# Patient Record
Sex: MALE | Race: WHITE | NOT HISPANIC OR LATINO | Employment: OTHER | URBAN - METROPOLITAN AREA
[De-identification: names, ages, dates, MRNs, and addresses within clinical notes are randomized per-mention and may not be internally consistent; named-entity substitution may affect disease eponyms.]

---

## 2017-03-10 RX ORDER — AMLODIPINE BESYLATE 5 MG/1
TABLET ORAL
Qty: 30 TABLET | Refills: 0 | Status: SHIPPED | OUTPATIENT
Start: 2017-03-10 | End: 2017-04-10 | Stop reason: SDUPTHER

## 2017-04-10 RX ORDER — AMLODIPINE BESYLATE 5 MG/1
TABLET ORAL
Qty: 30 TABLET | Refills: 0 | Status: SHIPPED | OUTPATIENT
Start: 2017-04-10 | End: 2017-05-08 | Stop reason: SDUPTHER

## 2017-05-08 RX ORDER — LOSARTAN POTASSIUM 100 MG/1
TABLET ORAL
Qty: 30 TABLET | Refills: 0 | Status: SHIPPED | OUTPATIENT
Start: 2017-05-08 | End: 2017-06-06 | Stop reason: SDUPTHER

## 2017-05-08 RX ORDER — AMLODIPINE BESYLATE 5 MG/1
TABLET ORAL
Qty: 30 TABLET | Refills: 0 | Status: SHIPPED | OUTPATIENT
Start: 2017-05-08 | End: 2017-06-06 | Stop reason: SDUPTHER

## 2017-06-07 RX ORDER — AMLODIPINE BESYLATE 5 MG/1
TABLET ORAL
Qty: 30 TABLET | Refills: 0 | Status: SHIPPED | OUTPATIENT
Start: 2017-06-07 | End: 2017-07-05 | Stop reason: SDUPTHER

## 2017-06-07 RX ORDER — LOSARTAN POTASSIUM 100 MG/1
TABLET ORAL
Qty: 30 TABLET | Refills: 0 | Status: SHIPPED | OUTPATIENT
Start: 2017-06-07 | End: 2017-07-05 | Stop reason: SDUPTHER

## 2017-07-07 RX ORDER — AMLODIPINE BESYLATE 5 MG/1
TABLET ORAL
Qty: 30 TABLET | Refills: 0 | Status: SHIPPED | OUTPATIENT
Start: 2017-07-07 | End: 2018-05-10 | Stop reason: SDUPTHER

## 2017-07-07 RX ORDER — LOSARTAN POTASSIUM 100 MG/1
TABLET ORAL
Qty: 30 TABLET | Refills: 0 | Status: SHIPPED | OUTPATIENT
Start: 2017-07-07 | End: 2017-10-10 | Stop reason: SDUPTHER

## 2017-08-23 DIAGNOSIS — Z00.00 ROUTINE HEALTH MAINTENANCE: Primary | ICD-10-CM

## 2017-08-23 DIAGNOSIS — Z12.5 ENCOUNTER FOR SCREENING FOR MALIGNANT NEOPLASM OF PROSTATE: ICD-10-CM

## 2017-08-30 ENCOUNTER — CLINICAL SUPPORT (OUTPATIENT)
Dept: FAMILY MEDICINE CLINIC | Facility: CLINIC | Age: 72
End: 2017-08-30

## 2017-08-30 DIAGNOSIS — Z00.00 ROUTINE HEALTH MAINTENANCE: Primary | ICD-10-CM

## 2017-08-30 LAB
ALBUMIN SERPL-MCNC: 4.8 G/DL (ref 3.5–5.2)
ALBUMIN/GLOB SERPL: 1.7 G/DL
ALP SERPL-CCNC: 90 U/L (ref 39–117)
ALT SERPL-CCNC: 16 U/L (ref 1–41)
APPEARANCE UR: CLEAR
AST SERPL-CCNC: 26 U/L (ref 1–40)
BILIRUB SERPL-MCNC: 0.6 MG/DL (ref 0.1–1.2)
BILIRUB UR QL STRIP: NEGATIVE
BUN SERPL-MCNC: 20 MG/DL (ref 8–23)
BUN/CREAT SERPL: 23 (ref 7–25)
CALCIUM SERPL-MCNC: 10.2 MG/DL (ref 8.6–10.5)
CHLORIDE SERPL-SCNC: 99 MMOL/L (ref 98–107)
CHOLEST SERPL-MCNC: 268 MG/DL (ref 0–200)
CO2 SERPL-SCNC: 27.1 MMOL/L (ref 22–29)
COLOR UR: ABNORMAL
CREAT SERPL-MCNC: 0.87 MG/DL (ref 0.76–1.27)
GLOBULIN SER CALC-MCNC: 2.9 GM/DL
GLUCOSE SERPL-MCNC: 95 MG/DL (ref 65–99)
GLUCOSE UR QL: NEGATIVE
HDLC SERPL-MCNC: 64 MG/DL (ref 40–60)
HGB UR QL STRIP: NEGATIVE
KETONES UR QL STRIP: NEGATIVE
LDLC SERPL CALC-MCNC: 180 MG/DL (ref 0–100)
LDLC/HDLC SERPL: 2.82 {RATIO}
LEUKOCYTE ESTERASE UR QL STRIP: NEGATIVE
NITRITE UR QL STRIP: NEGATIVE
PH UR STRIP: 7 [PH] (ref 5–8)
POTASSIUM SERPL-SCNC: 4.3 MMOL/L (ref 3.5–5.2)
PROT SERPL-MCNC: 7.7 G/DL (ref 6–8.5)
PROT UR QL STRIP: NEGATIVE
PSA SERPL-MCNC: 0.3 NG/ML (ref 0–4)
SODIUM SERPL-SCNC: 139 MMOL/L (ref 136–145)
SP GR UR: 1.02 (ref 1–1.03)
TRIGL SERPL-MCNC: 118 MG/DL (ref 0–150)
UROBILINOGEN UR STRIP-MCNC: ABNORMAL MG/DL
VLDLC SERPL CALC-MCNC: 23.6 MG/DL (ref 5–40)

## 2017-08-30 PROCEDURE — 93000 ELECTROCARDIOGRAM COMPLETE: CPT

## 2017-08-30 PROCEDURE — 85025 COMPLETE CBC W/AUTO DIFF WBC: CPT

## 2017-08-30 PROCEDURE — 71020 XR CHEST PA AND LATERAL: CPT

## 2017-09-06 ENCOUNTER — OFFICE VISIT (OUTPATIENT)
Dept: FAMILY MEDICINE CLINIC | Facility: CLINIC | Age: 72
End: 2017-09-06

## 2017-09-06 VITALS
OXYGEN SATURATION: 98 % | DIASTOLIC BLOOD PRESSURE: 88 MMHG | SYSTOLIC BLOOD PRESSURE: 146 MMHG | HEIGHT: 71 IN | HEART RATE: 75 BPM | RESPIRATION RATE: 16 BRPM | WEIGHT: 221 LBS | BODY MASS INDEX: 30.94 KG/M2

## 2017-09-06 DIAGNOSIS — I10 ESSENTIAL HYPERTENSION: ICD-10-CM

## 2017-09-06 DIAGNOSIS — E78.00 HYPERCHOLESTEROLEMIA: ICD-10-CM

## 2017-09-06 DIAGNOSIS — Z12.11 SCREEN FOR COLON CANCER: Primary | ICD-10-CM

## 2017-09-06 DIAGNOSIS — Z00.00 ANNUAL PHYSICAL EXAM: ICD-10-CM

## 2017-09-06 LAB — HEMOCCULT STL QL IA: NEGATIVE

## 2017-09-06 PROCEDURE — 82274 ASSAY TEST FOR BLOOD FECAL: CPT

## 2017-09-06 PROCEDURE — 99397 PER PM REEVAL EST PAT 65+ YR: CPT

## 2017-09-06 NOTE — PROGRESS NOTES
Subjective   Gamal Modi is a 72 y.o. male. Patient is here today for   Chief Complaint   Patient presents with   • Annual Exam     PHYSICAL           Vitals:    09/06/17 1016   BP: 146/88   Pulse: 75   Resp: 16   SpO2: 98%       The following portions of the patient's history were reviewed and updated as appropriate: allergies, current medications, past family history, past medical history, past social history, past surgical history and problem list.    Past Medical History:   Diagnosis Date   • Gout    • Hypertension    • Sebaceous cyst       No Known Allergies   Social History     Social History   • Marital status:      Spouse name: N/A   • Number of children: N/A   • Years of education: N/A     Occupational History   • Not on file.     Social History Main Topics   • Smoking status: Never Smoker   • Smokeless tobacco: Not on file   • Alcohol use Yes   • Drug use: Defer   • Sexual activity: Defer     Other Topics Concern   • Not on file     Social History Narrative        Current Outpatient Prescriptions:   •  allopurinol (ZYLOPRIM) 300 MG tablet, Take 300 mg by mouth daily., Disp: , Rfl:   •  amLODIPine (NORVASC) 5 MG tablet, TAKE 1 TABLET BY MOUTH EVERY DAY, Disp: 30 tablet, Rfl: 0  •  B Complex-Folic Acid (SUPER B COMPLEX MAXI PO), Take  by mouth., Disp: , Rfl:   •  losartan (COZAAR) 100 MG tablet, TAKE 1 TABLET BY MOUTH EVERY DAY, Disp: 30 tablet, Rfl: 0  •  Multiple Vitamin (MULTI VITAMIN MENS PO), Take  by mouth., Disp: , Rfl:   •  mupirocin (BACTROBAN NASAL) 2 % nasal ointment, into each nostril 2 (two) times a day., Disp: 10 g, Rfl: 0  •  Omega-3 Fatty Acids (FISH OIL) 1000 MG capsule capsule, Take  by mouth daily with breakfast., Disp: , Rfl:      EKG  Normal    Objective   History of Present Illness   The patient is here for an annual physical exam    Review of Systems   Constitutional: Negative for fatigue and fever.        The patient states that he has been doing much less exercise as  compared to about one year ago   HENT: Negative for congestion, dental problem, ear pain, hearing loss, postnasal drip, rhinorrhea and sore throat.    Eyes: Negative for photophobia and visual disturbance.   Respiratory: Negative for cough, shortness of breath and wheezing.    Cardiovascular: Negative for chest pain, palpitations and leg swelling.   Gastrointestinal: Negative for abdominal pain, blood in stool, constipation, diarrhea and nausea.        No dyspepsia   Endocrine: Negative for cold intolerance and heat intolerance.   Genitourinary: Negative for difficulty urinating, dysuria, frequency and hematuria.   Musculoskeletal: Negative for arthralgias, back pain, joint swelling, myalgias and neck pain.   Neurological: Negative.    Hematological: Negative for adenopathy. Does not bruise/bleed easily.   Psychiatric/Behavioral: Negative.        Physical Exam   Constitutional: He is oriented to person, place, and time. He appears well-developed and well-nourished.   Moderately overweight   HENT:   Head: Normocephalic.   Right Ear: External ear normal.   Left Ear: External ear normal.   Nose: Nose normal.   Mouth/Throat: Oropharynx is clear and moist.   Eyes: Conjunctivae and EOM are normal. Pupils are equal, round, and reactive to light. No scleral icterus.   Neck: Normal range of motion. No JVD present. No thyromegaly present.   Carotid pulses normal   Cardiovascular: Normal rate, regular rhythm, normal heart sounds and intact distal pulses.    No murmur heard.  Pulmonary/Chest: Effort normal and breath sounds normal. No respiratory distress. He has no wheezes. He has no rales.   Abdominal: Soft. Bowel sounds are normal. He exhibits no distension and no mass. There is no tenderness. There is no guarding.   Genitourinary: Rectum normal, prostate normal and penis normal. Rectal exam shows guaiac negative stool.   Musculoskeletal: Normal range of motion. He exhibits no edema, tenderness or deformity.    Lymphadenopathy:     He has no cervical adenopathy.   Neurological: He is alert and oriented to person, place, and time. No cranial nerve deficit. He exhibits normal muscle tone. Coordination normal.   Skin: Skin is warm and dry.   Psychiatric: He has a normal mood and affect.   Nursing note and vitals reviewed.      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  The patient's chest x-ray has old scarring from his previous exposure to asbestos there is no change from multiple previous chest x-rays done at this office.  The patient has no pulmonary symptoms.  The patient's EKG remains normal.  CMP is normal.  PSA remains normal.  CBC and urinalysis were normal.  Total cholesterol is elevated at 268 and the patient's LDL cholesterol is also elevated at 180.  The patient's triglycerides were 118 and his HDL cholesterol 64.  The patient's total cholesterol and LDL cholesterol have risen significantly since last year.  The patient states that he has not been exercising much at all.  We discussed a low sugar, low starch and low saturated fat diet as well as him increasing his aerobic exercise significantly.  The patient does not wish to go on medications and wants to work on this with diet and exercise.  The patient does not smoke nor does he consume excessive alcohol.  The patient has had both pneumonia vaccines and the shingles vaccine.  The patient had a colonoscopy which was normal about 3 or 4 years ago.  The patient was having some difficulty with erectile dysfunction and, on his own, discontinued because his blood pressure medicines and after 3 weeks and his symptoms improved.  He restarted his losartan and amlodipine prior to coming in for his physical.  I have asked him to hold his amlodipine  for several weeks to see if this helps his erectile dysfunction.  He will call us to let us know if the discontinuation of this medication helps the problem.  The patient will closely monitor his blood pressure over this period of time    PLAN  Recheck in one year for annual physical exam    No Follow-up on file.

## 2017-10-02 ENCOUNTER — TELEPHONE (OUTPATIENT)
Dept: FAMILY MEDICINE CLINIC | Facility: CLINIC | Age: 72
End: 2017-10-02

## 2017-10-02 RX ORDER — ALLOPURINOL 300 MG/1
300 TABLET ORAL DAILY
Qty: 30 TABLET | Refills: 0 | Status: SHIPPED | OUTPATIENT
Start: 2017-10-02 | End: 2017-10-30 | Stop reason: SDUPTHER

## 2017-10-02 NOTE — TELEPHONE ENCOUNTER
PATIENT AWARE THAT IF HE DIDN'T HEAR BACK FROM ME THEN HIS RX WOULD BE SENT TO THE PHARMACY. WE ALREADY HAD ALLOPURINOL ON HIS MED LIST. SENT TO [facility].    ----- Message from Johanna Christopher MA sent at 10/2/2017  9:04 AM EDT -----  Contact: PT  CALLED MR MUNOZ BACK AND HE DOES NOT WANT TO BE SEEN HE WANTS TO KNOW IF ANOTHER DR CAN CALL HIS RX IN FOR ALLOPURINOL 300 MG / PHARMACY HE USES IS [facility] [address] [place] [address] [facility] - [phone] [facility] [phone] FX/ PT CAN BE REACHED [phone]  ----- Message -----     From: Naomi Hurley MA     Sent: 10/2/2017   8:56 AM       To: HINA Renner DR. IS OUT OF THE OFFICE UNTIL 10/10/17.    ----- Message -----     From: Johanna Christopher MA     Sent: 10/2/2017   8:27 AM       To: Naomi Hurley MA    PT IS WANTING TO KNOW IF DR LEWIS CAN CALL HIM IN A RX FOR GOUT LIKE HE HAS PREVIOUSLY PT CAN BE REACHED [phone]

## 2017-10-10 RX ORDER — LOSARTAN POTASSIUM 100 MG/1
TABLET ORAL
Qty: 30 TABLET | Refills: 5 | Status: SHIPPED | OUTPATIENT
Start: 2017-10-10 | End: 2018-09-04 | Stop reason: SDUPTHER

## 2017-10-31 RX ORDER — ALLOPURINOL 300 MG/1
TABLET ORAL
Qty: 30 TABLET | Refills: 4 | Status: SHIPPED | OUTPATIENT
Start: 2017-10-31 | End: 2019-04-01 | Stop reason: SDUPTHER

## 2018-05-10 ENCOUNTER — OFFICE VISIT (OUTPATIENT)
Dept: FAMILY MEDICINE CLINIC | Facility: CLINIC | Age: 73
End: 2018-05-10

## 2018-05-10 VITALS
SYSTOLIC BLOOD PRESSURE: 132 MMHG | HEART RATE: 83 BPM | WEIGHT: 223 LBS | RESPIRATION RATE: 18 BRPM | BODY MASS INDEX: 31.22 KG/M2 | OXYGEN SATURATION: 97 % | DIASTOLIC BLOOD PRESSURE: 74 MMHG | TEMPERATURE: 99.9 F | HEIGHT: 71 IN

## 2018-05-10 DIAGNOSIS — I10 ACCELERATED HYPERTENSION: Primary | ICD-10-CM

## 2018-05-10 PROCEDURE — 99213 OFFICE O/P EST LOW 20 MIN: CPT

## 2018-05-10 RX ORDER — AMLODIPINE BESYLATE 10 MG/1
10 TABLET ORAL DAILY
Qty: 90 TABLET | Refills: 3 | Status: SHIPPED | OUTPATIENT
Start: 2018-05-10 | End: 2019-04-29 | Stop reason: SDUPTHER

## 2018-05-10 RX ORDER — AMLODIPINE BESYLATE 10 MG/1
10 TABLET ORAL DAILY
Refills: 0 | COMMUNITY
Start: 2018-05-01 | End: 2018-05-10 | Stop reason: SDUPTHER

## 2018-05-10 NOTE — PROGRESS NOTES
Subjective   Gamal Modi is a 73 y.o. male. Patient is here today for   Chief Complaint   Patient presents with   • Blood Pressure Check     patient was in [place] and went to an urgent care center on 4/30/18 due to his elevated BP, nausea, and left arm numbness. He was then sent to the ER for possible TIA.          Vitals:    05/10/18 0948   BP: 132/74   Pulse: 83   Resp: 18   Temp: 99.9 °F (37.7 °C)   SpO2: 97%     The following portions of the patient's history were reviewed and updated as appropriate: allergies, current medications, past family history, past medical history, past social history, past surgical history and problem list.    Past Medical History:   Diagnosis Date   • Gout    • Hypertension    • Sebaceous cyst       No Known Allergies   Social History     Social History   • Marital status:      Spouse name: N/A   • Number of children: N/A   • Years of education: N/A     Occupational History   • Not on file.     Social History Main Topics   • Smoking status: Never Smoker   • Smokeless tobacco: Not on file   • Alcohol use Yes   • Drug use: Unknown   • Sexual activity: Defer     Other Topics Concern   • Not on file     Social History Narrative   • No narrative on file        Current Outpatient Prescriptions:   •  allopurinol (ZYLOPRIM) 300 MG tablet, TAKE 1 TABLET BY MOUTH DAILY, Disp: 30 tablet, Rfl: 4  •  B Complex-Folic Acid (SUPER B COMPLEX MAXI PO), Take  by mouth., Disp: , Rfl:   •  losartan (COZAAR) 100 MG tablet, TAKE 1 TABLET BY MOUTH EVERY DAY, Disp: 30 tablet, Rfl: 5  •  Multiple Vitamin (MULTI VITAMIN MENS PO), Take  by mouth., Disp: , Rfl:   •  mupirocin (BACTROBAN NASAL) 2 % nasal ointment, into each nostril 2 (two) times a day., Disp: 10 g, Rfl: 0  •  Omega-3 Fatty Acids (FISH OIL) 1000 MG capsule capsule, Take  by mouth daily with breakfast., Disp: , Rfl:   •  amLODIPine (NORVASC) 10 MG tablet, Take 1 tablet by mouth Daily., Disp: 90 tablet, Rfl: 3     Objective      History of Present Illness   The patient is here today for evaluation of hypertension and a follow-up to a recent hospitalization in [place] for elevated blood pressure and transient numbness of his left arm.    Review of Systems   Constitutional:        The patient feels well now but was slightly lightheaded when he was first diagnosed with accelerated hypertension   HENT: Negative.    Respiratory: Negative for cough, shortness of breath and wheezing.    Cardiovascular: Negative for chest pain, palpitations and leg swelling.   Gastrointestinal: Negative for abdominal pain, blood in stool, constipation and diarrhea.   Genitourinary:        The patient has no problems with urination but does have some mild to moderate erectile dysfunction issues.  Libido is intact.   Musculoskeletal:        Mild aches and pains only   Neurological: Negative for speech difficulty, weakness and headaches.        On 4/30/18 patient had some numbness in his left arm and mild dizziness.  Now these symptoms have resolved.   Psychiatric/Behavioral: Negative.        Physical Exam   Constitutional: He is oriented to person, place, and time. He appears well-developed and well-nourished.   Mildly overweight   Eyes: Pupils are equal, round, and reactive to light.   Neck:   Carotid pulses normal   Cardiovascular: Normal rate, regular rhythm and normal heart sounds.    Pulmonary/Chest: Effort normal and breath sounds normal. No respiratory distress. He has no wheezes. He has no rales.   Abdominal: Soft. Bowel sounds are normal.   Musculoskeletal:   Mild osteoarthritic changes in multiple joints   Neurological: He is alert and oriented to person, place, and time. He has normal reflexes. He displays normal reflexes. No cranial nerve deficit. Coordination normal.   On 4/30/18 the patient had some numbness in his left arm which resolved about 6 hours later.  It has not recurred since that time.   Skin: Skin is warm and dry.   Psychiatric: He has a  normal mood and affect.   Nursing note and vitals reviewed.      ASSESSMENT  #1 essential hypertension                       #2 transient numbness of left arm  #3 erectile dysfunction    DISCUSSION/SUMMARY   The patient's vital signs are normal with his blood pressure being 130/74.  Early in the morning of 4/30/18 the patient awoke with numbness of his left arm and difficulty with dizziness and lightheadedness.  The patient first went to an urgent care center but was referred to the hospital because of this degree of symptomatology.  The patient was admitted overnight and had a negative MRI and carotid screening.  The patient's labs were all normal as well.  The patient was on losartan 100 mg daily and 5 mg of amlodipine.  They increased his amlodipine to 10 mg daily and told the patient to stay on his losartan 100 mg daily.  I am seeing him today in follow-up.  The patient's symptoms have totally resolved and his blood pressures have gradually come down to a normal range.  He will continue to check his blood pressures very regularly and I explained to him that our goal is to keep his blood pressure down below 130/80.  He will let us know if this is not achieved with his current medications.  I discussed the possibility of lower leg swelling with his amlodipine 10 mg daily.  He will watch for this.  The patient also is having difficulty with erectile dysfunction which predates this recent episode of accelerated hypertension.  I have given the patient a prescription for generic sildenafil 20 mg tablets, 2-5 tablets, 30-45 minutes prior to sexual activity.  The patient will set up a physical in the early fall.  If the patient has any abnormal neurological symptoms of course he will call us about that.    PLAN  CPE in early fall.  The patient will call back to set this up.  No Follow-up on file.

## 2018-09-04 RX ORDER — LOSARTAN POTASSIUM 100 MG/1
TABLET ORAL
Qty: 30 TABLET | Refills: 5 | Status: SHIPPED | OUTPATIENT
Start: 2018-09-04 | End: 2019-02-07 | Stop reason: SDUPTHER

## 2018-12-07 ENCOUNTER — APPOINTMENT (OUTPATIENT)
Dept: GENERAL RADIOLOGY | Facility: HOSPITAL | Age: 73
End: 2018-12-07

## 2018-12-07 PROCEDURE — 73560 X-RAY EXAM OF KNEE 1 OR 2: CPT | Performed by: FAMILY MEDICINE

## 2019-02-07 RX ORDER — LOSARTAN POTASSIUM 100 MG/1
TABLET ORAL
Qty: 30 TABLET | Refills: 2 | Status: SHIPPED | OUTPATIENT
Start: 2019-02-07 | End: 2019-04-26 | Stop reason: SDUPTHER

## 2019-04-01 RX ORDER — ALLOPURINOL 300 MG/1
TABLET ORAL
Qty: 30 TABLET | Refills: 2 | Status: SHIPPED | OUTPATIENT
Start: 2019-04-01 | End: 2019-06-28 | Stop reason: SDUPTHER

## 2019-04-26 RX ORDER — LOSARTAN POTASSIUM 100 MG/1
TABLET ORAL
Qty: 30 TABLET | Refills: 0 | Status: SHIPPED | OUTPATIENT
Start: 2019-04-26 | End: 2020-01-24 | Stop reason: SDUPTHER

## 2019-04-29 RX ORDER — AMLODIPINE BESYLATE 10 MG/1
10 TABLET ORAL DAILY
Qty: 14 TABLET | Refills: 0 | Status: SHIPPED | OUTPATIENT
Start: 2019-04-29 | End: 2019-06-13 | Stop reason: SDUPTHER

## 2019-04-29 RX ORDER — LOSARTAN POTASSIUM 100 MG/1
TABLET ORAL
Qty: 30 TABLET | Refills: 5 | Status: SHIPPED | OUTPATIENT
Start: 2019-04-29 | End: 2019-09-03

## 2019-06-14 RX ORDER — AMLODIPINE BESYLATE 10 MG/1
10 TABLET ORAL DAILY
Qty: 30 TABLET | Refills: 0 | Status: SHIPPED | OUTPATIENT
Start: 2019-06-14 | End: 2019-07-09 | Stop reason: SDUPTHER

## 2019-07-01 RX ORDER — ALLOPURINOL 300 MG/1
TABLET ORAL
Qty: 30 TABLET | Refills: 0 | Status: SHIPPED | OUTPATIENT
Start: 2019-07-01 | End: 2019-07-28 | Stop reason: SDUPTHER

## 2019-07-09 RX ORDER — AMLODIPINE BESYLATE 10 MG/1
10 TABLET ORAL DAILY
Qty: 30 TABLET | Refills: 0 | Status: SHIPPED | OUTPATIENT
Start: 2019-07-09 | End: 2019-08-12 | Stop reason: SDUPTHER

## 2019-07-29 RX ORDER — ALLOPURINOL 300 MG/1
TABLET ORAL
Qty: 30 TABLET | Refills: 0 | Status: SHIPPED | OUTPATIENT
Start: 2019-07-29 | End: 2019-08-27 | Stop reason: SDUPTHER

## 2019-08-12 RX ORDER — AMLODIPINE BESYLATE 10 MG/1
10 TABLET ORAL DAILY
Qty: 30 TABLET | Refills: 0 | Status: SHIPPED | OUTPATIENT
Start: 2019-08-12 | End: 2019-09-07 | Stop reason: SDUPTHER

## 2019-08-27 RX ORDER — ALLOPURINOL 300 MG/1
TABLET ORAL
Qty: 30 TABLET | Refills: 0 | Status: SHIPPED | OUTPATIENT
Start: 2019-08-27 | End: 2021-09-27

## 2019-09-03 ENCOUNTER — OFFICE VISIT (OUTPATIENT)
Dept: FAMILY MEDICINE CLINIC | Facility: CLINIC | Age: 74
End: 2019-09-03

## 2019-09-03 ENCOUNTER — HOSPITAL ENCOUNTER (OUTPATIENT)
Dept: GENERAL RADIOLOGY | Facility: HOSPITAL | Age: 74
Discharge: HOME | End: 2019-09-03
Admitting: INTERNAL MEDICINE

## 2019-09-03 VITALS
TEMPERATURE: 97.3 F | DIASTOLIC BLOOD PRESSURE: 80 MMHG | HEIGHT: 70 IN | HEART RATE: 92 BPM | SYSTOLIC BLOOD PRESSURE: 142 MMHG | RESPIRATION RATE: 17 BRPM | OXYGEN SATURATION: 97 % | BODY MASS INDEX: 34.65 KG/M2 | WEIGHT: 242 LBS

## 2019-09-03 DIAGNOSIS — R06.02 SHORTNESS OF BREATH ON EXERTION: Primary | ICD-10-CM

## 2019-09-03 DIAGNOSIS — E78.00 HYPERCHOLESTEROLEMIA: ICD-10-CM

## 2019-09-03 DIAGNOSIS — I10 ESSENTIAL HYPERTENSION: ICD-10-CM

## 2019-09-03 LAB
ALBUMIN SERPL-MCNC: 4.6 G/DL (ref 3.5–5.2)
ALBUMIN/GLOB SERPL: 1.7 G/DL
ALP SERPL-CCNC: 79 U/L (ref 39–117)
ALT SERPL-CCNC: 23 U/L (ref 1–41)
AST SERPL-CCNC: 26 U/L (ref 1–40)
BASOPHILS # BLD AUTO: 0.05 10*3/MM3 (ref 0–0.2)
BASOPHILS NFR BLD AUTO: 0.9 % (ref 0–1.5)
BILIRUB SERPL-MCNC: 0.7 MG/DL (ref 0.2–1.2)
BUN SERPL-MCNC: 17 MG/DL (ref 8–23)
BUN/CREAT SERPL: 23 (ref 7–25)
CALCIUM SERPL-MCNC: 10.2 MG/DL (ref 8.6–10.5)
CHLORIDE SERPL-SCNC: 96 MMOL/L (ref 98–107)
CHOLEST SERPL-MCNC: 226 MG/DL (ref 0–200)
CO2 SERPL-SCNC: 28.5 MMOL/L (ref 22–29)
CREAT SERPL-MCNC: 0.74 MG/DL (ref 0.76–1.27)
EOSINOPHIL # BLD AUTO: 0.12 10*3/MM3 (ref 0–0.4)
EOSINOPHIL NFR BLD AUTO: 2.1 % (ref 0.3–6.2)
ERYTHROCYTE [DISTWIDTH] IN BLOOD BY AUTOMATED COUNT: 12.4 % (ref 12.3–15.4)
GLOBULIN SER CALC-MCNC: 2.7 GM/DL
GLUCOSE SERPL-MCNC: 110 MG/DL (ref 65–99)
HCT VFR BLD AUTO: 39.6 % (ref 37.5–51)
HDLC SERPL-MCNC: 70 MG/DL (ref 40–60)
HGB BLD-MCNC: 12.5 G/DL (ref 13–17.7)
IMM GRANULOCYTES # BLD AUTO: 0.03 10*3/MM3 (ref 0–0.05)
IMM GRANULOCYTES NFR BLD AUTO: 0.5 % (ref 0–0.5)
LDLC SERPL CALC-MCNC: 131 MG/DL (ref 0–100)
LDLC/HDLC SERPL: 1.87 {RATIO}
LYMPHOCYTES # BLD AUTO: 1.63 10*3/MM3 (ref 0.7–3.1)
LYMPHOCYTES NFR BLD AUTO: 28.6 % (ref 19.6–45.3)
MCH RBC QN AUTO: 31.6 PG (ref 26.6–33)
MCHC RBC AUTO-ENTMCNC: 31.6 G/DL (ref 31.5–35.7)
MCV RBC AUTO: 100 FL (ref 79–97)
MONOCYTES # BLD AUTO: 0.89 10*3/MM3 (ref 0.1–0.9)
MONOCYTES NFR BLD AUTO: 15.6 % (ref 5–12)
NEUTROPHILS # BLD AUTO: 2.97 10*3/MM3 (ref 1.7–7)
NEUTROPHILS NFR BLD AUTO: 52.3 % (ref 42.7–76)
NRBC BLD AUTO-RTO: 0 /100 WBC (ref 0–0.2)
PLATELET # BLD AUTO: 293 10*3/MM3 (ref 140–450)
POTASSIUM SERPL-SCNC: 4.1 MMOL/L (ref 3.5–5.2)
PROT SERPL-MCNC: 7.3 G/DL (ref 6–8.5)
RBC # BLD AUTO: 3.96 10*6/MM3 (ref 4.14–5.8)
SODIUM SERPL-SCNC: 140 MMOL/L (ref 136–145)
TRIGL SERPL-MCNC: 126 MG/DL (ref 0–150)
VLDLC SERPL CALC-MCNC: 25.2 MG/DL
WBC # BLD AUTO: 5.69 10*3/MM3 (ref 3.4–10.8)

## 2019-09-03 PROCEDURE — 71046 X-RAY EXAM CHEST 2 VIEWS: CPT

## 2019-09-03 PROCEDURE — 99214 OFFICE O/P EST MOD 30 MIN: CPT | Performed by: INTERNAL MEDICINE

## 2019-09-03 PROCEDURE — 93000 ELECTROCARDIOGRAM COMPLETE: CPT | Performed by: INTERNAL MEDICINE

## 2019-09-03 RX ORDER — MELATONIN
1000 DAILY
COMMUNITY

## 2019-09-03 NOTE — PATIENT INSTRUCTIONS
EKG today shows junctional rhythm without acute changes.  We will check some labs today as well as a chest x-ray.  Blood pressure is high.  Discussed monitoring blood pressure correctly at home as instructed.  Will adjust blood pressure medicines if indicated and follow-up depending on pending results.

## 2019-09-03 NOTE — PROGRESS NOTES
Subjective   Gamal Modi is a 74 y.o. male. Patient is here today for   Chief Complaint   Patient presents with   • Shortness of Breath     3-4 weeks          Vitals:    09/03/19 0929   BP: 142/80   Pulse: 92   Resp: 17   Temp: 97.3 °F (36.3 °C)   SpO2: 97%       The following portions of the patient's history were reviewed and updated as appropriate: allergies, current medications, past family history, past medical history, past social history, past surgical history and problem list.    Past Medical History:   Diagnosis Date   • Gout    • Hypertension    • Sebaceous cyst       No Known Allergies   Social History     Socioeconomic History   • Marital status:      Spouse name: Not on file   • Number of children: Not on file   • Years of education: Not on file   • Highest education level: Not on file   Tobacco Use   • Smoking status: Never Smoker   Substance and Sexual Activity   • Alcohol use: Yes   • Drug use: Defer   • Sexual activity: Defer        Current Outpatient Medications:   •  cholecalciferol (VITAMIN D3) 1000 units tablet, Take 1,000 Units by mouth Daily., Disp: , Rfl:   •  Multiple Vitamins-Minerals (OCUVITE EXTRA PO), Take  by mouth., Disp: , Rfl:   •  allopurinol (ZYLOPRIM) 300 MG tablet, TAKE 1 TABLET BY MOUTH DAILY, Disp: 30 tablet, Rfl: 0  •  amLODIPine (NORVASC) 10 MG tablet, Take 1 tablet by mouth Daily. NEEDS APPOINTMENT- NO MORE REFILLS., Disp: 30 tablet, Rfl: 0  •  B Complex-Folic Acid (SUPER B COMPLEX MAXI PO), Take  by mouth., Disp: , Rfl:   •  losartan (COZAAR) 100 MG tablet, TAKE 1 TABLET BY MOUTH EVERY DAY, Disp: 30 tablet, Rfl: 0  •  Multiple Vitamin (MULTI VITAMIN MENS PO), Take  by mouth., Disp: , Rfl:      Objective   History of Present Illness Gamal complains of exertional shortness of breath that started a couple weeks ago.  He has never smoked and has no history of asthma.  He denies chest pain.  He has hypertension and hyperuricemia.  When he checks his blood  pressure systolic readings are in the 140s.  Lipids were last checked in 2018 in [facility] and total cholesterol was 187.  HDL was 95 and LDL was 81.  Triglycerides were normal at 55.  LDL cholesterol in 2017 was 180.  He states vascular screening test in the past of been normal.    Review of Systems   Constitutional: Negative.    Respiratory: Positive for shortness of breath. Negative for chest tightness.    Cardiovascular: Negative for chest pain.   Gastrointestinal: Negative.    Genitourinary: Negative.    Neurological: Negative.    Psychiatric/Behavioral: Negative.        Physical Exam   Constitutional: He appears well-developed and well-nourished.   Cardiovascular: Normal rate, regular rhythm and normal heart sounds.   145/60   Pulmonary/Chest: Effort normal and breath sounds normal.   Musculoskeletal: He exhibits no edema.   Neurological: He is alert.   Psychiatric: He has a normal mood and affect. His behavior is normal. Judgment and thought content normal.   Vitals reviewed.      ASSESSMENT     Problem List Items Addressed This Visit        Cardiovascular and Mediastinum    BP (high blood pressure)    Relevant Orders    ECG 12 Lead    CBC & Differential    Lipid Panel With LDL / HDL Ratio    Comprehensive Metabolic Panel    Hypercholesterolemia       Respiratory    Shortness of breath on exertion - Primary    Relevant Orders    ECG 12 Lead    CBC & Differential    XR Chest PA & Lateral          PLAN  Patient Instructions   EKG today shows junctional rhythm without acute changes.  We will check some labs today as well as a chest x-ray.  Blood pressure is high.  Discussed monitoring blood pressure correctly at home as instructed.  Will adjust blood pressure medicines if indicated and follow-up depending on pending results.      No Follow-up on file.

## 2019-09-05 DIAGNOSIS — R06.02 SHORTNESS OF BREATH ON EXERTION: Primary | ICD-10-CM

## 2019-09-09 RX ORDER — AMLODIPINE BESYLATE 10 MG/1
TABLET ORAL
Qty: 30 TABLET | Refills: 5 | Status: SHIPPED | OUTPATIENT
Start: 2019-09-09 | End: 2020-03-05

## 2019-09-27 RX ORDER — ALLOPURINOL 300 MG/1
TABLET ORAL
Qty: 30 TABLET | Refills: 0 | OUTPATIENT
Start: 2019-09-27

## 2019-09-27 NOTE — TELEPHONE ENCOUNTER
I am denying the allopurinol .  I have not seen this patient  in 3 years. He will need an appt for further refills

## 2019-10-03 ENCOUNTER — TRANSCRIBE ORDERS (OUTPATIENT)
Dept: ADMINISTRATIVE | Facility: HOSPITAL | Age: 74
End: 2019-10-03

## 2019-10-03 DIAGNOSIS — J84.9 ILD (INTERSTITIAL LUNG DISEASE) (HCC): Primary | ICD-10-CM

## 2019-10-10 RX ORDER — LOSARTAN POTASSIUM 100 MG/1
TABLET ORAL
Qty: 30 TABLET | Refills: 3 | Status: SHIPPED | OUTPATIENT
Start: 2019-10-10 | End: 2020-01-24

## 2019-10-16 ENCOUNTER — HOSPITAL ENCOUNTER (OUTPATIENT)
Dept: CT IMAGING | Facility: HOSPITAL | Age: 74
Discharge: HOME | End: 2019-10-16
Admitting: INTERNAL MEDICINE

## 2019-10-16 DIAGNOSIS — J84.9 ILD (INTERSTITIAL LUNG DISEASE) (HCC): ICD-10-CM

## 2019-10-16 PROCEDURE — 71250 CT THORAX DX C-: CPT

## 2019-11-15 ENCOUNTER — TRANSCRIBE ORDERS (OUTPATIENT)
Dept: ADMINISTRATIVE | Facility: HOSPITAL | Age: 74
End: 2019-11-15

## 2019-11-15 DIAGNOSIS — R06.09 DOE (DYSPNEA ON EXERTION): Primary | ICD-10-CM

## 2020-01-24 RX ORDER — LOSARTAN POTASSIUM 100 MG/1
TABLET ORAL
Qty: 30 TABLET | Refills: 0 | Status: SHIPPED | OUTPATIENT
Start: 2020-01-24 | End: 2020-02-24

## 2020-02-24 RX ORDER — LOSARTAN POTASSIUM 100 MG/1
TABLET ORAL
Qty: 30 TABLET | Refills: 0 | Status: SHIPPED | OUTPATIENT
Start: 2020-02-24 | End: 2020-03-24

## 2020-03-05 RX ORDER — AMLODIPINE BESYLATE 10 MG/1
TABLET ORAL
Qty: 30 TABLET | Refills: 5 | Status: SHIPPED | OUTPATIENT
Start: 2020-03-05 | End: 2020-10-12 | Stop reason: SDUPTHER

## 2020-03-24 RX ORDER — LOSARTAN POTASSIUM 100 MG/1
TABLET ORAL
Qty: 30 TABLET | Refills: 2 | Status: SHIPPED | OUTPATIENT
Start: 2020-03-24 | End: 2020-06-17

## 2020-06-17 RX ORDER — LOSARTAN POTASSIUM 100 MG/1
TABLET ORAL
Qty: 30 TABLET | Refills: 2 | Status: SHIPPED | OUTPATIENT
Start: 2020-06-17 | End: 2020-10-12 | Stop reason: SDUPTHER

## 2020-08-17 RX ORDER — AMLODIPINE BESYLATE 10 MG/1
TABLET ORAL
Qty: 30 TABLET | Refills: 5 | OUTPATIENT
Start: 2020-08-17

## 2020-10-02 DIAGNOSIS — Z00.00 ANNUAL PHYSICAL EXAM: Primary | ICD-10-CM

## 2020-10-05 ENCOUNTER — HOSPITAL ENCOUNTER (OUTPATIENT)
Dept: GENERAL RADIOLOGY | Facility: HOSPITAL | Age: 75
Discharge: HOME | End: 2020-10-05

## 2020-10-05 ENCOUNTER — LAB (OUTPATIENT)
Dept: LAB | Facility: HOSPITAL | Age: 75
End: 2020-10-05

## 2020-10-05 DIAGNOSIS — I10 ESSENTIAL HYPERTENSION: Primary | ICD-10-CM

## 2020-10-05 DIAGNOSIS — E78.00 HYPERCHOLESTEROLEMIA: ICD-10-CM

## 2020-10-05 LAB
ALBUMIN SERPL-MCNC: 4.2 G/DL (ref 3.5–5.2)
ALBUMIN/GLOB SERPL: 1.4 G/DL
ALP SERPL-CCNC: 76 U/L (ref 39–117)
ALT SERPL W P-5'-P-CCNC: 13 U/L (ref 1–41)
ANION GAP SERPL CALCULATED.3IONS-SCNC: 11.3 MMOL/L (ref 5–15)
AST SERPL-CCNC: 19 U/L (ref 1–40)
BACTERIA UR QL AUTO: NORMAL /HPF
BASOPHILS # BLD AUTO: 0.08 10*3/MM3 (ref 0–0.2)
BASOPHILS NFR BLD AUTO: 1.4 % (ref 0–1.5)
BILIRUB SERPL-MCNC: 0.5 MG/DL (ref 0–1.2)
BILIRUB UR QL STRIP: NEGATIVE
BUN SERPL-MCNC: 15 MG/DL (ref 8–23)
BUN/CREAT SERPL: 16.5 (ref 7–25)
CALCIUM SPEC-SCNC: 9.7 MG/DL (ref 8.6–10.5)
CHLORIDE SERPL-SCNC: 97 MMOL/L (ref 98–107)
CHOLEST SERPL-MCNC: 194 MG/DL (ref 0–200)
CLARITY UR: CLEAR
CO2 SERPL-SCNC: 28.7 MMOL/L (ref 22–29)
COLOR UR: YELLOW
CREAT SERPL-MCNC: 0.91 MG/DL (ref 0.76–1.27)
DEPRECATED RDW RBC AUTO: 42.8 FL (ref 37–54)
EOSINOPHIL # BLD AUTO: 0.16 10*3/MM3 (ref 0–0.4)
EOSINOPHIL NFR BLD AUTO: 2.7 % (ref 0.3–6.2)
ERYTHROCYTE [DISTWIDTH] IN BLOOD BY AUTOMATED COUNT: 12.5 % (ref 12.3–15.4)
GFR SERPL CREATININE-BSD FRML MDRD: 82 ML/MIN/1.73
GLOBULIN UR ELPH-MCNC: 2.9 GM/DL
GLUCOSE SERPL-MCNC: 103 MG/DL (ref 65–99)
GLUCOSE UR STRIP-MCNC: NEGATIVE MG/DL
HCT VFR BLD AUTO: 37.2 % (ref 37.5–51)
HDLC SERPL-MCNC: 66 MG/DL (ref 40–60)
HGB BLD-MCNC: 12.8 G/DL (ref 13–17.7)
HGB UR QL STRIP.AUTO: NEGATIVE
HYALINE CASTS UR QL AUTO: NORMAL /LPF
IMM GRANULOCYTES # BLD AUTO: 0.05 10*3/MM3 (ref 0–0.05)
IMM GRANULOCYTES NFR BLD AUTO: 0.9 % (ref 0–0.5)
KETONES UR QL STRIP: NEGATIVE
LDLC SERPL CALC-MCNC: 112 MG/DL (ref 0–100)
LDLC/HDLC SERPL: 1.7 {RATIO}
LEUKOCYTE ESTERASE UR QL STRIP.AUTO: NEGATIVE
LYMPHOCYTES # BLD AUTO: 1.74 10*3/MM3 (ref 0.7–3.1)
LYMPHOCYTES NFR BLD AUTO: 29.9 % (ref 19.6–45.3)
MCH RBC QN AUTO: 32.4 PG (ref 26.6–33)
MCHC RBC AUTO-ENTMCNC: 34.4 G/DL (ref 31.5–35.7)
MCV RBC AUTO: 94.2 FL (ref 79–97)
MONOCYTES # BLD AUTO: 0.76 10*3/MM3 (ref 0.1–0.9)
MONOCYTES NFR BLD AUTO: 13.1 % (ref 5–12)
NEUTROPHILS NFR BLD AUTO: 3.03 10*3/MM3 (ref 1.7–7)
NEUTROPHILS NFR BLD AUTO: 52 % (ref 42.7–76)
NITRITE UR QL STRIP: NEGATIVE
NRBC BLD AUTO-RTO: 0 /100 WBC (ref 0–0.2)
PH UR STRIP.AUTO: 8 [PH] (ref 5–8)
PLATELET # BLD AUTO: 337 10*3/MM3 (ref 140–450)
PMV BLD AUTO: 10.3 FL (ref 6–12)
POTASSIUM SERPL-SCNC: 4 MMOL/L (ref 3.5–5.2)
PROT SERPL-MCNC: 7.1 G/DL (ref 6–8.5)
PROT UR QL STRIP: NEGATIVE
RBC # BLD AUTO: 3.95 10*6/MM3 (ref 4.14–5.8)
RBC # UR: NORMAL /HPF
REF LAB TEST METHOD: NORMAL
SODIUM SERPL-SCNC: 137 MMOL/L (ref 136–145)
SP GR UR STRIP: 1.02 (ref 1–1.03)
SQUAMOUS #/AREA URNS HPF: NORMAL /HPF
TRIGL SERPL-MCNC: 78 MG/DL (ref 0–150)
UROBILINOGEN UR QL STRIP: NORMAL
VLDLC SERPL-MCNC: 15.6 MG/DL (ref 5–40)
WBC # BLD AUTO: 5.82 10*3/MM3 (ref 3.4–10.8)
WBC UR QL AUTO: NORMAL /HPF

## 2020-10-05 PROCEDURE — 36415 COLL VENOUS BLD VENIPUNCTURE: CPT | Performed by: INTERNAL MEDICINE

## 2020-10-05 PROCEDURE — 81001 URINALYSIS AUTO W/SCOPE: CPT | Performed by: INTERNAL MEDICINE

## 2020-10-05 PROCEDURE — 80053 COMPREHEN METABOLIC PANEL: CPT | Performed by: INTERNAL MEDICINE

## 2020-10-05 PROCEDURE — 85025 COMPLETE CBC W/AUTO DIFF WBC: CPT | Performed by: INTERNAL MEDICINE

## 2020-10-05 PROCEDURE — 71046 X-RAY EXAM CHEST 2 VIEWS: CPT

## 2020-10-05 PROCEDURE — 80061 LIPID PANEL: CPT | Performed by: INTERNAL MEDICINE

## 2020-10-12 ENCOUNTER — OFFICE VISIT (OUTPATIENT)
Dept: FAMILY MEDICINE CLINIC | Facility: CLINIC | Age: 75
End: 2020-10-12

## 2020-10-12 VITALS
OXYGEN SATURATION: 96 % | HEIGHT: 70 IN | WEIGHT: 246 LBS | HEART RATE: 103 BPM | SYSTOLIC BLOOD PRESSURE: 170 MMHG | DIASTOLIC BLOOD PRESSURE: 90 MMHG | BODY MASS INDEX: 35.22 KG/M2 | TEMPERATURE: 98.7 F | RESPIRATION RATE: 17 BRPM

## 2020-10-12 DIAGNOSIS — I10 ESSENTIAL HYPERTENSION: ICD-10-CM

## 2020-10-12 DIAGNOSIS — Z23 NEED FOR VACCINATION: ICD-10-CM

## 2020-10-12 DIAGNOSIS — J61 PULMONARY ASBESTOSIS (HCC): ICD-10-CM

## 2020-10-12 DIAGNOSIS — E78.00 HYPERCHOLESTEROLEMIA: ICD-10-CM

## 2020-10-12 DIAGNOSIS — D64.9 ANEMIA, UNSPECIFIED TYPE: ICD-10-CM

## 2020-10-12 DIAGNOSIS — Z00.00 ANNUAL PHYSICAL EXAM: Primary | ICD-10-CM

## 2020-10-12 LAB — HEMOCCULT STL QL IA: NEGATIVE

## 2020-10-12 PROCEDURE — 82274 ASSAY TEST FOR BLOOD FECAL: CPT | Performed by: INTERNAL MEDICINE

## 2020-10-12 PROCEDURE — 99397 PER PM REEVAL EST PAT 65+ YR: CPT | Performed by: INTERNAL MEDICINE

## 2020-10-12 PROCEDURE — 90471 IMMUNIZATION ADMIN: CPT | Performed by: INTERNAL MEDICINE

## 2020-10-12 PROCEDURE — 90694 VACC AIIV4 NO PRSRV 0.5ML IM: CPT | Performed by: INTERNAL MEDICINE

## 2020-10-12 RX ORDER — AMLODIPINE BESYLATE 10 MG/1
10 TABLET ORAL DAILY
Qty: 90 TABLET | Refills: 3 | Status: SHIPPED | OUTPATIENT
Start: 2020-10-12 | End: 2021-09-22 | Stop reason: HOSPADM

## 2020-10-12 RX ORDER — LOSARTAN POTASSIUM 100 MG/1
100 TABLET ORAL DAILY
Qty: 90 TABLET | Refills: 3 | Status: SHIPPED | OUTPATIENT
Start: 2020-10-12 | End: 2021-09-22 | Stop reason: HOSPADM

## 2020-10-12 NOTE — PROGRESS NOTES
Subjective   Gamal Modi is a 75 y.o. male. Patient is here today for   Chief Complaint   Patient presents with   • Annual Exam          Vitals:    10/12/20 0902   BP: 170/90   Pulse: 103   Resp: 17   Temp: 98.7 °F (37.1 °C)   SpO2: 96%     Body mass index is 35.3 kg/m².    The following portions of the patient's history were reviewed and updated as appropriate: allergies, current medications, past family history, past medical history, past social history, past surgical history and problem list.    Past Medical History:   Diagnosis Date   • Gout    • Hypertension    • Sebaceous cyst       No Known Allergies   Social History     Socioeconomic History   • Marital status:      Spouse name: Not on file   • Number of children: Not on file   • Years of education: Not on file   • Highest education level: Not on file   Tobacco Use   • Smoking status: Never Smoker   Substance and Sexual Activity   • Alcohol use: Yes   • Drug use: Defer   • Sexual activity: Defer        Current Outpatient Medications:   •  allopurinol (ZYLOPRIM) 300 MG tablet, TAKE 1 TABLET BY MOUTH DAILY, Disp: 30 tablet, Rfl: 0  •  amLODIPine (NORVASC) 10 MG tablet, Take 1 tablet by mouth Daily., Disp: 90 tablet, Rfl: 3  •  B Complex-Folic Acid (SUPER B COMPLEX MAXI PO), Take  by mouth., Disp: , Rfl:   •  cholecalciferol (VITAMIN D3) 1000 units tablet, Take 1,000 Units by mouth Daily., Disp: , Rfl:   •  losartan (COZAAR) 100 MG tablet, Take 1 tablet by mouth Daily., Disp: 90 tablet, Rfl: 3  •  Multiple Vitamin (MULTI VITAMIN MENS PO), Take  by mouth., Disp: , Rfl:   •  Multiple Vitamins-Minerals (OCUVITE EXTRA PO), Take  by mouth., Disp: , Rfl:      EKG not done    Objective   History of Present Illness Gamal is here today for an annual physical examination.  He has hypertension, pulmonary asbestosis, history of gout, and obesity.  He feels well.  He watches dietary sodium.  He walks for exercise.  He does have  dyspnea on exertion.  He is  followed by pulmonology and has a CT of the chest scheduled for November.  Allopurinol is on his medicine list but he is not taking it.  He has not had any recent gout attacks.  There are no vascular screening results in epic.  He states he had a normal colonoscopy 4 or 5 years ago.  There are no results in epic.    Review of Systems   Constitutional: Negative for activity change and unexpected weight change.   Respiratory: Negative for cough and shortness of breath.    Cardiovascular:        BP around 130/<80   Gastrointestinal: Negative.    Genitourinary: Negative.    Musculoskeletal: Negative.    Skin: Negative.    Neurological: Negative.    Psychiatric/Behavioral: Negative.        Physical Exam  Constitutional:       Appearance: He is obese.   HENT:      Right Ear: Tympanic membrane normal.      Left Ear: Tympanic membrane normal.      Nose: Nose normal.      Mouth/Throat:      Mouth: Mucous membranes are moist.      Pharynx: Oropharynx is clear.   Eyes:      Extraocular Movements: Extraocular movements intact.      Pupils: Pupils are equal, round, and reactive to light.   Neck:      Vascular: No carotid bruit.   Cardiovascular:      Rate and Rhythm: Normal rate and regular rhythm.      Heart sounds: Normal heart sounds.      Comments: 160/80  Pulmonary:      Effort: Pulmonary effort is normal.      Breath sounds: No wheezing, rhonchi or rales.   Abdominal:      General: Abdomen is flat. Bowel sounds are normal.      Palpations: Abdomen is soft.      Tenderness: There is no abdominal tenderness.   Musculoskeletal:      Right lower leg: No edema.      Left lower leg: No edema.   Lymphadenopathy:      Cervical: No cervical adenopathy.   Skin:     General: Skin is warm and dry.   Neurological:      Mental Status: He is alert.   Psychiatric:         Mood and Affect: Mood normal.         Behavior: Behavior normal.         Thought Content: Thought content normal.         Judgment: Judgment normal.         ASSESSMENT      Problem List Items Addressed This Visit        Cardiovascular and Mediastinum    BP (high blood pressure)    Relevant Medications    amLODIPine (NORVASC) 10 MG tablet    losartan (COZAAR) 100 MG tablet    Hypercholesterolemia       Respiratory    Pulmonary asbestosis (CMS/HCC)       Hematopoietic and Hemostatic    Anemia       Other    Annual physical exam - Primary    Relevant Orders    POCT FECAL OCCULT BLOOD BY IMMUNOASSAY (Completed)      Other Visit Diagnoses     Need for vaccination        Relevant Orders    Fluad Quad 65+ yrs ([id number]) (Completed)          PLAN  Patient Instructions   Blood pressure is high today but stable by your numbers.  Continue to monitor blood pressure correctly at home while resting relaxed as instructed.  Normal is less than 120/80.  Hemoglobin is mildly decreased.  We will also check iron studies and a B12 folate level.  Fasting blood sugar is mildly elevated and LDL cholesterol is also mildly elevated.  HDL cholesterol is normal.  Kidney and liver functions and thyroid-stimulating hormone level and urinalysis are normal.  We will also check a prostate-specific antigen.  Discussed diet, exercise, and weight loss.  We will continue current medicines.      No follow-ups on file.

## 2020-10-12 NOTE — PATIENT INSTRUCTIONS
Blood pressure is high today but stable by your numbers.  Continue to monitor blood pressure correctly at home while resting relaxed as instructed.  Normal is less than 120/80.  Hemoglobin is mildly decreased.  We will also check iron studies and a B12 folate level.  Fasting blood sugar is mildly elevated and LDL cholesterol is also mildly elevated.  HDL cholesterol is normal.  Kidney and liver functions and thyroid-stimulating hormone level and urinalysis are normal.  We will also check a prostate-specific antigen, hepatitis C screening, and a uric acid.  Discussed diet, exercise, and weight loss.  We will continue current medicines.  You need vascular screening tests.

## 2020-11-02 ENCOUNTER — HOSPITAL ENCOUNTER (OUTPATIENT)
Dept: CT IMAGING | Facility: HOSPITAL | Age: 75
Discharge: HOME | End: 2020-11-02
Admitting: INTERNAL MEDICINE

## 2020-11-02 DIAGNOSIS — R06.09 DOE (DYSPNEA ON EXERTION): ICD-10-CM

## 2020-11-02 PROCEDURE — 71250 CT THORAX DX C-: CPT

## 2021-03-09 DIAGNOSIS — Z23 IMMUNIZATION DUE: ICD-10-CM

## 2021-09-20 ENCOUNTER — HOSPITAL ENCOUNTER (OUTPATIENT)
Facility: HOSPITAL | Age: 76
Setting detail: OBSERVATION
Discharge: HOME | End: 2021-09-22
Attending: EMERGENCY MEDICINE | Admitting: INTERNAL MEDICINE

## 2021-09-20 ENCOUNTER — APPOINTMENT (OUTPATIENT)
Dept: CT IMAGING | Facility: HOSPITAL | Age: 76
End: 2021-09-20

## 2021-09-20 DIAGNOSIS — I48.91 NEW ONSET ATRIAL FIBRILLATION (HCC): Primary | ICD-10-CM

## 2021-09-20 DIAGNOSIS — I50.9 ACUTE CONGESTIVE HEART FAILURE, UNSPECIFIED HEART FAILURE TYPE (HCC): ICD-10-CM

## 2021-09-20 DIAGNOSIS — R06.02 SHORTNESS OF BREATH: ICD-10-CM

## 2021-09-20 LAB
ALBUMIN SERPL-MCNC: 4.5 G/DL (ref 3.5–5.2)
ALBUMIN/GLOB SERPL: 1.6 G/DL
ALP SERPL-CCNC: 83 U/L (ref 39–117)
ALT SERPL W P-5'-P-CCNC: 26 U/L (ref 1–41)
ANION GAP SERPL CALCULATED.3IONS-SCNC: 15.1 MMOL/L (ref 5–15)
AST SERPL-CCNC: 43 U/L (ref 1–40)
BASOPHILS # BLD AUTO: 0.06 10*3/MM3 (ref 0–0.2)
BASOPHILS NFR BLD AUTO: 0.7 % (ref 0–1.5)
BILIRUB SERPL-MCNC: 0.9 MG/DL (ref 0–1.2)
BUN SERPL-MCNC: 25 MG/DL (ref 8–23)
BUN/CREAT SERPL: 29.8 (ref 7–25)
CALCIUM SPEC-SCNC: 9.6 MG/DL (ref 8.6–10.5)
CHLORIDE SERPL-SCNC: 99 MMOL/L (ref 98–107)
CO2 SERPL-SCNC: 26.9 MMOL/L (ref 22–29)
CREAT SERPL-MCNC: 0.84 MG/DL (ref 0.76–1.27)
D DIMER PPP FEU-MCNC: 1.16 MCGFEU/ML (ref 0–0.49)
DEPRECATED RDW RBC AUTO: 47.3 FL (ref 37–54)
EOSINOPHIL # BLD AUTO: 0.06 10*3/MM3 (ref 0–0.4)
EOSINOPHIL NFR BLD AUTO: 0.7 % (ref 0.3–6.2)
ERYTHROCYTE [DISTWIDTH] IN BLOOD BY AUTOMATED COUNT: 13.6 % (ref 12.3–15.4)
GFR SERPL CREATININE-BSD FRML MDRD: 89 ML/MIN/1.73
GLOBULIN UR ELPH-MCNC: 2.8 GM/DL
GLUCOSE SERPL-MCNC: 115 MG/DL (ref 65–99)
HCT VFR BLD AUTO: 36.8 % (ref 37.5–51)
HGB BLD-MCNC: 12.7 G/DL (ref 13–17.7)
IMM GRANULOCYTES # BLD AUTO: 0.09 10*3/MM3 (ref 0–0.05)
IMM GRANULOCYTES NFR BLD AUTO: 1.1 % (ref 0–0.5)
LYMPHOCYTES # BLD AUTO: 1.45 10*3/MM3 (ref 0.7–3.1)
LYMPHOCYTES NFR BLD AUTO: 18 % (ref 19.6–45.3)
MCH RBC QN AUTO: 32.9 PG (ref 26.6–33)
MCHC RBC AUTO-ENTMCNC: 34.5 G/DL (ref 31.5–35.7)
MCV RBC AUTO: 95.3 FL (ref 79–97)
MONOCYTES # BLD AUTO: 1.11 10*3/MM3 (ref 0.1–0.9)
MONOCYTES NFR BLD AUTO: 13.8 % (ref 5–12)
NEUTROPHILS NFR BLD AUTO: 5.3 10*3/MM3 (ref 1.7–7)
NEUTROPHILS NFR BLD AUTO: 65.7 % (ref 42.7–76)
NRBC BLD AUTO-RTO: 0 /100 WBC (ref 0–0.2)
NT-PROBNP SERPL-MCNC: 1711 PG/ML (ref 0–900)
PLATELET # BLD AUTO: 282 10*3/MM3 (ref 140–450)
PMV BLD AUTO: 9.3 FL (ref 6–12)
POTASSIUM SERPL-SCNC: 3.9 MMOL/L (ref 3.5–5.2)
PROT SERPL-MCNC: 7.3 G/DL (ref 6–8.5)
QT INTERVAL: 355 MS
RBC # BLD AUTO: 3.86 10*6/MM3 (ref 4.14–5.8)
SARS-COV-2 ORF1AB RESP QL NAA+PROBE: NOT DETECTED
SODIUM SERPL-SCNC: 141 MMOL/L (ref 136–145)
TROPONIN T SERPL-MCNC: <0.01 NG/ML (ref 0–0.03)
WBC # BLD AUTO: 8.07 10*3/MM3 (ref 3.4–10.8)

## 2021-09-20 PROCEDURE — 93005 ELECTROCARDIOGRAM TRACING: CPT | Performed by: EMERGENCY MEDICINE

## 2021-09-20 PROCEDURE — 0 IOPAMIDOL PER 1 ML: Performed by: EMERGENCY MEDICINE

## 2021-09-20 PROCEDURE — 25010000002 FUROSEMIDE PER 20 MG: Performed by: EMERGENCY MEDICINE

## 2021-09-20 PROCEDURE — 80053 COMPREHEN METABOLIC PANEL: CPT | Performed by: EMERGENCY MEDICINE

## 2021-09-20 PROCEDURE — 93010 ELECTROCARDIOGRAM REPORT: CPT | Performed by: INTERNAL MEDICINE

## 2021-09-20 PROCEDURE — 71275 CT ANGIOGRAPHY CHEST: CPT

## 2021-09-20 PROCEDURE — U0004 COV-19 TEST NON-CDC HGH THRU: HCPCS | Performed by: EMERGENCY MEDICINE

## 2021-09-20 PROCEDURE — 84484 ASSAY OF TROPONIN QUANT: CPT | Performed by: EMERGENCY MEDICINE

## 2021-09-20 PROCEDURE — 25010000002 ENOXAPARIN PER 10 MG: Performed by: INTERNAL MEDICINE

## 2021-09-20 PROCEDURE — G0378 HOSPITAL OBSERVATION PER HR: HCPCS

## 2021-09-20 PROCEDURE — 96376 TX/PRO/DX INJ SAME DRUG ADON: CPT

## 2021-09-20 PROCEDURE — 96374 THER/PROPH/DIAG INJ IV PUSH: CPT

## 2021-09-20 PROCEDURE — 25010000002 FUROSEMIDE PER 20 MG: Performed by: INTERNAL MEDICINE

## 2021-09-20 PROCEDURE — 96372 THER/PROPH/DIAG INJ SC/IM: CPT

## 2021-09-20 PROCEDURE — 83880 ASSAY OF NATRIURETIC PEPTIDE: CPT | Performed by: EMERGENCY MEDICINE

## 2021-09-20 PROCEDURE — 85025 COMPLETE CBC W/AUTO DIFF WBC: CPT | Performed by: EMERGENCY MEDICINE

## 2021-09-20 PROCEDURE — C9803 HOPD COVID-19 SPEC COLLECT: HCPCS

## 2021-09-20 PROCEDURE — 99284 EMERGENCY DEPT VISIT MOD MDM: CPT

## 2021-09-20 PROCEDURE — 85379 FIBRIN DEGRADATION QUANT: CPT | Performed by: EMERGENCY MEDICINE

## 2021-09-20 RX ORDER — UREA 10 %
3 LOTION (ML) TOPICAL NIGHTLY PRN
Status: DISCONTINUED | OUTPATIENT
Start: 2021-09-20 | End: 2021-09-22 | Stop reason: HOSPADM

## 2021-09-20 RX ORDER — AMLODIPINE BESYLATE 5 MG/1
10 TABLET ORAL DAILY
Status: DISCONTINUED | OUTPATIENT
Start: 2021-09-20 | End: 2021-09-21

## 2021-09-20 RX ORDER — MELATONIN
1000 DAILY
Status: DISCONTINUED | OUTPATIENT
Start: 2021-09-20 | End: 2021-09-22 | Stop reason: HOSPADM

## 2021-09-20 RX ORDER — FUROSEMIDE 10 MG/ML
40 INJECTION INTRAMUSCULAR; INTRAVENOUS EVERY 12 HOURS
Status: DISCONTINUED | OUTPATIENT
Start: 2021-09-20 | End: 2021-09-21

## 2021-09-20 RX ORDER — ONDANSETRON 4 MG/1
4 TABLET, FILM COATED ORAL EVERY 6 HOURS PRN
Status: DISCONTINUED | OUTPATIENT
Start: 2021-09-20 | End: 2021-09-22 | Stop reason: HOSPADM

## 2021-09-20 RX ORDER — ACETAMINOPHEN 325 MG/1
650 TABLET ORAL EVERY 4 HOURS PRN
Status: DISCONTINUED | OUTPATIENT
Start: 2021-09-20 | End: 2021-09-22 | Stop reason: HOSPADM

## 2021-09-20 RX ORDER — ALLOPURINOL 300 MG/1
300 TABLET ORAL DAILY
Status: DISCONTINUED | OUTPATIENT
Start: 2021-09-20 | End: 2021-09-22 | Stop reason: HOSPADM

## 2021-09-20 RX ORDER — MULTIPLE VITAMINS W/ MINERALS TAB 9MG-400MCG
1 TAB ORAL DAILY
Status: DISCONTINUED | OUTPATIENT
Start: 2021-09-20 | End: 2021-09-22 | Stop reason: HOSPADM

## 2021-09-20 RX ORDER — ONDANSETRON 2 MG/ML
4 INJECTION INTRAMUSCULAR; INTRAVENOUS EVERY 6 HOURS PRN
Status: DISCONTINUED | OUTPATIENT
Start: 2021-09-20 | End: 2021-09-22 | Stop reason: HOSPADM

## 2021-09-20 RX ORDER — LOSARTAN POTASSIUM 100 MG/1
100 TABLET ORAL DAILY
Status: DISCONTINUED | OUTPATIENT
Start: 2021-09-20 | End: 2021-09-21

## 2021-09-20 RX ORDER — NITROGLYCERIN 0.4 MG/1
0.4 TABLET SUBLINGUAL
Status: DISCONTINUED | OUTPATIENT
Start: 2021-09-20 | End: 2021-09-22 | Stop reason: HOSPADM

## 2021-09-20 RX ORDER — DIPHENOXYLATE HYDROCHLORIDE AND ATROPINE SULFATE 2.5; .025 MG/1; MG/1
1 TABLET ORAL DAILY
Status: DISCONTINUED | OUTPATIENT
Start: 2021-09-20 | End: 2021-09-22 | Stop reason: HOSPADM

## 2021-09-20 RX ORDER — FUROSEMIDE 10 MG/ML
40 INJECTION INTRAMUSCULAR; INTRAVENOUS ONCE
Status: COMPLETED | OUTPATIENT
Start: 2021-09-20 | End: 2021-09-20

## 2021-09-20 RX ORDER — SODIUM CHLORIDE 0.9 % (FLUSH) 0.9 %
10 SYRINGE (ML) INJECTION AS NEEDED
Status: DISCONTINUED | OUTPATIENT
Start: 2021-09-20 | End: 2021-09-22 | Stop reason: HOSPADM

## 2021-09-20 RX ORDER — FOLIC ACID/VIT B COMPLEX AND C 0.8 MG
1 TABLET ORAL DAILY
Status: DISCONTINUED | OUTPATIENT
Start: 2021-09-20 | End: 2021-09-22 | Stop reason: HOSPADM

## 2021-09-20 RX ADMIN — IOPAMIDOL 95 ML: 755 INJECTION, SOLUTION INTRAVENOUS at 16:04

## 2021-09-20 RX ADMIN — FUROSEMIDE 40 MG: 10 INJECTION, SOLUTION INTRAMUSCULAR; INTRAVENOUS at 22:42

## 2021-09-20 RX ADMIN — FUROSEMIDE 40 MG: 20 INJECTION, SOLUTION INTRAMUSCULAR; INTRAVENOUS at 16:30

## 2021-09-20 RX ADMIN — ENOXAPARIN SODIUM 40 MG: 40 INJECTION SUBCUTANEOUS at 22:43

## 2021-09-20 NOTE — PROGRESS NOTES
"Pharmacy Consult - [facility]    Gamal Modi has been consulted for pharmacy to dose enoxaparin for VTE prophylaxis per Dr. Polanco's request.     Relevant clinical data and objective history reviewed:  76 y.o. male 177.8 cm (70\") 119 kg (263 lb)    Home Anticoagulation: None    Past Medical History:   Diagnosis Date    Gout     Hypertension     Sebaceous cyst      has No Known Allergies.    Lab Results   Component Value Date    WBC 8.07 09/20/2021    HGB 12.7 (L) 09/20/2021    HCT 36.8 (L) 09/20/2021    MCV 95.3 09/20/2021     09/20/2021     Lab Results   Component Value Date    GLUCOSE 115 (H) 09/20/2021    CALCIUM 9.6 09/20/2021     09/20/2021    K 3.9 09/20/2021    CO2 26.9 09/20/2021    CL 99 09/20/2021    BUN 25 (H) 09/20/2021    CREATININE 0.84 09/20/2021    EGFRIFAFRI 126 09/03/2019    EGFRIFNONA 89 09/20/2021    BCR 29.8 (H) 09/20/2021    ANIONGAP 15.1 (H) 09/20/2021       Estimated Creatinine Clearance: 99.7 mL/min (by C-G formula based on SCr of 0.84 mg/dL).    Active Inpatient Anticoagulation Orders: None    Assessment/Plan    Will start patient on 40 mg subcutaneous every 24 hours  No need for adjustment for renal function. Pharmacy will sign off but will continue to follow peripherally for renal dose adjustments.    Marilin Singh [facility]    "

## 2021-09-20 NOTE — ED NOTES
".  Nursing report ED to floor  Gamal Modi  76 y.o.  male    HPI (triage note):   Chief Complaint   Patient presents with   • Shortness of Breath   • Numbness   • Tingling       Admitting doctor:   Nancy Polanco MD    Admitting diagnosis:   The primary encounter diagnosis was New onset atrial fibrillation (CMS/HCC). Diagnoses of Acute congestive heart failure, unspecified heart failure type (CMS/HCC) and Shortness of breath were also pertinent to this visit.    Code status:   Current Code Status     Date Active Code Status Order ID Comments User Context       9/20/2021 1852 CPR [id number]  Nancy Polanco MD ED     Advance Care Planning Activity      Questions for Current Code Status     Question Answer Comment    Code Status CPR     Medical Interventions (Level of Support Prior to Arrest) Full           Allergies:   Patient has no known allergies.    Weight:       09/20/21 1926   Weight: 119 kg (263 lb)       Most recent vitals:   Vitals:    09/20/21 1402 09/20/21 1403 09/20/21 1630 09/20/21 1926   BP:  160/82 138/84    Pulse: 97  94    Resp: 16      Temp: 98.6 °F (37 °C)      TempSrc: Tympanic      SpO2: 96%      Weight:    119 kg (263 lb)   Height:    177.8 cm (70\")       Active LDAs/IV Access:   Lines, Drains & Airways    Active LDAs     Name:   Placement date:   Placement time:   Site:   Days:    Peripheral IV 09/20/21 1422 Left Antecubital   09/20/21    1422    Antecubital   less than 1                Labs (abnormal labs have a star):   Labs Reviewed   COMPREHENSIVE METABOLIC PANEL - Abnormal; Notable for the following components:       Result Value    Glucose 115 (*)     BUN 25 (*)     AST (SGOT) 43 (*)     BUN/Creatinine Ratio 29.8 (*)     Anion Gap 15.1 (*)     All other components within normal limits    Narrative:     GFR Normal >60  Chronic Kidney Disease <60  Kidney Failure <15     BNP (IN-HOUSE) - Abnormal; Notable for the following components:    proBNP 1,711.0 (*)     All other " components within normal limits    Narrative:     Among patients with dyspnea, NT-proBNP is highly sensitive for the detection of acute congestive heart failure. In addition NT-proBNP of <300 pg/ml effectively rules out acute congestive heart failure with 99% negative predictive value.    Results may be falsely decreased if patient taking Biotin.     D-DIMER, QUANTITATIVE - Abnormal; Notable for the following components:    D-Dimer, Quantitative 1.16 (*)     All other components within normal limits    Narrative:     The [employer] D-Dimer test used in conjunction with a clinical pretest probability (PTP) assessment model, has been approved by the FDA to rule out the presence of venous thromboembolism (VTE) in outpatients suspected of deep venous thrombosis (DVT) or pulmonary embolism (PE). The cut-off for negative predictive value is <0.50 MCGFEU/mL.   CBC WITH AUTO DIFFERENTIAL - Abnormal; Notable for the following components:    RBC 3.86 (*)     Hemoglobin 12.7 (*)     Hematocrit 36.8 (*)     Lymphocyte % 18.0 (*)     Monocyte % 13.8 (*)     Immature Grans % 1.1 (*)     Monocytes, Absolute 1.11 (*)     Immature Grans, Absolute 0.09 (*)     All other components within normal limits   TROPONIN (IN-HOUSE) - Normal    Narrative:     Troponin T Reference Range:  <= 0.03 ng/mL-   Negative for AMI  >0.03 ng/mL-     Abnormal for myocardial necrosis.  Clinicians would have to utilize clinical acumen, EKG, Troponin and serial changes to determine if it is an Acute Myocardial Infarction or myocardial injury due to an underlying chronic condition.       Results may be falsely decreased if patient taking Biotin.     COVID PRE-OP / PRE-PROCEDURE SCREENING ORDER (NO ISOLATION)    Narrative:     The following orders were created for panel order COVID PRE-OP / PRE-PROCEDURE SCREENING ORDER (NO ISOLATION) - Swab, Nasopharynx.  Procedure                               Abnormality         Status                     ---------                                -----------         ------                     COVID-19,APTIMA PANTHER(...[[id number]]                      In process                   Please view results for these tests on the individual orders.   COVID-19,APTIMA PANTHER (CINTHYA) SONYA ,NP/OP SWAB IN UTM/VTM/SALINE TRANSPORT MEDIA,24 HR TAT   CBC AND DIFFERENTIAL    Narrative:     The following orders were created for panel order CBC & Differential.  Procedure                               Abnormality         Status                     ---------                               -----------         ------                     CBC Auto Differential[[id number]]        Abnormal            Final result                 Please view results for these tests on the individual orders.       EKG:   ECG 12 Lead   Final Result   HEART RATE= 90  bpm   RR Interval= 665  ms   PA Interval=   ms   P Horizontal Axis=   deg   P Front Axis=   deg   QRSD Interval= 95  ms   QT Interval= 355  ms   QRS Axis= 61  deg   T Wave Axis= 24  deg   - ABNORMAL ECG -   Atrial fibrillation   NO PRIOR TRACING AVAILABLE FOR COMPARISON   Electronically Signed By: Ian Shell ([facility]) 20-Sep-2021 19:12:48   Date and Time of Study: 2021-09-20 14:32:18          Meds given in ED:   Medications   sodium chloride 0.9 % flush 10 mL (has no administration in time range)   nitroglycerin (NITROSTAT) SL tablet 0.4 mg (has no administration in time range)   acetaminophen (TYLENOL) tablet 650 mg (has no administration in time range)   ondansetron (ZOFRAN) tablet 4 mg (has no administration in time range)     Or   ondansetron (ZOFRAN) injection 4 mg (has no administration in time range)   melatonin tablet 3 mg (has no administration in time range)   Pharmacy to Dose enoxaparin (LOVENOX) (has no administration in time range)   furosemide (LASIX) injection 40 mg (has no administration in time range)   iopamidol (ISOVUE-370) 76 % injection 100 mL (95 mL Intravenous Given 9/20/21 7392)   furosemide (LASIX)  injection 40 mg (40 mg Intravenous Given 9/20/21 1630)       Imaging results:  CT Angiogram Chest    Result Date: 9/20/2021  1. No evidence of pulmonary embolus. 2. Other findings discussed above appear stable since the 11/02/2020 study.      Radiation dose reduction techniques were utilized, including automated exposure control and exposure modulation based on body size.  This report was finalized on 9/20/2021 4:46 PM by Dr. Naseem Schaeffer M.D.        Ambulatory status:   -     Social issues:   Social History     Socioeconomic History   • Marital status:      Spouse name: Not on file   • Number of children: Not on file   • Years of education: Not on file   • Highest education level: Not on file   Tobacco Use   • Smoking status: Never Smoker   Substance and Sexual Activity   • Alcohol use: Yes   • Drug use: Defer   • Sexual activity: Defer    Nursing report ED to floor       Shae Estrada RN  09/20/21 [id number]

## 2021-09-20 NOTE — ED PROVIDER NOTES
EMERGENCY DEPARTMENT ENCOUNTER    Room Number:  33/33  Date of encounter:  9/20/2021  PCP: Alberto Monsivais MD  Historian: Patient     I used full protective equipment while examining this patient.  This includes face mask, gloves and protective eyewear.  I washed my hands before entering the room and immediately upon leaving the room.  Patient was wearing a surgical mask.      HPI:  Chief Complaint: Shortness of breath  A complete HPI/ROS/PMH/PSH/SH/FH are unobtainable due to: None    Context: Gamal Modi is a 76 y.o. male, with history of interstitial lung disease, who presents to the ED c/o shortness of breath that began around 3 AM this morning.  Patient states he woke up feeling short of breath.  He is supposed to be on 3.5 L of oxygen at night, but he was in [place] last night and did not take his oxygen with him.  Also reports having a dry cough during this episode.  Symptoms lasted for several hours then began to subside.  Patient and his wife then drove back to [place].  Reports having shortness of breath with exertion on the drive home.  Symptoms are currently mild.  Denies chest pain, fever, nausea, vomiting, abdominal pain, palpitations, dizziness, syncope, or calf pain.  Has chronic ankle edema which has worsened over the past few days.  Denies recent weight gain..  Also reports having tingling in his left hand since he woke up early this morning.  Denies unilateral weakness, headache, vision changes, or slurred speech.  Patient has been fully vaccinated for Covid and recently had his third shot.      PAST MEDICAL HISTORY  Active Ambulatory Problems     Diagnosis Date Noted   • Gout 04/11/2016   • BP (high blood pressure) 04/11/2016   • Annual physical exam 05/23/2016   • Hypercholesterolemia 09/06/2017   • Shortness of breath on exertion 09/03/2019   • Anemia 10/12/2020   • Pulmonary asbestosis (CMS/HCC) 10/12/2020     Resolved Ambulatory Problems     Diagnosis Date Noted   •  Accelerated hypertension 05/10/2018     Past Medical History:   Diagnosis Date   • Hypertension    • Sebaceous cyst          PAST SURGICAL HISTORY  Past Surgical History:   Procedure Laterality Date   • COLON SURGERY     • OTHER SURGICAL HISTORY      BOWEL BLOCKAGE         FAMILY HISTORY  Family History   Problem Relation Age of Onset   • Diabetes Other    • Cancer Mother    • Diabetes Father    • Cancer Sister          SOCIAL HISTORY  Social History     Socioeconomic History   • Marital status:      Spouse name: Not on file   • Number of children: Not on file   • Years of education: Not on file   • Highest education level: Not on file   Tobacco Use   • Smoking status: Never Smoker   Substance and Sexual Activity   • Alcohol use: Yes   • Drug use: Defer   • Sexual activity: Defer         ALLERGIES  Patient has no known allergies.       REVIEW OF SYSTEMS  Review of Systems      All systems have been reviewed and are negative except as as discussed in the HPI    PHYSICAL EXAM    I have reviewed the triage vital signs and nursing notes.    ED Triage Vitals   Temp Heart Rate Resp BP SpO2   09/20/21 1402 09/20/21 1402 09/20/21 1402 09/20/21 1403 09/20/21 1402   98.6 °F (37 °C) 97 16 160/82 96 %      Temp src Heart Rate Source Patient Position BP Location FiO2 (%)   09/20/21 1402 09/20/21 1402 -- -- --   Tympanic Monitor          Physical Exam  GENERAL: Awake, alert, no acute distress  HENT: NCAT, nares patent, moist mucous membranes  NECK: supple  EYES: Extraocular muscles intact, no scleral icterus  CV: Irregularly irregular rhythm, regular rate, equal radial pulses bilaterally  RESPIRATORY: normal effort, clear to auscultation bilaterally with decreased air movement in both lung bases  ABDOMEN: soft, nontender  MUSCULOSKELETAL: Extremities are nontender and without obvious deformity.  There is normal range of motion in all extremities.  There is no calf tenderness.  There is 1+ pedal edema in both lower  legs/ankles   NEURO: Speech is clear and fluent.  No aphasia.  No facial droop.  Normal strength and light touch sensation all extremities.  SKIN: warm, dry, no rash  PSYCH: Normal mood and affect      LAB RESULTS  Recent Results (from the past 24 hour(s))   Comprehensive Metabolic Panel    Collection Time: 09/20/21  2:26 PM    Specimen: Blood   Result Value Ref Range    Glucose 115 (H) 65 - 99 mg/dL    BUN 25 (H) 8 - 23 mg/dL    Creatinine 0.84 0.76 - 1.27 mg/dL    Sodium 141 136 - 145 mmol/L    Potassium 3.9 3.5 - 5.2 mmol/L    Chloride 99 98 - 107 mmol/L    CO2 26.9 22.0 - 29.0 mmol/L    Calcium 9.6 8.6 - 10.5 mg/dL    Total Protein 7.3 6.0 - 8.5 g/dL    Albumin 4.50 3.50 - 5.20 g/dL    ALT (SGPT) 26 1 - 41 U/L    AST (SGOT) 43 (H) 1 - 40 U/L    Alkaline Phosphatase 83 39 - 117 U/L    Total Bilirubin 0.9 0.0 - 1.2 mg/dL    eGFR Non African Amer 89 >60 mL/min/1.73    Globulin 2.8 gm/dL    A/G Ratio 1.6 g/dL    BUN/Creatinine Ratio 29.8 (H) 7.0 - 25.0    Anion Gap 15.1 (H) 5.0 - 15.0 mmol/L   BNP    Collection Time: 09/20/21  2:26 PM    Specimen: Blood   Result Value Ref Range    proBNP 1,711.0 (H) 0.0 - 900.0 pg/mL   D-dimer, Quantitative    Collection Time: 09/20/21  2:26 PM    Specimen: Blood   Result Value Ref Range    D-Dimer, Quantitative 1.16 (H) 0.00 - 0.49 MCGFEU/mL   Troponin    Collection Time: 09/20/21  2:26 PM    Specimen: Blood   Result Value Ref Range    Troponin T <0.010 0.000 - 0.030 ng/mL   CBC Auto Differential    Collection Time: 09/20/21  2:26 PM    Specimen: Blood   Result Value Ref Range    WBC 8.07 3.40 - 10.80 10*3/mm3    RBC 3.86 (L) 4.14 - 5.80 10*6/mm3    Hemoglobin 12.7 (L) 13.0 - 17.7 g/dL    Hematocrit 36.8 (L) 37.5 - 51.0 %    MCV 95.3 79.0 - 97.0 fL    MCH 32.9 26.6 - 33.0 pg    MCHC 34.5 31.5 - 35.7 g/dL    RDW 13.6 12.3 - 15.4 %    RDW-SD 47.3 37.0 - 54.0 fl    MPV 9.3 6.0 - 12.0 fL    Platelets 282 140 - 450 10*3/mm3    Neutrophil % 65.7 42.7 - 76.0 %    Lymphocyte % 18.0 (L)  19.6 - 45.3 %    Monocyte % 13.8 (H) 5.0 - 12.0 %    Eosinophil % 0.7 0.3 - 6.2 %    Basophil % 0.7 0.0 - 1.5 %    Immature Grans % 1.1 (H) 0.0 - 0.5 %    Neutrophils, Absolute 5.30 1.70 - 7.00 10*3/mm3    Lymphocytes, Absolute 1.45 0.70 - 3.10 10*3/mm3    Monocytes, Absolute 1.11 (H) 0.10 - 0.90 10*3/mm3    Eosinophils, Absolute 0.06 0.00 - 0.40 10*3/mm3    Basophils, Absolute 0.06 0.00 - 0.20 10*3/mm3    Immature Grans, Absolute 0.09 (H) 0.00 - 0.05 10*3/mm3    nRBC 0.0 0.0 - 0.2 /100 WBC   ECG 12 Lead    Collection Time: 09/20/21  2:32 PM   Result Value Ref Range    QT Interval 355 ms       Ordered the above labs and independently reviewed the results.      RADIOLOGY  CT Angiogram Chest    Result Date: 9/20/2021  CT ANGIOGRAM OF THE CHEST WITH CONTRAST INCLUDING RECONSTRUCTION IMAGES 09/20/2021  HISTORY: Shortness of breath. Recent travel. Possible pulmonary embolus.  TECHNIQUE: Following the intravenous contrast injection, CT angiography was performed through the chest. Sagittal, coronal and 3-D reconstruction images were reviewed.  FINDINGS: There is no evidence of pulmonary embolus. There is bilateral pleural thickening with partial calcification of the bilateral pleural plaques. Trace amount of left pleural effusion is seen.  No pathologically enlarged hilar or mediastinal lymph nodes are seen. Calcified precarinal node is seen.  There are some scattered areas of chronic parenchymal change in the lungs.  There is fatty infiltration of the liver. There is a 6.6 cm low-density right hepatic lobe lesion consistent with a hepatic cyst. This was also seen on the 11/02/2020 study.      1. No evidence of pulmonary embolus. 2. Other findings discussed above appear stable since the 11/02/2020 study.      Radiation dose reduction techniques were utilized, including automated exposure control and exposure modulation based on body size.         I ordered the above noted radiological studies. Reviewed by me and discussed  "with radiologist.  See dictation for official radiology interpretation.      PROCEDURES  Procedures      MEDICATIONS GIVEN IN ER    Medications   sodium chloride 0.9 % flush 10 mL (has no administration in time range)   iopamidol (ISOVUE-370) 76 % injection 100 mL (95 mL Intravenous Given 9/20/21 1604)   furosemide (LASIX) injection 40 mg (40 mg Intravenous Given 9/20/21 1630)         PROGRESS, DATA ANALYSIS, CONSULTS, AND MEDICAL DECISION MAKING    All labs have been independently reviewed by me.  All radiology studies have been reviewed by me and discussed with radiologist dictating the report.   EKG's independently viewed and interpreted by me.  I have reviewed the nurse's notes, vital signs, past medical history, and medication list.  Discussion below represents my analysis of pertinent findings related to patient's condition, differential diagnosis, treatment plan and final disposition.    Differential diagnosis includes but is not limited to CHF, acute coronary syndrome, pulmonary embolism, pneumothorax, pneumonia, asthma/COPD, deconditioning, anemia, anxiety.         ED Course as of Sep 20 1646   Mon Sep 20, 2021   1416 Old records reviewed.  Patient does not have any prior ED visits or admissions here.  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  There were stable mild fibrotic changes as well    [WH]   1416 EKG          EKG time: 1432  Rhythm/Rate: Atrial fibrillation, rate 90  P waves and DC: Irregular, varying  QRS, axis: Normal  ST and T waves: Nonspecific T wave changes inferiorly, no ST elevation or depression    Interpreted Contemporaneously by me, independently viewed  No prior available for comparison       [WH]   1522 D-Dimer, Quant(!): 1.16 [WH]   1522 proBNP(!): 1,711.0 [WH]   1522 Stable   Hemoglobin(!): 12.7 [WH]   1615 Test results discussed with the patient.  He is resting comfortably.  O2 " sats are 93% on room air.  States he is not short of breath as long as he is lying still.  Denies any history of atrial fibrillation.  CTA is pending.    [WH]   1623 Results of the CTA chest discussed with Dr. Schaeffer.  Images independently viewed by me.  No PE.  There are chronic pleural plaques.  Small amount of pleural fluid bilaterally.  See full report for details.    [WH]   1643 Case discussed with Dr. Polanco and she agrees to admit the patient to monitored bed.  Pertinent exam findings, test results, ED course, and diagnoses were discussed with her.  Patient will be admitted to a monitored bed    [WH]   1645 Patient presented to ED complaining of acute onset of shortness of breath this morning.  Also reported increased ankle edema.  He was found to be in new onset A. fib but his rate was well controlled.  proBNP was elevated.  Suspect there is mild CHF as well.  CTA of the chest was negative for PE.  Patient was given IV Lasix.  He will be admitted to the hospitalist.    []      ED Course User Index  [] Anthony Boland MD       AS OF 16:46 EDT VITALS:    BP - 138/84  HR - 94  TEMP - 98.6 °F (37 °C) (Tympanic)  O2 SATS - 96%      DIAGNOSIS  Final diagnoses:   New onset atrial fibrillation (CMS/HCC)   Acute congestive heart failure, unspecified heart failure type (CMS/HCC)   Shortness of breath         DISPOSITION  Admission    ADMISSION    Discussed treatment plan and reason for admission with pt/family and admitting physician.  Pt/family voiced understanding of the plan for admission for further testing/treatment as needed.         Dictated utilizing Dragon dictation:  Much of this encounter note is an electronic transcription/translation of spoken language to printed text. The electronic translation of spoken language may permit erroneous, or at times, nonsensical words or phrases to be inadvertently transcribed; Although I have reviewed the note for such errors, some may still exist.     Krystian  Anthony CROWE MD  09/20/21 1664

## 2021-09-20 NOTE — H&P
HISTORY AND PHYSICAL   [facility]        Date of Admission: 2021  Patient Identification:  Name: Gamal Modi  Age: 76 y.o.  Sex: male  :  1945  MRN: [id number]                     Primary Care Physician: Alberto Monsivais MD    Chief Complaint:  76 year old gentleman who presented to the emergency room with shortness of breath which started around 3am; he was out of state and did not take his oxygen with him; he uses it at bedtime; he denies fever or chills; he drove back here from [place] but noted intermittent shortness of breath with exertion; no chest pain; he has no history of heart disease; he has also notes some leg swelling which has worsened over the last few days    History of Present Illness:   As above    Past Medical History:  Past Medical History:   Diagnosis Date   • Gout    • Hypertension    • Sebaceous cyst      Past Surgical History:  Past Surgical History:   Procedure Laterality Date   • COLON SURGERY     • OTHER SURGICAL HISTORY      BOWEL BLOCKAGE      Home Meds:  (Not in a hospital admission)      Allergies:  No Known Allergies  Immunizations:  Immunization History   Administered Date(s) Administered   • Fluad Quad 65+ 10/12/2020   • Pneumococcal Conjugate 13-Valent (PCV13) 2016   • Pneumococcal Polysaccharide (PPSV23) 2014   • Zostavax 2014     Social History:   Social History     Social History Narrative   • Not on file     Social History     Socioeconomic History   • Marital status:      Spouse name: Not on file   • Number of children: Not on file   • Years of education: Not on file   • Highest education level: Not on file   Tobacco Use   • Smoking status: Never Smoker   Substance and Sexual Activity   • Alcohol use: Yes   • Drug use: Defer   • Sexual activity: Defer       Family History:  Family History   Problem Relation Age of Onset   • Diabetes Other    • Cancer Mother    • Diabetes Father    • Cancer Sister          Review of Systems  See history of present illness and past medical history.  Patient denies headache, dizziness, syncope, falls, trauma, change in vision, change in hearing, change in taste, changes in weight, changes in appetite, focal weakness, numbness, or paresthesia.  Patient denies chest pain,   PND,  sinus pressure, rhinorrhea, epistaxis, hemoptysis, nausea, vomiting,hematemesis, diarrhea, constipation or hematchezia.  Denies cold or heat intolerance, polydipsia, polyuria, polyphagia. Denies hematuria, pyuria, dysuria, hesitancy, frequency or urgency. Denies consumption of raw and under cooked meats foods or change in water source.  Denies fever, chills, sweats, night sweats.  Denies missing any routine medications. Remainder of ROS is negative.    Objective:  T Max 24 hrs: Temp (24hrs), Av.6 °F (37 °C), Min:98.6 °F (37 °C), Max:98.6 °F (37 °C)    Vitals Ranges:   Temp:  [98.6 °F (37 °C)] 98.6 °F (37 °C)  Heart Rate:  [94-97] 94  Resp:  [16] 16  BP: (138-160)/(82-84) 138/84      Exam:  /84   Pulse 94   Temp 98.6 °F (37 °C) (Tympanic)   Resp 16   SpO2 96%     General Appearance:    Alert, cooperative, no distress, appears stated age   Head:    Normocephalic, without obvious abnormality, atraumatic   Eyes:    PERRL, conjunctivae/corneas clear, EOM's intact, both eyes   Ears:    Normal external ear canals, both ears   Nose:   Nares normal, septum midline, mucosa normal, no drainage    or sinus tenderness   Throat:   Lips, mucosa, and tongue normal   Neck:   Supple, symmetrical, trachea midline, no adenopathy;     thyroid:  no enlargement/tenderness/nodules; no carotid    bruit or JVD   Back:     Symmetric, no curvature, ROM normal, no CVA tenderness   Lungs:     Decreased breath sounds bilaterally, respirations unlabored   Chest Wall:    No tenderness or deformity    Heart:    Regular rate and rhythm, S1 and S2 normal, no murmur, rub   or gallop   Abdomen:     Soft, nontender, bowel sounds  active all four quadrants,     no masses, no hepatomegaly, no splenomegaly   Extremities:   Extremities normal, atraumatic, no cyanosis or edema   Pulses:   2+ and symmetric all extremities   Skin:   Skin color, texture, turgor normal, no rashes or lesions   Lymph nodes:   Cervical, supraclavicular, and axillary nodes normal   Neurologic:   CNII-XII intact, normal strength, sensation intact throughout      .    Data Review:  Labs in chart were reviewed.  WBC   Date Value Ref Range Status   09/20/2021 8.07 3.40 - 10.80 10*3/mm3 Final     Hemoglobin   Date Value Ref Range Status   09/20/2021 12.7 (L) 13.0 - 17.7 g/dL Final     Hematocrit   Date Value Ref Range Status   09/20/2021 36.8 (L) 37.5 - 51.0 % Final     Platelets   Date Value Ref Range Status   09/20/2021 282 140 - 450 10*3/mm3 Final     Sodium   Date Value Ref Range Status   09/20/2021 141 136 - 145 mmol/L Final     Potassium   Date Value Ref Range Status   09/20/2021 3.9 3.5 - 5.2 mmol/L Final     Chloride   Date Value Ref Range Status   09/20/2021 99 98 - 107 mmol/L Final     CO2   Date Value Ref Range Status   09/20/2021 26.9 22.0 - 29.0 mmol/L Final     BUN   Date Value Ref Range Status   09/20/2021 25 (H) 8 - 23 mg/dL Final     Creatinine   Date Value Ref Range Status   09/20/2021 0.84 0.76 - 1.27 mg/dL Final     Glucose   Date Value Ref Range Status   09/20/2021 115 (H) 65 - 99 mg/dL Final     Calcium   Date Value Ref Range Status   09/20/2021 9.6 8.6 - 10.5 mg/dL Final     AST (SGOT)   Date Value Ref Range Status   09/20/2021 43 (H) 1 - 40 U/L Final     ALT (SGPT)   Date Value Ref Range Status   09/20/2021 26 1 - 41 U/L Final     Alkaline Phosphatase   Date Value Ref Range Status   09/20/2021 83 39 - 117 U/L Final                Imaging Results (All)     Procedure Component Value Units Date/Time    CT Angiogram Chest [[id number]] Collected: 09/20/21 1637     Updated: 09/20/21 1650    Narrative:      CT ANGIOGRAM OF THE CHEST WITH CONTRAST INCLUDING  RECONSTRUCTION IMAGES  09/20/2021     HISTORY: Shortness of breath. Recent travel. Possible pulmonary embolus.     TECHNIQUE: Following the intravenous contrast injection, CT angiography  was performed through the chest. Sagittal, coronal and 3-D  reconstruction images were reviewed.     FINDINGS: There is no evidence of pulmonary embolus. There is bilateral  pleural thickening with partial calcification of the bilateral pleural  plaques. Trace amount of left pleural effusion is seen.     No pathologically enlarged hilar or mediastinal lymph nodes are seen.  Calcified precarinal node is seen.     There are some scattered areas of chronic parenchymal change in the  lungs.     There is fatty infiltration of the liver. There is a 6.6 cm low-density  right hepatic lobe lesion consistent with a hepatic cyst. This was also  seen on the 11/02/2020 study.       Impression:      1. No evidence of pulmonary embolus.  2. Other findings discussed above appear stable since the 11/02/2020  study.                 Radiation dose reduction techniques were utilized, including automated  exposure control and exposure modulation based on body size.     This report was finalized on 9/20/2021 4:46 PM by Dr. Naseem Schaeffer M.D.           Patient Active Problem List   Diagnosis Code   • Gout M10.9   • BP (high blood pressure) I10   • Annual physical exam Z00.00   • Hypercholesterolemia E78.00   • Shortness of breath on exertion R06.02   • Anemia D64.9   • Pulmonary asbestosis (CMS/HCC) J61   • New onset atrial fibrillation (CMS/HCC) I48.91       Assessment:    New onset atrial fibrillation (CMS/HCC)  acute chf  Hypertension  Sleep apnea  obesity    Plan:  Will get a second troponin  Echo  Monitor on telemetry  Cardiology consult  Oxygen at night  Continue diuresis  D.w patient and ED provider    Nancy Polanco MD  9/20/2021  18:53 EDT

## 2021-09-20 NOTE — ED TRIAGE NOTES
soa worse c exertion started during the night.  He reports left hand numbness and tingling in left arm    Patient was placed in face mask during first look triage.  Patient was wearing a face mask throughout encounter.  I wore personal protective equipment throughout the encounter.  Hand hygiene was performed before and after patient encounter.

## 2021-09-21 ENCOUNTER — APPOINTMENT (OUTPATIENT)
Dept: CARDIOLOGY | Facility: HOSPITAL | Age: 76
End: 2021-09-21

## 2021-09-21 DIAGNOSIS — I48.19 ATRIAL FIBRILLATION, PERSISTENT (HCC): Primary | ICD-10-CM

## 2021-09-21 PROBLEM — K76.0 FATTY LIVER: Status: ACTIVE | Noted: 2021-09-21

## 2021-09-21 PROBLEM — I51.89 DIASTOLIC DYSFUNCTION: Status: ACTIVE | Noted: 2021-09-21

## 2021-09-21 PROBLEM — R06.00 DYSPNEA: Status: ACTIVE | Noted: 2019-09-03

## 2021-09-21 PROBLEM — D64.9 ANEMIA: Status: ACTIVE | Noted: 2021-09-21

## 2021-09-21 LAB
ANION GAP SERPL CALCULATED.3IONS-SCNC: 11.1 MMOL/L (ref 5–15)
BH CV ECHO MEAS - ACS: 2.1 CM
BH CV ECHO MEAS - AI DEC SLOPE: 161 CM/SEC^2
BH CV ECHO MEAS - AI MAX PG: 35 MMHG
BH CV ECHO MEAS - AI MAX VEL: 296 CM/SEC
BH CV ECHO MEAS - AI P1/2T: 538.5 MSEC
BH CV ECHO MEAS - AO MAX PG (FULL): 1.3 MMHG
BH CV ECHO MEAS - AO MAX PG: 6.9 MMHG
BH CV ECHO MEAS - AO MEAN PG (FULL): 1 MMHG
BH CV ECHO MEAS - AO MEAN PG: 4 MMHG
BH CV ECHO MEAS - AO ROOT AREA (BSA CORRECTED): 1.5
BH CV ECHO MEAS - AO ROOT AREA: 10.2 CM^2
BH CV ECHO MEAS - AO ROOT DIAM: 3.6 CM
BH CV ECHO MEAS - AO V2 MAX: 131 CM/SEC
BH CV ECHO MEAS - AO V2 MEAN: 89.9 CM/SEC
BH CV ECHO MEAS - AO V2 VTI: 23.3 CM
BH CV ECHO MEAS - AVA(I,A): 3.2 CM^2
BH CV ECHO MEAS - AVA(I,D): 3.2 CM^2
BH CV ECHO MEAS - AVA(V,A): 3.1 CM^2
BH CV ECHO MEAS - AVA(V,D): 3.1 CM^2
BH CV ECHO MEAS - BSA(HAYCOCK): 2.5 M^2
BH CV ECHO MEAS - BSA: 2.3 M^2
BH CV ECHO MEAS - BZI_BMI: 37.7 KILOGRAMS/M^2
BH CV ECHO MEAS - BZI_METRIC_HEIGHT: 177.8 CM
BH CV ECHO MEAS - BZI_METRIC_WEIGHT: 119.3 KG
BH CV ECHO MEAS - CONTRAST EF (2CH): 69 CM2
BH CV ECHO MEAS - CONTRAST EF 4CH: 76 CM2
BH CV ECHO MEAS - EDV(CUBED): 64 ML
BH CV ECHO MEAS - EDV(MOD-SP2): 64 ML
BH CV ECHO MEAS - EDV(MOD-SP4): 100 ML
BH CV ECHO MEAS - EDV(TEICH): 70 ML
BH CV ECHO MEAS - EF(CUBED): 57.8 %
BH CV ECHO MEAS - EF(MOD-BP): 73 %
BH CV ECHO MEAS - EF(MOD-SP2): 68.8 %
BH CV ECHO MEAS - EF(MOD-SP4): 76 %
BH CV ECHO MEAS - EF(TEICH): 50 %
BH CV ECHO MEAS - ESV(MOD-SP2): 20 ML
BH CV ECHO MEAS - ESV(MOD-SP4): 24 ML
BH CV ECHO MEAS - ESV(TEICH): 35 ML
BH CV ECHO MEAS - FS: 25 %
BH CV ECHO MEAS - IVS/LVPW: 1.1
BH CV ECHO MEAS - IVSD: 1.1 CM
BH CV ECHO MEAS - LAT PEAK E' VEL: 4.6 CM/SEC
BH CV ECHO MEAS - LV DIASTOLIC VOL/BSA (35-75): 42.6 ML/M^2
BH CV ECHO MEAS - LV MASS(C)D: 136.2 GRAMS
BH CV ECHO MEAS - LV MASS(C)DI: 58.1 GRAMS/M^2
BH CV ECHO MEAS - LV MAX PG: 5.6 MMHG
BH CV ECHO MEAS - LV MEAN PG: 3 MMHG
BH CV ECHO MEAS - LV SYSTOLIC VOL/BSA (12-30): 10.2 ML/M^2
BH CV ECHO MEAS - LV V1 MAX: 118 CM/SEC
BH CV ECHO MEAS - LV V1 MEAN: 73.6 CM/SEC
BH CV ECHO MEAS - LV V1 VTI: 21.8 CM
BH CV ECHO MEAS - LVIDD: 4 CM
BH CV ECHO MEAS - LVIDS: 3 CM
BH CV ECHO MEAS - LVLD AP2: 7.6 CM
BH CV ECHO MEAS - LVLD AP4: 8 CM
BH CV ECHO MEAS - LVLS AP2: 6.5 CM
BH CV ECHO MEAS - LVLS AP4: 6.6 CM
BH CV ECHO MEAS - LVOT AREA (M): 3.5 CM^2
BH CV ECHO MEAS - LVOT AREA: 3.5 CM^2
BH CV ECHO MEAS - LVOT DIAM: 2.1 CM
BH CV ECHO MEAS - LVPWD: 1 CM
BH CV ECHO MEAS - MED PEAK E' VEL: 5.8 CM/SEC
BH CV ECHO MEAS - MR MAX PG: 34.8 MMHG
BH CV ECHO MEAS - MR MAX VEL: 295 CM/SEC
BH CV ECHO MEAS - MV DEC SLOPE: 808 CM/SEC^2
BH CV ECHO MEAS - MV DEC TIME: 0.16 SEC
BH CV ECHO MEAS - MV E MAX VEL: 123 CM/SEC
BH CV ECHO MEAS - MV MAX PG: 4.8 MMHG
BH CV ECHO MEAS - MV MEAN PG: 2 MMHG
BH CV ECHO MEAS - MV P1/2T MAX VEL: 118 CM/SEC
BH CV ECHO MEAS - MV P1/2T: 42.8 MSEC
BH CV ECHO MEAS - MV V2 MAX: 110 CM/SEC
BH CV ECHO MEAS - MV V2 MEAN: 66.9 CM/SEC
BH CV ECHO MEAS - MV V2 VTI: 15.2 CM
BH CV ECHO MEAS - MVA P1/2T LCG: 1.9 CM^2
BH CV ECHO MEAS - MVA(P1/2T): 5.1 CM^2
BH CV ECHO MEAS - MVA(VTI): 5 CM^2
BH CV ECHO MEAS - PA ACC TIME: 0.13 SEC
BH CV ECHO MEAS - PA MAX PG (FULL): 2.7 MMHG
BH CV ECHO MEAS - PA MAX PG: 3.7 MMHG
BH CV ECHO MEAS - PA PR(ACCEL): 20.5 MMHG
BH CV ECHO MEAS - PA V2 MAX: 96.4 CM/SEC
BH CV ECHO MEAS - PULM DIAS VEL: 49.8 CM/SEC
BH CV ECHO MEAS - PULM S/D: 0.61
BH CV ECHO MEAS - PULM SYS VEL: 30.2 CM/SEC
BH CV ECHO MEAS - PVA(V,A): 2.8 CM^2
BH CV ECHO MEAS - PVA(V,D): 2.8 CM^2
BH CV ECHO MEAS - QP/QS: 0.67
BH CV ECHO MEAS - RAP SYSTOLE: 3 MMHG
BH CV ECHO MEAS - RV MAX PG: 1 MMHG
BH CV ECHO MEAS - RV MEAN PG: 1 MMHG
BH CV ECHO MEAS - RV V1 MAX: 50.9 CM/SEC
BH CV ECHO MEAS - RV V1 MEAN: 33.1 CM/SEC
BH CV ECHO MEAS - RV V1 VTI: 9.6 CM
BH CV ECHO MEAS - RVOT AREA: 5.3 CM^2
BH CV ECHO MEAS - RVOT DIAM: 2.6 CM
BH CV ECHO MEAS - RVSP: 35 MMHG
BH CV ECHO MEAS - SI(AO): 101.1 ML/M^2
BH CV ECHO MEAS - SI(CUBED): 15.8 ML/M^2
BH CV ECHO MEAS - SI(LVOT): 32.2 ML/M^2
BH CV ECHO MEAS - SI(MOD-SP2): 18.8 ML/M^2
BH CV ECHO MEAS - SI(MOD-SP4): 32.4 ML/M^2
BH CV ECHO MEAS - SI(TEICH): 14.9 ML/M^2
BH CV ECHO MEAS - SV(AO): 237.2 ML
BH CV ECHO MEAS - SV(CUBED): 37 ML
BH CV ECHO MEAS - SV(LVOT): 75.5 ML
BH CV ECHO MEAS - SV(MOD-SP2): 44 ML
BH CV ECHO MEAS - SV(MOD-SP4): 76 ML
BH CV ECHO MEAS - SV(RVOT): 50.8 ML
BH CV ECHO MEAS - SV(TEICH): 35 ML
BH CV ECHO MEAS - TR MAX VEL: 280 CM/SEC
BH CV ECHO MEASUREMENTS AVERAGE E/E' RATIO: 23.65
BH CV XLRA - RV BASE: 3.8 CM
BH CV XLRA - RV LENGTH: 6.5 CM
BH CV XLRA - RV MID: 3.2 CM
BH CV XLRA - TDI S': 11.6 CM/SEC
BUN SERPL-MCNC: 20 MG/DL (ref 8–23)
BUN/CREAT SERPL: 23.8 (ref 7–25)
CALCIUM SPEC-SCNC: 9.2 MG/DL (ref 8.6–10.5)
CHLORIDE SERPL-SCNC: 97 MMOL/L (ref 98–107)
CO2 SERPL-SCNC: 31.9 MMOL/L (ref 22–29)
CREAT SERPL-MCNC: 0.84 MG/DL (ref 0.76–1.27)
DEPRECATED RDW RBC AUTO: 47.8 FL (ref 37–54)
ERYTHROCYTE [DISTWIDTH] IN BLOOD BY AUTOMATED COUNT: 13.4 % (ref 12.3–15.4)
GFR SERPL CREATININE-BSD FRML MDRD: 89 ML/MIN/1.73
GLUCOSE SERPL-MCNC: 100 MG/DL (ref 65–99)
HCT VFR BLD AUTO: 35.5 % (ref 37.5–51)
HGB BLD-MCNC: 12.1 G/DL (ref 13–17.7)
LEFT ATRIUM VOLUME INDEX: 31 ML/M2
MAGNESIUM SERPL-MCNC: 1.8 MG/DL (ref 1.6–2.4)
MCH RBC QN AUTO: 33.1 PG (ref 26.6–33)
MCHC RBC AUTO-ENTMCNC: 34.1 G/DL (ref 31.5–35.7)
MCV RBC AUTO: 97 FL (ref 79–97)
PLATELET # BLD AUTO: 253 10*3/MM3 (ref 140–450)
PMV BLD AUTO: 9.3 FL (ref 6–12)
POTASSIUM SERPL-SCNC: 3.6 MMOL/L (ref 3.5–5.2)
RBC # BLD AUTO: 3.66 10*6/MM3 (ref 4.14–5.8)
SINUS: 3.4 CM
SODIUM SERPL-SCNC: 140 MMOL/L (ref 136–145)
STJ: 3 CM
TSH SERPL DL<=0.05 MIU/L-ACNC: 6.31 UIU/ML (ref 0.27–4.2)
WBC # BLD AUTO: 5.75 10*3/MM3 (ref 3.4–10.8)

## 2021-09-21 PROCEDURE — G0378 HOSPITAL OBSERVATION PER HR: HCPCS

## 2021-09-21 PROCEDURE — 80048 BASIC METABOLIC PNL TOTAL CA: CPT | Performed by: INTERNAL MEDICINE

## 2021-09-21 PROCEDURE — 85027 COMPLETE CBC AUTOMATED: CPT | Performed by: INTERNAL MEDICINE

## 2021-09-21 PROCEDURE — 36415 COLL VENOUS BLD VENIPUNCTURE: CPT | Performed by: INTERNAL MEDICINE

## 2021-09-21 PROCEDURE — 93306 TTE W/DOPPLER COMPLETE: CPT | Performed by: INTERNAL MEDICINE

## 2021-09-21 PROCEDURE — 25010000002 PERFLUTREN (DEFINITY) 8.476 MG IN SODIUM CHLORIDE (PF) 0.9 % 10 ML INJECTION: Performed by: INTERNAL MEDICINE

## 2021-09-21 PROCEDURE — 84443 ASSAY THYROID STIM HORMONE: CPT | Performed by: INTERNAL MEDICINE

## 2021-09-21 PROCEDURE — 99245 OFF/OP CONSLTJ NEW/EST HI 55: CPT | Performed by: INTERNAL MEDICINE

## 2021-09-21 PROCEDURE — 93306 TTE W/DOPPLER COMPLETE: CPT

## 2021-09-21 PROCEDURE — 25010000002 FUROSEMIDE PER 20 MG: Performed by: INTERNAL MEDICINE

## 2021-09-21 PROCEDURE — 96376 TX/PRO/DX INJ SAME DRUG ADON: CPT

## 2021-09-21 PROCEDURE — 83735 ASSAY OF MAGNESIUM: CPT | Performed by: INTERNAL MEDICINE

## 2021-09-21 RX ORDER — DILTIAZEM HYDROCHLORIDE 240 MG/1
240 CAPSULE, COATED, EXTENDED RELEASE ORAL
Status: DISCONTINUED | OUTPATIENT
Start: 2021-09-21 | End: 2021-09-21

## 2021-09-21 RX ORDER — LOSARTAN POTASSIUM 50 MG/1
50 TABLET ORAL DAILY
Status: DISCONTINUED | OUTPATIENT
Start: 2021-09-22 | End: 2021-09-22 | Stop reason: HOSPADM

## 2021-09-21 RX ORDER — SPIRONOLACTONE 25 MG/1
25 TABLET ORAL DAILY
Status: DISCONTINUED | OUTPATIENT
Start: 2021-09-21 | End: 2021-09-22 | Stop reason: HOSPADM

## 2021-09-21 RX ORDER — DILTIAZEM HYDROCHLORIDE 120 MG/1
120 CAPSULE, COATED, EXTENDED RELEASE ORAL
Status: DISCONTINUED | OUTPATIENT
Start: 2021-09-21 | End: 2021-09-22 | Stop reason: HOSPADM

## 2021-09-21 RX ORDER — FUROSEMIDE 40 MG/1
40 TABLET ORAL DAILY
Status: DISCONTINUED | OUTPATIENT
Start: 2021-09-21 | End: 2021-09-22 | Stop reason: HOSPADM

## 2021-09-21 RX ADMIN — APIXABAN 5 MG: 5 TABLET, FILM COATED ORAL at 20:06

## 2021-09-21 RX ADMIN — FUROSEMIDE 40 MG: 10 INJECTION, SOLUTION INTRAMUSCULAR; INTRAVENOUS at 09:21

## 2021-09-21 RX ADMIN — SPIRONOLACTONE 25 MG: 25 TABLET ORAL at 14:29

## 2021-09-21 RX ADMIN — DILTIAZEM HYDROCHLORIDE 120 MG: 120 CAPSULE, COATED, EXTENDED RELEASE ORAL at 14:29

## 2021-09-21 RX ADMIN — FUROSEMIDE 40 MG: 40 TABLET ORAL at 14:29

## 2021-09-21 RX ADMIN — PERFLUTREN 1 ML: 6.52 INJECTION, SUSPENSION INTRAVENOUS at 09:01

## 2021-09-21 RX ADMIN — APIXABAN 5 MG: 5 TABLET, FILM COATED ORAL at 14:29

## 2021-09-21 NOTE — PLAN OF CARE
Problem: Adult Inpatient Plan of Care  Goal: Plan of Care Review  Outcome: Ongoing, Progressing  Flowsheets (Taken 9/21/2021 9976)  Progress: improving  Plan of Care Reviewed With: patient  Outcome Summary: Pt came from ER with Afib and SOA.Pt alert and orientated. HR around 105. gave IV lasix and Lovenox as order. wearing 2 L oxygen during the night and tolerated. no c/o pain. walked independently to bathroom. had 1 time BM. no acute change. will continue to monitor.

## 2021-09-21 NOTE — PROGRESS NOTES
Name: Gamal Modi ADMIT: 2021   : 1945  PCP: Alberto Monsivais MD    MRN: [id number] LOS: 1 days   AGE/SEX: 76 y.o. male  ROOM: [facility]     Subjective   Subjective   Patient feels much better. Significant decrease in the dyspnea. No PND or orthopnea. No chest pain. No palpitation. No ankle edema. No fever or chills. No cough. No wheeze. No hemoptysis.    Review of Systems  GI. No abdominal pain. No nausea or vomiting.  . No dysuria or hematuria.     Objective   Objective   Vital Signs  Temp:  [97.8 °F (36.6 °C)-98.9 °F (37.2 °C)] 98.9 °F (37.2 °C)  Heart Rate:  [] 101  Resp:  [16-18] 16  BP: (123-147)/(71-90) 123/89  SpO2:  [89 %-95 %] 91 %  on  Flow (L/min):  [2] 2;   Device (Oxygen Therapy): room air    Intake/Output Summary (Last 24 hours) at 2021 1600  Last data filed at 2021 1210  Gross per 24 hour   Intake 0 ml   Output 1400 ml   Net -1400 ml     Body mass index is 37.74 kg/m².      21  1926 21  0859   Weight: 119 kg (263 lb) 119 kg (263 lb)     Physical Exam  General. Middle-aged gentleman. Alert oriented x3. No apparent pain distress or diaphoresis. Normal mood and affect. Overweight.  Eyes. Pupils equal round and reactive. Intact extraocular musculature. No pallor or jaundice. Normal conjunctive and lids. Oral cavity. Moist mucous membrane.  Neck. Supple. No JVD. No lymphadenopathy or thyromegaly.  Cardiovascular. Regular rate and rhythm. Grade 2 systolic murmur. Occasional ectopic beats.  Chest. Clear to auscultation bilaterally with no added sounds.  Abdomen. Soft lax. No tenderness. No organomegaly. No guarding or rebound.  Extremities. No clubbing cyanosis or edema.  CNS. No acute focal neurological deficits.     Results Review:      Results from last 7 days   Lab Units 21  0413 21  1426   SODIUM mmol/L 140 141   POTASSIUM mmol/L 3.6 3.9   CHLORIDE mmol/L 97* 99   CO2 mmol/L 31.9* 26.9   BUN mg/dL 20 25*   CREATININE mg/dL 0.84 0.84    GLUCOSE mg/dL 100* 115*   CALCIUM mg/dL 9.2 9.6   AST (SGOT) U/L  --  43*   ALT (SGPT) U/L  --  26     Estimated Creatinine Clearance: 99.7 mL/min (by C-G formula based on SCr of 0.84 mg/dL).          Results from last 7 days   Lab Units 09/20/21  1426   TROPONIN T ng/mL <0.010     Results from last 7 days   Lab Units 09/20/21  1426   PROBNP pg/mL 1,711.0*               Invalid input(s):  PHOS        Invalid input(s): LDLCALC  Results from last 7 days   Lab Units 09/21/21  0413 09/20/21  1426 09/20/21  1426   WBC 10*3/mm3 5.75  --  8.07   HEMOGLOBIN g/dL 12.1*  --  12.7*   HEMATOCRIT % 35.5*  --  36.8*   PLATELETS 10*3/mm3 253  --  282   MCV fL 97.0   < > 95.3   MCH pg 33.1*   < > 32.9   MCHC g/dL 34.1   < > 34.5   RDW % 13.4   < > 13.6   RDW-SD fl 47.8   < > 47.3   MPV fL 9.3   < > 9.3   NEUTROPHIL % %  --   --  65.7   LYMPHOCYTE % %  --   --  18.0*   MONOCYTES % %  --   --  13.8*   EOSINOPHIL % %  --   --  0.7   BASOPHIL % %  --   --  0.7   IMM GRAN % %  --   --  1.1*   NEUTROS ABS 10*3/mm3  --   --  5.30   LYMPHS ABS 10*3/mm3  --   --  1.45   MONOS ABS 10*3/mm3  --   --  1.11*   EOS ABS 10*3/mm3  --   --  0.06   BASOS ABS 10*3/mm3  --   --  0.06   IMMATURE GRANS (ABS) 10*3/mm3  --   --  0.09*   NRBC /100 WBC  --   --  0.0    < > = values in this interval not displayed.                                           Imaging:  Imaging Results (Last 24 Hours)     ** No results found for the last 24 hours. **             I reviewed the patient's new clinical results / labs / tests / procedures      Assessment/Plan     Active Hospital Problems    Diagnosis  POA   • **New onset atrial fibrillation (CMS/HCC) [I48.91]  Yes   • Diastolic dysfunction [I51.89]  Yes   • Anemia [D64.9]  Yes   • Fatty liver [K76.0]  Yes   • Dyspnea [R06.00]  Yes   • Essential hypertension [I10]  Yes      Resolved Hospital Problems   No resolved problems to display.           · Dyspnea is secondary to new onset atrial fibrillation and that  diastolic cardiac dysfunction in a patient with a history of hypertension. 2D echo with normal ejection fraction and no significant valvular disease and diastolic dysfunction. Potassium is normal. Troponin is negative. CTA of the chest is negative for PE. Cardiology consult noted and appreciated. Cardiology stopped the Norvasc started diltiazem/Lasix/Aldactone. Eliquis. Vascular CHADS2 score is 2. Will check magnesium and TSH. Greatly improved after IV diuresis.  · Anemia. Hemoglobin stable. Will monitor specially after Eliquis initiation.  · Pulmonary asbestosis/positive D-dimer. CTA of the chest is negative for PE but positive for pleural plaques and calcification. Will need outpatient follow-up.  · Fatty liver with mild elevation of the liver function test. Asymptomatic. Will monitor.  · Gout. Stable on allopurinol.        · Discussed with discussed with the patient/wife.  · Disposition. Anticipate discharge tomorrow if blood pressure/rate control/hemoglobin/electrolytes are normal      Marielena Leslie MD  [facility]  09/21/21  16:00 EDT

## 2021-09-21 NOTE — CASE MANAGEMENT/SOCIAL WORK
Continued Stay Note  [facility]     Patient Name: Gamal Modi  MRN: [id number]  Today's Date: 9/21/2021    Admit Date: 9/20/2021    Discharge Plan     Row Name 09/21/21 1538       Plan    Plan  Home with wife on Eliquis. To receive 1st month of Eliquis through [facility]  free and $10/month copay card given for following months. Scale given to pt for daily weights. Pt to be D/C on 48 hr holter monitor per Cardiology.    Patient/Family in Agreement with Plan  yes    Plan Comments  Met with pt. and wife at bedside. Explained roll of . Face sheet and pharmacy verified. Pt lives with his wife. There are 3 steps to enter home.  DME equipment includes home O2@2.5LNC at night. Scale given to pt. for daily weights. Pt states he is buying a B/P cuff for home. Pt is independent with ADLs. Pt has never been to Rehab or used HH. Pt’s PCP is Dr. ROSETTA Monsivais. Pt enrolled with Meds to Bed as he is starting on Eliquis at D/C. Pt will receive 1st month of Eliquis through [facility] free and $10/month copay card given for following months. Pt still drives. At discharge, family will transport. Explained that CCP would follow to assess for discharge needs.  Chay Lehman RN-BC        Discharge Codes    No documentation.             Chay Lehman RN

## 2021-09-21 NOTE — CONSULTS
Date of Hospital Visit: 2021  Date of consult: 2021  Encounter Provider: Ochoa Murrell MD  Place of Service: [facility] CARDIOLOGY  Patient Name: Gamal Modi  :1945  Referral Provider: Nancy Polanco, *    Chief complaint: Shortness of breath    Reason for consultation: new Afib    History of Present Illness:    This is a 76 year old male patient with a history of interstitial lung disease, hypertension and gout.   He presented to the [facility] ED on 2021 with complaints of shortness of breath that began a few hours prior to arrival. He is on 3.5L oxygen at night at baseline prescribed by his pulmonologist for asbestosis pleural plaques. He was out of state and did not take his oxygen with him. He also reports a dry cough and leg swelling beyond his baseline. He had elevated D-dimer, proBNP 1711.0, EKG showed atrial fibrillation, rate of 90. CTA chest was negative for PE. He was given IV Lasix and admitted for further evaluation.     Patient reports progressively worsening shortness of breath for the past 3 months or more and has been interfering with his daily activities.  He plays golf with his wife and it has become increasingly difficult to walk around and play.  He believes he has gained some weight along with lower extremity swelling.  He denies any significant chest discomfort and no prior diagnosis of heart disease other than hypertension for which she takes medications.  Denies prior diagnosis of obstructive sleep apnea        Past Medical History:   Diagnosis Date   • Gout    • Hypertension    • Sebaceous cyst        Past Surgical History:   Procedure Laterality Date   • COLON SURGERY     • OTHER SURGICAL HISTORY      BOWEL BLOCKAGE       Medications Prior to Admission   Medication Sig Dispense Refill Last Dose   • allopurinol (ZYLOPRIM) 300 MG tablet TAKE 1 TABLET BY MOUTH DAILY 30 tablet 0    • amLODIPine (NORVASC) 10 MG  "tablet Take 1 tablet by mouth Daily. 90 tablet 3    • B Complex-Folic Acid (SUPER B COMPLEX MAXI PO) Take  by mouth.      • cholecalciferol (VITAMIN D3) 1000 units tablet Take 1,000 Units by mouth Daily.      • losartan (COZAAR) 100 MG tablet Take 1 tablet by mouth Daily. 90 tablet 3    • Multiple Vitamin (MULTI VITAMIN MENS PO) Take  by mouth.      • Multiple Vitamins-Minerals (OCUVITE EXTRA PO) Take  by mouth.          Current Meds  Scheduled Meds:allopurinol, 300 mg, Oral, Daily  apixaban, 5 mg, Oral, Q12H  b complex-vitamin c-folic acid, 1 tablet, Oral, Daily  cholecalciferol, 1,000 Units, Oral, Daily  dilTIAZem CD, 120 mg, Oral, Q24H  furosemide, 40 mg, Oral, Daily  [START ON 9/22/2021] losartan, 50 mg, Oral, Daily  multivitamin, 1 tablet, Oral, Daily  multivitamin with minerals, 1 tablet, Oral, Daily  spironolactone, 25 mg, Oral, Daily      Continuous Infusions:   PRN Meds:.•  acetaminophen  •  melatonin  •  nitroglycerin  •  ondansetron **OR** ondansetron  •  [COMPLETED] Insert peripheral IV **AND** sodium chloride    Allergies as of 09/20/2021   • (No Known Allergies)       Social History     Socioeconomic History   • Marital status:      Spouse name: Not on file   • Number of children: Not on file   • Years of education: Not on file   • Highest education level: Not on file   Tobacco Use   • Smoking status: Never Smoker   Substance and Sexual Activity   • Alcohol use: Yes   • Drug use: Defer   • Sexual activity: Defer       Family History   Problem Relation Age of Onset   • Diabetes Other    • Cancer Mother    • Diabetes Father    • Cancer Sister        REVIEW OF SYSTEMS:   All systems reviewed and negative except as noted in HPI.       Objective:   Temp:  [97.8 °F (36.6 °C)-98.9 °F (37.2 °C)] 98.9 °F (37.2 °C)  Heart Rate:  [] 116  Resp:  [16-18] 16  BP: (123-160)/(71-90) 123/89  Body mass index is 37.74 kg/m².  Flowsheet Rows      First Filed Value   Admission Height  177.8 cm (70\") Documented " at 09/20/2021 1926   Admission Weight  119 kg (263 lb) Documented at 09/20/2021 1926        Vitals:    09/21/21 1210   BP: 123/89   Pulse: 116   Resp: 16   Temp: 98.9 °F (37.2 °C)   SpO2: 94%       General Appearance:    Alert, cooperative, in no acute distress   Head:    Normocephalic, without obvious abnormality, atraumatic   Eyes:            Lids and lashes normal, conjunctivae and sclerae normal, no   icterus, no pallor, corneas clear, PERRLA   Ears:    Ears appear intact with no abnormalities noted   Throat:   No oral lesions, no thrush, oral mucosa moist   Neck:   No adenopathy, supple, trachea midline, no thyromegaly, no   carotid bruit, no JVD   Back:     No kyphosis present, no scoliosis present, no skin lesions, erythema or scars, no tenderness to percussion or palpation, range of motion normal   Lungs:     Clear to auscultation,respirations regular, even and unlabored    Heart:   Irregularly irregular, normal S1 and S2, no murmur, no gallop, no rub, no click   Chest Wall:    No abnormalities observed   Abdomen:     Normal bowel sounds, no masses, no organomegaly, soft nontender, nondistended, no guarding, no rebound  tenderness   Extremities:   Moves all extremities well, no edema, no cyanosis, no redness   Pulses:   Pulses palpable and equal bilaterally. Normal radial, carotid, femoral, dorsalis pedis and posterior tibial pulses bilaterally. Normal abdominal aorta   Skin:  Neurology:   Psychiatric:   No bleeding, bruising or rash   Normal speech and cranial nerve exam, no focal deficit   Alert and oriented x 3, normal mood and affect                 Review of Data:      Results from last 7 days   Lab Units 09/21/21  0413 09/20/21  1426 09/20/21  1426   SODIUM mmol/L 140   < > 141   POTASSIUM mmol/L 3.6   < > 3.9   CHLORIDE mmol/L 97*   < > 99   CO2 mmol/L 31.9*   < > 26.9   BUN mg/dL 20   < > 25*   CREATININE mg/dL 0.84   < > 0.84   CALCIUM mg/dL 9.2   < > 9.6   BILIRUBIN mg/dL  --   --  0.9   ALK PHOS  U/L  --   --  83   ALT (SGPT) U/L  --   --  26   AST (SGOT) U/L  --   --  43*   GLUCOSE mg/dL 100*   < > 115*    < > = values in this interval not displayed.     Results from last 7 days   Lab Units 09/20/21  1426   TROPONIN T ng/mL <0.010     @LABRCNTbnp@  Results from last 7 days   Lab Units 09/21/21  0413 09/20/21  1426   WBC 10*3/mm3 5.75 8.07   HEMOGLOBIN g/dL 12.1* 12.7*   HEMATOCRIT % 35.5* 36.8*   PLATELETS 10*3/mm3 253 282             @LABRCNTIP(chol,trig,hdl,ldl)    Admission EKG 09/20/2021:      Previous EKG 08/30/2017:      I personally viewed and interpreted the patient's EKG/Telemetry data  )  Patient Active Problem List   Diagnosis   • Gout   • BP (high blood pressure)   • Annual physical exam   • Hypercholesterolemia   • Shortness of breath on exertion   • Anemia   • Pulmonary asbestosis (CMS/HCC)   • New onset atrial fibrillation (CMS/HCC)     I have reviewed prior documentations and labs, EKG/rhyhtm strip, CTA of chest   Discussed with patient's nurse     Assessment and Plan:    1.  Shortness of breath-likely related to underlying diastolic CHF and atrial fibrillation  2.  Newly diagnosed atrial fibrillation-suspect to be symptomatic  CBR0DB2 vasc score   3.  Hypertension  4.  Obesity  5.  Pleural plaque  6.  Coronary calcification on CT   7.  Hypercholesterolemia  8. Prior hx of TIA and patient reports intermittent RUE numbness     Patient reports significant improvement of shortness of breath with IV diuresis.  At time of exam he was hemodynamically stable.  He gets tachycardic intermittently. I will start him a AV madhuri blocker and AC - we have discussed different options and will try Eliquis first. Made some adjustments to BP meds as BP only mildly elevated(diastolic even if he has not taken meds today) - change amlodipine to diltiazem and decreased dose of losartan as he will be on oral lasix/aldactone. Patient can be released on 48 hour Holter. I will follow him up in clinic and consider  doing Stress test to rule out any significant CAD.   Consider doing rhythm strategy if he continues to be symptomatic.    Needs to check weight daily, BP and HR monitoring at home and keep log. BMP in one week.    Thank you for consulting with cardiology      Ochoa Murrell MD  09/21/21  13:06 EDT.  Time spent in reviewing chart, discussion and examination:

## 2021-09-21 NOTE — CASE MANAGEMENT/SOCIAL WORK
Discharge Planning Assessment  [facility]     Patient Name: Gamal Modi  MRN: [id number]  Today's Date: 9/21/2021    Admit Date: 9/20/2021    Discharge Needs Assessment     Row Name 09/21/21 1536       Living Environment    Lives With  spouse    Current Living Arrangements  home/apartment/condo    Primary Care Provided by  self    Provides Primary Care For  no one    Family Caregiver if Needed  spouse    Quality of Family Relationships  supportive;involved    Able to Return to Prior Arrangements  yes       Resource/Environmental Concerns    Resource/Environmental Concerns  none       Transition Planning    Patient/Family Anticipates Transition to  home with family    Patient/Family Anticipated Services at Transition  none    Transportation Anticipated  family or friend will provide       Discharge Needs Assessment    Readmission Within the Last 30 Days  no previous admission in last 30 days    Equipment Currently Used at Home  oxygen Wears home O2 at night    Concerns to be Addressed  discharge planning    Anticipated Changes Related to Illness  none    Equipment Needed After Discharge  other (see comments) Scale given for Daily weights. Pt to purchase B/P cuff.    Provided Post Acute Provider List?  N/A    Provided Post Acute Provider Quality & Resource List?  N/A    Current Discharge Risk  chronically ill        Discharge Plan     Row Name 09/21/21 4368       Plan    Plan  Home with wife on Eliquis. To receive 1st month of Eliquis through [facility]  free and $10/month copay card given for following months. Scale given to pt for daily weights. Pt to be D/C on 48 hr holter monitor per Cardiology.    Patient/Family in Agreement with Plan  yes    Plan Comments  Met with pt. and wife at bedside. Explained roll of . Face sheet and pharmacy verified. Pt lives with his wife. There are 3 steps to enter home.  DME equipment includes home O2@2.5LNC at night. Scale given to pt. for daily  weights. Pt states he is buying a B/P cuff for home. Pt is independent with ADLs. Pt has never been to Rehab or used HH. Pt’s PCP is Dr. ROSETTA Monsivais. Pt enrolled with Meds to Bed as he is starting on Eliquis at D/C. Pt will receive 1st month of Eliquis through [facility] free and $10/month copay card given for following months. Pt still drives. At discharge, family will transport. Explained that CCP would follow to assess for discharge needs.  Chay Lehman RN-BC        Continued Care and Services - Admitted Since 9/20/2021    Coordination has not been started for this encounter.         Demographic Summary     Row Name 09/21/21 1533       General Information    Admission Type  inpatient    Arrived From  emergency department    Reason for Consult  care coordination/care conference;discharge planning    Preferred Language  English     Used During This Interaction  no        Functional Status     Row Name 09/21/21 1536       Functional Status    Usual Activity Tolerance  good    Current Activity Tolerance  good       Functional Status, IADL    Medications  independent    Meal Preparation  independent    Housekeeping  independent    Laundry  independent    Shopping  independent       Mental Status    General Appearance WDL  WDL       Mental Status Summary    Recent Changes in Mental Status/Cognitive Functioning  no changes                Chay Lehman RN

## 2021-09-21 NOTE — SIGNIFICANT NOTE
09/21/21 1028   PT Discharge Summary   Anticipated Discharge Disposition (PT) home   Reason for Discharge Independent;At baseline function   PT Screen/Discharge: Pt is a 75 yo male admitted 9/20/21 with SOA diagnosed with new onset a-fib. PMH includes but not limited to: HTN, HLD. Pt reports he lives with wife in 2 story home with 4STE with hand rail, no use of DME, was independent with mobility, driving, and golfing as a hobby prior to admission. Pt currently transfers and ambulates 150ft independently without AD. No mobility deficits, no safety issues. Pt reports SOA at end of bout that goes away with seated rest break. No skilled PT warranted at this time, PT will sign off. Please re-consult if pt status changes warranting skilled PT.    Time Spent Direct Patient Care: [id number]    Patient was intermittently wearing a face mask during this therapy encounter. Therapist used appropriate personal protective equipment including eye protection, mask, and gloves.  Mask used was standard procedure mask. Appropriate PPE was worn during the entire therapy session. Hand hygiene was completed before and after therapy session. Patient is not in enhanced droplet precautions.

## 2021-09-21 NOTE — PLAN OF CARE
Problem: Adult Inpatient Plan of Care  Goal: Plan of Care Review  Outcome: Ongoing, Progressing  Flowsheets (Taken 9/21/2021 1646)  Progress: improving  Plan of Care Reviewed With: patient  Outcome Summary: VSS. Echo done. Medications adjusted per cardiology. Possible discharge tomorrow. Holter monitor to be placed at discharge. No c/o pain or discomfort. Will continue to monitor.

## 2021-09-22 ENCOUNTER — READMISSION MANAGEMENT (OUTPATIENT)
Dept: CALL CENTER | Facility: HOSPITAL | Age: 76
End: 2021-09-22

## 2021-09-22 ENCOUNTER — APPOINTMENT (OUTPATIENT)
Dept: CARDIOLOGY | Facility: HOSPITAL | Age: 76
End: 2021-09-22

## 2021-09-22 VITALS
TEMPERATURE: 97.9 F | HEART RATE: 89 BPM | WEIGHT: 253.3 LBS | SYSTOLIC BLOOD PRESSURE: 120 MMHG | RESPIRATION RATE: 18 BRPM | BODY MASS INDEX: 36.26 KG/M2 | HEIGHT: 70 IN | DIASTOLIC BLOOD PRESSURE: 75 MMHG | OXYGEN SATURATION: 90 %

## 2021-09-22 PROBLEM — R94.6 ABNORMAL THYROID FUNCTION TEST: Status: ACTIVE | Noted: 2021-09-22

## 2021-09-22 PROBLEM — R06.00 DYSPNEA: Status: RESOLVED | Noted: 2019-09-03 | Resolved: 2021-09-22

## 2021-09-22 LAB
ALBUMIN SERPL-MCNC: 4 G/DL (ref 3.5–5.2)
ALBUMIN/GLOB SERPL: 1.5 G/DL
ALP SERPL-CCNC: 72 U/L (ref 39–117)
ALT SERPL W P-5'-P-CCNC: 52 U/L (ref 1–41)
ANION GAP SERPL CALCULATED.3IONS-SCNC: 14.8 MMOL/L (ref 5–15)
AST SERPL-CCNC: 62 U/L (ref 1–40)
BILIRUB SERPL-MCNC: 0.7 MG/DL (ref 0–1.2)
BUN SERPL-MCNC: 19 MG/DL (ref 8–23)
BUN/CREAT SERPL: 22.4 (ref 7–25)
CALCIUM SPEC-SCNC: 9.2 MG/DL (ref 8.6–10.5)
CHLORIDE SERPL-SCNC: 94 MMOL/L (ref 98–107)
CO2 SERPL-SCNC: 26.2 MMOL/L (ref 22–29)
CREAT SERPL-MCNC: 0.85 MG/DL (ref 0.76–1.27)
DEPRECATED RDW RBC AUTO: 45.1 FL (ref 37–54)
ERYTHROCYTE [DISTWIDTH] IN BLOOD BY AUTOMATED COUNT: 13 % (ref 12.3–15.4)
GFR SERPL CREATININE-BSD FRML MDRD: 88 ML/MIN/1.73
GLOBULIN UR ELPH-MCNC: 2.6 GM/DL
GLUCOSE SERPL-MCNC: 94 MG/DL (ref 65–99)
HCT VFR BLD AUTO: 34.3 % (ref 37.5–51)
HGB BLD-MCNC: 11.9 G/DL (ref 13–17.7)
MCH RBC QN AUTO: 33.1 PG (ref 26.6–33)
MCHC RBC AUTO-ENTMCNC: 34.7 G/DL (ref 31.5–35.7)
MCV RBC AUTO: 95.3 FL (ref 79–97)
PLATELET # BLD AUTO: 262 10*3/MM3 (ref 140–450)
PMV BLD AUTO: 9.5 FL (ref 6–12)
POTASSIUM SERPL-SCNC: 3.2 MMOL/L (ref 3.5–5.2)
PROT SERPL-MCNC: 6.6 G/DL (ref 6–8.5)
RBC # BLD AUTO: 3.6 10*6/MM3 (ref 4.14–5.8)
SODIUM SERPL-SCNC: 135 MMOL/L (ref 136–145)
T4 FREE SERPL-MCNC: 1.4 NG/DL (ref 0.93–1.7)
WBC # BLD AUTO: 5.63 10*3/MM3 (ref 3.4–10.8)

## 2021-09-22 PROCEDURE — 93246 EXT ECG>7D<15D RECORDING: CPT

## 2021-09-22 PROCEDURE — G0378 HOSPITAL OBSERVATION PER HR: HCPCS

## 2021-09-22 PROCEDURE — 80053 COMPREHEN METABOLIC PANEL: CPT | Performed by: INTERNAL MEDICINE

## 2021-09-22 PROCEDURE — 84481 FREE ASSAY (FT-3): CPT | Performed by: INTERNAL MEDICINE

## 2021-09-22 PROCEDURE — 84439 ASSAY OF FREE THYROXINE: CPT | Performed by: INTERNAL MEDICINE

## 2021-09-22 PROCEDURE — 85027 COMPLETE CBC AUTOMATED: CPT | Performed by: INTERNAL MEDICINE

## 2021-09-22 PROCEDURE — 84480 ASSAY TRIIODOTHYRONINE (T3): CPT | Performed by: INTERNAL MEDICINE

## 2021-09-22 RX ORDER — LOSARTAN POTASSIUM 50 MG/1
50 TABLET ORAL DAILY
Qty: 30 TABLET | Refills: 3 | Status: SHIPPED | OUTPATIENT
Start: 2021-09-22 | End: 2022-01-04 | Stop reason: SDUPTHER

## 2021-09-22 RX ORDER — SPIRONOLACTONE 25 MG/1
25 TABLET ORAL DAILY
Qty: 30 TABLET | Refills: 3 | Status: SHIPPED | OUTPATIENT
Start: 2021-09-22 | End: 2022-01-04

## 2021-09-22 RX ORDER — FUROSEMIDE 40 MG/1
40 TABLET ORAL DAILY
Qty: 30 TABLET | Refills: 3 | Status: SHIPPED | OUTPATIENT
Start: 2021-09-22 | End: 2022-01-04 | Stop reason: SDUPTHER

## 2021-09-22 RX ORDER — POTASSIUM CHLORIDE 750 MG/1
40 TABLET, FILM COATED, EXTENDED RELEASE ORAL ONCE
Status: COMPLETED | OUTPATIENT
Start: 2021-09-22 | End: 2021-09-22

## 2021-09-22 RX ORDER — DILTIAZEM HYDROCHLORIDE 120 MG/1
120 CAPSULE, COATED, EXTENDED RELEASE ORAL
Qty: 30 CAPSULE | Refills: 3 | Status: SHIPPED | OUTPATIENT
Start: 2021-09-22 | End: 2022-01-04 | Stop reason: SDUPTHER

## 2021-09-22 RX ADMIN — ALLOPURINOL 300 MG: 300 TABLET ORAL at 08:23

## 2021-09-22 RX ADMIN — LOSARTAN POTASSIUM 50 MG: 50 TABLET, FILM COATED ORAL at 09:24

## 2021-09-22 RX ADMIN — MULTIPLE VITAMINS W/ MINERALS TAB 1 TABLET: TAB at 08:24

## 2021-09-22 RX ADMIN — ACETAMINOPHEN 650 MG: 325 TABLET, FILM COATED ORAL at 07:16

## 2021-09-22 RX ADMIN — FUROSEMIDE 40 MG: 40 TABLET ORAL at 08:24

## 2021-09-22 RX ADMIN — DILTIAZEM HYDROCHLORIDE 120 MG: 120 CAPSULE, COATED, EXTENDED RELEASE ORAL at 09:24

## 2021-09-22 RX ADMIN — POTASSIUM CHLORIDE 40 MEQ: 750 TABLET, EXTENDED RELEASE ORAL at 07:14

## 2021-09-22 RX ADMIN — SPIRONOLACTONE 25 MG: 25 TABLET ORAL at 08:24

## 2021-09-22 RX ADMIN — APIXABAN 5 MG: 5 TABLET, FILM COATED ORAL at 08:24

## 2021-09-22 RX ADMIN — Medication 1 TABLET: at 08:24

## 2021-09-22 RX ADMIN — Medication 1000 UNITS: at 08:24

## 2021-09-22 NOTE — DISCHARGE SUMMARY
Patient Name: Gamal Modi  : 1945  MRN: [id number]    Date of Admission: 2021  Date of Discharge:  2021  Primary Care Physician: Alberto Monsivais MD      Discharge Diagnoses     Active Hospital Problems    Diagnosis  POA   • **New onset atrial fibrillation (CMS/HCC) [I48.91]  Yes   • Abnormal thyroid function test [R94.6]  Yes   • Diastolic dysfunction [I51.89]  Yes   • Anemia [D64.9]  Yes   • Fatty liver [K76.0]  Yes   • Essential hypertension [I10]  Yes      Resolved Hospital Problems    Diagnosis Date Resolved POA   • Dyspnea [R06.00] 2021 Yes        Hospital Course     Brief admission history and physical.  Please refer to the H&P for full details.  A very pleasant 76 years old white gentleman with a past history of hypertension/gout.  Patient presents to the emergency department with progressive shortness of breath within the last several hours.  There was no other cardiac/respiratory/GI/ symptoms.  His physical examination on admission included a blood pressure of 138/84 pulse of 94 temperature 98.6 a respiratory rate of 16 and a O2 sats of 96% on 2 L the rest of the examination is remarkable for overweight gentleman/decreased bilateral air entry in the chest examination/controlled rate atrial fibrillation.  Hospital course.  Initial ER evaluation included CBC that was normal except a hemoglobin of 12.7.  CMP was normal except BUN of 25 and a random blood sugar of 115 AST of 43.  CT of the chest revealed no evidence of pulmonary embolus with bilateral pleural thickening and partial calcification of the bilateral pleural plaques with small amount of left pleural effusion.  Troponin was negative.  D-dimer was positive at 1.1.  proBNP was elevated at 1711.  Covid was negative.  Patient was diagnosed with dyspnea and new onset rate controlled atrial fibrillation and a diastolic congestive heart failure.  2D echo was done revealed a normal ejection fraction no significant  valvular disease and evidence of diastolic dysfunction.  Troponins were negative.  CTA was negative for PE.  Vascular CHADS2 score was 2.  The cardiology consult was obtained and the patient was placed on IV diuresis.  Subsequently the medications were adjusted with decreasing the patient Arps and stopping Norvasc adding diltiazem and p.o. Lasix and Aldactone.  Eliquis was started.  Prior to discharge a Zio patch was placed.  His magnesium was checked and it was normal TSH was checked and was mildly elevated a total T3 and a free T4 was ordered and they are pending at the time of discharge.  The patient dyspnea has resolved with the above measures and cardiology okayed the discharge with an outpatient stress test.  He was noted to be anemic his hemoglobin remained stable around 12.  Patient has a history of pulmonary asbestosis his CTA of the chest was negative for any tumors.  And he needs to follow-up with every 6 month to few years CT of the chest chest.  We will leave that to the primary care physician.  He was noted to have an elevated liver function test and his CT of the chest revealed evidence of fatty liver his liver enzymes were mildly elevated at this time and this needs to be followed up as an outpatient.  The history of gout that remained stable on allopurinol.  At the time of discharge patient was asymptomatic and hemodynamically stable.  Patient will be discharged with a follow-up with primary care physician in 4 to 5 days with a repeat CBC/CMP/magnesium.  He will also follow-up with cardiology for stress test.    Consultants     Consult Orders (all) (From admission, onward)     Start     Ordered    09/20/21 1853  Inpatient Cardiology Consult  Once     Specialty:  Cardiology  Provider:  Breanna Maddox MD    09/20/21 1852 09/20/21 1619  [facility] (on-call MD unless specified) Details  Once     Specialty:  Hospitalist  Provider:  (Not yet assigned)    09/20/21 1616              Procedures     Imaging  Results (All)     Procedure Component Value Units Date/Time    CT Angiogram Chest [[id number]] Collected: 09/20/21 1637     Updated: 09/20/21 1650    Narrative:      CT ANGIOGRAM OF THE CHEST WITH CONTRAST INCLUDING RECONSTRUCTION IMAGES  09/20/2021     HISTORY: Shortness of breath. Recent travel. Possible pulmonary embolus.     TECHNIQUE: Following the intravenous contrast injection, CT angiography  was performed through the chest. Sagittal, coronal and 3-D  reconstruction images were reviewed.     FINDINGS: There is no evidence of pulmonary embolus. There is bilateral  pleural thickening with partial calcification of the bilateral pleural  plaques. Trace amount of left pleural effusion is seen.     No pathologically enlarged hilar or mediastinal lymph nodes are seen.  Calcified precarinal node is seen.     There are some scattered areas of chronic parenchymal change in the  lungs.     There is fatty infiltration of the liver. There is a 6.6 cm low-density  right hepatic lobe lesion consistent with a hepatic cyst. This was also  seen on the 11/02/2020 study.       Impression:      1. No evidence of pulmonary embolus.  2. Other findings discussed above appear stable since the 11/02/2020  study.                 Radiation dose reduction techniques were utilized, including automated  exposure control and exposure modulation based on body size.     This report was finalized on 9/20/2021 4:46 PM by Dr. Naseem Schaeffer M.D.             Pertinent Labs     Results from last 7 days   Lab Units 09/22/21  0358 09/21/21  0413 09/20/21  1426   WBC 10*3/mm3 5.63 5.75 8.07   HEMOGLOBIN g/dL 11.9* 12.1* 12.7*   PLATELETS 10*3/mm3 262 253 282     Results from last 7 days   Lab Units 09/22/21  0358 09/21/21  0413 09/20/21  1426   SODIUM mmol/L 135* 140 141   POTASSIUM mmol/L 3.2* 3.6 3.9   CHLORIDE mmol/L 94* 97* 99   CO2 mmol/L 26.2 31.9* 26.9   BUN mg/dL 19 20 25*   CREATININE mg/dL 0.85 0.84 0.84   GLUCOSE mg/dL 94 100* 115*    Estimated Creatinine Clearance: 96.8 mL/min (by C-G formula based on SCr of 0.85 mg/dL).  Results from last 7 days   Lab Units 09/22/21  0358 09/20/21  1426   ALBUMIN g/dL 4.00 4.50   BILIRUBIN mg/dL 0.7 0.9   ALK PHOS U/L 72 83   AST (SGOT) U/L 62* 43*   ALT (SGPT) U/L 52* 26     Results from last 7 days   Lab Units 09/22/21  0358 09/21/21  1628 09/21/21  0413 09/20/21  1426   CALCIUM mg/dL 9.2  --  9.2 9.6   ALBUMIN g/dL 4.00  --   --  4.50   MAGNESIUM mg/dL  --  1.8  --   --        Results from last 7 days   Lab Units 09/20/21  1426   TROPONIN T ng/mL <0.010   PROBNP pg/mL 1,711.0*   D DIMER QUANT MCGFEU/mL 1.16*           Invalid input(s): LDLCALC      Imaging Results (Last 24 Hours)     ** No results found for the last 24 hours. **          Test Results Pending at Discharge         Discharge Exam   Physical Exam  Vitals.  Temperature 97.9 a pulse of 89 respiratory rate of 18 and blood pressure 120/75 and O2 sats of 90% on room air.  General. Middle-aged gentleman. Alert oriented x3. No apparent pain distress or diaphoresis. Normal mood and affect. Overweight.  Eyes. Pupils equal round and reactive. Intact extraocular musculature. No pallor or jaundice. Normal conjunctive and lids. Oral cavity. Moist mucous membrane.  Neck. Supple. No JVD. No lymphadenopathy or thyromegaly.  Cardiovascular. Regular rate and rhythm. Grade 2 systolic murmur. Occasional ectopic beats.  Chest. Clear to auscultation bilaterally with no added sounds.  Abdomen. Soft lax. No tenderness. No organomegaly. No guarding or rebound.  Extremities. No clubbing cyanosis or edema.  CNS. No acute focal neurological deficits.  Discharge Details        Discharge Medications      New Medications      Instructions Start Date   apixaban 5 MG tablet tablet  Commonly known as: ELIQUIS   5 mg, Oral, Every 12 Hours Scheduled      dilTIAZem  MG 24 hr capsule  Commonly known as: CARDIZEM CD   120 mg, Oral, Every 24 Hours Scheduled      furosemide 40  MG tablet  Commonly known as: LASIX   40 mg, Oral, Daily      spironolactone 25 MG tablet  Commonly known as: ALDACTONE   25 mg, Oral, Daily         Changes to Medications      Instructions Start Date   losartan 50 MG tablet  Commonly known as: COZAAR  What changed:   · medication strength  · how much to take   50 mg, Oral, Daily         Continue These Medications      Instructions Start Date   allopurinol 300 MG tablet  Commonly known as: ZYLOPRIM   TAKE 1 TABLET BY MOUTH DAILY      cholecalciferol 25 MCG (1000 UT) tablet  Commonly known as: VITAMIN D3   1,000 Units, Oral, Daily      multivitamin tablet tablet  Commonly known as: THERAGRAN   Oral      multivitamin with minerals tablet tablet   Oral      SUPER B COMPLEX MAXI PO   Oral         Stop These Medications    amLODIPine 10 MG tablet  Commonly known as: NORVASC            No Known Allergies      Discharge Disposition:  Condition: Stable    Diet:   Diet Order   Procedures   • Diet Regular; Cardiac       Activity:   Activity Instructions     Activity as Tolerated            Counseling : No nonsteroidal anti-inflammatory drugs over-the-counter.    CODE STATUS:    Code Status and Medical Interventions:   Ordered at: 09/20/21 1852     Code Status:    CPR     Medical Interventions (Level of Support Prior to Arrest):    Full       Future Appointments   Date Time Provider Department Center   10/1/2021  8:00 AM S[facility]     Additional Instructions for the Follow-ups that You Need to Schedule     Call MD With Problems / Concerns   As directed      Instructions: Call MD or return to ER if chest pain/palpitation/shortness of breath/ankle edema/nausea or vomiting/abdominal pain/dizziness    Order Comments: Instructions: Call MD or return to ER if chest pain/palpitation/shortness of breath/ankle edema/nausea or vomiting/abdominal pain/dizziness          Discharge Follow-up with PCP   As directed       Currently Documented PCP:    Alberto Monsivais MD     PCP Phone Number:    [phone]     Follow Up Details: Primary MD.  4 days.  BMP and magnesium and CBC and liver function test rechecked/diastolic congestive heart failure/new onset atrial fibrillation/hypertension/gout/anemia/fatty liver with elevated liver function test/abnormal thyroid function test.         Discharge Follow-up with Specified Provider: Cardiology; 2 Weeks   As directed      To: Cardiology    Follow Up: 2 Weeks    Follow Up Details: New onset A. fib/diastolic congestive heart failure/hypertension         Basic Metabolic Panel    Sep 26, 2021 (Approximate)      Release to patient: Immediate           Follow-up Information     Alberto Monsivais MD .    Specialty: Internal Medicine  Why: Primary MD.  4 days.  BMP and magnesium and CBC and liver function test rechecked/diastolic congestive heart failure/new onset atrial fibrillation/hypertension/gout/anemia/fatty liver with elevated liver function test/abnormal thyroid function test.  Contact information:  [address] [facility] [address] [phone]                     Time Spent on Discharge:  Greater than 30 minutes      Marielena Leslie MD  [facility]  09/22/21  08:30 EDT

## 2021-09-22 NOTE — OUTREACH NOTE
Prep Survey      Responses   [facility] patient discharged from?  [place]   Is LACE score < 7 ?  No   Emergency Room discharge w/ pulse ox?  No   Eligibility  [facility]   Date of Admission  09/20/21   Date of Discharge  09/22/21   Discharge Disposition  Home or Self Care   Discharge diagnosis  New onset atrial fibrillation,   Dyspnea,   Diastolic HF     Does the patient have one of the following disease processes/diagnoses(primary or secondary)?  CHF   Does the patient have Home health ordered?  No   Is there a DME ordered?  No   Prep survey completed?  Yes          Ema Ornelas RN

## 2021-09-22 NOTE — PLAN OF CARE
Problem: Adult Inpatient Plan of Care  Goal: Plan of Care Review  Outcome: Ongoing, Progressing  Flowsheets (Taken 9/22/2021 0316)  Progress: improving  Plan of Care Reviewed With: patient  Outcome Summary: VSS. no c/o pain or SOA, on room air during night. no acute changed. will continue to monitor.

## 2021-09-23 ENCOUNTER — TRANSITIONAL CARE MANAGEMENT TELEPHONE ENCOUNTER (OUTPATIENT)
Dept: CALL CENTER | Facility: HOSPITAL | Age: 76
End: 2021-09-23

## 2021-09-23 ENCOUNTER — TELEPHONE (OUTPATIENT)
Dept: FAMILY MEDICINE CLINIC | Facility: CLINIC | Age: 76
End: 2021-09-23

## 2021-09-23 LAB
T3 SERPL-MCNC: 131 NG/DL (ref 71–180)
T3FREE SERPL-MCNC: 3.7 PG/ML (ref 2–4.4)

## 2021-09-23 NOTE — TELEPHONE ENCOUNTER
PATIENT CALLED BACK IS ANXIOUS TO GET THIS DONE AND TO TALK WITH DR ZHANG.    PLEASE ADVISE TODAY IF POSSIBLE  [phone]

## 2021-09-23 NOTE — TELEPHONE ENCOUNTER
Caller: Gamal Modi    Relationship: Self    Best call back number: [phone]     What orders are you requesting (i.e. lab or imaging): LAB WORK- THE FOLLOWING ORDERS:    CBC  BMP  MG  GOUT  ANEMIA   ELEVATED LIVER FUNCTION  ABNORMAL THYROID FUNCTION   LIVER FUNCTION TEST RECHECK     In what timeframe would the patient need to come in: WITHIN THE NEXT 4 DAYS IF AT ALL POSSIBLE     Where will you receive your lab/imaging services: IN OFFICE, [facility]    Additional notes:       PATIENT WAS ADMITTED TO [facility] ON 09/20/21 AND GOT DISCHARGED YESTERDAY. THE DOCTORS AT THE HOSPITAL WANTED HIM TO GET IN TO SEE HIS PCP WITHIN 4 DAYS OF DISCHARGE AND IS ASKING FOR HIM TO GET SEVERAL LABS RECHECKED. PLEASE CONTACT PATIENT ONCE ORDERS ARE ENTERED FOR SCHEDULING PURPOSES.

## 2021-09-23 NOTE — CASE MANAGEMENT/SOCIAL WORK
Case Management Discharge Note      Final Note: Home with wife on Eliquis. To receive 1st month of Eliquis through [facility]  free and $10/month copay card given for following months. Scale given to pt for daily weights. Pt to be D/C on 48 hr holter monitor per Cardiology. Transport by private auto    Provided Post Acute Provider List?: N/A  Provided Post Acute Provider Quality & Resource List?: N/A    Selected Continued Care - Discharged on 9/22/2021 Admission date: 9/20/2021 - Discharge disposition: Home or Self Care    Destination    No services have been selected for the patient.              Durable Medical Equipment    No services have been selected for the patient.              Dialysis/Infusion    No services have been selected for the patient.              Home Medical Care    No services have been selected for the patient.              Therapy    No services have been selected for the patient.              Community Resources    No services have been selected for the patient.              Community & DME    No services have been selected for the patient.                  Transportation Services  Private: Car    Final Discharge Disposition Code: 01 - home or self-care

## 2021-09-23 NOTE — TELEPHONE ENCOUNTER
"Caller: Gamal Modi    Relationship: Self    Best call back number: [phone]     What is the best time to reach you: ANY TIME     Who are you requesting to speak with (clinical staff, provider,  specific staff member):  STAFF     What was the call regarding: PATIENT CALLED IN TODAY TO GET SCHEDULED FOR A HOSPITAL FOLLOW UP. PATIENT WENT TO [facility] ON 09/20/21 WHERE HE WAS ADMITTED FOR SHORTNESS OF BREATHE- THEY DIAGNOSED HIM WITH \" A FIB \". PATIENT WAS DISCHARGED ON 09/22/21.    I WAS UNABLE TO GET PATIENT SCHEDULED WITHIN THE 7 DAY WINDOW WITH HIS PCP, PER OUR WORK FLOW. I ATTEMPTED TO WARM TRANSFER TO THE OFFICE FOR SCHEDULING PURPOSES AND WAS UNSUCCESSFUL.    PATIENT WAS TOLD TO SEE HIS PRIMARY CARE DOCTOR WITHIN 4 DAYS OF BEING DISCHARGED (WHERE HE WILL NEED SEVERAL LABS REDRAWN IN OFFICE- HE IS HOPING TO DO THE LABS AND THE FOLLOW UP ON THE SAME DAY- I AM SENDING IN A ORDER REQUEST MYSELF, FOR ALL THESE LABS THAT NEED TO BE RECHECKED)    PLEASE CONTACT PATIENT FOR SCHEDULING.          "

## 2021-09-23 NOTE — OUTREACH NOTE
Call Center TCM Note      Responses   [facility] patient discharged from?  [place]   Does the patient have one of the following disease processes/diagnoses(primary or secondary)?  CHF   TCM attempt successful?  No [Verbal release out of date ]   Unsuccessful attempts  Attempt 1           Honey Greer RN    9/23/2021, 14:34 EDT

## 2021-09-23 NOTE — OUTREACH NOTE
Call Center TCM Note      Responses   [facility] patient discharged from?  [place]   Does the patient have one of the following disease processes/diagnoses(primary or secondary)?  CHF   TCM attempt successful?  Yes   Call start time  1504   Call end time  1526   Discharge diagnosis  New onset atrial fibrillation,   Dyspnea,   Diastolic HF     Meds reviewed with patient/caregiver?  Yes   Is the patient having any side effects they believe may be caused by any medication additions or changes?  No   Does the patient have all medications ordered at discharge?  Yes   Is the patient taking all medications as directed (includes completed medication regime)?  Yes   Comments regarding appointments  cardiology apt on 10/5/21   Does the patient have a primary care provider?   Yes   Does the patient have an appointment with their PCP within 7 days of discharge?  Yes   Comments regarding PCP  Hosp dc fu apt on 9/27/21   DME comments  2.5 L at night ,  encouraged pt to purchase to pulse oximeter    Pulse Ox monitoring  None   Psychosocial issues?  No   Did the patient receive a copy of their discharge instructions?  Yes   Nursing interventions  Reviewed instructions with patient   What is the patient's perception of their health status since discharge?  Improving   Nursing interventions  Nurse provided patient education   Is the patient weighing daily?  Yes   Does the patient have scales?  Yes   Daily weight interventions  Education provided on importance of daily weight   Is the patient able to teach back Heart Failure diet management?  Yes   Is the patient able to teach back Heart Failure Zones?  Yes   Is the patient able to teach back signs and symptoms of worsening condition? (i.e. weight gain, shortness of air, etc.)  Yes   If the patient is a current smoker, are they able to teach back resources for cessation?  Not a smoker   Is the patient/caregiver able to teach back the hierarchy of who to call/visit for  symptoms/problems? PCP, Specialist, Home health nurse, Urgent Care, ED, 911  Yes   TCM call completed?  Yes   Wrap up additional comments  Everybody was really great pt reports            Honey Greer RN    9/23/2021, 15:26 EDT

## 2021-09-24 ENCOUNTER — TELEPHONE (OUTPATIENT)
Dept: CARDIOLOGY | Facility: CLINIC | Age: 76
End: 2021-09-24

## 2021-09-24 NOTE — TELEPHONE ENCOUNTER
----- Message from MATT Rivera sent at 9/23/2021  4:29 PM EDT -----  I am not sure why he was told to see me I have never seen him. See if Lydia has an availability but it is a Dr. Murrell patient. Thanks   ----- Message -----  From: Dhara Anderson MA  Sent: 9/23/2021  12:50 PM EDT  To: MATT Rivera    What do you need me to do dear?    DA  ----- Message -----  From: Naomi Reynolds  Sent: 9/22/2021   4:32 PM EDT  To: Dhara Anderson MA    Pt called stating he was told see Majo Cooper next week. Can you help me with this please. Thanks

## 2021-09-27 ENCOUNTER — TELEPHONE (OUTPATIENT)
Dept: CARDIOLOGY | Facility: CLINIC | Age: 76
End: 2021-09-27

## 2021-09-27 ENCOUNTER — OFFICE VISIT (OUTPATIENT)
Dept: FAMILY MEDICINE CLINIC | Facility: CLINIC | Age: 76
End: 2021-09-27

## 2021-09-27 VITALS
TEMPERATURE: 96.9 F | SYSTOLIC BLOOD PRESSURE: 142 MMHG | BODY MASS INDEX: 35.65 KG/M2 | WEIGHT: 249 LBS | DIASTOLIC BLOOD PRESSURE: 84 MMHG | HEART RATE: 85 BPM | OXYGEN SATURATION: 100 % | HEIGHT: 70 IN

## 2021-09-27 DIAGNOSIS — I51.89 DIASTOLIC DYSFUNCTION: ICD-10-CM

## 2021-09-27 DIAGNOSIS — I10 ESSENTIAL HYPERTENSION: ICD-10-CM

## 2021-09-27 DIAGNOSIS — Z09 HOSPITAL DISCHARGE FOLLOW-UP: Primary | ICD-10-CM

## 2021-09-27 DIAGNOSIS — R94.6 ABNORMAL THYROID FUNCTION TEST: ICD-10-CM

## 2021-09-27 DIAGNOSIS — I48.91 NEW ONSET ATRIAL FIBRILLATION (HCC): ICD-10-CM

## 2021-09-27 PROCEDURE — 99496 TRANSJ CARE MGMT HIGH F2F 7D: CPT | Performed by: STUDENT IN AN ORGANIZED HEALTH CARE EDUCATION/TRAINING PROGRAM

## 2021-09-27 NOTE — PROGRESS NOTES
"Chief Complaint  Hospital Follow Up Visit and Atrial Fibrillation    Subjective          Gamal Modi presents to [facility] PRIMARY CARE  History of Present Illness  For hospital follow-up.  Patient reported he is feeling much better than before.  He is taking all his medication and he has appointment with his cardiologist next week in October.  He reported he is aware like while he is on blood thinner he has to watch for any blood in stool, urine etc.  Denies any fever, headache, chest pain, palpitation, nausea, vomiting, abdominal pain, any blood in urine, any difficulty urination, constipation or diarrhea.  Patient reported he has been scheduled for stress test on October 1 and following that he has cardiology appointment on October 5.  Patient has a list of his blood pressure record at home with him.  Blood pressure readings are very good mostly below 120 systolic blood pressure lowest is 110 and highest  was 136 systolic blood pressure.    Objective   Vital Signs:   /84   Pulse 85   Temp 96.9 °F (36.1 °C)   Ht 177.8 cm (70\")   Wt 113 kg (249 lb)   SpO2 100%   BMI 35.73 kg/m²     Physical Exam  HENT:      Head: Normocephalic and atraumatic.      Mouth/Throat:      Mouth: Mucous membranes are moist.      Pharynx: Oropharynx is clear.   Eyes:      Extraocular Movements: Extraocular movements intact.      Conjunctiva/sclera: Conjunctivae normal.      Pupils: Pupils are equal, round, and reactive to light.   Cardiovascular:      Rate and Rhythm: Normal rate.   Pulmonary:      Effort: Pulmonary effort is normal.      Breath sounds: Normal breath sounds.   Abdominal:      General: Bowel sounds are normal.      Palpations: Abdomen is soft.   Musculoskeletal:         General: Normal range of motion.      Cervical back: Neck supple.   Skin:     General: Skin is warm.      Capillary Refill: Capillary refill takes less than 2 seconds.   Neurological:      General: No focal deficit " present.      Mental Status: He is alert and oriented to person, place, and time. Mental status is at baseline.   Psychiatric:         Mood and Affect: Mood normal.        Result Review :     CMP    CMP 9/20/21 9/21/21 9/22/21   Glucose 115 (A) 100 (A) 94   BUN 25 (A) 20 19   Creatinine 0.84 0.84 0.85   eGFR Non African Am 89 89 88   Sodium 141 140 135 (A)   Potassium 3.9 3.6 3.2 (A)   Chloride 99 97 (A) 94 (A)   Calcium 9.6 9.2 9.2   Albumin 4.50  4.00   Total Bilirubin 0.9  0.7   Alkaline Phosphatase 83  72   AST (SGOT) 43 (A)  62 (A)   ALT (SGPT) 26  52 (A)   (A) Abnormal value            CBC    CBC 9/20/21 9/21/21 9/22/21   WBC 8.07 5.75 5.63   RBC 3.86 (A) 3.66 (A) 3.60 (A)   Hemoglobin 12.7 (A) 12.1 (A) 11.9 (A)   Hematocrit 36.8 (A) 35.5 (A) 34.3 (A)   MCV 95.3 97.0 95.3   MCH 32.9 33.1 (A) 33.1 (A)   MCHC 34.5 34.1 34.7   RDW 13.6 13.4 13.0   Platelets 282 253 262   (A) Abnormal value            Lipid Panel    Lipid Panel 10/5/20   Total Cholesterol 194   Triglycerides 78   HDL Cholesterol 66 (A)   VLDL Cholesterol 15.6   LDL Cholesterol  112 (A)   LDL/HDL Ratio 1.70   (A) Abnormal value            TSH    TSH 9/21/21   TSH 6.310 (A)   (A) Abnormal value          Free T4 is 1.4 so he meets criteria of subclinical hypothyroidism.  Data reviewed: Recent hospitalization notes Govind was recently admitted in the hospital for shortness of breath and he was diagnosed with atrial fibrillation.  Echocardiogram was done which showed normal ejection fraction but diastolic function was indeterminate with increased left atrial pressure.  Cardiology was consulted and decision was made to place patient on Lasix.  Patient is taking Cardizem and Eliquis for A. fib.  Patient is scheduled to do stress test on October 1 and then he has a visit with his cardiologist on October 5     Current outpatient and discharge medications have been reconciled for the patient.  Reviewed by: Alysha Cosme MD       Assessment and Plan     Diagnoses and all orders for this visit:    1. Hospital discharge follow-up (Primary)  -     CBC Auto Differential  -     Comprehensive Metabolic Panel  -     Magnesium    2. New onset atrial fibrillation (CMS/HCC)  Comments:  Currently patient is rate controlled and is taking Cardizem and Eliquis.  Patient is educated to look for any bleeding while he is on blood thinner    3. Abnormal thyroid function test  Comments:  TSH was checked in hospital which came out slightly high with normal T4  Advised to ask cardiologist if any treatment is needed as pt is having a fib    4. Diastolic dysfunction  Comments:  Patient has cardiology appointment coming on October 5 and stress test on October 1    5. Essential hypertension  Comments:  Moderately controlled.  Blood pressure recordings SBP varies from 110-130 continue taking medication we will continue monitor    Repeat labs ordered.  Will follow up.    Follow Up   No follow-ups on file.  Patient was given instructions and counseling regarding his condition or for health maintenance advice. Please see specific information pulled into the AVS if appropriate.

## 2021-09-28 ENCOUNTER — TELEPHONE (OUTPATIENT)
Dept: FAMILY MEDICINE CLINIC | Facility: CLINIC | Age: 76
End: 2021-09-28

## 2021-09-28 LAB
ALBUMIN SERPL-MCNC: 4.7 G/DL (ref 3.5–5.2)
ALBUMIN/GLOB SERPL: 1.8 G/DL
ALP SERPL-CCNC: 82 U/L (ref 39–117)
ALT SERPL-CCNC: 28 U/L (ref 1–41)
AST SERPL-CCNC: 26 U/L (ref 1–40)
BASOPHILS # BLD AUTO: 0.08 10*3/MM3 (ref 0–0.2)
BASOPHILS NFR BLD AUTO: 1.3 % (ref 0–1.5)
BILIRUB SERPL-MCNC: 0.4 MG/DL (ref 0–1.2)
BUN SERPL-MCNC: 23 MG/DL (ref 8–23)
BUN/CREAT SERPL: 23 (ref 7–25)
CALCIUM SERPL-MCNC: 10.6 MG/DL (ref 8.6–10.5)
CHLORIDE SERPL-SCNC: 99 MMOL/L (ref 98–107)
CO2 SERPL-SCNC: 27.8 MMOL/L (ref 22–29)
CREAT SERPL-MCNC: 1 MG/DL (ref 0.76–1.27)
EOSINOPHIL # BLD AUTO: 0.15 10*3/MM3 (ref 0–0.4)
EOSINOPHIL NFR BLD AUTO: 2.4 % (ref 0.3–6.2)
ERYTHROCYTE [DISTWIDTH] IN BLOOD BY AUTOMATED COUNT: 12.7 % (ref 12.3–15.4)
GLOBULIN SER CALC-MCNC: 2.6 GM/DL
GLUCOSE SERPL-MCNC: 102 MG/DL (ref 65–99)
HCT VFR BLD AUTO: 38.5 % (ref 37.5–51)
HGB BLD-MCNC: 13.1 G/DL (ref 13–17.7)
IMM GRANULOCYTES # BLD AUTO: 0.01 10*3/MM3 (ref 0–0.05)
IMM GRANULOCYTES NFR BLD AUTO: 0.2 % (ref 0–0.5)
LYMPHOCYTES # BLD AUTO: 1.31 10*3/MM3 (ref 0.7–3.1)
LYMPHOCYTES NFR BLD AUTO: 21.3 % (ref 19.6–45.3)
MAGNESIUM SERPL-MCNC: 1.9 MG/DL (ref 1.6–2.4)
MCH RBC QN AUTO: 32.3 PG (ref 26.6–33)
MCHC RBC AUTO-ENTMCNC: 34 G/DL (ref 31.5–35.7)
MCV RBC AUTO: 95.1 FL (ref 79–97)
MONOCYTES # BLD AUTO: 1.03 10*3/MM3 (ref 0.1–0.9)
MONOCYTES NFR BLD AUTO: 16.7 % (ref 5–12)
NEUTROPHILS # BLD AUTO: 3.58 10*3/MM3 (ref 1.7–7)
NEUTROPHILS NFR BLD AUTO: 58.1 % (ref 42.7–76)
NRBC BLD AUTO-RTO: 0 /100 WBC (ref 0–0.2)
PLATELET # BLD AUTO: 365 10*3/MM3 (ref 140–450)
POTASSIUM SERPL-SCNC: 4.8 MMOL/L (ref 3.5–5.2)
PROT SERPL-MCNC: 7.3 G/DL (ref 6–8.5)
RBC # BLD AUTO: 4.05 10*6/MM3 (ref 4.14–5.8)
SODIUM SERPL-SCNC: 139 MMOL/L (ref 136–145)
WBC # BLD AUTO: 6.16 10*3/MM3 (ref 3.4–10.8)

## 2021-09-28 NOTE — TELEPHONE ENCOUNTER
Caller: Gamal Modi    Relationship to patient: Self    Best call back number:[phone]    Patient is needing: PATIENT RETURNED CALL REGARDING LAB RESULTS. PLEASE CALL PATIENT BACK. UNABLE TO WARM TRANSFER

## 2021-10-01 ENCOUNTER — HOSPITAL ENCOUNTER (OUTPATIENT)
Dept: CARDIOLOGY | Facility: HOSPITAL | Age: 76
Discharge: HOME | End: 2021-10-01
Admitting: INTERNAL MEDICINE

## 2021-10-01 VITALS — BODY MASS INDEX: 35.66 KG/M2 | HEIGHT: 70 IN | WEIGHT: 249.12 LBS

## 2021-10-01 DIAGNOSIS — I48.19 ATRIAL FIBRILLATION, PERSISTENT (HCC): ICD-10-CM

## 2021-10-01 LAB
BH CV NUCLEAR PRIOR STUDY: 2
BH CV REST NUCLEAR ISOTOPE DOSE: 11.2 MCI
BH CV STRESS BP STAGE 1: NORMAL
BH CV STRESS COMMENTS STAGE 1: NORMAL
BH CV STRESS DOSE REGADENOSON STAGE 1: 0.4
BH CV STRESS DURATION MIN STAGE 1: 0
BH CV STRESS DURATION SEC STAGE 1: 10
BH CV STRESS HR STAGE 1: 107
BH CV STRESS NUCLEAR ISOTOPE DOSE: 35.7 MCI
BH CV STRESS PROTOCOL 1: NORMAL
BH CV STRESS RECOVERY BP: NORMAL MMHG
BH CV STRESS RECOVERY HR: 88 BPM
BH CV STRESS STAGE 1: 1
LV EF NUC BP: 63 %
MAXIMAL PREDICTED HEART RATE: 146 BPM
PERCENT MAX PREDICTED HR: 73.29 %
STRESS BASELINE BP: NORMAL MMHG
STRESS BASELINE HR: 82 BPM
STRESS PERCENT HR: 86 %
STRESS POST EXERCISE DUR SEC: 10 SEC
STRESS POST PEAK BP: NORMAL MMHG
STRESS POST PEAK HR: 107 BPM
STRESS TARGET HR: 124 BPM

## 2021-10-01 PROCEDURE — 0 TECHNETIUM TETROFOSMIN KIT: Performed by: INTERNAL MEDICINE

## 2021-10-01 PROCEDURE — A9502 TC99M TETROFOSMIN: HCPCS | Performed by: INTERNAL MEDICINE

## 2021-10-01 PROCEDURE — 78452 HT MUSCLE IMAGE SPECT MULT: CPT

## 2021-10-01 PROCEDURE — 93017 CV STRESS TEST TRACING ONLY: CPT

## 2021-10-01 PROCEDURE — 25010000002 REGADENOSON 0.4 MG/5ML SOLUTION: Performed by: INTERNAL MEDICINE

## 2021-10-01 PROCEDURE — 78452 HT MUSCLE IMAGE SPECT MULT: CPT | Performed by: INTERNAL MEDICINE

## 2021-10-01 PROCEDURE — 93016 CV STRESS TEST SUPVJ ONLY: CPT | Performed by: INTERNAL MEDICINE

## 2021-10-01 PROCEDURE — 93018 CV STRESS TEST I&R ONLY: CPT | Performed by: INTERNAL MEDICINE

## 2021-10-01 RX ADMIN — REGADENOSON 0.4 MG: 0.08 INJECTION, SOLUTION INTRAVENOUS at 08:58

## 2021-10-01 RX ADMIN — TETROFOSMIN 1 DOSE: 1.38 INJECTION, POWDER, LYOPHILIZED, FOR SOLUTION INTRAVENOUS at 08:11

## 2021-10-01 RX ADMIN — TETROFOSMIN 1 DOSE: 1.38 INJECTION, POWDER, LYOPHILIZED, FOR SOLUTION INTRAVENOUS at 08:59

## 2021-10-05 ENCOUNTER — LAB (OUTPATIENT)
Dept: LAB | Facility: HOSPITAL | Age: 76
End: 2021-10-05

## 2021-10-05 ENCOUNTER — OFFICE VISIT (OUTPATIENT)
Dept: CARDIOLOGY | Facility: CLINIC | Age: 76
End: 2021-10-05

## 2021-10-05 VITALS
BODY MASS INDEX: 34.65 KG/M2 | HEIGHT: 70 IN | SYSTOLIC BLOOD PRESSURE: 130 MMHG | DIASTOLIC BLOOD PRESSURE: 80 MMHG | HEART RATE: 69 BPM | WEIGHT: 242 LBS | OXYGEN SATURATION: 99 %

## 2021-10-05 DIAGNOSIS — I48.19 ATRIAL FIBRILLATION, PERSISTENT (HCC): Primary | ICD-10-CM

## 2021-10-05 DIAGNOSIS — I48.19 ATRIAL FIBRILLATION, PERSISTENT (HCC): ICD-10-CM

## 2021-10-05 DIAGNOSIS — I51.89 DIASTOLIC DYSFUNCTION: ICD-10-CM

## 2021-10-05 DIAGNOSIS — E78.00 HYPERCHOLESTEROLEMIA: ICD-10-CM

## 2021-10-05 DIAGNOSIS — I10 ESSENTIAL HYPERTENSION: ICD-10-CM

## 2021-10-05 LAB
ANION GAP SERPL CALCULATED.3IONS-SCNC: 12.6 MMOL/L (ref 5–15)
BUN SERPL-MCNC: 26 MG/DL (ref 8–23)
BUN/CREAT SERPL: 24.5 (ref 7–25)
CALCIUM SPEC-SCNC: 10.2 MG/DL (ref 8.6–10.5)
CHLORIDE SERPL-SCNC: 100 MMOL/L (ref 98–107)
CO2 SERPL-SCNC: 28.4 MMOL/L (ref 22–29)
CREAT SERPL-MCNC: 1.06 MG/DL (ref 0.76–1.27)
GFR SERPL CREATININE-BSD FRML MDRD: 68 ML/MIN/1.73
GLUCOSE SERPL-MCNC: 97 MG/DL (ref 65–99)
POTASSIUM SERPL-SCNC: 4.3 MMOL/L (ref 3.5–5.2)
SODIUM SERPL-SCNC: 141 MMOL/L (ref 136–145)

## 2021-10-05 PROCEDURE — 80048 BASIC METABOLIC PNL TOTAL CA: CPT

## 2021-10-05 PROCEDURE — 99214 OFFICE O/P EST MOD 30 MIN: CPT | Performed by: INTERNAL MEDICINE

## 2021-10-05 PROCEDURE — 36415 COLL VENOUS BLD VENIPUNCTURE: CPT

## 2021-10-05 PROCEDURE — 93000 ELECTROCARDIOGRAM COMPLETE: CPT | Performed by: INTERNAL MEDICINE

## 2021-10-05 RX ORDER — ATORVASTATIN CALCIUM 20 MG/1
20 TABLET, FILM COATED ORAL DAILY
Qty: 90 TABLET | Refills: 3 | Status: SHIPPED | OUTPATIENT
Start: 2021-10-05 | End: 2022-01-04 | Stop reason: SDUPTHER

## 2021-10-05 NOTE — PROGRESS NOTES
PATIENTINFORMATION    Date of Office Visit: 10/05/2021  Encounter Provider: Ochoa Murrell MD  Place of Service: [facility] CARDIOLOGY  Patient Name: Gamal Modi  : 1945    Subjective:     Encounter Date:10/05/2021      Patient ID: Gamal Modi is a 76 y.o. male.    Chief Complaint   Patient presents with   • Atrial Fibrillation     hospital follow up    • Congestive Heart Failure   • Hypertension   • Hyperlipidemia     HPI  Mr. Modi a 76 years old man who was recently admitted and treated for symptomatic atrial fibrillation with shortness of breath came to cardiology clinic for post hospital discharge follow-up.  He was started on diltiazem, Eliquis and released home on 2-week ZIO that he returned a few days ago.  He has a normal myocardial perfusion study done few days ago as well.  Patient reports significantly improved shortness of breath particularly over the past several days likely due to conversion to sinus as EKG shows sinus rhythm today.  He also lost about 10 pounds since after discharge on Lasix and spironolactone.  He is compliant with all current medications and brought blood pressure logs from home that is within normal range for the most part and heart rate mostly running in the 80s and 90s.  He has been walking at least 2 miles every day without any significant limiting symptoms.  Otherwise no orthopnea, PND, palpitations, presyncope or syncope or extremity swelling.      ROS   All systems reviewed and negative except as noted in HPI.    Past Medical History:   Diagnosis Date   • AF (atrial fibrillation) (HCC)    • Gout    • Hypertension    • Sebaceous cyst        Past Surgical History:   Procedure Laterality Date   • COLON SURGERY     • OTHER SURGICAL HISTORY      BOWEL BLOCKAGE       Social History     Socioeconomic History   • Marital status:      Spouse name: Not on file   • Number of children: Not on file   • Years of education: Not on  "file   • Highest education level: Not on file   Tobacco Use   • Smoking status: Never Smoker   • Smokeless tobacco: Never Used   Vaping Use   • Vaping Use: Never used   Substance and Sexual Activity   • Alcohol use: Yes   • Drug use: Defer   • Sexual activity: Defer       Family History   Problem Relation Age of Onset   • Diabetes Other    • Cancer Mother    • Diabetes Father    • Cancer Sister            ECG 12 Lead    Date/Time: 10/5/2021 8:59 AM  Performed by: Ochoa Murrell MD  Authorized by: Ochoa Murrell MD   Comparison: compared with previous ECG from 9/20/2021  Similar to previous ECG  Comparison to previous ECG: Sinus rhythm has replaced A. fib on this tracing.  Rhythm: sinus rhythm  Rate: normal  Conduction: 1st degree AV block  ST Segments: ST segments normal  T Waves: T waves normal  QRS axis: normal  Other findings: non-specific ST-T wave changes and early transition    Clinical impression: abnormal EKG               Objective:     /80   Pulse 69   Ht 177.8 cm (70\")   Wt 110 kg (242 lb)   SpO2 99%   BMI 34.72 kg/m²  Body mass index is 34.72 kg/m².     Constitutional:       General: Not in acute distress.     Appearance: Well-developed. Not diaphoretic.   Eyes:      Pupils: Pupils are equal, round, and reactive to light.   HENT:      Head: Normocephalic and atraumatic.   Neck:      Thyroid: No thyromegaly.   Pulmonary:      Effort: Pulmonary effort is normal. No respiratory distress.      Breath sounds: Normal breath sounds. No wheezing. No rales.   Chest:      Chest wall: Not tender to palpatation.   Cardiovascular:      Normal rate. Regular rhythm.      No gallop.   Pulses:     Intact distal pulses.   Edema:     Peripheral edema absent.   Abdominal:      General: Bowel sounds are normal. There is no distension.      Palpations: Abdomen is soft.      Tenderness: There is no guarding.   Musculoskeletal: Normal range of motion.         General: No deformity.      Cervical back: " Normal range of motion and neck supple. Skin:     General: Skin is warm and dry.      Findings: No rash.   Neurological:      Mental Status: Alert and oriented to person, place, and time.      Cranial Nerves: No cranial nerve deficit.      Deep Tendon Reflexes: Reflexes are normal and symmetric.   Psychiatric:         Judgment: Judgment normal.         Review Of Data: I reviewed recent labs      Assessment/Plan:         1.  Shortness of breath-likely related to underlying diastolic CHF and atrial fibrillation  -Significantly improved and today patient is in sinus rhythm.  -Normal myocardial perfusion study on 10/1/2021  -Echo on 9/21/2021 revealed preserved low ventricular ejection fraction, elevated left atrial filling pressure while patient was in A. fib  2.    Recently diagnosed paroxysmal atrial fibrillation-probably- causing shortness of breath  -Today in sinus rhythm  -Zio patch still pending  -Continue diltiazem 120 mg p.o. daily and anticoagulation with Eliquis   3.  Hypertension-fairly controlled  4.  Obesity-lost about 10 pounds-encouraged to continue daily exercise  5.  Pleural plaque-follows up with pulmonary and uses nocturnal oxygen  6.  Coronary calcification on CT   -Start statin  7.  Hypercholesterolemia-start statin  8. Prior hx of TIA and patient reports intermittent RUE numbness -no neurologic complaints today       Patient reports significantly poor shortness of breath and almost back to baseline.  Encouraged to continue exercising.  I will hold off Lasix and spironolactone and patient will continue to monitor blood pressure at home.  May increase losartan if needed.  Home sleep study  BMP today        Sleep history reviewed with patient.  Decision to order home sleep study reviewed with patient and patient is agreeable.  Diagnosis and plan of care discussed with patient and verbalized understanding.    Return to clinic in 3 months or sooner with any concerning symptoms.           Ochoa CHACON  MD Handy  10/05/21  09:18 EDT

## 2021-10-07 ENCOUNTER — TELEPHONE (OUTPATIENT)
Dept: FAMILY MEDICINE CLINIC | Facility: CLINIC | Age: 76
End: 2021-10-07

## 2021-10-07 NOTE — TELEPHONE ENCOUNTER
Caller: Gamal Modi    Relationship: Self    Best call back number: [phone] (H)    Who is your current provider: DR. FILIPPO ZHANG     Who would you like your new provider to be: CORIE SWANN    What are your reasons for transferring care: HE REALLY LIKE HER WHEN HE SEEN HER ON 09/27/21. PATIENT STATES THAT HE FEELS THAT THIS MIGHT HELP DR. ZHANG OUT ALSO, SO HE HAS 1 LESS PATIENT TO SEE.     Additional notes:          THANKS

## 2021-10-12 LAB
MAXIMAL PREDICTED HEART RATE: 146 BPM
STRESS TARGET HR: 124 BPM

## 2021-10-12 PROCEDURE — 93248 EXT ECG>7D<15D REV&INTERPJ: CPT | Performed by: INTERNAL MEDICINE

## 2021-10-18 NOTE — TELEPHONE ENCOUNTER
THE PATIENT IS CALLING IN TO CHECK ON THE STATUS OF HIS TRANSFER OF CARE HE CAN BE REACHED  [phone]

## 2021-10-21 ENCOUNTER — APPOINTMENT (OUTPATIENT)
Dept: SLEEP MEDICINE | Facility: HOSPITAL | Age: 76
End: 2021-10-21

## 2021-11-02 ENCOUNTER — TRANSCRIBE ORDERS (OUTPATIENT)
Dept: ADMINISTRATIVE | Facility: HOSPITAL | Age: 76
End: 2021-11-02

## 2021-11-02 DIAGNOSIS — R06.09 DOE (DYSPNEA ON EXERTION): Primary | ICD-10-CM

## 2021-11-02 DIAGNOSIS — J92.9 PLEURAL PLAQUE: ICD-10-CM

## 2021-11-02 DIAGNOSIS — J98.4 RESTRICTIVE LUNG DISEASE: ICD-10-CM

## 2022-01-03 NOTE — TELEPHONE ENCOUNTER
Caller: Gamal Modi    Relationship: Self    Best call back number: [phone]     What is the best time to reach you: ANY    Who are you requesting to speak with (clinical staff, provider,  specific staff member): DR. SWANN    What was the call regarding: MEDICATION CHANGE WHILE IN HOSPITAL    Do you require a callback: YES

## 2022-01-04 DIAGNOSIS — I48.91 NEW ONSET ATRIAL FIBRILLATION: Primary | ICD-10-CM

## 2022-01-04 DIAGNOSIS — I10 ESSENTIAL HYPERTENSION: ICD-10-CM

## 2022-01-04 DIAGNOSIS — I51.89 DIASTOLIC DYSFUNCTION: ICD-10-CM

## 2022-01-04 RX ORDER — ATORVASTATIN CALCIUM 20 MG/1
20 TABLET, FILM COATED ORAL DAILY
Qty: 90 TABLET | Refills: 3 | Status: SHIPPED | OUTPATIENT
Start: 2022-01-04 | End: 2022-01-13 | Stop reason: SDUPTHER

## 2022-01-04 RX ORDER — DILTIAZEM HYDROCHLORIDE 120 MG/1
120 CAPSULE, COATED, EXTENDED RELEASE ORAL
Qty: 90 CAPSULE | Refills: 3 | Status: SHIPPED | OUTPATIENT
Start: 2022-01-04 | End: 2023-01-23

## 2022-01-04 RX ORDER — FUROSEMIDE 40 MG/1
40 TABLET ORAL DAILY
Qty: 90 TABLET | Refills: 3 | Status: SHIPPED | OUTPATIENT
Start: 2022-01-04 | End: 2022-08-05

## 2022-01-04 RX ORDER — LOSARTAN POTASSIUM 50 MG/1
50 TABLET ORAL DAILY
Qty: 30 TABLET | Refills: 3 | Status: CANCELLED | OUTPATIENT
Start: 2022-01-04

## 2022-01-04 RX ORDER — LOSARTAN POTASSIUM 50 MG/1
50 TABLET ORAL DAILY
Qty: 90 TABLET | Refills: 3 | Status: SHIPPED | OUTPATIENT
Start: 2022-01-04 | End: 2022-01-13 | Stop reason: SDUPTHER

## 2022-01-04 NOTE — TELEPHONE ENCOUNTER
Spoke with patient this morning. He stated that he has had a couple of medications that needed to be refilled. Eliquis is one of them. He only has 2 days left. Patient sees a cardiologist, Dr. Murrell. I'm not sure if they should be refilling the Eliquis or not. He has an appointment with you later this month but sees the cardiologist tomorrow. Enriquekj also sent over a refill on his losartan 50mg. I have pended that as well. If possible, patient would like a 90 day supply of both. Please advise on medication refills.      Thanks,  Korey

## 2022-01-04 NOTE — TELEPHONE ENCOUNTER
Caller: Gamal Modi    Relationship: Self    Best call back number: [phone]    Requested Prescriptions:   Requested Prescriptions     Pending Prescriptions Disp Refills   • apixaban (ELIQUIS) 5 MG tablet tablet 60 tablet 3     Sig: Take 1 tablet by mouth Every 12 (Twelve) Hours   • atorvastatin (LIPITOR) 20 MG tablet 90 tablet 3     Sig: Take 1 tablet by mouth Daily.   • dilTIAZem CD (CARDIZEM CD) 120 MG 24 hr capsule 30 capsule 3     Sig: Take 1 capsule by mouth Daily.   • furosemide (LASIX) 40 MG tablet 30 tablet 3     Sig: Take 1 tablet by mouth Daily.   • losartan (COZAAR) 50 MG tablet 30 tablet 3     Sig: Take 1 tablet by mouth Daily.        Pharmacy where request should be sent: [facility] [address] AT [facility] - [phone] [facility] [phone] FX     Additional details provided by patient: THE PATIENT WILL NEED THESE MEDICATIONS SENT TO THE [facility] PHARMACY ABOVE. PLEASE ADVISE.     Does the patient have less than a 3 day supply:  [x] Yes  [] No    Aamir Driscoll Rep   01/04/22 08:09 EST

## 2022-01-05 ENCOUNTER — OFFICE VISIT (OUTPATIENT)
Dept: CARDIOLOGY | Facility: CLINIC | Age: 77
End: 2022-01-05

## 2022-01-05 ENCOUNTER — LAB (OUTPATIENT)
Dept: LAB | Facility: HOSPITAL | Age: 77
End: 2022-01-05

## 2022-01-05 VITALS
RESPIRATION RATE: 16 BRPM | HEART RATE: 81 BPM | DIASTOLIC BLOOD PRESSURE: 82 MMHG | SYSTOLIC BLOOD PRESSURE: 136 MMHG | WEIGHT: 243 LBS | HEIGHT: 70 IN | BODY MASS INDEX: 34.79 KG/M2 | OXYGEN SATURATION: 96 %

## 2022-01-05 DIAGNOSIS — I48.0 PAROXYSMAL ATRIAL FIBRILLATION: Primary | ICD-10-CM

## 2022-01-05 DIAGNOSIS — I10 ESSENTIAL HYPERTENSION: ICD-10-CM

## 2022-01-05 DIAGNOSIS — E78.00 HYPERCHOLESTEROLEMIA: ICD-10-CM

## 2022-01-05 DIAGNOSIS — I51.89 DIASTOLIC DYSFUNCTION: ICD-10-CM

## 2022-01-05 LAB
ALBUMIN SERPL-MCNC: 4.7 G/DL (ref 3.5–5.2)
ALBUMIN/GLOB SERPL: 1.7 G/DL
ALP SERPL-CCNC: 95 U/L (ref 39–117)
ALT SERPL W P-5'-P-CCNC: 12 U/L (ref 1–41)
ANION GAP SERPL CALCULATED.3IONS-SCNC: 10.1 MMOL/L (ref 5–15)
AST SERPL-CCNC: 16 U/L (ref 1–40)
BILIRUB SERPL-MCNC: 0.5 MG/DL (ref 0–1.2)
BUN SERPL-MCNC: 15 MG/DL (ref 8–23)
BUN/CREAT SERPL: 18.3 (ref 7–25)
CALCIUM SPEC-SCNC: 10 MG/DL (ref 8.6–10.5)
CHLORIDE SERPL-SCNC: 97 MMOL/L (ref 98–107)
CHOLEST SERPL-MCNC: 135 MG/DL (ref 0–200)
CO2 SERPL-SCNC: 31.9 MMOL/L (ref 22–29)
CREAT SERPL-MCNC: 0.82 MG/DL (ref 0.76–1.27)
GFR SERPL CREATININE-BSD FRML MDRD: 92 ML/MIN/1.73
GLOBULIN UR ELPH-MCNC: 2.8 GM/DL
GLUCOSE SERPL-MCNC: 111 MG/DL (ref 65–99)
HDLC SERPL-MCNC: 46 MG/DL (ref 40–60)
LDLC SERPL CALC-MCNC: 73 MG/DL (ref 0–100)
LDLC/HDLC SERPL: 1.59 {RATIO}
POTASSIUM SERPL-SCNC: 3.5 MMOL/L (ref 3.5–5.2)
PROT SERPL-MCNC: 7.5 G/DL (ref 6–8.5)
SODIUM SERPL-SCNC: 139 MMOL/L (ref 136–145)
TRIGL SERPL-MCNC: 79 MG/DL (ref 0–150)
VLDLC SERPL-MCNC: 16 MG/DL (ref 5–40)

## 2022-01-05 PROCEDURE — 80053 COMPREHEN METABOLIC PANEL: CPT

## 2022-01-05 PROCEDURE — 80061 LIPID PANEL: CPT

## 2022-01-05 PROCEDURE — 99214 OFFICE O/P EST MOD 30 MIN: CPT | Performed by: INTERNAL MEDICINE

## 2022-01-05 PROCEDURE — 36415 COLL VENOUS BLD VENIPUNCTURE: CPT

## 2022-01-05 PROCEDURE — 93000 ELECTROCARDIOGRAM COMPLETE: CPT | Performed by: INTERNAL MEDICINE

## 2022-01-05 NOTE — PROGRESS NOTES
PATIENTINFORMATION    Date of Office Visit: 2022  Encounter Provider: Ochoa Murrell MD  Place of Service: [facility] CARDIOLOGY  Patient Name: Gamal Modi  : 1945    Subjective:     Encounter Date:2022      Patient ID: Gamal Modi is a 76 y.o. male.    Chief Complaint   Patient presents with   • Atrial Fibrillation     HPI  Mr. Modi is a very pleasant 75 yo man with pmh of paroxysmal atrial fibrillation and coronary calcifications came to clinic for follow-up visit.  He denies any shortness of breath, chest pain, palpitations and exercises regularly on a treadmill without any limiting exertional symptoms.  No ER visits or hospitalizations since last clinic visit about 3 months ago.  I discontinued Lasix and spironolactone during last visit and he denies any recurrence of shortness of breath or extremity swelling and his blood pressure has been running normal during home monitoring.  No bleeding from any site.  I referred him for sleep study cost but reports feeling great and is sleeping very well.      ROS   All systems reviewed and negative except as per HPI.    Past Medical History:   Diagnosis Date   • AF (atrial fibrillation) (HCC)    • Gout    • Hypertension    • Sebaceous cyst        Past Surgical History:   Procedure Laterality Date   • COLON SURGERY     • OTHER SURGICAL HISTORY      BOWEL BLOCKAGE       Social History     Socioeconomic History   • Marital status:    Tobacco Use   • Smoking status: Never Smoker   • Smokeless tobacco: Never Used   Vaping Use   • Vaping Use: Never used   Substance and Sexual Activity   • Alcohol use: Yes   • Drug use: Defer   • Sexual activity: Defer       Family History   Problem Relation Age of Onset   • Diabetes Other    • Cancer Mother    • Diabetes Father    • Cancer Sister            ECG 12 Lead    Date/Time: 2022 8:41 AM  Performed by: Ochoa Murrell MD  Authorized by: Ochoa Murrell  "MD [email or web address]   Comparison: compared with previous ECG from 10/5/2021  Similar to previous ECG  Rhythm: sinus rhythm  Rate: normal  Conduction: conduction normal  ST Segments: ST segments normal  T Waves: T waves normal  QRS axis: normal  Other: no other findings    Clinical impression: normal ECG               Objective:     /82 (BP Location: Left arm, Patient Position: Sitting, Cuff Size: Large Adult)   Pulse 81   Resp 16   Ht 177.8 cm (70\")   Wt 110 kg (243 lb)   SpO2 96%   BMI 34.87 kg/m²  Body mass index is 34.87 kg/m².     Constitutional:       General: Not in acute distress.     Appearance: Well-developed. Not diaphoretic.   Eyes:      Pupils: Pupils are equal, round, and reactive to light.   HENT:      Head: Normocephalic and atraumatic.   Neck:      Thyroid: No thyromegaly.   Pulmonary:      Effort: Pulmonary effort is normal. No respiratory distress.      Breath sounds: Normal breath sounds. No wheezing. No rales.   Chest:      Chest wall: Not tender to palpatation.   Cardiovascular:      Normal rate. Regular rhythm.      No gallop.   Pulses:     Intact distal pulses.   Edema:     Peripheral edema absent.   Abdominal:      General: Bowel sounds are normal. There is no distension.      Palpations: Abdomen is soft.      Tenderness: There is no guarding.   Musculoskeletal: Normal range of motion.         General: No deformity.      Cervical back: Normal range of motion and neck supple. Skin:     General: Skin is warm and dry.      Findings: No rash.   Neurological:      Mental Status: Alert and oriented to person, place, and time.      Cranial Nerves: No cranial nerve deficit.      Deep Tendon Reflexes: Reflexes are normal and symmetric.   Psychiatric:         Judgment: Judgment normal.         Review Of Data: Reviewed recent labs and his documents       Assessment/Plan:       1.    Shortness of breath in the past likely from A. fib and diastolic dysfunction-resolved  -Normal myocardial perfusion study on " 10/1/2021  -Echo on 9/21/2021 revealed preserved low ventricular ejection fraction, elevated left atrial filling pressure while patient was in A. fib  2.    Paroxysmal atrial fibrillation diagnosed in September 2021  -Today in sinus rhythm  -ZIO revealed A. fib burden of about 1% after discharge in  September  -Continue diltiazem 120 mg p.o. daily and anticoagulation with Eliquis   3.  Hypertension-fairly controlled only on diltiazem 120 mg p.o. daily  4.  Obesity-weight about the same compared to last visit  5.  Pleural plaque-follows up with pulmonary and uses nocturnal oxygen  6.  Coronary calcification on CT -myocardial perfusion study in October 2021  -on statin   7.  Hypercholesterolemia-started on statin about 3 months ago.  8. Prior hx of TIA and patient reports intermittent RUE numbness -no neurologic complaints today     Check lipid panel and CMP today      Diagnosis and plan of care discussed with patient and verbalized understanding.    Return to clinic in 1 year or sooner with any concerning symptoms         Ochoa Murrell MD  01/05/22  08:46 EST

## 2022-01-05 NOTE — PROGRESS NOTES
Place make patient aware of normal lipid/cholesterol level and his kidney and liver function tests are also normal.  Thank you

## 2022-01-06 ENCOUNTER — TELEPHONE (OUTPATIENT)
Dept: CARDIOLOGY | Facility: CLINIC | Age: 77
End: 2022-01-06

## 2022-01-06 NOTE — TELEPHONE ENCOUNTER
[facility] to be informed    ----- Message from Ochoa Murrell MD sent at 1/5/2022 12:45 PM EST -----  Place make patient aware of normal lipid/cholesterol level and his kidney and liver function tests are also normal.  Thank you

## 2022-01-13 DIAGNOSIS — I48.91 NEW ONSET ATRIAL FIBRILLATION: ICD-10-CM

## 2022-01-13 DIAGNOSIS — I51.89 DIASTOLIC DYSFUNCTION: ICD-10-CM

## 2022-01-13 DIAGNOSIS — I10 ESSENTIAL HYPERTENSION: ICD-10-CM

## 2022-01-13 RX ORDER — ATORVASTATIN CALCIUM 20 MG/1
20 TABLET, FILM COATED ORAL DAILY
Qty: 90 TABLET | Refills: 3 | Status: SHIPPED | OUTPATIENT
Start: 2022-01-13 | End: 2023-04-05

## 2022-01-13 RX ORDER — LOSARTAN POTASSIUM 50 MG/1
50 TABLET ORAL DAILY
Qty: 90 TABLET | Refills: 0 | Status: SHIPPED | OUTPATIENT
Start: 2022-01-13 | End: 2022-01-24

## 2022-01-18 DIAGNOSIS — D64.9 ANEMIA, UNSPECIFIED TYPE: ICD-10-CM

## 2022-01-18 DIAGNOSIS — E55.9 VITAMIN D DEFICIENCY: ICD-10-CM

## 2022-01-18 DIAGNOSIS — I10 ESSENTIAL HYPERTENSION: Primary | ICD-10-CM

## 2022-01-18 DIAGNOSIS — E78.00 HYPERCHOLESTEROLEMIA: ICD-10-CM

## 2022-01-18 DIAGNOSIS — R94.6 ABNORMAL THYROID FUNCTION TEST: ICD-10-CM

## 2022-01-19 LAB
25(OH)D3+25(OH)D2 SERPL-MCNC: 41.4 NG/ML (ref 30–100)
BASOPHILS # BLD AUTO: 0 X10E3/UL (ref 0–0.2)
BASOPHILS NFR BLD AUTO: 1 %
BUN SERPL-MCNC: 12 MG/DL (ref 8–27)
BUN/CREAT SERPL: 17 (ref 10–24)
CALCIUM SERPL-MCNC: 9.6 MG/DL (ref 8.6–10.2)
CHLORIDE SERPL-SCNC: 102 MMOL/L (ref 96–106)
CO2 SERPL-SCNC: 24 MMOL/L (ref 20–29)
CREAT SERPL-MCNC: 0.72 MG/DL (ref 0.76–1.27)
EOSINOPHIL # BLD AUTO: 0.3 X10E3/UL (ref 0–0.4)
EOSINOPHIL NFR BLD AUTO: 5 %
ERYTHROCYTE [DISTWIDTH] IN BLOOD BY AUTOMATED COUNT: 12.1 % (ref 11.6–15.4)
GLUCOSE SERPL-MCNC: 98 MG/DL (ref 65–99)
HBA1C MFR BLD: 5.5 % (ref 4.8–5.6)
HCT VFR BLD AUTO: 35.2 % (ref 37.5–51)
HGB BLD-MCNC: 12.1 G/DL (ref 13–17.7)
IMM GRANULOCYTES # BLD AUTO: 0 X10E3/UL (ref 0–0.1)
IMM GRANULOCYTES NFR BLD AUTO: 0 %
LYMPHOCYTES # BLD AUTO: 1.3 X10E3/UL (ref 0.7–3.1)
LYMPHOCYTES NFR BLD AUTO: 23 %
MCH RBC QN AUTO: 32.2 PG (ref 26.6–33)
MCHC RBC AUTO-ENTMCNC: 34.4 G/DL (ref 31.5–35.7)
MCV RBC AUTO: 94 FL (ref 79–97)
MONOCYTES # BLD AUTO: 0.7 X10E3/UL (ref 0.1–0.9)
MONOCYTES NFR BLD AUTO: 13 %
NEUTROPHILS # BLD AUTO: 3.2 X10E3/UL (ref 1.4–7)
NEUTROPHILS NFR BLD AUTO: 58 %
PLATELET # BLD AUTO: 273 X10E3/UL (ref 150–450)
POTASSIUM SERPL-SCNC: 4.5 MMOL/L (ref 3.5–5.2)
RBC # BLD AUTO: 3.76 X10E6/UL (ref 4.14–5.8)
SODIUM SERPL-SCNC: 141 MMOL/L (ref 134–144)
T4 FREE SERPL-MCNC: 1.25 NG/DL (ref 0.82–1.77)
TSH SERPL DL<=0.005 MIU/L-ACNC: 2.32 UIU/ML (ref 0.45–4.5)
WBC # BLD AUTO: 5.5 X10E3/UL (ref 3.4–10.8)

## 2022-01-24 ENCOUNTER — OFFICE VISIT (OUTPATIENT)
Dept: FAMILY MEDICINE CLINIC | Facility: CLINIC | Age: 77
End: 2022-01-24

## 2022-01-24 VITALS
OXYGEN SATURATION: 96 % | RESPIRATION RATE: 18 BRPM | BODY MASS INDEX: 34.9 KG/M2 | TEMPERATURE: 96.9 F | HEART RATE: 75 BPM | DIASTOLIC BLOOD PRESSURE: 85 MMHG | SYSTOLIC BLOOD PRESSURE: 161 MMHG | WEIGHT: 243.8 LBS | HEIGHT: 70 IN

## 2022-01-24 DIAGNOSIS — I10 ESSENTIAL HYPERTENSION: Primary | ICD-10-CM

## 2022-01-24 DIAGNOSIS — E78.00 HYPERCHOLESTEROLEMIA: ICD-10-CM

## 2022-01-24 DIAGNOSIS — I48.0 PAROXYSMAL ATRIAL FIBRILLATION: ICD-10-CM

## 2022-01-24 DIAGNOSIS — Z12.12 ENCOUNTER FOR COLORECTAL CANCER SCREENING: ICD-10-CM

## 2022-01-24 DIAGNOSIS — I51.89 DIASTOLIC DYSFUNCTION: ICD-10-CM

## 2022-01-24 DIAGNOSIS — Z12.11 ENCOUNTER FOR COLORECTAL CANCER SCREENING: ICD-10-CM

## 2022-01-24 DIAGNOSIS — D64.9 ANEMIA, UNSPECIFIED TYPE: ICD-10-CM

## 2022-01-24 PROCEDURE — 99397 PER PM REEVAL EST PAT 65+ YR: CPT | Performed by: STUDENT IN AN ORGANIZED HEALTH CARE EDUCATION/TRAINING PROGRAM

## 2022-01-24 RX ORDER — LOSARTAN POTASSIUM 100 MG/1
100 TABLET ORAL DAILY
Qty: 90 TABLET | Refills: 3 | Status: SHIPPED | OUTPATIENT
Start: 2022-01-24 | End: 2022-07-31 | Stop reason: HOSPADM

## 2022-01-24 NOTE — PROGRESS NOTES
"Chief Complaint  Annual Exam (physical)    Subjective          Gamal Modi presents to [facility] PRIMARY CARE  Annual physical exam and for hypertension.  Patient reported he checked his blood pressure at home daily and averages around 136, 138 at home.  Denies having any symptoms.  Review of system is negative for fever, headache, chest pain, shortness of breath, palpitation, nausea, vomiting, any recent change in bladder habits.        Objective   Vital Signs:   /85 (BP Location: Left arm, Patient Position: Sitting)   Pulse 75   Temp 96.9 °F (36.1 °C) (Oral)   Resp 18   Ht 177.8 cm (70\")   Wt 111 kg (243 lb 12.8 oz)   SpO2 96%   BMI 34.98 kg/m²     Physical Exam  HENT:      Head: Normocephalic and atraumatic.      Mouth/Throat:      Mouth: Mucous membranes are moist.      Pharynx: Oropharynx is clear.   Eyes:      Extraocular Movements: Extraocular movements intact.      Conjunctiva/sclera: Conjunctivae normal.      Pupils: Pupils are equal, round, and reactive to light.   Cardiovascular:      Rate and Rhythm: Normal rate and regular rhythm.   Pulmonary:      Effort: Pulmonary effort is normal.      Breath sounds: Normal breath sounds.   Abdominal:      General: Bowel sounds are normal.      Palpations: Abdomen is soft.   Musculoskeletal:         General: Normal range of motion.      Cervical back: Neck supple.   Skin:     General: Skin is warm.      Capillary Refill: Capillary refill takes less than 2 seconds.   Neurological:      General: No focal deficit present.      Mental Status: He is alert and oriented to person, place, and time. Mental status is at baseline.   Psychiatric:         Mood and Affect: Mood normal.        Result Review :                 Assessment and Plan    Diagnoses and all orders for this visit:    1. Essential hypertension (Primary)  Comments:  not well controlled , increase losartan to 100 mg PO daily.  Continue  BP monitoring  Orders:  -     " losartan (Cozaar) 100 MG tablet; Take 1 tablet by mouth Daily.  Dispense: 90 tablet; Refill: 3    2. Encounter for colorectal cancer screening  -     Ambulatory Referral For Screening Colonoscopy    3. Hypercholesterolemia  Comments:  Continue Lipitor 20 mg p.o. daily    4. Paroxysmal atrial fibrillation (HCC)  Comments:  Diltiazem 120 mg p.o. daily and Eliquis 5 mg p.o. twice daily continue same    5. Diastolic dysfunction  Comments:  asymtomatic , not on any lasix, f/uing cardiologist once a year.    6. Anemia, unspecified type  Comments:  Colonoscopy referral given, average hemoglobin in last 1 year is around 12        Follow Up   No follow-ups on file.  Patient was given instructions and counseling regarding his condition or for health maintenance advice. Please see specific information pulled into the AVS if appropriate.

## 2022-01-26 ENCOUNTER — PRE-PROCEDURE SCREENING (OUTPATIENT)
Dept: GASTROENTEROLOGY | Facility: CLINIC | Age: 77
End: 2022-01-26

## 2022-01-26 NOTE — TELEPHONE ENCOUNTER
Last scope 7yrs ( no records)-- No personal of polyps-- No family history of polyps or colon cancer--Eliquis--Medications:             apixaban (ELIQUIS) 5 MG tablet tablet  atorvastatin (LIPITOR) 20 MG tablet  cholecalciferol (VITAMIN D3) 1000 units tablet  dilTIAZem CD (CARDIZEM CD) 120 MG 24 hr capsule  furosemide (LASIX) 40 MG tablet  losartan (Cozaar) 100 MG tablet  Multiple Vitamin (MULTI VITAMIN MENS PO)  Multiple Vitamins-Minerals (OCUVITE   EXTRA PO)      Pt verbalized and understood that it would be few weeks before been schedule

## 2022-01-31 NOTE — TELEPHONE ENCOUNTER
When pt calls back will need New pt visit as indication is for anemia.  Ok to schedule with BG if necessary thx you

## 2022-02-14 ENCOUNTER — TELEPHONE (OUTPATIENT)
Dept: GASTROENTEROLOGY | Facility: CLINIC | Age: 77
End: 2022-02-14

## 2022-02-14 ENCOUNTER — OFFICE VISIT (OUTPATIENT)
Dept: GASTROENTEROLOGY | Facility: CLINIC | Age: 77
End: 2022-02-14

## 2022-02-14 VITALS — TEMPERATURE: 97.8 F | BODY MASS INDEX: 34.3 KG/M2 | WEIGHT: 239.6 LBS | HEIGHT: 70 IN

## 2022-02-14 DIAGNOSIS — D50.8 OTHER IRON DEFICIENCY ANEMIA: Primary | ICD-10-CM

## 2022-02-14 PROCEDURE — 99204 OFFICE O/P NEW MOD 45 MIN: CPT | Performed by: NURSE PRACTITIONER

## 2022-02-14 NOTE — PROGRESS NOTES
Chief Complaint   Patient presents with   • Anemia       HPI    Gamal Modi is a  76 y.o. male here to establish care as a new patient for anemia with a hemoglobin of 12.1.    This patient will also follow with Dr. Branham.    Currently without GI complaints; no nausea, vomiting, diarrhea, constipation, rectal bleeding, abdominal pain, poor appetite, or weight loss.    His last colonoscopy was 2015 and normal per his recollection.  No family history of colon polyps or colon cancer.    He is on Eliquis for history of atrial fibrillation.    Past Medical History:   Diagnosis Date   • AF (atrial fibrillation) (HCC)    • Gout    • Hypertension    • Sebaceous cyst        Past Surgical History:   Procedure Laterality Date   • COLON SURGERY     • COLONOSCOPY  approx 2015    negative per pt    • OTHER SURGICAL HISTORY      BOWEL BLOCKAGE       Scheduled Meds:     Continuous Infusions: No current facility-administered medications for this visit.      PRN Meds:     No Known Allergies    Social History     Socioeconomic History   • Marital status:    Tobacco Use   • Smoking status: Never Smoker   • Smokeless tobacco: Never Used   Vaping Use   • Vaping Use: Never used   Substance and Sexual Activity   • Alcohol use: Not Currently   • Drug use: Defer   • Sexual activity: Defer       Family History   Problem Relation Age of Onset   • Diabetes Other    • Cancer Mother    • Diabetes Father    • Cancer Sister        Review of Systems   Constitutional: Negative for activity change, appetite change, fatigue and unexpected weight change.   HENT: Negative for trouble swallowing.    Eyes: Negative.    Respiratory: Negative.    Cardiovascular: Negative.    Gastrointestinal: Negative for abdominal distention, abdominal pain, anal bleeding, blood in stool, constipation, diarrhea, nausea, rectal pain and vomiting.   Endocrine: Negative.    Genitourinary: Negative.    Musculoskeletal: Negative.    Allergic/Immunologic: Negative.     Neurological: Negative.    Hematological: Negative.    Psychiatric/Behavioral: Negative.        Vitals:    02/14/22 0937   Temp: 97.8 °F (36.6 °C)       Physical Exam  Constitutional:       Appearance: He is well-developed.   Abdominal:      General: Bowel sounds are normal. There is no distension.      Palpations: Abdomen is soft. There is no mass.      Tenderness: There is no abdominal tenderness. There is no guarding.      Hernia: No hernia is present.   Skin:     General: Skin is warm and dry.      Capillary Refill: Capillary refill takes less than 2 seconds.   Neurological:      Mental Status: He is alert and oriented to person, place, and time.   Psychiatric:         Behavior: Behavior normal.       Assessment    Diagnoses and all orders for this visit:    1. Other iron deficiency anemia (Primary)  -     Case Request; Standing  -     Case Request  -     CBC & Differential  -     Iron and TIBC  -     Vitamin B12 and Folate    Plan    Pleasant 76-year-old male seen today to establish care as a new patient for iron deficiency anemia.    Differential diagnosis for iron-deficiency anemia must include sources of occult gastrointestinal blood loss such as ulceration, AVM, inflammation, celiac disease, or mass/malignancy. We will proceed with bidirectional endoscopy for further evaluation.     I discussed with the patient that I would prefer that anticoagulation be held for prior to the procedure, as there is otherwise increased risk of bleeding. However, I have indicated that I would defer the final decision to the patient’s prescribing provider (cardiology/internist) as to whether or not it is safe to hold anticoagulation from a cardiac standpoint.    Labs today as above.    Follow-up and further recommendations pending the aforementioned work-up.         MATT Estrada  [facility]  [address] [place] [address]  Office: [phone]

## 2022-02-14 NOTE — TELEPHONE ENCOUNTER
MONIQUE Shepherd for colonoscopy/EGD on  03/12/2022 arrive at 8am   . Gave Prep instructions in office. ----miralax      Advised PT  that  will call with final arrival time  24 hrs before procedure. If they do not get a phone call, arrival time will stay the same as given on instructions

## 2022-02-15 ENCOUNTER — TELEPHONE (OUTPATIENT)
Dept: GASTROENTEROLOGY | Facility: CLINIC | Age: 77
End: 2022-02-15

## 2022-02-15 LAB
BASOPHILS # BLD AUTO: 0.1 X10E3/UL (ref 0–0.2)
BASOPHILS NFR BLD AUTO: 1 %
EOSINOPHIL # BLD AUTO: 0.3 X10E3/UL (ref 0–0.4)
EOSINOPHIL NFR BLD AUTO: 5 %
ERYTHROCYTE [DISTWIDTH] IN BLOOD BY AUTOMATED COUNT: 12 % (ref 11.6–15.4)
FOLATE SERPL-MCNC: >20 NG/ML
HCT VFR BLD AUTO: 38.6 % (ref 37.5–51)
HGB BLD-MCNC: 12.8 G/DL (ref 13–17.7)
IMM GRANULOCYTES # BLD AUTO: 0 X10E3/UL (ref 0–0.1)
IMM GRANULOCYTES NFR BLD AUTO: 0 %
IRON SATN MFR SERPL: 25 % (ref 15–55)
IRON SERPL-MCNC: 71 UG/DL (ref 38–169)
LYMPHOCYTES # BLD AUTO: 1.7 X10E3/UL (ref 0.7–3.1)
LYMPHOCYTES NFR BLD AUTO: 31 %
MCH RBC QN AUTO: 30.8 PG (ref 26.6–33)
MCHC RBC AUTO-ENTMCNC: 33.2 G/DL (ref 31.5–35.7)
MCV RBC AUTO: 93 FL (ref 79–97)
MONOCYTES # BLD AUTO: 0.7 X10E3/UL (ref 0.1–0.9)
MONOCYTES NFR BLD AUTO: 13 %
NEUTROPHILS # BLD AUTO: 2.8 X10E3/UL (ref 1.4–7)
NEUTROPHILS NFR BLD AUTO: 50 %
PLATELET # BLD AUTO: 367 X10E3/UL (ref 150–450)
RBC # BLD AUTO: 4.16 X10E6/UL (ref 4.14–5.8)
TIBC SERPL-MCNC: 288 UG/DL (ref 250–450)
UIBC SERPL-MCNC: 217 UG/DL (ref 111–343)
VIT B12 SERPL-MCNC: 595 PG/ML (ref 232–1245)
WBC # BLD AUTO: 5.5 X10E3/UL (ref 3.4–10.8)

## 2022-02-15 NOTE — PROGRESS NOTES
Please inform the patient that labs demonstrate improvement in hemoglobin up to 12.8 just about back to normal.  No evidence of vitamin deficiencies.  Await endoscopic findings.

## 2022-02-15 NOTE — TELEPHONE ENCOUNTER
----- Message from MATT Estrada sent at 2/15/2022  2:02 PM EST -----  Please inform the patient that labs demonstrate improvement in hemoglobin up to 12.8 just about back to normal.  No evidence of vitamin deficiencies.  Await endoscopic findings.

## 2022-02-18 ENCOUNTER — TELEPHONE (OUTPATIENT)
Dept: GASTROENTEROLOGY | Facility: CLINIC | Age: 77
End: 2022-02-18

## 2022-02-18 NOTE — TELEPHONE ENCOUNTER
----- Message from MATT Estrada sent at 2/14/2022 10:15 AM EST -----  Needs cardiac clearance to hold Eliquis prior to procedures with Dr. Branham.  Please contact cardiology.

## 2022-02-18 NOTE — TELEPHONE ENCOUNTER
Per Ochoa Murrell MD Stowers, Sharon G RN  Caller: Unspecified (Today,  8:36 AM)  It is ok to hold Elquis 2 days prior to procedure resume ASAP. Thank you!

## 2022-03-15 ENCOUNTER — OFFICE VISIT (OUTPATIENT)
Dept: FAMILY MEDICINE CLINIC | Facility: CLINIC | Age: 77
End: 2022-03-15

## 2022-03-15 VITALS
DIASTOLIC BLOOD PRESSURE: 66 MMHG | TEMPERATURE: 97.5 F | HEIGHT: 70 IN | WEIGHT: 235 LBS | HEART RATE: 79 BPM | BODY MASS INDEX: 33.64 KG/M2 | OXYGEN SATURATION: 98 % | SYSTOLIC BLOOD PRESSURE: 108 MMHG

## 2022-03-15 DIAGNOSIS — G56.02 CARPAL TUNNEL SYNDROME OF LEFT WRIST: ICD-10-CM

## 2022-03-15 DIAGNOSIS — G56.03 BILATERAL CARPAL TUNNEL SYNDROME: ICD-10-CM

## 2022-03-15 DIAGNOSIS — R20.2 NUMBNESS AND TINGLING OF BOTH UPPER EXTREMITIES WHILE SLEEPING: Primary | ICD-10-CM

## 2022-03-15 DIAGNOSIS — R20.0 NUMBNESS AND TINGLING OF BOTH UPPER EXTREMITIES WHILE SLEEPING: Primary | ICD-10-CM

## 2022-03-15 DIAGNOSIS — N52.9 ERECTILE DYSFUNCTION, UNSPECIFIED ERECTILE DYSFUNCTION TYPE: ICD-10-CM

## 2022-03-15 PROCEDURE — 99213 OFFICE O/P EST LOW 20 MIN: CPT | Performed by: STUDENT IN AN ORGANIZED HEALTH CARE EDUCATION/TRAINING PROGRAM

## 2022-03-15 RX ORDER — TADALAFIL 5 MG/1
5 TABLET ORAL DAILY PRN
Qty: 90 TABLET | Refills: 1 | Status: SHIPPED | OUTPATIENT
Start: 2022-03-15 | End: 2022-08-17

## 2022-03-15 NOTE — PROGRESS NOTES
"Chief Complaint  Shoulder Pain, Numbness, and Sleeping Problem    Subjective          Gamal Modi Sr. presents to [facility]  For bilateral shoulder pain numbness.  Patient reported his symptoms is affecting his sleep.  Pain often awakes him from sleep.  Sweated with any exacerbating or relieving factors.  Review of system is negative for fever, headache, chest pain, shortness of breath, palpitation, nausea, vomiting, any recent change in bladder habits.        Objective   Vital Signs:   /66   Pulse 79   Temp 97.5 °F (36.4 °C)   Ht 177.8 cm (70\")   Wt 107 kg (235 lb)   SpO2 98%   BMI 33.72 kg/m²     Physical Exam  HENT:      Head: Normocephalic and atraumatic.      Mouth/Throat:      Mouth: Mucous membranes are moist.      Pharynx: Oropharynx is clear.   Eyes:      Extraocular Movements: Extraocular movements intact.      Conjunctiva/sclera: Conjunctivae normal.      Pupils: Pupils are equal, round, and reactive to light.   Cardiovascular:      Rate and Rhythm: Normal rate and regular rhythm.   Pulmonary:      Effort: Pulmonary effort is normal.      Breath sounds: Normal breath sounds.   Abdominal:      General: Bowel sounds are normal.      Palpations: Abdomen is soft.   Musculoskeletal:         General: Normal range of motion.      Cervical back: Neck supple.      Comments: DTR on both right and left upper extremity 2+, pulses normal, skin warm to touch, motor strength grade 5 WNL both upper extremity   Skin:     General: Skin is warm.      Capillary Refill: Capillary refill takes less than 2 seconds.   Neurological:      General: No focal deficit present.      Mental Status: He is alert and oriented to person, place, and time. Mental status is at baseline.        Result Review :                 Assessment and Plan    Diagnoses and all orders for this visit:    1. Numbness and tingling of both upper extremities while sleeping (Primary)  -     XR Spine Cervical " Complete 4 or 5 View; Future  -     Ambulatory Referral to Physical Therapy Evaluate and treat; ROM, Strengthening  -     EMG & Nerve Conduction Test; Future    2. Erectile dysfunction, unspecified erectile dysfunction type  -     Testosterone  -     tadalafil (Cialis) 5 MG tablet; Take 1 tablet by mouth Daily As Needed for Erectile Dysfunction.  Dispense: 90 tablet; Refill: 1    3. Carpal tunnel syndrome of left wrist    4. Bilateral carpal tunnel syndrome    EMG studies ordered to confirm carpal tunnel syndrome, x-ray done negative for any fracture or acute compression.  Physical therapy referral given.  Patient was called and informed about x-ray result and reported his symptoms are improving.    Follow Up   Return if symptoms worsen or fail to improve.  Patient was given instructions and counseling regarding his condition or for health maintenance advice. Please see specific information pulled into the AVS if appropriate.

## 2022-03-17 ENCOUNTER — HOSPITAL ENCOUNTER (OUTPATIENT)
Dept: GENERAL RADIOLOGY | Facility: HOSPITAL | Age: 77
Discharge: HOME | End: 2022-03-17
Admitting: STUDENT IN AN ORGANIZED HEALTH CARE EDUCATION/TRAINING PROGRAM

## 2022-03-17 DIAGNOSIS — R20.2 NUMBNESS AND TINGLING OF BOTH UPPER EXTREMITIES WHILE SLEEPING: ICD-10-CM

## 2022-03-17 DIAGNOSIS — R20.0 NUMBNESS AND TINGLING OF BOTH UPPER EXTREMITIES WHILE SLEEPING: ICD-10-CM

## 2022-03-17 PROCEDURE — 72050 X-RAY EXAM NECK SPINE 4/5VWS: CPT

## 2022-03-18 LAB — TESTOST SERPL-MCNC: 457 NG/DL (ref 264–916)

## 2022-03-21 ENCOUNTER — TELEPHONE (OUTPATIENT)
Dept: FAMILY MEDICINE CLINIC | Facility: CLINIC | Age: 77
End: 2022-03-21

## 2022-03-23 NOTE — TELEPHONE ENCOUNTER
Dr. Cosme,       Please see patient message below. He called to discuss lab results. Let me know if anything additional is needed. Testosterone is resulted DOS 03/17/2022      ThanksKorey

## 2022-05-04 ENCOUNTER — TRANSCRIBE ORDERS (OUTPATIENT)
Dept: ADMINISTRATIVE | Facility: HOSPITAL | Age: 77
End: 2022-05-04

## 2022-05-04 DIAGNOSIS — Z01.818 OTHER SPECIFIED PRE-OPERATIVE EXAMINATION: Primary | ICD-10-CM

## 2022-05-05 ENCOUNTER — TELEPHONE (OUTPATIENT)
Dept: GASTROENTEROLOGY | Facility: CLINIC | Age: 77
End: 2022-05-05

## 2022-05-05 DIAGNOSIS — Z01.818 PRE-OP TESTING: Primary | ICD-10-CM

## 2022-05-10 ENCOUNTER — LAB (OUTPATIENT)
Dept: LAB | Facility: HOSPITAL | Age: 77
End: 2022-05-10

## 2022-05-10 DIAGNOSIS — Z01.818 OTHER SPECIFIED PRE-OPERATIVE EXAMINATION: ICD-10-CM

## 2022-05-10 LAB — SARS-COV-2 ORF1AB RESP QL NAA+PROBE: NOT DETECTED

## 2022-05-10 PROCEDURE — U0004 COV-19 TEST NON-CDC HGH THRU: HCPCS

## 2022-05-10 PROCEDURE — C9803 HOPD COVID-19 SPEC COLLECT: HCPCS

## 2022-05-12 ENCOUNTER — HOSPITAL ENCOUNTER (OUTPATIENT)
Facility: HOSPITAL | Age: 77
Setting detail: HOSPITAL OUTPATIENT SURGERY
Discharge: HOME | End: 2022-05-12
Attending: INTERNAL MEDICINE | Admitting: INTERNAL MEDICINE

## 2022-05-12 ENCOUNTER — ANESTHESIA (OUTPATIENT)
Dept: GASTROENTEROLOGY | Facility: HOSPITAL | Age: 77
End: 2022-05-12

## 2022-05-12 ENCOUNTER — ANESTHESIA EVENT (OUTPATIENT)
Dept: GASTROENTEROLOGY | Facility: HOSPITAL | Age: 77
End: 2022-05-12

## 2022-05-12 VITALS
OXYGEN SATURATION: 97 % | DIASTOLIC BLOOD PRESSURE: 68 MMHG | RESPIRATION RATE: 16 BRPM | WEIGHT: 220 LBS | BODY MASS INDEX: 30.8 KG/M2 | HEIGHT: 71 IN | SYSTOLIC BLOOD PRESSURE: 116 MMHG | HEART RATE: 77 BPM

## 2022-05-12 DIAGNOSIS — D50.8 OTHER IRON DEFICIENCY ANEMIA: ICD-10-CM

## 2022-05-12 PROCEDURE — 45385 COLONOSCOPY W/LESION REMOVAL: CPT | Performed by: INTERNAL MEDICINE

## 2022-05-12 PROCEDURE — 88305 TISSUE EXAM BY PATHOLOGIST: CPT | Performed by: INTERNAL MEDICINE

## 2022-05-12 PROCEDURE — S0260 H&P FOR SURGERY: HCPCS | Performed by: INTERNAL MEDICINE

## 2022-05-12 PROCEDURE — 43239 EGD BIOPSY SINGLE/MULTIPLE: CPT | Performed by: INTERNAL MEDICINE

## 2022-05-12 PROCEDURE — 25010000002 PROPOFOL 10 MG/ML EMULSION: Performed by: ANESTHESIOLOGY

## 2022-05-12 RX ORDER — PROPOFOL 10 MG/ML
VIAL (ML) INTRAVENOUS AS NEEDED
Status: DISCONTINUED | OUTPATIENT
Start: 2022-05-12 | End: 2022-05-12 | Stop reason: SURG

## 2022-05-12 RX ORDER — SODIUM CHLORIDE 0.9 % (FLUSH) 0.9 %
10 SYRINGE (ML) INJECTION EVERY 12 HOURS SCHEDULED
Status: DISCONTINUED | OUTPATIENT
Start: 2022-05-12 | End: 2022-05-12 | Stop reason: HOSPADM

## 2022-05-12 RX ORDER — GLYCOPYRROLATE 0.2 MG/ML
INJECTION INTRAMUSCULAR; INTRAVENOUS AS NEEDED
Status: DISCONTINUED | OUTPATIENT
Start: 2022-05-12 | End: 2022-05-12 | Stop reason: SURG

## 2022-05-12 RX ORDER — FERROUS SULFATE 325(65) MG
325 TABLET ORAL
COMMUNITY

## 2022-05-12 RX ORDER — SODIUM CHLORIDE, SODIUM LACTATE, POTASSIUM CHLORIDE, CALCIUM CHLORIDE 600; 310; 30; 20 MG/100ML; MG/100ML; MG/100ML; MG/100ML
30 INJECTION, SOLUTION INTRAVENOUS CONTINUOUS PRN
Status: DISCONTINUED | OUTPATIENT
Start: 2022-05-12 | End: 2022-05-12 | Stop reason: HOSPADM

## 2022-05-12 RX ORDER — SODIUM CHLORIDE 0.9 % (FLUSH) 0.9 %
10 SYRINGE (ML) INJECTION AS NEEDED
Status: DISCONTINUED | OUTPATIENT
Start: 2022-05-12 | End: 2022-05-12 | Stop reason: HOSPADM

## 2022-05-12 RX ORDER — LIDOCAINE HYDROCHLORIDE 20 MG/ML
INJECTION, SOLUTION INFILTRATION; PERINEURAL AS NEEDED
Status: DISCONTINUED | OUTPATIENT
Start: 2022-05-12 | End: 2022-05-12 | Stop reason: SURG

## 2022-05-12 RX ADMIN — GLYCOPYRROLATE 0.3 MG: 0.2 INJECTION INTRAMUSCULAR; INTRAVENOUS at 09:12

## 2022-05-12 RX ADMIN — PROPOFOL 400 MG: 10 INJECTION, EMULSION INTRAVENOUS at 09:14

## 2022-05-12 RX ADMIN — LIDOCAINE HYDROCHLORIDE 100 MG: 20 INJECTION, SOLUTION INFILTRATION; PERINEURAL at 09:12

## 2022-05-12 RX ADMIN — SODIUM CHLORIDE, POTASSIUM CHLORIDE, SODIUM LACTATE AND CALCIUM CHLORIDE 30 ML/HR: 600; 310; 30; 20 INJECTION, SOLUTION INTRAVENOUS at 08:43

## 2022-05-12 NOTE — H&P
[facility]  Pre Procedure History & Physical    Chief Complaint:   Time for my colonoscopy and egd     Subjective     HPI:   77 y.o. male with IDQA    Past Medical History:   Past Medical History:   Diagnosis Date   • AF (atrial fibrillation) (HCC)    • Elevated cholesterol    • Gout    • Hypertension    • Long term exposure to asbestos     3 years   • Sebaceous cyst        Family History:  Family History   Problem Relation Age of Onset   • Diabetes Other    • Cancer Mother    • Diabetes Father    • Cancer Sister        Social History:   reports that he has never smoked. He has never used smokeless tobacco. He reports previous alcohol use. Drug use questions deferred to the physician.    Medications:   Medications Prior to Admission   Medication Sig Dispense Refill Last Dose   • cholecalciferol (VITAMIN D3) 1000 units tablet Take 1,000 Units by mouth Daily.   Past Week at Unknown time   • dilTIAZem CD (CARDIZEM CD) 120 MG 24 hr capsule Take 1 capsule by mouth Daily. 90 capsule 3 Past Week at Unknown time   • losartan (Cozaar) 100 MG tablet Take 1 tablet by mouth Daily. 90 tablet 3 Past Week at Unknown time   • Multiple Vitamin (MULTI VITAMIN MENS PO) Take  by mouth.   Past Week at Unknown time   • apixaban (ELIQUIS) 5 MG tablet tablet Take 1 tablet by mouth Every 12 (Twelve) Hours 180 tablet 3 5/9/2022   • atorvastatin (LIPITOR) 20 MG tablet Take 1 tablet by mouth Daily. 90 tablet 3 More than a month at Unknown time   • ferrous sulfate 325 (65 FE) MG tablet Take 325 mg by mouth Daily With Breakfast.   5/8/2022   • furosemide (LASIX) 40 MG tablet Take 1 tablet by mouth Daily. 90 tablet 3 More than a month at Unknown time   • Multiple Vitamins-Minerals (OCUVITE EXTRA PO) Take  by mouth.      • tadalafil (Cialis) 5 MG tablet Take 1 tablet by mouth Daily As Needed for Erectile Dysfunction. 90 tablet 1        Allergies:  Patient has no known allergies.    ROS:    Pertinent items are noted in HPI  "    Objective     Blood pressure 150/77, pulse 73, resp. rate 17, height 180.3 cm (71\"), weight 99.8 kg (220 lb), SpO2 97 %.    Physical Exam   Constitutional: Pt is oriented to person, place, and time and well-developed, well-nourished, and in no distress.   Abdominal: Soft.   Psychiatric: Mood, memory, affect and judgment normal.     Assessment & Plan     Diagnosis:  STACY    Anticipated Surgical Procedure:  Colonoscopy and egd     The risks, benefits, and alternatives of this procedure have been discussed with the patient or the responsible party- the patient understands and agrees to proceed.                                                                "

## 2022-05-12 NOTE — ANESTHESIA PREPROCEDURE EVALUATION
Anesthesia Evaluation     NPO Solid Status: > 8 hours             Airway   Mallampati: II  TM distance: >3 FB  Neck ROM: full  no difficulty expected  Dental - normal exam     Pulmonary - normal exam   Cardiovascular - normal exam    (+) hypertension, dysrhythmias Atrial Fib, hyperlipidemia,       Neuro/Psych  GI/Hepatic/Renal/Endo    (+)   liver disease fatty liver disease,     Musculoskeletal     Abdominal  - normal exam   Substance History      OB/GYN          Other                        Anesthesia Plan    ASA 3     MAC       Anesthetic plan, all risks, benefits, and alternatives have been provided, discussed and informed consent has been obtained with: patient.        CODE STATUS:

## 2022-05-12 NOTE — DISCHARGE INSTRUCTIONS
For the next 24 hours patient needs to be with a responsible adult.    For 24 hours DO NOT drive, operate machinery, appliances, drink alcohol, make important decisions or sign legal documents.    Start with a light or bland diet if you are feeling sick to your stomach otherwise advance to regular diet as tolerated.    Follow recommendations on procedure report if provided by your doctor.    Call Dr Garcia for problems [phone]    Problems may include but not limited to: large amounts of bleeding, trouble breathing, repeated vomiting, severe unrelieved pain, fever or chills.

## 2022-05-12 NOTE — ANESTHESIA POSTPROCEDURE EVALUATION
"Patient: Gamal Modi Sr.    Procedure Summary     Date: 05/12/22 [facility] / [facility] /  [facility]    Anesthesia Start: 0907 Anesthesia Stop: 0937    Procedures:       ESOPHAGOGASTRODUODENOSCOPY WITH BIOPSIES (N/A Esophagus)      COLONOSCOPY INTO CECUM/ TERMINAL ILEUM WITH POLYPECTOMY (N/A ) Diagnosis:       Other iron deficiency anemia      (Other iron deficiency anemia [D50.8])    Surgeons: Aidan Branham MD Provider: John Sexton MD    Anesthesia Type: MAC ASA Status: 3          Anesthesia Type: MAC    Vitals  Vitals Value Taken Time   /68 05/12/22 0955   Temp     Pulse 77 05/12/22 0955   Resp 16 05/12/22 0955   SpO2 97 % 05/12/22 0955           Post Anesthesia Care and Evaluation    Patient location during evaluation: bedside  Patient participation: complete - patient participated  Level of consciousness: awake and alert  Pain management: adequate  Airway patency: patent  Anesthetic complications: No anesthetic complications    Cardiovascular status: acceptable  Respiratory status: acceptable  Hydration status: acceptable    Comments: /68 (BP Location: Left arm, Patient Position: Lying)   Pulse 77   Resp 16   Ht 180.3 cm (71\")   Wt 99.8 kg (220 lb)   SpO2 97%   BMI 30.68 kg/m²       " Estimated Blood Loss (Cc): minimal

## 2022-05-13 LAB
LAB AP CASE REPORT: NORMAL
PATH REPORT.FINAL DX SPEC: NORMAL
PATH REPORT.GROSS SPEC: NORMAL

## 2022-05-16 ENCOUNTER — OFFICE VISIT (OUTPATIENT)
Dept: FAMILY MEDICINE CLINIC | Facility: CLINIC | Age: 77
End: 2022-05-16

## 2022-05-16 VITALS
HEART RATE: 84 BPM | RESPIRATION RATE: 18 BRPM | BODY MASS INDEX: 30.8 KG/M2 | WEIGHT: 220 LBS | TEMPERATURE: 97.5 F | DIASTOLIC BLOOD PRESSURE: 74 MMHG | SYSTOLIC BLOOD PRESSURE: 110 MMHG | HEIGHT: 71 IN | OXYGEN SATURATION: 96 %

## 2022-05-16 DIAGNOSIS — J20.9 ACUTE BRONCHITIS, UNSPECIFIED ORGANISM: Primary | ICD-10-CM

## 2022-05-16 DIAGNOSIS — R05.9 COUGH: ICD-10-CM

## 2022-05-16 PROCEDURE — 99213 OFFICE O/P EST LOW 20 MIN: CPT | Performed by: STUDENT IN AN ORGANIZED HEALTH CARE EDUCATION/TRAINING PROGRAM

## 2022-05-16 RX ORDER — BIMATOPROST 0.01 %
1 DROPS OPHTHALMIC (EYE) NIGHTLY
COMMUNITY
Start: 2022-05-07

## 2022-05-16 RX ORDER — GUAIFENESIN/DEXTROMETHORPHAN 100-10MG/5
5 SYRUP ORAL NIGHTLY PRN
Qty: 118 ML | Refills: 0 | Status: SHIPPED | OUTPATIENT
Start: 2022-05-16 | End: 2022-05-31

## 2022-05-16 RX ORDER — AZITHROMYCIN 250 MG/1
TABLET, FILM COATED ORAL
Qty: 6 TABLET | Refills: 0 | Status: ON HOLD | OUTPATIENT
Start: 2022-05-16 | End: 2022-07-29

## 2022-05-16 NOTE — PROGRESS NOTES
I spoke to patient about his results, no recall needed he will follow-up with his PCP  You do not need to call none

## 2022-05-16 NOTE — PROGRESS NOTES
"Chief Complaint  Cough (CHEST CONGESTION- COUGHING UP GREEN- STARTED ABOUT FOUR DAYS AGO- NO FEVER, CHILLS, BODY ACHES)    Subjective          Gamal Modi Sr. presents to [facility]  For cough along with sputum production since last Thursday which was May 12.  Patient reported color of sputum has been changed to green now and that was the reason he came for evaluation.  He was tested positive in April but at that time did not have any symptoms and finished his isolation.  Patient has history of asbestosis and seeing pulmonologist.  Review of system is negative for fever, headache, chest pain, shortness of breath, palpitation, nausea, vomiting, any recent change in bladder habits.      Cough        Objective   Vital Signs:  /74   Pulse 84   Temp 97.5 °F (36.4 °C) (Oral)   Resp 18   Ht 180.3 cm (70.98\")   Wt 99.8 kg (220 lb)   SpO2 96%   BMI 30.70 kg/m²           Physical Exam  HENT:      Head: Normocephalic and atraumatic.      Mouth/Throat:      Mouth: Mucous membranes are moist.      Pharynx: Oropharynx is clear.   Eyes:      Extraocular Movements: Extraocular movements intact.      Conjunctiva/sclera: Conjunctivae normal.      Pupils: Pupils are equal, round, and reactive to light.   Cardiovascular:      Rate and Rhythm: Normal rate and regular rhythm.   Pulmonary:      Effort: Pulmonary effort is normal.      Breath sounds: Normal breath sounds.   Abdominal:      General: Bowel sounds are normal.      Palpations: Abdomen is soft.   Musculoskeletal:         General: Normal range of motion.      Cervical back: Neck supple.   Skin:     General: Skin is warm.      Capillary Refill: Capillary refill takes less than 2 seconds.   Neurological:      General: No focal deficit present.      Mental Status: He is alert and oriented to person, place, and time. Mental status is at baseline.   Psychiatric:         Mood and Affect: Mood normal.        Result Review :            "      Assessment and Plan    Diagnoses and all orders for this visit:    1. Acute bronchitis, unspecified organism (Primary)  Comments:  Prescribe Z-Jonathon, encouraged to drink a lot of water, over-the-counter Robitussin DM at night as needed for cough.  Orders:  -     azithromycin (Zithromax Z-Jonathon) 250 MG tablet; Take 2 tablets by mouth on day 1, then 1 tablet daily on days 2-5  Dispense: 6 tablet; Refill: 0  -     guaiFENesin-dextromethorphan (ROBITUSSIN DM) 100-10 MG/5ML syrup; Take 5 mL by mouth At Night As Needed for Cough for up to 15 days.  Dispense: 118 mL; Refill: 0    2. Cough  -     COVID-19,[facility] ROUTINE, NP/OP SWAB IN TRANSPORT MEDIA OR ESWAB 72 HR TAT - Swab, Nasopharynx  -     guaiFENesin-dextromethorphan (ROBITUSSIN DM) 100-10 MG/5ML syrup; Take 5 mL by mouth At Night As Needed for Cough for up to 15 days.  Dispense: 118 mL; Refill: 0    Warning symptoms like shortness of breath, using neck muscles for breathing, unable to speak in full sentences should not be ignored and patient should go immediately to ER for further evaluation.           Follow Up   Return if symptoms worsen or fail to improve.  Patient was given instructions and counseling regarding his condition or for health maintenance advice. Please see specific information pulled into the AVS if appropriate.

## 2022-05-17 LAB
LABCORP SARS-COV-2, NAA 2 DAY TAT: NORMAL
SARS-COV-2 RNA RESP QL NAA+PROBE: NOT DETECTED

## 2022-06-16 ENCOUNTER — OFFICE VISIT (OUTPATIENT)
Dept: ORTHOPEDIC SURGERY | Facility: CLINIC | Age: 77
End: 2022-06-16

## 2022-06-16 VITALS — TEMPERATURE: 98.6 F | HEIGHT: 71 IN | BODY MASS INDEX: 29.41 KG/M2 | WEIGHT: 210.1 LBS

## 2022-06-16 DIAGNOSIS — S42.402A CLOSED FRACTURE OF LEFT ELBOW, INITIAL ENCOUNTER: ICD-10-CM

## 2022-06-16 DIAGNOSIS — S46.012A TRAUMATIC TEAR OF LEFT ROTATOR CUFF, UNSPECIFIED TEAR EXTENT, INITIAL ENCOUNTER: ICD-10-CM

## 2022-06-16 DIAGNOSIS — M25.512 LEFT SHOULDER PAIN, UNSPECIFIED CHRONICITY: Primary | ICD-10-CM

## 2022-06-16 DIAGNOSIS — M25.532 LEFT WRIST PAIN: ICD-10-CM

## 2022-06-16 DIAGNOSIS — M25.522 LEFT ELBOW PAIN: ICD-10-CM

## 2022-06-16 PROCEDURE — 73110 X-RAY EXAM OF WRIST: CPT | Performed by: ORTHOPAEDIC SURGERY

## 2022-06-16 PROCEDURE — 73030 X-RAY EXAM OF SHOULDER: CPT | Performed by: ORTHOPAEDIC SURGERY

## 2022-06-16 PROCEDURE — 99204 OFFICE O/P NEW MOD 45 MIN: CPT | Performed by: ORTHOPAEDIC SURGERY

## 2022-06-16 PROCEDURE — 73070 X-RAY EXAM OF ELBOW: CPT | Performed by: ORTHOPAEDIC SURGERY

## 2022-06-16 RX ORDER — HYDROCODONE BITARTRATE AND ACETAMINOPHEN 5; 325 MG/1; MG/1
1 TABLET ORAL EVERY 4 HOURS PRN
Qty: 40 TABLET | Refills: 0 | Status: SHIPPED | OUTPATIENT
Start: 2022-06-16 | End: 2022-06-28 | Stop reason: SDUPTHER
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
## 2022-06-20 ENCOUNTER — TELEPHONE (OUTPATIENT)
Dept: FAMILY MEDICINE CLINIC | Facility: CLINIC | Age: 77
End: 2022-06-20

## 2022-06-20 NOTE — TELEPHONE ENCOUNTER
Caller: Gamal Modi Sr.    Relationship: Self    Best call back number: [phone]    Who are you requesting to speak with (clinical staff, provider,  specific staff member): CLINICAL STAFF     What was the call regarding: STATES THE NUMBING IN HANDS IS GETTING WORSE AND WANTS TO KNOW IF NERVE TEST SCHEDULED FOR 8/8/22 CAN BE MOVED UP SOONER PLEASE CALL AND ADVISE

## 2022-06-21 NOTE — TELEPHONE ENCOUNTER
Spoke with Zohra, she was able to get this patient in to see Dr. Washington 07/26/2022 for the study. Unfortunately this patient will be out of town that particular day. This was the only time Zohra could come up with after calling multiple different locations. Most of them are scheduling out until late August. Patient was offered to come in to talk to MD about worsening symptoms but patient declined and stated that he would wait until August to have the testing done. Nothing further needed at this time.      Korey

## 2022-06-21 NOTE — TELEPHONE ENCOUNTER
We are looking into this. We will see if patient can have testing done sooner elsewhere. Aida Luke is working on this. Updates to come as we get them.      Korey

## 2022-06-28 ENCOUNTER — OFFICE VISIT (OUTPATIENT)
Dept: ORTHOPEDIC SURGERY | Facility: CLINIC | Age: 77
End: 2022-06-28

## 2022-06-28 VITALS — WEIGHT: 215 LBS | BODY MASS INDEX: 30.78 KG/M2 | TEMPERATURE: 97.5 F | HEIGHT: 70 IN

## 2022-06-28 DIAGNOSIS — S49.92XD SHOULDER INJURY, LEFT, SUBSEQUENT ENCOUNTER: ICD-10-CM

## 2022-06-28 DIAGNOSIS — S46.012A TRAUMATIC TEAR OF LEFT ROTATOR CUFF, UNSPECIFIED TEAR EXTENT, INITIAL ENCOUNTER: ICD-10-CM

## 2022-06-28 DIAGNOSIS — S42.492D CLOSED BICONDYLAR FRACTURE OF DISTAL END OF LEFT HUMERUS WITH ROUTINE HEALING, SUBSEQUENT ENCOUNTER: ICD-10-CM

## 2022-06-28 DIAGNOSIS — M89.9 LESION OF LEFT HUMERUS: Primary | ICD-10-CM

## 2022-06-28 PROCEDURE — 99214 OFFICE O/P EST MOD 30 MIN: CPT | Performed by: NURSE PRACTITIONER

## 2022-06-28 RX ORDER — HYDROCODONE BITARTRATE AND ACETAMINOPHEN 5; 325 MG/1; MG/1
1 TABLET ORAL EVERY 4 HOURS PRN
Qty: 25 TABLET | Refills: 0 | Status: ON HOLD | OUTPATIENT
Start: 2022-06-28 | End: 2022-07-29
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
## 2022-07-05 ENCOUNTER — TELEPHONE (OUTPATIENT)
Dept: ORTHOPEDIC SURGERY | Facility: CLINIC | Age: 77
End: 2022-07-05

## 2022-07-05 NOTE — TELEPHONE ENCOUNTER
Provider: OLAF  Caller: BRONWYN  Phone Number: [phone]  Reason for Call:PT IS REPORTING HE HAS HAD THE IMAGINE YOU ORDERED HAS BEEN DONE LAST Thursday AT [facility]. HE WILL REACH OUT AND TRY TO GET THE IMAGINE SENT TO US ASAP

## 2022-07-05 NOTE — TELEPHONE ENCOUNTER
----- Message from Augusta Garcia MD sent at 7/5/2022  9:59 AM EDT -----  Can you order for me on this patient an MRI of his humerus I believe it is left with long bone tumor protocol with and without IV contrast thank you probably at [facility] just because that is where the other MRIs were done    Thank you

## 2022-07-12 ENCOUNTER — APPOINTMENT (OUTPATIENT)
Dept: MRI IMAGING | Facility: HOSPITAL | Age: 77
End: 2022-07-12

## 2022-07-28 ENCOUNTER — DOCUMENTATION (OUTPATIENT)
Dept: ORTHOPEDIC SURGERY | Facility: CLINIC | Age: 77
End: 2022-07-28

## 2022-07-29 ENCOUNTER — HOSPITAL ENCOUNTER (OUTPATIENT)
Facility: HOSPITAL | Age: 77
Setting detail: OBSERVATION
Discharge: HOME | End: 2022-07-31
Attending: EMERGENCY MEDICINE | Admitting: INTERNAL MEDICINE

## 2022-07-29 ENCOUNTER — APPOINTMENT (OUTPATIENT)
Dept: GENERAL RADIOLOGY | Facility: HOSPITAL | Age: 77
End: 2022-07-29

## 2022-07-29 ENCOUNTER — APPOINTMENT (OUTPATIENT)
Dept: MRI IMAGING | Facility: HOSPITAL | Age: 77
End: 2022-07-29

## 2022-07-29 DIAGNOSIS — R42 DIZZINESS: Primary | ICD-10-CM

## 2022-07-29 DIAGNOSIS — E87.1 HYPONATREMIA: ICD-10-CM

## 2022-07-29 LAB
ALBUMIN SERPL-MCNC: 4 G/DL (ref 3.5–5.2)
ALBUMIN/GLOB SERPL: 1.4 G/DL
ALP SERPL-CCNC: 101 U/L (ref 39–117)
ALT SERPL W P-5'-P-CCNC: 31 U/L (ref 1–41)
ANION GAP SERPL CALCULATED.3IONS-SCNC: 13.7 MMOL/L (ref 5–15)
ANION GAP SERPL CALCULATED.3IONS-SCNC: 17.3 MMOL/L (ref 5–15)
AST SERPL-CCNC: 33 U/L (ref 1–40)
BASOPHILS # BLD AUTO: 0.02 10*3/MM3 (ref 0–0.2)
BASOPHILS NFR BLD AUTO: 0.3 % (ref 0–1.5)
BILIRUB SERPL-MCNC: 1.2 MG/DL (ref 0–1.2)
BUN SERPL-MCNC: 10 MG/DL (ref 8–23)
BUN SERPL-MCNC: 11 MG/DL (ref 8–23)
BUN/CREAT SERPL: 11.6 (ref 7–25)
BUN/CREAT SERPL: 13 (ref 7–25)
CALCIUM SPEC-SCNC: 8.5 MG/DL (ref 8.6–10.5)
CALCIUM SPEC-SCNC: 9.2 MG/DL (ref 8.6–10.5)
CHLORIDE SERPL-SCNC: 74 MMOL/L (ref 98–107)
CHLORIDE SERPL-SCNC: 77 MMOL/L (ref 98–107)
CO2 SERPL-SCNC: 25.7 MMOL/L (ref 22–29)
CO2 SERPL-SCNC: 28.3 MMOL/L (ref 22–29)
CREAT SERPL-MCNC: 0.77 MG/DL (ref 0.76–1.27)
CREAT SERPL-MCNC: 0.95 MG/DL (ref 0.76–1.27)
DEPRECATED RDW RBC AUTO: 43.7 FL (ref 37–54)
EGFRCR SERPLBLD CKD-EPI 2021: 83.5 ML/MIN/1.73
EGFRCR SERPLBLD CKD-EPI 2021: 93.4 ML/MIN/1.73
EOSINOPHIL # BLD AUTO: 0.01 10*3/MM3 (ref 0–0.4)
EOSINOPHIL NFR BLD AUTO: 0.1 % (ref 0.3–6.2)
ERYTHROCYTE [DISTWIDTH] IN BLOOD BY AUTOMATED COUNT: 13.4 % (ref 12.3–15.4)
GLOBULIN UR ELPH-MCNC: 2.9 GM/DL
GLUCOSE BLDC GLUCOMTR-MCNC: 144 MG/DL (ref 70–130)
GLUCOSE SERPL-MCNC: 120 MG/DL (ref 65–99)
GLUCOSE SERPL-MCNC: 130 MG/DL (ref 65–99)
HCT VFR BLD AUTO: 33.7 % (ref 37.5–51)
HGB BLD-MCNC: 12 G/DL (ref 13–17.7)
HOLD SPECIMEN: NORMAL
IMM GRANULOCYTES # BLD AUTO: 0.02 10*3/MM3 (ref 0–0.05)
IMM GRANULOCYTES NFR BLD AUTO: 0.3 % (ref 0–0.5)
LYMPHOCYTES # BLD AUTO: 1.16 10*3/MM3 (ref 0.7–3.1)
LYMPHOCYTES NFR BLD AUTO: 17 % (ref 19.6–45.3)
MCH RBC QN AUTO: 32.3 PG (ref 26.6–33)
MCHC RBC AUTO-ENTMCNC: 35.6 G/DL (ref 31.5–35.7)
MCV RBC AUTO: 90.8 FL (ref 79–97)
MONOCYTES # BLD AUTO: 1.32 10*3/MM3 (ref 0.1–0.9)
MONOCYTES NFR BLD AUTO: 19.3 % (ref 5–12)
NEUTROPHILS NFR BLD AUTO: 4.31 10*3/MM3 (ref 1.7–7)
NEUTROPHILS NFR BLD AUTO: 63 % (ref 42.7–76)
NRBC BLD AUTO-RTO: 0 /100 WBC (ref 0–0.2)
NT-PROBNP SERPL-MCNC: 783 PG/ML (ref 0–1800)
PLATELET # BLD AUTO: 334 10*3/MM3 (ref 140–450)
PMV BLD AUTO: 9.6 FL (ref 6–12)
POTASSIUM SERPL-SCNC: 3.1 MMOL/L (ref 3.5–5.2)
POTASSIUM SERPL-SCNC: 3.1 MMOL/L (ref 3.5–5.2)
PROT SERPL-MCNC: 6.9 G/DL (ref 6–8.5)
QT INTERVAL: 393 MS
RBC # BLD AUTO: 3.71 10*6/MM3 (ref 4.14–5.8)
SARS-COV-2 ORF1AB RESP QL NAA+PROBE: NOT DETECTED
SODIUM SERPL-SCNC: 117 MMOL/L (ref 136–145)
SODIUM SERPL-SCNC: 119 MMOL/L (ref 136–145)
TROPONIN T SERPL-MCNC: <0.01 NG/ML (ref 0–0.03)
WBC NRBC COR # BLD: 6.84 10*3/MM3 (ref 3.4–10.8)
WHOLE BLOOD HOLD COAG: NORMAL
WHOLE BLOOD HOLD SPECIMEN: NORMAL

## 2022-07-29 PROCEDURE — 93010 ELECTROCARDIOGRAM REPORT: CPT | Performed by: INTERNAL MEDICINE

## 2022-07-29 PROCEDURE — 85025 COMPLETE CBC W/AUTO DIFF WBC: CPT | Performed by: EMERGENCY MEDICINE

## 2022-07-29 PROCEDURE — G0378 HOSPITAL OBSERVATION PER HR: HCPCS

## 2022-07-29 PROCEDURE — C9803 HOPD COVID-19 SPEC COLLECT: HCPCS

## 2022-07-29 PROCEDURE — 99285 EMERGENCY DEPT VISIT HI MDM: CPT

## 2022-07-29 PROCEDURE — 80053 COMPREHEN METABOLIC PANEL: CPT | Performed by: EMERGENCY MEDICINE

## 2022-07-29 PROCEDURE — U0004 COV-19 TEST NON-CDC HGH THRU: HCPCS | Performed by: EMERGENCY MEDICINE

## 2022-07-29 PROCEDURE — 83880 ASSAY OF NATRIURETIC PEPTIDE: CPT | Performed by: EMERGENCY MEDICINE

## 2022-07-29 PROCEDURE — 82962 GLUCOSE BLOOD TEST: CPT

## 2022-07-29 PROCEDURE — 99204 OFFICE O/P NEW MOD 45 MIN: CPT | Performed by: PSYCHIATRY & NEUROLOGY

## 2022-07-29 PROCEDURE — 93005 ELECTROCARDIOGRAM TRACING: CPT | Performed by: EMERGENCY MEDICINE

## 2022-07-29 PROCEDURE — 71045 X-RAY EXAM CHEST 1 VIEW: CPT

## 2022-07-29 PROCEDURE — 70551 MRI BRAIN STEM W/O DYE: CPT

## 2022-07-29 PROCEDURE — 84484 ASSAY OF TROPONIN QUANT: CPT | Performed by: EMERGENCY MEDICINE

## 2022-07-29 RX ORDER — POTASSIUM CHLORIDE 1.5 G/1.77G
40 POWDER, FOR SOLUTION ORAL AS NEEDED
Status: DISCONTINUED | OUTPATIENT
Start: 2022-07-29 | End: 2022-07-31 | Stop reason: HOSPADM

## 2022-07-29 RX ORDER — CYCLOBENZAPRINE HCL 10 MG
5 TABLET ORAL 3 TIMES DAILY PRN
Status: DISCONTINUED | OUTPATIENT
Start: 2022-07-29 | End: 2022-07-31 | Stop reason: HOSPADM

## 2022-07-29 RX ORDER — ONDANSETRON 4 MG/1
4 TABLET, FILM COATED ORAL EVERY 6 HOURS PRN
Status: DISCONTINUED | OUTPATIENT
Start: 2022-07-29 | End: 2022-07-31 | Stop reason: HOSPADM

## 2022-07-29 RX ORDER — POTASSIUM CHLORIDE 7.45 MG/ML
10 INJECTION INTRAVENOUS
Status: DISCONTINUED | OUTPATIENT
Start: 2022-07-29 | End: 2022-07-31 | Stop reason: HOSPADM

## 2022-07-29 RX ORDER — POTASSIUM CHLORIDE 750 MG/1
40 TABLET, FILM COATED, EXTENDED RELEASE ORAL AS NEEDED
Status: DISCONTINUED | OUTPATIENT
Start: 2022-07-29 | End: 2022-07-31 | Stop reason: HOSPADM

## 2022-07-29 RX ORDER — SODIUM CHLORIDE 0.9 % (FLUSH) 0.9 %
10 SYRINGE (ML) INJECTION AS NEEDED
Status: DISCONTINUED | OUTPATIENT
Start: 2022-07-29 | End: 2022-07-31 | Stop reason: HOSPADM

## 2022-07-29 RX ORDER — MULTIPLE VITAMINS W/ MINERALS TAB 9MG-400MCG
1 TAB ORAL DAILY
Status: DISCONTINUED | OUTPATIENT
Start: 2022-07-30 | End: 2022-07-31 | Stop reason: HOSPADM

## 2022-07-29 RX ORDER — ATORVASTATIN CALCIUM 20 MG/1
20 TABLET, FILM COATED ORAL DAILY
Status: DISCONTINUED | OUTPATIENT
Start: 2022-07-30 | End: 2022-07-31 | Stop reason: HOSPADM

## 2022-07-29 RX ORDER — ONDANSETRON 2 MG/ML
4 INJECTION INTRAMUSCULAR; INTRAVENOUS EVERY 6 HOURS PRN
Status: DISCONTINUED | OUTPATIENT
Start: 2022-07-29 | End: 2022-07-31 | Stop reason: HOSPADM

## 2022-07-29 RX ORDER — ACETAMINOPHEN 325 MG/1
650 TABLET ORAL EVERY 4 HOURS PRN
Status: DISCONTINUED | OUTPATIENT
Start: 2022-07-29 | End: 2022-07-31 | Stop reason: HOSPADM

## 2022-07-29 RX ORDER — DIPHENOXYLATE HYDROCHLORIDE AND ATROPINE SULFATE 2.5; .025 MG/1; MG/1
1 TABLET ORAL DAILY
Status: DISCONTINUED | OUTPATIENT
Start: 2022-07-30 | End: 2022-07-31 | Stop reason: HOSPADM

## 2022-07-29 RX ORDER — MELATONIN
1000 DAILY
Status: DISCONTINUED | OUTPATIENT
Start: 2022-07-30 | End: 2022-07-31 | Stop reason: HOSPADM

## 2022-07-29 RX ORDER — DILTIAZEM HYDROCHLORIDE 120 MG/1
120 CAPSULE, COATED, EXTENDED RELEASE ORAL
Status: DISCONTINUED | OUTPATIENT
Start: 2022-07-30 | End: 2022-07-31 | Stop reason: HOSPADM

## 2022-07-29 RX ORDER — UREA 10 %
3 LOTION (ML) TOPICAL NIGHTLY PRN
Status: DISCONTINUED | OUTPATIENT
Start: 2022-07-29 | End: 2022-07-31 | Stop reason: HOSPADM

## 2022-07-29 RX ORDER — FERROUS SULFATE 325(65) MG
325 TABLET ORAL
Status: DISCONTINUED | OUTPATIENT
Start: 2022-07-30 | End: 2022-07-31 | Stop reason: HOSPADM

## 2022-07-29 RX ORDER — NITROGLYCERIN 0.4 MG/1
0.4 TABLET SUBLINGUAL
Status: DISCONTINUED | OUTPATIENT
Start: 2022-07-29 | End: 2022-07-31 | Stop reason: HOSPADM

## 2022-07-29 RX ORDER — LATANOPROST 50 UG/ML
1 SOLUTION/ DROPS OPHTHALMIC NIGHTLY
Status: DISCONTINUED | OUTPATIENT
Start: 2022-07-29 | End: 2022-07-31 | Stop reason: HOSPADM

## 2022-07-29 RX ADMIN — SODIUM CHLORIDE 1000 ML: 9 INJECTION, SOLUTION INTRAVENOUS at 11:28

## 2022-07-29 RX ADMIN — ACETAMINOPHEN 650 MG: 325 TABLET, FILM COATED ORAL at 18:49

## 2022-07-29 RX ADMIN — Medication 10 ML: at 11:23

## 2022-07-29 RX ADMIN — POTASSIUM CHLORIDE 40 MEQ: 750 TABLET, EXTENDED RELEASE ORAL at 22:22

## 2022-07-29 RX ADMIN — APIXABAN 5 MG: 5 TABLET, FILM COATED ORAL at 22:18

## 2022-07-29 RX ADMIN — LATANOPROST 1 DROP: 50 SOLUTION/ DROPS OPHTHALMIC at 22:18

## 2022-07-30 PROBLEM — E87.1 HYPONATREMIA: Status: ACTIVE | Noted: 2022-07-30

## 2022-07-30 LAB
ANION GAP SERPL CALCULATED.3IONS-SCNC: 11.3 MMOL/L (ref 5–15)
BUN SERPL-MCNC: 10 MG/DL (ref 8–23)
BUN/CREAT SERPL: 15.4 (ref 7–25)
CALCIUM SPEC-SCNC: 8.7 MG/DL (ref 8.6–10.5)
CHLORIDE SERPL-SCNC: 83 MMOL/L (ref 98–107)
CO2 SERPL-SCNC: 30.7 MMOL/L (ref 22–29)
CREAT SERPL-MCNC: 0.65 MG/DL (ref 0.76–1.27)
DEPRECATED RDW RBC AUTO: 44.5 FL (ref 37–54)
EGFRCR SERPLBLD CKD-EPI 2021: 98.3 ML/MIN/1.73
ERYTHROCYTE [DISTWIDTH] IN BLOOD BY AUTOMATED COUNT: 13.6 % (ref 12.3–15.4)
GLUCOSE SERPL-MCNC: 109 MG/DL (ref 65–99)
HCT VFR BLD AUTO: 29.2 % (ref 37.5–51)
HGB BLD-MCNC: 10.1 G/DL (ref 13–17.7)
MCH RBC QN AUTO: 31.6 PG (ref 26.6–33)
MCHC RBC AUTO-ENTMCNC: 34.6 G/DL (ref 31.5–35.7)
MCV RBC AUTO: 91.3 FL (ref 79–97)
OSMOLALITY UR: 115 MOSM/KG
PLATELET # BLD AUTO: 286 10*3/MM3 (ref 140–450)
PMV BLD AUTO: 8.7 FL (ref 6–12)
POTASSIUM SERPL-SCNC: 3.3 MMOL/L (ref 3.5–5.2)
POTASSIUM SERPL-SCNC: 4.3 MMOL/L (ref 3.5–5.2)
RBC # BLD AUTO: 3.2 10*6/MM3 (ref 4.14–5.8)
SODIUM SERPL-SCNC: 125 MMOL/L (ref 136–145)
TSH SERPL DL<=0.05 MIU/L-ACNC: 2.28 UIU/ML (ref 0.27–4.2)
WBC NRBC COR # BLD: 5.02 10*3/MM3 (ref 3.4–10.8)

## 2022-07-30 PROCEDURE — 36415 COLL VENOUS BLD VENIPUNCTURE: CPT | Performed by: INTERNAL MEDICINE

## 2022-07-30 PROCEDURE — 83935 ASSAY OF URINE OSMOLALITY: CPT | Performed by: INTERNAL MEDICINE

## 2022-07-30 PROCEDURE — 85027 COMPLETE CBC AUTOMATED: CPT | Performed by: INTERNAL MEDICINE

## 2022-07-30 PROCEDURE — 80048 BASIC METABOLIC PNL TOTAL CA: CPT | Performed by: INTERNAL MEDICINE

## 2022-07-30 PROCEDURE — 84132 ASSAY OF SERUM POTASSIUM: CPT | Performed by: INTERNAL MEDICINE

## 2022-07-30 PROCEDURE — 84300 ASSAY OF URINE SODIUM: CPT | Performed by: INTERNAL MEDICINE

## 2022-07-30 PROCEDURE — 84443 ASSAY THYROID STIM HORMONE: CPT | Performed by: INTERNAL MEDICINE

## 2022-07-30 PROCEDURE — 99214 OFFICE O/P EST MOD 30 MIN: CPT | Performed by: NURSE PRACTITIONER

## 2022-07-30 PROCEDURE — G0378 HOSPITAL OBSERVATION PER HR: HCPCS

## 2022-07-30 RX ADMIN — Medication 1000 UNITS: at 09:31

## 2022-07-30 RX ADMIN — POTASSIUM CHLORIDE 40 MEQ: 750 TABLET, EXTENDED RELEASE ORAL at 09:37

## 2022-07-30 RX ADMIN — APIXABAN 5 MG: 5 TABLET, FILM COATED ORAL at 20:13

## 2022-07-30 RX ADMIN — POTASSIUM CHLORIDE 40 MEQ: 750 TABLET, EXTENDED RELEASE ORAL at 13:51

## 2022-07-30 RX ADMIN — DILTIAZEM HYDROCHLORIDE 120 MG: 120 CAPSULE, COATED, EXTENDED RELEASE ORAL at 09:31

## 2022-07-30 RX ADMIN — FERROUS SULFATE TAB 325 MG (65 MG ELEMENTAL FE) 325 MG: 325 (65 FE) TAB at 09:31

## 2022-07-30 RX ADMIN — APIXABAN 5 MG: 5 TABLET, FILM COATED ORAL at 09:31

## 2022-07-30 RX ADMIN — LATANOPROST 1 DROP: 50 SOLUTION/ DROPS OPHTHALMIC at 20:14

## 2022-07-30 RX ADMIN — MULTIPLE VITAMINS W/ MINERALS TAB 1 TABLET: TAB at 09:31

## 2022-07-30 RX ADMIN — ATORVASTATIN CALCIUM 20 MG: 20 TABLET, FILM COATED ORAL at 09:37

## 2022-07-31 ENCOUNTER — READMISSION MANAGEMENT (OUTPATIENT)
Dept: CALL CENTER | Facility: HOSPITAL | Age: 77
End: 2022-07-31

## 2022-07-31 VITALS
OXYGEN SATURATION: 99 % | WEIGHT: 208.78 LBS | DIASTOLIC BLOOD PRESSURE: 59 MMHG | SYSTOLIC BLOOD PRESSURE: 109 MMHG | TEMPERATURE: 97.8 F | HEIGHT: 71 IN | RESPIRATION RATE: 16 BRPM | HEART RATE: 93 BPM | BODY MASS INDEX: 29.23 KG/M2

## 2022-07-31 LAB
ALBUMIN SERPL-MCNC: 3.9 G/DL (ref 3.5–5.2)
ANION GAP SERPL CALCULATED.3IONS-SCNC: 13 MMOL/L (ref 5–15)
ANION GAP SERPL CALCULATED.3IONS-SCNC: 13 MMOL/L (ref 5–15)
BUN SERPL-MCNC: 10 MG/DL (ref 8–23)
BUN SERPL-MCNC: 10 MG/DL (ref 8–23)
BUN/CREAT SERPL: 13.2 (ref 7–25)
BUN/CREAT SERPL: 13.2 (ref 7–25)
CALCIUM SPEC-SCNC: 8.9 MG/DL (ref 8.6–10.5)
CALCIUM SPEC-SCNC: 8.9 MG/DL (ref 8.6–10.5)
CHLORIDE SERPL-SCNC: 90 MMOL/L (ref 98–107)
CHLORIDE SERPL-SCNC: 90 MMOL/L (ref 98–107)
CO2 SERPL-SCNC: 28 MMOL/L (ref 22–29)
CO2 SERPL-SCNC: 28 MMOL/L (ref 22–29)
CREAT SERPL-MCNC: 0.76 MG/DL (ref 0.76–1.27)
CREAT SERPL-MCNC: 0.76 MG/DL (ref 0.76–1.27)
DEPRECATED RDW RBC AUTO: 44.5 FL (ref 37–54)
EGFRCR SERPLBLD CKD-EPI 2021: 93.7 ML/MIN/1.73
EGFRCR SERPLBLD CKD-EPI 2021: 93.7 ML/MIN/1.73
EOSINOPHIL # BLD MANUAL: 0.12 10*3/MM3 (ref 0–0.4)
EOSINOPHIL NFR BLD MANUAL: 2 % (ref 0.3–6.2)
ERYTHROCYTE [DISTWIDTH] IN BLOOD BY AUTOMATED COUNT: 13.2 % (ref 12.3–15.4)
FERRITIN SERPL-MCNC: 479 NG/ML (ref 30–400)
GLUCOSE SERPL-MCNC: 137 MG/DL (ref 65–99)
GLUCOSE SERPL-MCNC: 137 MG/DL (ref 65–99)
HCT VFR BLD AUTO: 29.9 % (ref 37.5–51)
HGB BLD-MCNC: 10.3 G/DL (ref 13–17.7)
IRON 24H UR-MRATE: 45 MCG/DL (ref 59–158)
IRON SATN MFR SERPL: 16 % (ref 20–50)
LYMPHOCYTES # BLD MANUAL: 1.69 10*3/MM3 (ref 0.7–3.1)
LYMPHOCYTES NFR BLD MANUAL: 17 % (ref 5–12)
MAGNESIUM SERPL-MCNC: 1.3 MG/DL (ref 1.6–2.4)
MCH RBC QN AUTO: 31.9 PG (ref 26.6–33)
MCHC RBC AUTO-ENTMCNC: 34.4 G/DL (ref 31.5–35.7)
MCV RBC AUTO: 92.6 FL (ref 79–97)
MONOCYTES # BLD: 0.99 10*3/MM3 (ref 0.1–0.9)
NEUTROPHILS # BLD AUTO: 3.03 10*3/MM3 (ref 1.7–7)
NEUTROPHILS NFR BLD MANUAL: 52 % (ref 42.7–76)
PHOSPHATE SERPL-MCNC: 2.5 MG/DL (ref 2.5–4.5)
PLAT MORPH BLD: NORMAL
PLATELET # BLD AUTO: 391 10*3/MM3 (ref 140–450)
PMV BLD AUTO: 9.5 FL (ref 6–12)
POTASSIUM SERPL-SCNC: 3.5 MMOL/L (ref 3.5–5.2)
POTASSIUM SERPL-SCNC: 3.5 MMOL/L (ref 3.5–5.2)
RBC # BLD AUTO: 3.23 10*6/MM3 (ref 4.14–5.8)
RBC MORPH BLD: NORMAL
SODIUM SERPL-SCNC: 131 MMOL/L (ref 136–145)
SODIUM SERPL-SCNC: 131 MMOL/L (ref 136–145)
SODIUM UR-SCNC: <60 MMOL/L
TIBC SERPL-MCNC: 280 MCG/DL (ref 298–536)
TRANSFERRIN SERPL-MCNC: 188 MG/DL (ref 200–360)
URATE SERPL-MCNC: 1.2 MG/DL (ref 3.4–7)
VARIANT LYMPHS NFR BLD MANUAL: 29 % (ref 19.6–45.3)
WBC MORPH BLD: NORMAL
WBC NRBC COR # BLD: 5.82 10*3/MM3 (ref 3.4–10.8)

## 2022-07-31 PROCEDURE — 84466 ASSAY OF TRANSFERRIN: CPT | Performed by: INTERNAL MEDICINE

## 2022-07-31 PROCEDURE — 80048 BASIC METABOLIC PNL TOTAL CA: CPT | Performed by: INTERNAL MEDICINE

## 2022-07-31 PROCEDURE — 84550 ASSAY OF BLOOD/URIC ACID: CPT | Performed by: INTERNAL MEDICINE

## 2022-07-31 PROCEDURE — 83540 ASSAY OF IRON: CPT | Performed by: INTERNAL MEDICINE

## 2022-07-31 PROCEDURE — 82728 ASSAY OF FERRITIN: CPT | Performed by: INTERNAL MEDICINE

## 2022-07-31 PROCEDURE — G0378 HOSPITAL OBSERVATION PER HR: HCPCS

## 2022-07-31 PROCEDURE — 25010000002 MAGNESIUM SULFATE 2 GM/50ML SOLUTION: Performed by: INTERNAL MEDICINE

## 2022-07-31 PROCEDURE — 96365 THER/PROPH/DIAG IV INF INIT: CPT

## 2022-07-31 PROCEDURE — 85025 COMPLETE CBC W/AUTO DIFF WBC: CPT | Performed by: INTERNAL MEDICINE

## 2022-07-31 PROCEDURE — 80069 RENAL FUNCTION PANEL: CPT | Performed by: INTERNAL MEDICINE

## 2022-07-31 PROCEDURE — 96366 THER/PROPH/DIAG IV INF ADDON: CPT

## 2022-07-31 PROCEDURE — 83735 ASSAY OF MAGNESIUM: CPT | Performed by: INTERNAL MEDICINE

## 2022-07-31 PROCEDURE — 85007 BL SMEAR W/DIFF WBC COUNT: CPT | Performed by: INTERNAL MEDICINE

## 2022-07-31 RX ORDER — MAGNESIUM SULFATE HEPTAHYDRATE 40 MG/ML
2 INJECTION, SOLUTION INTRAVENOUS AS NEEDED
Status: DISCONTINUED | OUTPATIENT
Start: 2022-07-31 | End: 2022-07-31 | Stop reason: HOSPADM

## 2022-07-31 RX ORDER — MAGNESIUM SULFATE HEPTAHYDRATE 40 MG/ML
4 INJECTION, SOLUTION INTRAVENOUS AS NEEDED
Status: DISCONTINUED | OUTPATIENT
Start: 2022-07-31 | End: 2022-07-31 | Stop reason: HOSPADM

## 2022-07-31 RX ADMIN — ATORVASTATIN CALCIUM 20 MG: 20 TABLET, FILM COATED ORAL at 08:10

## 2022-07-31 RX ADMIN — APIXABAN 5 MG: 5 TABLET, FILM COATED ORAL at 08:10

## 2022-07-31 RX ADMIN — MAGNESIUM SULFATE HEPTAHYDRATE 2 G: 2 INJECTION, SOLUTION INTRAVENOUS at 15:13

## 2022-07-31 RX ADMIN — MAGNESIUM SULFATE HEPTAHYDRATE 2 G: 2 INJECTION, SOLUTION INTRAVENOUS at 13:13

## 2022-07-31 RX ADMIN — MAGNESIUM SULFATE HEPTAHYDRATE 2 G: 2 INJECTION, SOLUTION INTRAVENOUS at 11:39

## 2022-07-31 RX ADMIN — DILTIAZEM HYDROCHLORIDE 120 MG: 120 CAPSULE, COATED, EXTENDED RELEASE ORAL at 08:10

## 2022-07-31 RX ADMIN — Medication 10 ML: at 08:10

## 2022-07-31 RX ADMIN — Medication 1000 UNITS: at 08:10

## 2022-07-31 RX ADMIN — POTASSIUM CHLORIDE 40 MEQ: 750 TABLET, EXTENDED RELEASE ORAL at 11:39

## 2022-07-31 RX ADMIN — FERROUS SULFATE TAB 325 MG (65 MG ELEMENTAL FE) 325 MG: 325 (65 FE) TAB at 08:10

## 2022-07-31 RX ADMIN — MULTIPLE VITAMINS W/ MINERALS TAB 1 TABLET: TAB at 08:10

## 2022-08-01 ENCOUNTER — TRANSITIONAL CARE MANAGEMENT TELEPHONE ENCOUNTER (OUTPATIENT)
Dept: CALL CENTER | Facility: HOSPITAL | Age: 77
End: 2022-08-01

## 2022-08-01 NOTE — CASE MANAGEMENT/SOCIAL WORK
Case Management Discharge Note      Final Note: home no needs         Selected Continued Care - Discharged on 7/31/2022 Admission date: 7/29/2022 - Discharge disposition: Home or Self Care    Destination    No services have been selected for the patient.              Durable Medical Equipment    No services have been selected for the patient.              Dialysis/Infusion    No services have been selected for the patient.              Home Medical Care    No services have been selected for the patient.              Therapy    No services have been selected for the patient.              Community Resources    No services have been selected for the patient.              Community & DME    No services have been selected for the patient.                  Transportation Services  Private: Car    Final Discharge Disposition Code: 01 - home or self-care

## 2022-08-01 NOTE — OUTREACH NOTE
Call Center TCM Note    Flowsheet Row Responses   [facility] patient discharged from? [place]   Does the patient have one of the following disease processes/diagnoses(primary or secondary)? Other   TCM attempt successful? Yes  [verbal release for Jamee, wife]   Call start time 1447   Call end time 1450   Discharge diagnosis dizziness, A-fib, hyponatremia, anemia   Meds reviewed with patient/caregiver? Yes   Does the patient have all medications ordered at discharge? N/A   Is the patient taking all medications as directed (includes completed medication regime)? Yes   Medication comments Aware of need for discontinuation of Losartan   Comments regarding appointments Encouraged to review AVS for appts   Does the patient have a primary care provider?  Yes   Does the patient have an appointment with their PCP within 7 days of discharge? No   Comments regarding PCP Patient prefers to contact PCP for f/u   What is preventing the patient from scheduling follow up appointments within 7 days of discharge? Haven't had time   Nursing Interventions Advised patient to make appointment   Has the patient kept scheduled appointments due by today? N/A   Has home health visited the patient within 72 hours of discharge? N/A   Psychosocial issues? No   Did the patient receive a copy of their discharge instructions? Yes   Nursing interventions Reviewed instructions with patient   What is the patient's perception of their health status since discharge? Improving  [Patient pleasant--reports no concerns and offers no complaints at time of call. Denies ongoing issues with vertigo. Will call to schedule his appt for f/u. ]   Is the patient/caregiver able to teach back signs and symptoms related to disease process for when to call PCP? Yes   TCM call completed? Yes          Jamee Boateng RN    8/1/2022, 14:52 EDT

## 2022-08-05 ENCOUNTER — OFFICE VISIT (OUTPATIENT)
Dept: FAMILY MEDICINE CLINIC | Facility: CLINIC | Age: 77
End: 2022-08-05

## 2022-08-05 ENCOUNTER — TELEPHONE (OUTPATIENT)
Dept: NEUROLOGY | Facility: CLINIC | Age: 77
End: 2022-08-05

## 2022-08-05 VITALS
WEIGHT: 207 LBS | HEART RATE: 93 BPM | DIASTOLIC BLOOD PRESSURE: 78 MMHG | SYSTOLIC BLOOD PRESSURE: 140 MMHG | HEIGHT: 71 IN | TEMPERATURE: 97.8 F | RESPIRATION RATE: 18 BRPM | BODY MASS INDEX: 28.98 KG/M2 | OXYGEN SATURATION: 95 %

## 2022-08-05 DIAGNOSIS — Z09 HOSPITAL DISCHARGE FOLLOW-UP: Primary | ICD-10-CM

## 2022-08-05 DIAGNOSIS — E83.42 HYPOMAGNESEMIA: ICD-10-CM

## 2022-08-05 DIAGNOSIS — E87.1 HYPONATREMIA: ICD-10-CM

## 2022-08-05 PROCEDURE — 99495 TRANSJ CARE MGMT MOD F2F 14D: CPT | Performed by: STUDENT IN AN ORGANIZED HEALTH CARE EDUCATION/TRAINING PROGRAM

## 2022-08-08 ENCOUNTER — HOSPITAL ENCOUNTER (OUTPATIENT)
Dept: INFUSION THERAPY | Facility: HOSPITAL | Age: 77
Discharge: HOME | End: 2022-08-08
Admitting: PSYCHIATRY & NEUROLOGY

## 2022-08-08 DIAGNOSIS — R20.2 NUMBNESS AND TINGLING OF BOTH UPPER EXTREMITIES WHILE SLEEPING: ICD-10-CM

## 2022-08-08 DIAGNOSIS — R20.0 NUMBNESS AND TINGLING OF BOTH UPPER EXTREMITIES WHILE SLEEPING: ICD-10-CM

## 2022-08-08 PROCEDURE — 95911 NRV CNDJ TEST 9-10 STUDIES: CPT | Performed by: PSYCHIATRY & NEUROLOGY

## 2022-08-08 PROCEDURE — 95886 MUSC TEST DONE W/N TEST COMP: CPT

## 2022-08-08 PROCEDURE — 95886 MUSC TEST DONE W/N TEST COMP: CPT | Performed by: PSYCHIATRY & NEUROLOGY

## 2022-08-08 PROCEDURE — 95911 NRV CNDJ TEST 9-10 STUDIES: CPT
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
## 2022-08-10 ENCOUNTER — READMISSION MANAGEMENT (OUTPATIENT)
Dept: CALL CENTER | Facility: HOSPITAL | Age: 77
End: 2022-08-10

## 2022-08-10 NOTE — OUTREACH NOTE
Medical Week 2 Survey    Flowsheet Row Responses   [facility] patient discharged from? [place]   Does the patient have one of the following disease processes/diagnoses(primary or secondary)? Other   Week 2 attempt successful? No   Unsuccessful attempts Attempt 1          CHERYLE Tan Registered Nurse

## 2022-08-15 DIAGNOSIS — N52.9 ERECTILE DYSFUNCTION, UNSPECIFIED ERECTILE DYSFUNCTION TYPE: ICD-10-CM

## 2022-08-17 RX ORDER — TADALAFIL 5 MG/1
TABLET ORAL
Qty: 90 TABLET | Refills: 1 | Status: SHIPPED | OUTPATIENT
Start: 2022-08-17 | End: 2023-03-24

## 2022-08-31 LAB
ALBUMIN SERPL-MCNC: 4.8 G/DL (ref 3.5–5.2)
ALBUMIN/GLOB SERPL: 2.7 G/DL
ALP SERPL-CCNC: 70 U/L (ref 39–117)
ALT SERPL-CCNC: 10 U/L (ref 1–41)
AST SERPL-CCNC: 16 U/L (ref 1–40)
BASOPHILS # BLD AUTO: 0.05 10*3/MM3 (ref 0–0.2)
BASOPHILS NFR BLD AUTO: 0.9 % (ref 0–1.5)
BILIRUB SERPL-MCNC: 0.5 MG/DL (ref 0–1.2)
BUN SERPL-MCNC: 18 MG/DL (ref 8–23)
BUN/CREAT SERPL: 22.2 (ref 7–25)
CALCIUM SERPL-MCNC: 10.1 MG/DL (ref 8.6–10.5)
CHLORIDE SERPL-SCNC: 98 MMOL/L (ref 98–107)
CO2 SERPL-SCNC: 29 MMOL/L (ref 22–29)
CREAT SERPL-MCNC: 0.81 MG/DL (ref 0.76–1.27)
EGFRCR-CYS SERPLBLD CKD-EPI 2021: 91.9 ML/MIN/1.73
EOSINOPHIL # BLD AUTO: 0.07 10*3/MM3 (ref 0–0.4)
EOSINOPHIL NFR BLD AUTO: 1.2 % (ref 0.3–6.2)
ERYTHROCYTE [DISTWIDTH] IN BLOOD BY AUTOMATED COUNT: 13.3 % (ref 12.3–15.4)
GLOBULIN SER CALC-MCNC: 1.8 GM/DL
GLUCOSE SERPL-MCNC: 100 MG/DL (ref 65–99)
HCT VFR BLD AUTO: 38.3 % (ref 37.5–51)
HGB BLD-MCNC: 12.9 G/DL (ref 13–17.7)
IMM GRANULOCYTES # BLD AUTO: 0.01 10*3/MM3 (ref 0–0.05)
IMM GRANULOCYTES NFR BLD AUTO: 0.2 % (ref 0–0.5)
LYMPHOCYTES # BLD AUTO: 1.76 10*3/MM3 (ref 0.7–3.1)
LYMPHOCYTES NFR BLD AUTO: 30.7 % (ref 19.6–45.3)
MAGNESIUM SERPL-MCNC: 1.7 MG/DL (ref 1.6–2.4)
MCH RBC QN AUTO: 32 PG (ref 26.6–33)
MCHC RBC AUTO-ENTMCNC: 33.7 G/DL (ref 31.5–35.7)
MCV RBC AUTO: 95 FL (ref 79–97)
MONOCYTES # BLD AUTO: 0.73 10*3/MM3 (ref 0.1–0.9)
MONOCYTES NFR BLD AUTO: 12.7 % (ref 5–12)
NEUTROPHILS # BLD AUTO: 3.11 10*3/MM3 (ref 1.7–7)
NEUTROPHILS NFR BLD AUTO: 54.3 % (ref 42.7–76)
NRBC BLD AUTO-RTO: 0 /100 WBC (ref 0–0.2)
PHOSPHATE SERPL-MCNC: 3.5 MG/DL (ref 2.5–4.5)
PLATELET # BLD AUTO: 387 10*3/MM3 (ref 140–450)
POTASSIUM SERPL-SCNC: 4 MMOL/L (ref 3.5–5.2)
PROT SERPL-MCNC: 6.6 G/DL (ref 6–8.5)
RBC # BLD AUTO: 4.03 10*6/MM3 (ref 4.14–5.8)
SODIUM SERPL-SCNC: 137 MMOL/L (ref 136–145)
WBC # BLD AUTO: 5.73 10*3/MM3 (ref 3.4–10.8)

## 2022-09-06 ENCOUNTER — OFFICE VISIT (OUTPATIENT)
Dept: FAMILY MEDICINE CLINIC | Facility: CLINIC | Age: 77
End: 2022-09-06

## 2022-09-06 VITALS
HEART RATE: 86 BPM | SYSTOLIC BLOOD PRESSURE: 126 MMHG | WEIGHT: 207 LBS | DIASTOLIC BLOOD PRESSURE: 76 MMHG | OXYGEN SATURATION: 100 % | BODY MASS INDEX: 28.98 KG/M2 | HEIGHT: 71 IN | RESPIRATION RATE: 18 BRPM | TEMPERATURE: 97.5 F

## 2022-09-06 DIAGNOSIS — I10 ESSENTIAL HYPERTENSION: ICD-10-CM

## 2022-09-06 DIAGNOSIS — G56.03 BILATERAL CARPAL TUNNEL SYNDROME: ICD-10-CM

## 2022-09-06 DIAGNOSIS — D50.8 IRON DEFICIENCY ANEMIA SECONDARY TO INADEQUATE DIETARY IRON INTAKE: ICD-10-CM

## 2022-09-06 DIAGNOSIS — I48.0 PAROXYSMAL ATRIAL FIBRILLATION: ICD-10-CM

## 2022-09-06 DIAGNOSIS — Z00.00 ANNUAL PHYSICAL EXAM: Primary | ICD-10-CM

## 2022-09-06 DIAGNOSIS — Z23 NEED FOR PROPHYLACTIC VACCINATION WITH COMBINED DIPHTHERIA-TETANUS-PERTUSSIS (DTP) VACCINE: ICD-10-CM

## 2022-09-06 DIAGNOSIS — J61 PULMONARY ASBESTOSIS: ICD-10-CM

## 2022-09-06 DIAGNOSIS — Z13.89 SCREENING FOR MULTIPLE CONDITIONS: ICD-10-CM

## 2022-09-06 DIAGNOSIS — Z00.00 ENCOUNTER FOR PREVENTIVE CARE: ICD-10-CM

## 2022-09-06 PROCEDURE — 90715 TDAP VACCINE 7 YRS/> IM: CPT | Performed by: STUDENT IN AN ORGANIZED HEALTH CARE EDUCATION/TRAINING PROGRAM

## 2022-09-06 PROCEDURE — 99397 PER PM REEVAL EST PAT 65+ YR: CPT | Performed by: STUDENT IN AN ORGANIZED HEALTH CARE EDUCATION/TRAINING PROGRAM

## 2022-09-06 PROCEDURE — 90471 IMMUNIZATION ADMIN: CPT | Performed by: STUDENT IN AN ORGANIZED HEALTH CARE EDUCATION/TRAINING PROGRAM

## 2022-09-06 PROCEDURE — 99214 OFFICE O/P EST MOD 30 MIN: CPT | Performed by: STUDENT IN AN ORGANIZED HEALTH CARE EDUCATION/TRAINING PROGRAM

## 2022-09-06 NOTE — PROGRESS NOTES
"Chief Complaint  Physical    Subjective        Gamal Modi Sr. presents to [facility]  For physical examination and on follow-up on his general health.  Patient was recently in hospital and at that time patient medications were adjusted.  He used to take losartan but was discontinued at discharge time.  Patient stated his blood pressure is controlled and he feels much better.  Denies having any new complaint.  Review of system is negative for fever, headache, chest pain, shortness of breath, palpitation, nausea, vomiting, any recent change in bladder habits.      Hyperlipidemia        Objective   Vital Signs:  /76 (BP Location: Left arm, Patient Position: Sitting)   Pulse 86   Temp 97.5 °F (36.4 °C) (Oral)   Resp 18   Ht 180.3 cm (70.98\")   Wt 93.9 kg (207 lb)   SpO2 100%   BMI 28.88 kg/m²   Estimated body mass index is 28.88 kg/m² as calculated from the following:    Height as of this encounter: 180.3 cm (70.98\").    Weight as of this encounter: 93.9 kg (207 lb).          Physical Exam  HENT:      Head: Normocephalic and atraumatic.      Mouth/Throat:      Mouth: Mucous membranes are moist.      Pharynx: Oropharynx is clear.   Eyes:      Extraocular Movements: Extraocular movements intact.      Conjunctiva/sclera: Conjunctivae normal.      Pupils: Pupils are equal, round, and reactive to light.   Cardiovascular:      Rate and Rhythm: Normal rate and regular rhythm.   Pulmonary:      Effort: Pulmonary effort is normal.      Breath sounds: Normal breath sounds.   Abdominal:      General: Bowel sounds are normal.      Palpations: Abdomen is soft.   Musculoskeletal:         General: Normal range of motion.      Cervical back: Neck supple.   Skin:     General: Skin is warm.      Capillary Refill: Capillary refill takes less than 2 seconds.   Neurological:      General: No focal deficit present.      Mental Status: He is alert and oriented to person, place, and time. " Mental status is at baseline.   Psychiatric:         Mood and Affect: Mood normal.        Result Review :    CMP    CMP 7/30/22 7/30/22 7/31/22 7/31/22 8/30/22    0802 1920 0858 0858    Glucose 109 (A)  137 (A) 137 (A) 100 (A)   BUN 10  10 10 18   Creatinine 0.65 (A)  0.76 0.76 0.81   Sodium 125 (A)  131 (A) 131 (A) 137   Potassium 3.3 (A) 4.3 3.5 3.5 4.0   Chloride 83 (A)  90 (A) 90 (A) 98   Calcium 8.7  8.9 8.9 10.1   Total Protein     6.6   Albumin   3.90  4.80   Globulin     1.8   Total Bilirubin     0.5   Alkaline Phosphatase     70   AST (SGOT)     16   ALT (SGPT)     10   (A) Abnormal value            CBC    CBC 7/30/22 7/31/22 8/30/22   WBC 5.02 5.82 5.73   RBC 3.20 (A) 3.23 (A) 4.03 (A)   Hemoglobin 10.1 (A) 10.3 (A) 12.9 (A)   Hematocrit 29.2 (A) 29.9 (A) 38.3   MCV 91.3 92.6 95.0   MCH 31.6 31.9 32.0   MCHC 34.6 34.4 33.7   RDW 13.6 13.2 13.3   Platelets 286 391 387   (A) Abnormal value            Lipid Panel    Lipid Panel 1/5/22   Total Cholesterol 135   Triglycerides 79   HDL Cholesterol 46   VLDL Cholesterol 16   LDL Cholesterol  73   LDL/HDL Ratio 1.59           TSH    TSH 9/21/21 1/18/22 7/30/22   TSH 6.310 (A) 2.320 2.280   (A) Abnormal value                      Assessment and Plan   Diagnoses and all orders for this visit:    1. Annual physical exam (Primary)  Comments:  Labs reviewed, medications reviewed    2. Bilateral carpal tunnel syndrome  Comments:  EMG study done and showed severe disease on the left hand and moderate disease on the right hand.,  Orthopedic referral given, advised wrist splint  Orders:  -     Ambulatory Referral to Orthopedic Surgery    3. Screening for multiple conditions  -     CBC Auto Differential; Future  -     Comprehensive Metabolic Panel; Future  -     Lipid Panel With / Chol / HDL Ratio; Future  -     TSH; Future    4. Need for prophylactic vaccination with combined diphtheria-tetanus-pertussis (DTP) vaccine  -     Tdap Vaccine Greater Than or Equal To 8yo  IM    5. Essential hypertension  Comments:  Losartan is on hold, blood pressure is well controlled so we will continue to hold losartan.    6. Paroxysmal atrial fibrillation (HCC)  Comments:  On Eliquis 5 twice daily, diltiazem 120 mg p.o. daily, continue same    7. Pulmonary asbestosis (HCC)  Comments:  Currently on oxygen at night, stated undergo sleep study in past and he was prescribed oxygen to use at night, currently asymptomatic    8. Encounter for preventive care  Comments:  Tdap shot given today, patient updated with shingles and COVID 3 shots, colonoscopy due in 5 years    9. Iron deficiency anemia secondary to inadequate dietary iron intake  Comments:  On iron supplements, hemoglobin is around 12.9, currently asymptomatic, continue same    Patient encouraged to partake of healthy diet rich in fresh fruits and vegetables as well as lean proteins.  Patient encouraged to participate in daily exercise with goal of 30 min sustained activity.  Wear seatbelt when driving  Flu shot annually           Follow Up   No follow-ups on file.  Patient was given instructions and counseling regarding his condition or for health maintenance advice. Please see specific information pulled into the AVS if appropriate.

## 2022-09-07 NOTE — PROGRESS NOTES
"Chief Complaint  Hospital Follow Up Visit ([facility] 7/24/22 [facility])    Subjective        Gamal Modi Sr. presents to [facility]  For hospital follow-up.  Patient denies having any new complaint today.  Review of system is negative for fever, headache, chest pain, shortness of breath, palpitation, nausea, vomiting, any recent change in bladder habits.        Objective   Vital Signs:  /78 (BP Location: Left arm, Patient Position: Sitting)   Pulse 93   Temp 97.8 °F (36.6 °C) (Oral)   Resp 18   Ht 180.3 cm (70.98\")   Wt 93.9 kg (207 lb)   SpO2 95%   BMI 28.88 kg/m²   Estimated body mass index is 28.88 kg/m² as calculated from the following:    Height as of this encounter: 180.3 cm (70.98\").    Weight as of this encounter: 93.9 kg (207 lb).          Physical Exam  HENT:      Head: Normocephalic and atraumatic.      Mouth/Throat:      Mouth: Mucous membranes are moist.      Pharynx: Oropharynx is clear.   Eyes:      Extraocular Movements: Extraocular movements intact.      Conjunctiva/sclera: Conjunctivae normal.      Pupils: Pupils are equal, round, and reactive to light.   Cardiovascular:      Rate and Rhythm: Normal rate and regular rhythm.   Pulmonary:      Effort: Pulmonary effort is normal.      Breath sounds: Normal breath sounds.   Abdominal:      General: Bowel sounds are normal.      Palpations: Abdomen is soft.   Musculoskeletal:         General: Normal range of motion.      Cervical back: Neck supple.   Skin:     General: Skin is warm.      Capillary Refill: Capillary refill takes less than 2 seconds.   Neurological:      General: No focal deficit present.      Mental Status: He is alert and oriented to person, place, and time. Mental status is at baseline.   Psychiatric:         Mood and Affect: Mood normal.        Result Review :    [facility] 7/30/22 7/30/22 7/31/22 7/31/22 8/30/22    0802 1920 0858 0858    Glucose 109 (A)  137 (A) 137 (A) 100 (A)   BUN 10  10 10 " 18   Creatinine 0.65 (A)  0.76 0.76 0.81   Sodium 125 (A)  131 (A) 131 (A) 137   Potassium 3.3 (A) 4.3 3.5 3.5 4.0   Chloride 83 (A)  90 (A) 90 (A) 98   Calcium 8.7  8.9 8.9 10.1   Total Protein     6.6   Albumin   3.90  4.80   Globulin     1.8   Total Bilirubin     0.5   Alkaline Phosphatase     70   AST (SGOT)     16   ALT (SGPT)     10   (A) Abnormal value            CBC    CBC 7/30/22 7/31/22 8/30/22   WBC 5.02 5.82 5.73   RBC 3.20 (A) 3.23 (A) 4.03 (A)   Hemoglobin 10.1 (A) 10.3 (A) 12.9 (A)   Hematocrit 29.2 (A) 29.9 (A) 38.3   MCV 91.3 92.6 95.0   MCH 31.6 31.9 32.0   MCHC 34.6 34.4 33.7   RDW 13.6 13.2 13.3   Platelets 286 391 387   (A) Abnormal value            Data reviewed: Recent hospitalization notes Patient was recently admitted in the hospital for dizziness, hyponatremia.  Patient undergo MRI of the brain which came out negative for any pathology.  Neurology was consulted.  Only abnormal value noticed in the hospital was hyponatremia, hypomagnesemia..  The patient was given magnesium supplements.  On discharge since patient blood pressure was okay without losartan patient losartan was placed on hold and advised patient to follow-up with the PCP for blood pressure monitoring.          Assessment and Plan   Diagnoses and all orders for this visit:    1. Hospital discharge follow-up (Primary)  Comments:  Hospital notes, medication, labs all reviewed  Orders:  -     Cancel: CBC Auto Differential; Future  -     Cancel: Comprehensive metabolic panel; Future  -     Cancel: Magnesium; Future  -     Cancel: Phosphorus; Future  -     CBC Auto Differential  -     Comprehensive metabolic panel  -     Magnesium  -     Phosphorus    2. Hypomagnesemia  Comments:  Was supplemented in the hospital, magnesium level ordered    3. Hyponatremia  Comments:  Sodium level ordered, will follow up,    Other orders  -     CBC & Differential             Follow Up   No follow-ups on file.  Patient was given instructions and  counseling regarding his condition or for health maintenance advice. Please see specific information pulled into the AVS if appropriate.

## 2022-10-25 ENCOUNTER — HOSPITAL ENCOUNTER (OUTPATIENT)
Dept: CT IMAGING | Facility: HOSPITAL | Age: 77
Discharge: HOME | End: 2022-10-25
Admitting: INTERNAL MEDICINE

## 2022-10-25 DIAGNOSIS — J98.4 RESTRICTIVE LUNG DISEASE: ICD-10-CM

## 2022-10-25 DIAGNOSIS — J92.9 PLEURAL PLAQUE: ICD-10-CM

## 2022-10-25 DIAGNOSIS — R06.09 DOE (DYSPNEA ON EXERTION): ICD-10-CM

## 2022-10-25 PROCEDURE — 71250 CT THORAX DX C-: CPT

## 2022-11-08 ENCOUNTER — TRANSCRIBE ORDERS (OUTPATIENT)
Dept: ADMINISTRATIVE | Facility: HOSPITAL | Age: 77
End: 2022-11-08

## 2022-11-08 DIAGNOSIS — J94.9 PLEURAL DISEASE: Primary | ICD-10-CM

## 2022-11-08 DIAGNOSIS — J98.4 RESTRICTIVE LUNG DISEASE: ICD-10-CM

## 2023-01-23 DIAGNOSIS — I48.91 NEW ONSET ATRIAL FIBRILLATION: ICD-10-CM

## 2023-01-23 RX ORDER — DILTIAZEM HYDROCHLORIDE 120 MG/1
CAPSULE, COATED, EXTENDED RELEASE ORAL
Qty: 90 CAPSULE | Refills: 3 | Status: SHIPPED | OUTPATIENT
Start: 2023-01-23

## 2023-02-14 DIAGNOSIS — I48.91 NEW ONSET ATRIAL FIBRILLATION: ICD-10-CM

## 2023-02-15 RX ORDER — APIXABAN 5 MG/1
TABLET, FILM COATED ORAL
Qty: 180 TABLET | Refills: 3 | Status: SHIPPED | OUTPATIENT
Start: 2023-02-15

## 2023-02-20 ENCOUNTER — HOSPITAL ENCOUNTER (OUTPATIENT)
Dept: CT IMAGING | Facility: HOSPITAL | Age: 78
Discharge: HOME | End: 2023-02-20
Admitting: INTERNAL MEDICINE
Payer: COMMERCIAL

## 2023-02-20 DIAGNOSIS — J98.4 RESTRICTIVE LUNG DISEASE: ICD-10-CM

## 2023-02-20 DIAGNOSIS — J94.9 PLEURAL DISEASE: ICD-10-CM

## 2023-02-20 PROCEDURE — 71250 CT THORAX DX C-: CPT

## 2023-03-24 ENCOUNTER — OFFICE VISIT (OUTPATIENT)
Dept: FAMILY MEDICINE CLINIC | Facility: CLINIC | Age: 78
End: 2023-03-24
Payer: COMMERCIAL

## 2023-03-24 VITALS
OXYGEN SATURATION: 95 % | SYSTOLIC BLOOD PRESSURE: 127 MMHG | DIASTOLIC BLOOD PRESSURE: 78 MMHG | HEART RATE: 84 BPM | HEIGHT: 71 IN | RESPIRATION RATE: 16 BRPM | BODY MASS INDEX: 31.11 KG/M2 | TEMPERATURE: 97.8 F | WEIGHT: 222.2 LBS

## 2023-03-24 DIAGNOSIS — N52.9 ERECTILE DYSFUNCTION, UNSPECIFIED ERECTILE DYSFUNCTION TYPE: ICD-10-CM

## 2023-03-24 DIAGNOSIS — D50.8 IRON DEFICIENCY ANEMIA SECONDARY TO INADEQUATE DIETARY IRON INTAKE: ICD-10-CM

## 2023-03-24 DIAGNOSIS — I48.0 PAROXYSMAL ATRIAL FIBRILLATION: ICD-10-CM

## 2023-03-24 DIAGNOSIS — M25.511 CHRONIC PAIN OF BOTH SHOULDERS: ICD-10-CM

## 2023-03-24 DIAGNOSIS — I10 ESSENTIAL HYPERTENSION: ICD-10-CM

## 2023-03-24 DIAGNOSIS — H40.9 GLAUCOMA OF LEFT EYE, UNSPECIFIED GLAUCOMA TYPE: ICD-10-CM

## 2023-03-24 DIAGNOSIS — G56.03 BILATERAL CARPAL TUNNEL SYNDROME: Primary | ICD-10-CM

## 2023-03-24 DIAGNOSIS — G89.29 CHRONIC PAIN OF BOTH SHOULDERS: ICD-10-CM

## 2023-03-24 DIAGNOSIS — R91.8 PULMONARY NODULES: ICD-10-CM

## 2023-03-24 DIAGNOSIS — I51.89 DIASTOLIC DYSFUNCTION: ICD-10-CM

## 2023-03-24 DIAGNOSIS — J61 PULMONARY ASBESTOSIS: ICD-10-CM

## 2023-03-24 DIAGNOSIS — M25.512 CHRONIC PAIN OF BOTH SHOULDERS: ICD-10-CM

## 2023-03-24 RX ORDER — TADALAFIL 5 MG/1
TABLET ORAL
Qty: 90 TABLET | Refills: 1 | Status: SHIPPED | OUTPATIENT
Start: 2023-03-24

## 2023-03-24 NOTE — PROGRESS NOTES
"Chief Complaint  Results    Subjective        Gamal Modi Sr. presents to [facility]  History of Present Illness  For follow-up on the labs, hypercholesterolemia and with chief complaint of having B/l shouder pain for last 6 months.  Patient had MRI shoulder done in last 6-month and was following Dr. Garcia orthopedic surgeon for that.  MRI shoulder showed some age-related changes and he had some lesion in his humerus and for that he underwent biopsy and he was told by the specialist that everything came out normal and nothing needed to be done for that in the future.  Patient stated his left elbow issue has resolved but definitely when he raises his hand above his head he feels pain at the tip of his shoulder bilateral.  Patient stated he played golf and he does not have any problems at that time.  He is doing exercise and also lifts weight but has not done any physical therapy for the same issue in the past.  Patient stated he will discuss with orthopedic surgeon about his carpal tunnel syndrome also.      Objective   Vital Signs:  /78   Pulse 84   Temp 97.8 °F (36.6 °C)   Resp 16   Ht 180.3 cm (70.98\")   Wt 101 kg (222 lb 3.2 oz)   SpO2 95%   BMI 31.00 kg/m²   Estimated body mass index is 31 kg/m² as calculated from the following:    Height as of this encounter: 180.3 cm (70.98\").    Weight as of this encounter: 101 kg (222 lb 3.2 oz).             Physical Exam  HENT:      Head: Normocephalic and atraumatic.      Mouth/Throat:      Mouth: Mucous membranes are moist.      Pharynx: Oropharynx is clear.   Eyes:      Extraocular Movements: Extraocular movements intact.      Conjunctiva/sclera: Conjunctivae normal.      Pupils: Pupils are equal, round, and reactive to light.   Cardiovascular:      Rate and Rhythm: Normal rate and regular rhythm.   Pulmonary:      Effort: Pulmonary effort is normal. No respiratory distress.      Breath sounds: Rhonchi present. "   Abdominal:      General: Bowel sounds are normal.      Palpations: Abdomen is soft.   Musculoskeletal:      Cervical back: Neck supple.   Skin:     General: Skin is warm.      Capillary Refill: Capillary refill takes less than 2 seconds.   Neurological:      General: No focal deficit present.      Mental Status: He is alert and oriented to person, place, and time. Mental status is at baseline.   Psychiatric:         Mood and Affect: Mood normal.        Result Review :                   Assessment and Plan   Diagnoses and all orders for this visit:    1. Bilateral carpal tunnel syndrome (Primary)    2. Essential hypertension  -     CBC Auto Differential; Future  -     Comprehensive Metabolic Panel; Future  -     Lipid Panel With / Chol / HDL Ratio; Future  -     TSH; Future    3. Paroxysmal atrial fibrillation (HCC)  -     CBC Auto Differential; Future  -     Comprehensive Metabolic Panel; Future  -     Lipid Panel With / Chol / HDL Ratio; Future  -     TSH; Future    4. Pulmonary asbestosis (HCC)    5. Iron deficiency anemia secondary to inadequate dietary iron intake    6. Erectile dysfunction, unspecified erectile dysfunction type    7. Diastolic dysfunction    8. Pulmonary nodules  Comments:  Patient has done CT chest surveillance for these nodules and as per the last CT chest done in February, followed pulmonology, recommended no annual chest CT    9. Chronic pain of both shoulders  -     Ambulatory Referral to Physical Therapy Evaluate and treat    10. Glaucoma of left eye, unspecified glaucoma type  Comments:  Following ophthalmologist and using eyedrops in the left eye.  Currently asymptomatic            Bilateral chronic shoulder pain  Discussed with the patient that at this point will first approach to physical therapy and if no improvement is noticed with that and then he needs to follow-up with specialist for above problem    Bilateral carpal tunnel syndrome  Patient stated he is wearing wrist splint  and along with that he is doing carpal tunnel exercises and he is noticing some improvement in his symptoms.  Still he needs to discuss about above issue with specialist.    Diastolic dysfunction  Echocardiogram done in 2021 showed ejection fraction 73% in the range is between 66 to 70%  Left ventricular diastolic function was indeterminate although increased left atrial pressure was seen.  Patient is following cardiology  Last time patient seen by cardiologist was in January 2022 and as per the note patient symptoms such as shortness of breath in past is likely related from A-fib.  Recommendations were to continue Eliquis and diltiazem 120 mg p.o. daily and statins      Labs reviewed  CBC within normal range, TSH within normal range, lipid panel seems to improve, LDL dropped from 112-77.  Fasting glucose, electrolytes EGFR within normal range, LFTs within normal range    Next scheduled follow-up in 6 months with labs for physical    Follow Up   No follow-ups on file.  Patient was given instructions and counseling regarding his condition or for health maintenance advice. Please see specific information pulled into the AVS if appropriate.

## 2023-03-28 ENCOUNTER — OFFICE VISIT (OUTPATIENT)
Dept: CARDIOLOGY | Facility: CLINIC | Age: 78
End: 2023-03-28
Payer: COMMERCIAL

## 2023-03-28 VITALS
HEIGHT: 71 IN | HEART RATE: 64 BPM | WEIGHT: 221 LBS | BODY MASS INDEX: 30.94 KG/M2 | DIASTOLIC BLOOD PRESSURE: 84 MMHG | SYSTOLIC BLOOD PRESSURE: 124 MMHG

## 2023-03-28 DIAGNOSIS — E78.00 HYPERCHOLESTEROLEMIA: ICD-10-CM

## 2023-03-28 DIAGNOSIS — I10 ESSENTIAL HYPERTENSION: ICD-10-CM

## 2023-03-28 DIAGNOSIS — I25.10 CORONARY ARTERY CALCIFICATION SEEN ON CAT SCAN: ICD-10-CM

## 2023-03-28 DIAGNOSIS — I51.89 DIASTOLIC DYSFUNCTION: ICD-10-CM

## 2023-03-28 DIAGNOSIS — I48.0 PAROXYSMAL ATRIAL FIBRILLATION: Primary | ICD-10-CM

## 2023-03-28 PROCEDURE — 93000 ELECTROCARDIOGRAM COMPLETE: CPT | Performed by: INTERNAL MEDICINE

## 2023-03-28 PROCEDURE — 99214 OFFICE O/P EST MOD 30 MIN: CPT | Performed by: INTERNAL MEDICINE

## 2023-03-28 NOTE — PROGRESS NOTES
PATIENTINFORMATION    Date of Office Visit: 2023  Encounter Provider: Ochoa Murrell MD  Place of Service: [facility] CARDIOLOGY  Patient Name: Gamal Modi Sr.  : 1945    Subjective:     Encounter Date:2023      Patient ID: Gamal Modi Sr. is a 77 y.o. male.    No chief complaint on file.    HPI  Mr. Modi is a very pleasant 77 years old gentleman who came to cardiology clinic for follow-up visit.  He is very active and walks about 4-5 miles every day without any limiting symptoms and specifically denies chest pain or shortness of breath.  No palpitations, presyncope /syncope or orthopnea, PND or lower extremity swelling.  Very compliant with his medications and blood pressure runs normal during home monitoring.  He was admitted back in 2022 with dizziness/symptomatic hyponatremia and losartan was discontinued after that.        ROS  All systems reviewed and negative except as noted in HPI.    Past Medical History:   Diagnosis Date   • AF (atrial fibrillation) (HCC)    • Cataract    • Elevated cholesterol    • Erectile dysfunction    • Gout    • Hypertension    • Long term exposure to asbestos     3 years   • Sebaceous cyst        Past Surgical History:   Procedure Laterality Date   • COLON SURGERY     • COLONOSCOPY  approx     negative per pt    • COLONOSCOPY N/A 2022    Procedure: COLONOSCOPY INTO CECUM/ TERMINAL ILEUM WITH POLYPECTOMY;  Surgeon: Aidan Branham MD;  Location: [facility] ENDOSCOPY;  Service: Gastroenterology;  Laterality: N/A;  pre: anemia  post: hemorrhoids, normal TI, polyp, diverticulosis   • ENDOSCOPY N/A 2022    Procedure: ESOPHAGOGASTRODUODENOSCOPY WITH BIOPSIES;  Surgeon: Aidan Branham MD;  Location: [facility] ENDOSCOPY;  Service: Gastroenterology;  Laterality: N/A;  pre: anemia  post: hiatal hernia, bnormal ampulla   • OTHER SURGICAL HISTORY      BOWEL BLOCKAGE       Social History     Socioeconomic  "History   • Marital status:    Tobacco Use   • Smoking status: Never   • Smokeless tobacco: Never   Vaping Use   • Vaping Use: Never used   Substance and Sexual Activity   • Alcohol use: Not Currently   • Drug use: Never   • Sexual activity: Yes     Partners: Female       Family History   Problem Relation Age of Onset   • Diabetes Other    • Cancer Mother    • Diabetes Father    • Cancer Sister            ECG 12 Lead    Date/Time: 3/28/2023 7:11 AM  Performed by: Ochoa Murrell MD  Authorized by: Ochoa Murrell MD   Comparison: compared with previous ECG from 7/29/2023  Rhythm: sinus rhythm  Rate: normal  Conduction: conduction normal  ST Segments: ST segments normal  T Waves: T waves normal  QRS axis: normal  Other: no other findings    Clinical impression: normal ECG               Objective:     /84   Pulse 64   Ht 180.3 cm (70.98\")   Wt 100 kg (221 lb)   BMI 30.84 kg/m²  Body mass index is 30.84 kg/m².     Constitutional:       General: Not in acute distress.     Appearance: Well-developed. Not diaphoretic.   Eyes:      Pupils: Pupils are equal, round, and reactive to light.   HENT:      Head: Normocephalic and atraumatic.   Neck:      Thyroid: No thyromegaly.   Pulmonary:      Effort: Pulmonary effort is normal. No respiratory distress.      Breath sounds: Normal breath sounds. No wheezing. No rales.   Chest:      Chest wall: Not tender to palpatation.   Cardiovascular:      Normal rate. Regular rhythm.      No gallop.   Pulses:     Intact distal pulses.   Edema:     Peripheral edema absent.   Abdominal:      General: Bowel sounds are normal. There is no distension.      Palpations: Abdomen is soft.      Tenderness: There is no guarding.   Musculoskeletal: Normal range of motion.         General: No deformity.      Cervical back: Normal range of motion and neck supple. Skin:     General: Skin is warm and dry.      Findings: No rash.   Neurological:      Mental Status: Alert and " oriented to person, place, and time.      Cranial Nerves: No cranial nerve deficit.      Deep Tendon Reflexes: Reflexes are normal and symmetric.   Psychiatric:         Judgment: Judgment normal.         Review Of Data: I have reviewed pertinent recent labs, images and documents and pertinent findings included in HPI or assessment below.    Lipid Panel    Lipid Panel 3/22/23   Total Cholesterol 149   Triglycerides 63   HDL Cholesterol 59   VLDL Cholesterol 13   LDL Cholesterol  77      Comments are available for some flowsheets but are not being displayed.               Assessment/Plan:         1.   Shortness of breath in the past likely from A. fib and diastolic dysfunction-resolved  -Normal myocardial perfusion study on 10/1/2021  -Echo on 9/21/2021 revealed preserved left ventricular ejection fraction, elevated left atrial filling pressure while patient was in A. fib  2.    Paroxysmal atrial fibrillation diagnosed in September 2021  -Remains in sinus rhythm during follow-up office visit.  -ZIO revealed A. fib burden of about 1% after discharge in  September  -Doing fairly well on Eliquis and diltiazem.  3.  Hypertension-BPon both arms-160/100-Home readings are normal.  During recent visit with PCP BP was normal as well.  Likely whitecoat hypertension today.  4.  Pleural plaque-follows up with pulmonary and uses nocturnal oxygen  5.  Coronary calcification on CT -normal myocardial perfusion study in October 2021  -on statin   6. Hypercholesterolemia-on statin with lipid panel at goal.  7. Prior hx of TIA and patient reports intermittent RUE numbness -no neurologic complaints today  8. Admitted with dizziness and symptomatic hyponatremia in July 2022-resolved    He will continue walking regularly  He will check his at home and call back with results if persistently elevated  He will return to clinic in 1 year or sooner with any concerns.      Diagnosis and plan of care discussed with patient and verbalized  understanding.            Your medication list          Accurate as of March 28, 2023  8:42 AM. If you have any questions, ask your nurse or doctor.            CONTINUE taking these medications      Instructions Last Dose Given Next Dose Due   atorvastatin 20 MG tablet  Commonly known as: LIPITOR      Take 1 tablet by mouth Daily.       cholecalciferol 25 MCG (1000 UT) tablet  Commonly known as: VITAMIN D3      Take 1 tablet by mouth Daily.       dilTIAZem  MG 24 hr capsule  Commonly known as: CARDIZEM CD      TAKE ONE CAPSULE BY MOUTH DAILY       Eliquis 5 MG tablet tablet  Generic drug: apixaban      TAKE ONE TABLET BY MOUTH EVERY 12 HOURS       ferrous sulfate 325 (65 FE) MG tablet      Take 1 tablet by mouth Daily With Breakfast.       Lumigan 0.01 % ophthalmic drops  Generic drug: bimatoprost      Administer 1 drop into the left eye Every Night.       multivitamin with minerals tablet tablet      Take  by mouth.       tadalafil 5 MG tablet  Commonly known as: CIALIS      TAKE ONE TABLET BY MOUTH DAILY AS NEEDED FOR ERECTILE DYSFUNCTION                  Ochoa Murrell MD  03/28/23  08:42 EDT

## 2023-04-05 DIAGNOSIS — I51.89 DIASTOLIC DYSFUNCTION: ICD-10-CM

## 2023-04-05 RX ORDER — ATORVASTATIN CALCIUM 20 MG/1
TABLET, FILM COATED ORAL
Qty: 90 TABLET | Refills: 3 | Status: SHIPPED | OUTPATIENT
Start: 2023-04-05

## 2023-04-19 ENCOUNTER — TREATMENT (OUTPATIENT)
Dept: PHYSICAL THERAPY | Facility: CLINIC | Age: 78
End: 2023-04-19
Payer: COMMERCIAL

## 2023-04-19 DIAGNOSIS — M25.511 CHRONIC PAIN OF BOTH SHOULDERS: Primary | ICD-10-CM

## 2023-04-19 DIAGNOSIS — S46.012A STRAIN OF MUSCLE(S) AND TENDON(S) OF THE ROTATOR CUFF OF LEFT SHOULDER, INITIAL ENCOUNTER: ICD-10-CM

## 2023-04-19 DIAGNOSIS — M75.41 IMPINGEMENT SYNDROME OF RIGHT SHOULDER: ICD-10-CM

## 2023-04-19 DIAGNOSIS — G89.29 CHRONIC PAIN OF BOTH SHOULDERS: Primary | ICD-10-CM

## 2023-04-19 DIAGNOSIS — M75.42 IMPINGEMENT SYNDROME OF SHOULDER, LEFT: ICD-10-CM

## 2023-04-19 DIAGNOSIS — R26.89 DECREASED FUNCTIONAL MOBILITY: ICD-10-CM

## 2023-04-19 DIAGNOSIS — M25.512 CHRONIC PAIN OF BOTH SHOULDERS: Primary | ICD-10-CM

## 2023-04-19 NOTE — PROGRESS NOTES
Physical Therapy Initial Evaluation and Plan of Care  [address]   [facility] [address]  P: [phone]  F: [phone]    Patient: Gamal Modi Sr.   : 1945  Diagnosis/ICD-10 Code:  Chronic pain of both shoulders [M25.511, G89.29, M25.512]  Referring practitioner: Alysha Cosme MD  Date of Initial Visit: 2023  Today's Date: 2023  Patient seen for 1 session         Visit Diagnoses:    ICD-10-CM ICD-9-CM   1. Chronic pain of both shoulders  M25.511 719.41    G89.29 338.29    M25.512    2. Decreased functional mobility  R26.89 781.99   3. Strain of muscle(s) and tendon(s) of the rotator cuff of left shoulder, initial encounter  S46.012A 840.4   4. Impingement syndrome of shoulder, left  M75.42 726.2   5. Impingement syndrome of right shoulder  M75.41 726.2         Subjective Questionnaire: QuickDASH: 38.64%      Subjective Evaluation    History of Present Illness  Mechanism of injury: Pt is a 79 y/o male who presents to physical therapy with c/o B shoulder pain L>R that began about one year ago. Pt does not recall a YARITZA, but reports it has worsened over time. Pt reports that his pain has stayed about the same in the last few months. He reports pain laying on his left side and with overhead tasks. Pt also has difficulty reaching behind his back with both arms, left more than right. In addition, he has significant carpal tunnel in L wrist and hand.    Pain  Current pain ratin  At worst pain ratin  Quality: sharp  Relieving factors: change in position and rest  Aggravating factors: movement, lifting, sleeping, outstretched reach and repetitive movement    Hand dominance: right    Treatments  Current treatment: physical therapy  Patient Goals  Patient goals for therapy: increased strength, independence with ADLs/IADLs, increased motion and decreased pain             Objective          Postural Observations  Seated posture: fair  Standing posture:  good        Cervical/Thoracic Screen   Cervical range of motion within normal limits    Active Range of Motion   Left Shoulder   Flexion: 175 degrees with pain  Abduction: 175 degrees with pain  External rotation BTH: C7   Internal rotation BTB: L4 with pain    Right Shoulder   Flexion: 175 degrees   Abduction: 175 degrees   External rotation BTH: T1   Internal rotation BTB: L1     Passive Range of Motion   Left Shoulder   Flexion: 176 degrees with pain  Abduction: 176 degrees with pain  External rotation 90°: 90 degrees   Internal rotation 90°: 50 degrees with pain    Right Shoulder   Flexion: 178 degrees   Abduction: 176 degrees with pain  External rotation 90°: 90 degrees   Internal rotation 90°: 60 degrees     Strength/Myotome Testing     Left Shoulder     Planes of Motion   Flexion: 5   Abduction: 4-   External rotation at 90°: 3+   Internal rotation at 90°: 4     Right Shoulder     Planes of Motion   Flexion: 5   Abduction: 4+   External rotation at 90°: 4+   Internal rotation at 90°: 5     Tests     Left Shoulder   Positive empty can, full can, Hawkin's and painful arc.   Negative drop arm and lift-off.     Right Shoulder   Negative drop arm, empty can, full can, Hawkin's and lift-off.           Assessment & Plan     Assessment  Impairments: abnormal or restricted ROM, activity intolerance, impaired physical strength, lacks appropriate home exercise program and pain with function  Functional Limitations: carrying objects, lifting, sleeping, pulling, pushing, uncomfortable because of pain, reaching behind back, reaching overhead and unable to perform repetitive tasks  Assessment details: Pt is a 77 y/o male who presents to physical therapy with signs and symptoms consistent with L RTC strain, with signs of impingement in both of his shoulders as well. Pt has decreased B shoulder ROM. In addition, pt has B UE strength deficits which limits his ability to perform his ADLs/IADLs pain free. Pt has pain and  difficulty performing overhead reaching, outstretched reaching, and lifting tasks. Pt has a positive empty can, HK, and painful lift off test on LUE which leads me to believe he has some L RTC involvement. Pt would benefit from skilled physical therapy in order to address the above impairments and activity limitations outlined in this initial evaluation, in order to decrease pain, improve functional mobility, and return to PLOF.     Barriers to therapy: HTN, both shoulders are in pain  Prognosis: good    Goals  Plan Goals: STG 2-4 weeks    1. Decrease QuickDASH score by 10 points from IE in order to show improvement in overall functional activities.  2. Pt will be independent in home exercise progarm  3. Improve B shoulder flex/abd AROM to 175+ degrees and pain free in order to improve overhead elevation tasks    LTG 4-8 weeks    1. Decrease QuickDASH score by 20 points from IE in order to show improvement in overall functional activities.   2. Pt will be able to reach into upper cabinet 5/5 times for 2 pound weight kpain free  3. Improve MMT to 5/5 in order to show improvement in lifting tasks  4. Improve shoulder PROM to 70 degrees internal rotation bilaterally in order to improve reaching behind his back to put on a coat        Plan  Therapy options: will be seen for skilled therapy services  Planned modality interventions: cryotherapy, dry needling, TENS, electrical stimulation/Russian stimulation and thermotherapy (hydrocollator packs)  Planned therapy interventions: body mechanics training, ADL retraining, flexibility, functional ROM exercises, home exercise program, IADL retraining, joint mobilization, manual therapy, motor coordination training, neuromuscular re-education, postural training, soft tissue mobilization, strengthening, stretching and therapeutic activities  Frequency: 2x week  Duration in weeks: 8  Treatment plan discussed with: patient            Timed:         Manual Therapy:    10     mins   77816;     Therapeutic Exercise:    15     mins  61269;     Neuromuscular Santos:    0    mins  23209;    Therapeutic Activity:     0     mins  53503;     Gait Trainin     mins  25771;     Ultrasound:     0     mins  40333;    Ionto                               0    mins   44726  Self Care                       0     mins   42067  Canalith Repos    0     mins [id number]      Un-Timed:  Electrical Stimulation:    0     mins  96190 (MC );  Dry Needling     0     mins self-pay  Traction     0     mins 90161        Timed Treatment:   25   mins   Total Treatment:     42   mins          PT: Anyi Constantino PT     License Number: [id number]  Electronically signed by Anyi Constantino PT, 23, 9:54 AM EDT    Certification Period: 2023 thru 2023  I certify that the therapy services are furnished while this patient is under my care.  The services outlined above are required by this patient, and will be reviewed every 90 days.         Physician Signature:__________________________________________________    PHYSICIAN: Alysha Cosme MD  NPI: [id number]                                      DATE:      Please sign and return via fax to [email or web address] . Thank you, [facility].

## 2023-04-25 ENCOUNTER — TREATMENT (OUTPATIENT)
Dept: PHYSICAL THERAPY | Facility: CLINIC | Age: 78
End: 2023-04-25
Payer: COMMERCIAL

## 2023-04-25 DIAGNOSIS — G89.29 CHRONIC PAIN OF BOTH SHOULDERS: Primary | ICD-10-CM

## 2023-04-25 DIAGNOSIS — M25.511 CHRONIC PAIN OF BOTH SHOULDERS: Primary | ICD-10-CM

## 2023-04-25 DIAGNOSIS — M25.512 CHRONIC PAIN OF BOTH SHOULDERS: Primary | ICD-10-CM

## 2023-04-25 DIAGNOSIS — M75.41 IMPINGEMENT SYNDROME OF RIGHT SHOULDER: ICD-10-CM

## 2023-04-25 DIAGNOSIS — M75.42 IMPINGEMENT SYNDROME OF SHOULDER, LEFT: ICD-10-CM

## 2023-04-25 DIAGNOSIS — S46.012A STRAIN OF MUSCLE(S) AND TENDON(S) OF THE ROTATOR CUFF OF LEFT SHOULDER, INITIAL ENCOUNTER: ICD-10-CM

## 2023-04-25 DIAGNOSIS — R26.89 DECREASED FUNCTIONAL MOBILITY: ICD-10-CM

## 2023-04-25 NOTE — PROGRESS NOTES
Physical Therapy Daily Treatment Note  [address]   [facility] [address]  P: [phone]  F: [phone]    Patient: Gamal Modi Sr.   : 1945  Referring practitioner: Alysha Cosme MD  Date of Initial Visit: Type: THERAPY  Noted: 2023  Today's Date: 2023  Patient seen for 2 sessions       Visit Diagnoses:    ICD-10-CM ICD-9-CM   1. Chronic pain of both shoulders  M25.511 719.41    G89.29 338.29    M25.512    2. Decreased functional mobility  R26.89 781.99   3. Strain of muscle(s) and tendon(s) of the rotator cuff of left shoulder, initial encounter  S46.012A 840.4   4. Impingement syndrome of shoulder, left  M75.42 726.2   5. Impingement syndrome of right shoulder  M75.41 726.2       Gamal Lindsaycyndi Moody. reports:    Subjective   Pt reports that his shoulders feel 50% better since doing the exercises.   Objective   See Exercise, Manual, and Modality Logs for complete treatment.       Assessment/Plan  Pt tolerated therapy interventions well without adverse reactions. Pt continues to have difficulty during active shoulder internal rotation with more difficulty with LUE than right. Continue to progress towards goals. He benefits from v/c and t/c to ensure proper exercise performance.    Timed:         Manual Therapy:    8     mins  11395;     Therapeutic Exercise:    20     mins  67651;     Neuromuscular Santos:    0    mins  77298;    Therapeutic Activity:     10     mins  86030;     Gait Trainin     mins  14822;     Ultrasound:     0     mins  37201;    Ionto                               0    mins   39166  Self Care                       0     mins   52359  Canalith Repos    0     mins 03188      Un-Timed:  Electrical Stimulation:    0     mins  31982 ( );  Dry Needling     0     mins self-pay  Traction     0     mins 49188      Timed Treatment:   38   mins   Total Treatment:     38   mins    Anyi Constantino PT  [place] License:  [id number]

## 2023-05-02 ENCOUNTER — TREATMENT (OUTPATIENT)
Dept: PHYSICAL THERAPY | Facility: CLINIC | Age: 78
End: 2023-05-02
Payer: COMMERCIAL

## 2023-05-02 DIAGNOSIS — M75.42 IMPINGEMENT SYNDROME OF SHOULDER, LEFT: ICD-10-CM

## 2023-05-02 DIAGNOSIS — S46.012A STRAIN OF MUSCLE(S) AND TENDON(S) OF THE ROTATOR CUFF OF LEFT SHOULDER, INITIAL ENCOUNTER: ICD-10-CM

## 2023-05-02 DIAGNOSIS — M25.512 CHRONIC PAIN OF BOTH SHOULDERS: Primary | ICD-10-CM

## 2023-05-02 DIAGNOSIS — R26.89 DECREASED FUNCTIONAL MOBILITY: ICD-10-CM

## 2023-05-02 DIAGNOSIS — G89.29 CHRONIC PAIN OF BOTH SHOULDERS: Primary | ICD-10-CM

## 2023-05-02 DIAGNOSIS — M75.41 IMPINGEMENT SYNDROME OF RIGHT SHOULDER: ICD-10-CM

## 2023-05-02 DIAGNOSIS — M25.511 CHRONIC PAIN OF BOTH SHOULDERS: Primary | ICD-10-CM

## 2023-05-02 NOTE — PROGRESS NOTES
Physical Therapy Daily Treatment Note  [address]   [facility] [address]  P: [phone]  F: [phone]    Patient: Gamal Modi .   : 1945  Referring practitioner: Alysha Cosme MD  Date of Initial Visit: Type: THERAPY  Noted: 2023  Today's Date: 2023  Patient seen for 3 sessions       Visit Diagnoses:    ICD-10-CM ICD-9-CM   1. Chronic pain of both shoulders  M25.511 719.41    G89.29 338.29    M25.512    2. Decreased functional mobility  R26.89 781.99   3. Strain of muscle(s) and tendon(s) of the rotator cuff of left shoulder, initial encounter  S46.012A 840.4   4. Impingement syndrome of shoulder, left  M75.42 726.2   5. Impingement syndrome of right shoulder  M75.41 726.2       Gamal LOPEZ Ly Moody. reports:     Subjective   Pt reports that after last session for two days his shoulders were sore, and then it went away.  Objective   See Exercise, Manual, and Modality Logs for complete treatment.       Assessment/Plan  Pt c/o intermittent L hand numbness during left shoulder elevation tasks. Pt given cervical traction with towel and seated thoracic spine extensions to help decrease compression on left side. Pt tolerated this well. He benefits from t/c to ensure proper exercise performance. Resistance decreased to yellow band resistance for scapular strengthening, and isometric RTC strengthening put on hold due to pt's recent c/o shoulder pain. Continue POC.    Timed:         Manual Therapy:    15     mins  42519;     Therapeutic Exercise:    20     mins  65187;     Neuromuscular Santos:    0    mins  26389;    Therapeutic Activity:     10     mins  87092;     Gait Trainin     mins  64540;     Ultrasound:     0     mins  79326;    Ionto                               0    mins   82277  Self Care                       0     mins   50882  Canalith Repos    0     mins 29358      Un-Timed:  Electrical Stimulation:    0     mins  66809 ( );  Dry  Needling     0     mins self-pay  Traction     0     mins 17868      Timed Treatment:   45   mins   Total Treatment:     45   mins    Anyi Constantino, PT  [place] License: [id number]

## 2023-05-03 DIAGNOSIS — I51.89 DIASTOLIC DYSFUNCTION: ICD-10-CM

## 2023-05-04 RX ORDER — FUROSEMIDE 40 MG/1
TABLET ORAL
Qty: 90 TABLET | Refills: 3 | Status: SHIPPED | OUTPATIENT
Start: 2023-05-04

## 2023-05-04 NOTE — TELEPHONE ENCOUNTER
Rx Refill Note  Requested Prescriptions     Pending Prescriptions Disp Refills   • furosemide (LASIX) 40 MG tablet [Pharmacy Med Name: FUROSEMIDE 40 MG TABLET] 90 tablet 3     Sig: TAKE ONE TABLET BY MOUTH DAILY      Last office visit with prescribing clinician: 3/24/2023   Last telemedicine visit with prescribing clinician: 10/11/2023   Next office visit with prescribing clinician: 10/16/2023                         Would you like a call back once the refill request has been completed: [] Yes [] No    If the office needs to give you a call back, can they leave a voicemail: [] Yes [] No    Evelyn Martinez MA  05/04/23, 07:36 EDT

## 2023-05-09 ENCOUNTER — TREATMENT (OUTPATIENT)
Dept: PHYSICAL THERAPY | Facility: CLINIC | Age: 78
End: 2023-05-09
Payer: COMMERCIAL

## 2023-05-09 DIAGNOSIS — M25.512 CHRONIC PAIN OF BOTH SHOULDERS: Primary | ICD-10-CM

## 2023-05-09 DIAGNOSIS — M25.511 CHRONIC PAIN OF BOTH SHOULDERS: Primary | ICD-10-CM

## 2023-05-09 DIAGNOSIS — S46.012A STRAIN OF MUSCLE(S) AND TENDON(S) OF THE ROTATOR CUFF OF LEFT SHOULDER, INITIAL ENCOUNTER: ICD-10-CM

## 2023-05-09 DIAGNOSIS — M75.41 IMPINGEMENT SYNDROME OF RIGHT SHOULDER: ICD-10-CM

## 2023-05-09 DIAGNOSIS — R26.89 DECREASED FUNCTIONAL MOBILITY: ICD-10-CM

## 2023-05-09 DIAGNOSIS — G89.29 CHRONIC PAIN OF BOTH SHOULDERS: Primary | ICD-10-CM

## 2023-05-09 DIAGNOSIS — M75.42 IMPINGEMENT SYNDROME OF SHOULDER, LEFT: ICD-10-CM

## 2023-05-09 NOTE — PROGRESS NOTES
"Physical Therapy Daily Treatment Note  [address] [facility] [address] [place], [place] [address]  P: [phone]  F: [phone]    Patient: Gamal Modi .   : 1945  Referring practitioner: Alysha Cosme MD  Date of Initial Visit: Type: THERAPY  Noted: 2023  Today's Date: 2023  Patient seen for 4 sessions       Visit Diagnoses:    ICD-10-CM ICD-9-CM   1. Chronic pain of both shoulders  M25.511 719.41    G89.29 338.29    M25.512    2. Decreased functional mobility  R26.89 781.99   3. Strain of muscle(s) and tendon(s) of the rotator cuff of left shoulder, initial encounter  S46.012A 840.4   4. Impingement syndrome of shoulder, left  M75.42 726.2   5. Impingement syndrome of right shoulder  M75.41 726.2       Gamal Lindsaycyndi Moody. reports:     Subjective   Pt reports that his shoulders are feeling \"pretty good today\" and \"I notice I can lift them over my head without much pain\".   Objective   See Exercise, Manual, and Modality Logs for complete treatment.       Assessment/Plan  Pt responded well to manual therapy reporting relief during B GHJ glides. Pt able to perform s/l shoulder external rotation on both sides without adverse reactions. He benefits from t/c to prevent trunk rotation on LUE. Continue to strength B shlds to improve stability in overhead tasks.     Timed:         Manual Therapy:    15     mins  40035;     Therapeutic Exercise:    15     mins  20172;     Neuromuscular Santos:    0    mins  83893;    Therapeutic Activity:     10     mins  56633;     Gait Trainin     mins  98097;     Ultrasound:     0     mins  79439;    Ionto                               0    mins   26550  Self Care                       0     mins   85519  Canalith Repos    0     mins 09863      Un-Timed:  Electrical Stimulation:    0     mins  20818 ( );  Dry Needling     0     mins self-pay  Traction     0     mins 12657      Timed Treatment:   40   mins   Total Treatment:     40   " mins    Anyi Constantino, PT  [place] License: [id number]

## 2023-05-16 ENCOUNTER — TREATMENT (OUTPATIENT)
Dept: PHYSICAL THERAPY | Facility: CLINIC | Age: 78
End: 2023-05-16
Payer: COMMERCIAL

## 2023-05-16 DIAGNOSIS — M75.42 IMPINGEMENT SYNDROME OF SHOULDER, LEFT: ICD-10-CM

## 2023-05-16 DIAGNOSIS — M25.512 CHRONIC PAIN OF BOTH SHOULDERS: Primary | ICD-10-CM

## 2023-05-16 DIAGNOSIS — G89.29 CHRONIC PAIN OF BOTH SHOULDERS: Primary | ICD-10-CM

## 2023-05-16 DIAGNOSIS — S46.012A STRAIN OF MUSCLE(S) AND TENDON(S) OF THE ROTATOR CUFF OF LEFT SHOULDER, INITIAL ENCOUNTER: ICD-10-CM

## 2023-05-16 DIAGNOSIS — M25.511 CHRONIC PAIN OF BOTH SHOULDERS: Primary | ICD-10-CM

## 2023-05-16 DIAGNOSIS — R26.89 DECREASED FUNCTIONAL MOBILITY: ICD-10-CM

## 2023-05-16 DIAGNOSIS — M75.41 IMPINGEMENT SYNDROME OF RIGHT SHOULDER: ICD-10-CM

## 2023-05-16 NOTE — PROGRESS NOTES
"Physical Therapy Daily Treatment Note  [address]   [facility] [address]  P: [phone]  F: [phone]    Patient: Gamal Lindsaycyndi Moody.   : 1945  Referring practitioner: Alysha Cosme MD  Date of Initial Visit: Type: THERAPY  Noted: 2023  Today's Date: 2023  Patient seen for 5 sessions       Visit Diagnoses:    ICD-10-CM ICD-9-CM   1. Chronic pain of both shoulders  M25.511 719.41    G89.29 338.29    M25.512    2. Decreased functional mobility  R26.89 781.99   3. Strain of muscle(s) and tendon(s) of the rotator cuff of left shoulder, initial encounter  S46.012A 840.4   4. Impingement syndrome of shoulder, left  M75.42 726.2   5. Impingement syndrome of right shoulder  M75.41 726.2       Gamal LOPEZ Ly Moody. reports:     Subjective   Pt reports \"my shoulders are doing good\"   Objective   See Exercise, Manual, and Modality Logs for complete treatment.       Assessment/Plan  Pt has full range of motion in elevation with physioball assistance without reports of numbness in left hand and with good form. Pt benefits from /c to correct form during standing PNF D2 flexion with LUE to encourage a more opened up posture with LUE. Pt is progressing well with PT. Continue to progress towards goals.     Timed:         Manual Therapy:    15     mins  76627;     Therapeutic Exercise:    15     mins  03255;     Neuromuscular Santos:    0    mins  83181;    Therapeutic Activity:     8     mins  70821;     Gait Trainin     mins  01022;     Ultrasound:     0     mins  29485;    Ionto                               0    mins   93482  Self Care                       0     mins   62399  Canalith Repos    0     mins 00257      Un-Timed:  Electrical Stimulation:    0     mins  56011 ( );  Dry Needling     0     mins self-pay  Traction     0     mins 56136      Timed Treatment:   38   mins   Total Treatment:     38   mins    Anyi Constantino PT  [place] License: " [id number]

## 2023-05-30 ENCOUNTER — TREATMENT (OUTPATIENT)
Dept: PHYSICAL THERAPY | Facility: CLINIC | Age: 78
End: 2023-05-30

## 2023-05-30 DIAGNOSIS — G89.29 CHRONIC PAIN OF BOTH SHOULDERS: Primary | ICD-10-CM

## 2023-05-30 DIAGNOSIS — S46.012A STRAIN OF MUSCLE(S) AND TENDON(S) OF THE ROTATOR CUFF OF LEFT SHOULDER, INITIAL ENCOUNTER: ICD-10-CM

## 2023-05-30 DIAGNOSIS — M75.41 IMPINGEMENT SYNDROME OF RIGHT SHOULDER: ICD-10-CM

## 2023-05-30 DIAGNOSIS — M75.42 IMPINGEMENT SYNDROME OF SHOULDER, LEFT: ICD-10-CM

## 2023-05-30 DIAGNOSIS — M25.512 CHRONIC PAIN OF BOTH SHOULDERS: Primary | ICD-10-CM

## 2023-05-30 DIAGNOSIS — M25.511 CHRONIC PAIN OF BOTH SHOULDERS: Primary | ICD-10-CM

## 2023-05-30 DIAGNOSIS — R26.89 DECREASED FUNCTIONAL MOBILITY: ICD-10-CM

## 2023-05-30 NOTE — PROGRESS NOTES
"Re-Assessment / Progress Note  [facility]   [address]  [place] [address]  P: [phone]  F: [phone]    Patient: Gamal Modi .   : 1945  Diagnosis/ICD-10 Code:  Chronic pain of both shoulders [M25.511, G89.29, M25.512]  Referring practitioner: Alysha Cosme MD  Date of Initial Visit: Episode Type: THERAPY  Noted: 2023    Today's Date: 2023  Patient seen for 6 sessions.    Visit Diagnoses:    ICD-10-CM ICD-9-CM   1. Chronic pain of both shoulders  M25.511 719.41    G89.29 338.29    M25.512    2. Decreased functional mobility  R26.89 781.99   3. Strain of muscle(s) and tendon(s) of the rotator cuff of left shoulder, initial encounter  S46.012A 840.4   4. Impingement syndrome of shoulder, left  M75.42 726.2   5. Impingement syndrome of right shoulder  M75.41 726.2       Subjective:   Gamal Modi reports:     Subjective Questionnaire: QuickDASH: 27.27%  Clinical Progress: improved  Home Program Compliance: Yes  Treatment has included: therapeutic exercise, neuromuscular re-education, manual therapy and therapeutic activity    Subjective   Pt reports \"my shoulders are doing great, I've seen a big improvement\".  Pain  Current pain ratin  At worst pain ratin  Objective   Active Range of Motion   Left Shoulder   Flexion: 175 degrees   Abduction: 175 degrees   External rotation BTH: C7   Internal rotation BTB: L1    Right Shoulder   Flexion: 175 degrees   Abduction: 175 degrees   External rotation BTH: T1   Internal rotation BTB: L1      Passive Range of Motion   Left Shoulder   Flexion: 176 degrees   Abduction: 176 degrees   External rotation 90°: 90 degrees   Internal rotation 90°: 70 ddegrees     Right Shoulder   Flexion: 178 degrees   Abduction: 176 degrees   External rotation 90°: 90 degrees   Internal rotation 90°: 70 degrees      Strength/Myotome Testing     Left Shoulder      Planes of Motion   Flexion: 5 "   Abduction: 5  External rotation at 90°: 5  Internal rotation at 90°: 5    Right Shoulder      Planes of Motion   Flexion: 5   Abduction: 5  External rotation at 90°: 5  Internal rotation at 90°: 5               Assessment/Plan   Pt is a 79 y/o male who has attended PT for 6 sessions for B shoulder pain and stiffness. He has progressed very well towards his goals, meeting all objective measures. He has improved in his B shld range of motion actively and passively and has also improved in strength. He is independent in his home exercise program and is ready for discharge at this time. D/c Prognosis is good.  Progress toward previous goals: Partially Met  (all objective measures met, subjective Quick dash LTG not met, but progressing well)  Goals  Plan Goals: STG 2-4 weeks    1. Decrease QuickDASH score by 10 points from IE in order to show improvement in overall functional activities.MET  2. Pt will be independent in home exercise program MET  3. Improve B shoulder flex/abd AROM to 175+ degrees and pain free in order to improve overhead elevation tasks MET    LTG 4-8 weeks    1. Decrease QuickDASH score by 20 points from IE in order to show improvement in overall functional activities. PROGRESSING  2. Pt will be able to reach into upper cabinet 5/5 times for 2 pound weight kpain free MET  3. Improve MMT to 5/5 in order to show improvement in lifting tasks MET  4. Improve shoulder PROM to 70 degrees internal rotation bilaterally in order to improve reaching behind his back to put on a coat MET        Recommendations: Discharge  Timeframe: today  Prognosis to achieve goals: good    PT Signature: Anyi Constantino, PT  [place] Lic. # [id number]      Based upon review of the patient's progress and continued therapy plan, it is my medical opinion that Gamal Modi should continue physical therapy treatment at [facility]  [facility]  [address], [address]  [address]  [phone] ; Fax Number [phone]    Signature: __________________________________  Alysha Cosme MD    Manual Therapy:     15     mins  97793;  Therapeutic Exercise:     8     mins  47930;     Neuromuscular Santos:     0    mins  40733;    Therapeutic Activity:      0     mins  75247;     Gait Trainin     mins  53338;     Ultrasound:      0     mins  94683;    Electrical Stimulation:     0     mins  00076 ( );  Dry Needling      0     mins self-pay  Traction      0     mins 21457  Canalith Repositioning    0     mins 25199      Timed Treatment:   23   mins   Total Treatment:     23   mins

## 2023-08-02 ENCOUNTER — TRANSCRIBE ORDERS (OUTPATIENT)
Dept: ADMINISTRATIVE | Facility: HOSPITAL | Age: 78
End: 2023-08-02
Payer: COMMERCIAL

## 2023-08-02 DIAGNOSIS — H35.30 MACULAR DEGENERATION, UNSPECIFIED LATERALITY, UNSPECIFIED TYPE: Primary | ICD-10-CM

## 2023-08-04 ENCOUNTER — HOSPITAL ENCOUNTER (OUTPATIENT)
Facility: HOSPITAL | Age: 78
End: 2023-08-04
Payer: COMMERCIAL

## 2023-08-04 ENCOUNTER — HOSPITAL ENCOUNTER (OUTPATIENT)
Facility: HOSPITAL | Age: 78
Discharge: HOME | End: 2023-08-04
Payer: COMMERCIAL

## 2023-08-04 DIAGNOSIS — H35.30 AGE-RELATED MACULAR DEGENERATION: ICD-10-CM

## 2023-08-04 DIAGNOSIS — H35.30 MACULAR DEGENERATION, UNSPECIFIED LATERALITY, UNSPECIFIED TYPE: ICD-10-CM

## 2023-08-04 PROCEDURE — 0 GADOBENATE DIMEGLUMINE 529 MG/ML SOLUTION: Performed by: OPHTHALMOLOGY

## 2023-08-04 PROCEDURE — 70553 MRI BRAIN STEM W/O & W/DYE: CPT

## 2023-08-04 PROCEDURE — 70543 MRI ORBT/FAC/NCK W/O &W/DYE: CPT

## 2023-08-04 PROCEDURE — A9577 INJ MULTIHANCE: HCPCS | Performed by: OPHTHALMOLOGY

## 2023-08-04 RX ADMIN — GADOBENATE DIMEGLUMINE 20 ML: 529 INJECTION, SOLUTION INTRAVENOUS at 08:42

## 2023-09-15 ENCOUNTER — HOSPITAL ENCOUNTER (INPATIENT)
Facility: HOSPITAL | Age: 78
LOS: 3 days | Discharge: HOME | DRG: 641 | End: 2023-09-18
Attending: EMERGENCY MEDICINE | Admitting: INTERNAL MEDICINE
Payer: COMMERCIAL

## 2023-09-15 ENCOUNTER — APPOINTMENT (OUTPATIENT)
Dept: CT IMAGING | Facility: HOSPITAL | Age: 78
DRG: 641 | End: 2023-09-15
Payer: COMMERCIAL

## 2023-09-15 DIAGNOSIS — F10.20 ALCOHOLISM: ICD-10-CM

## 2023-09-15 DIAGNOSIS — E87.1 HYPONATREMIA: Primary | ICD-10-CM

## 2023-09-15 LAB
ALBUMIN SERPL-MCNC: 4.1 G/DL (ref 3.5–5.2)
ALBUMIN/GLOB SERPL: 1.9 G/DL
ALP SERPL-CCNC: 110 U/L (ref 39–117)
ALT SERPL W P-5'-P-CCNC: 41 U/L (ref 1–41)
AMPHET+METHAMPHET UR QL: NEGATIVE
ANION GAP SERPL CALCULATED.3IONS-SCNC: 11.7 MMOL/L (ref 5–15)
ANION GAP SERPL CALCULATED.3IONS-SCNC: 12 MMOL/L (ref 5–15)
AST SERPL-CCNC: 58 U/L (ref 1–40)
BARBITURATES UR QL SCN: NEGATIVE
BASOPHILS # BLD MANUAL: 0.07 10*3/MM3 (ref 0–0.2)
BASOPHILS NFR BLD MANUAL: 1 % (ref 0–1.5)
BENZODIAZ UR QL SCN: NEGATIVE
BILIRUB SERPL-MCNC: 1.1 MG/DL (ref 0–1.2)
BILIRUB UR QL STRIP: NEGATIVE
BUN SERPL-MCNC: 11 MG/DL (ref 8–23)
BUN SERPL-MCNC: 13 MG/DL (ref 8–23)
BUN/CREAT SERPL: 14.4 (ref 7–25)
BUN/CREAT SERPL: 14.9 (ref 7–25)
CALCIUM SPEC-SCNC: 8.7 MG/DL (ref 8.6–10.5)
CALCIUM SPEC-SCNC: 8.9 MG/DL (ref 8.6–10.5)
CANNABINOIDS SERPL QL: NEGATIVE
CHLORIDE SERPL-SCNC: 72 MMOL/L (ref 98–107)
CHLORIDE SERPL-SCNC: 74 MMOL/L (ref 98–107)
CLARITY UR: CLEAR
CO2 SERPL-SCNC: 30 MMOL/L (ref 22–29)
CO2 SERPL-SCNC: 31.3 MMOL/L (ref 22–29)
COCAINE UR QL: NEGATIVE
COLOR UR: YELLOW
CREAT SERPL-MCNC: 0.74 MG/DL (ref 0.76–1.27)
CREAT SERPL-MCNC: 0.9 MG/DL (ref 0.76–1.27)
DEPRECATED RDW RBC AUTO: 39.3 FL (ref 37–54)
EGFRCR SERPLBLD CKD-EPI 2021: 88.5 ML/MIN/1.73
EGFRCR SERPLBLD CKD-EPI 2021: 93.9 ML/MIN/1.73
ERYTHROCYTE [DISTWIDTH] IN BLOOD BY AUTOMATED COUNT: 12.4 % (ref 12.3–15.4)
ETHANOL BLD-MCNC: <10 MG/DL (ref 0–10)
ETHANOL UR QL: <0.01 %
FENTANYL UR-MCNC: NEGATIVE NG/ML
GLOBULIN UR ELPH-MCNC: 2.2 GM/DL
GLUCOSE BLDC GLUCOMTR-MCNC: 149 MG/DL (ref 70–130)
GLUCOSE SERPL-MCNC: 117 MG/DL (ref 65–99)
GLUCOSE SERPL-MCNC: 120 MG/DL (ref 65–99)
GLUCOSE UR STRIP-MCNC: NEGATIVE MG/DL
HCT VFR BLD AUTO: 34.1 % (ref 37.5–51)
HGB BLD-MCNC: 12.6 G/DL (ref 13–17.7)
HGB UR QL STRIP.AUTO: NEGATIVE
KETONES UR QL STRIP: NEGATIVE
LEUKOCYTE ESTERASE UR QL STRIP.AUTO: NEGATIVE
LYMPHOCYTES # BLD MANUAL: 0.64 10*3/MM3 (ref 0.7–3.1)
LYMPHOCYTES NFR BLD MANUAL: 21.4 % (ref 5–12)
MAGNESIUM SERPL-MCNC: 2.2 MG/DL (ref 1.6–2.4)
MCH RBC QN AUTO: 32.4 PG (ref 26.6–33)
MCHC RBC AUTO-ENTMCNC: 37 G/DL (ref 31.5–35.7)
MCV RBC AUTO: 87.7 FL (ref 79–97)
METHADONE UR QL SCN: NEGATIVE
MONOCYTES # BLD: 1.5 10*3/MM3 (ref 0.1–0.9)
NEUTROPHILS # BLD AUTO: 4.78 10*3/MM3 (ref 1.7–7)
NEUTROPHILS NFR BLD MANUAL: 68.4 % (ref 42.7–76)
NITRITE UR QL STRIP: NEGATIVE
NRBC BLD AUTO-RTO: 0 /100 WBC (ref 0–0.2)
OPIATES UR QL: NEGATIVE
OSMOLALITY UR: 182 MOSM/KG
OXYCODONE UR QL SCN: NEGATIVE
PH UR STRIP.AUTO: 6 [PH] (ref 5–8)
PLAT MORPH BLD: NORMAL
PLATELET # BLD AUTO: 234 10*3/MM3 (ref 140–450)
PMV BLD AUTO: 9.5 FL (ref 6–12)
POIKILOCYTOSIS BLD QL SMEAR: ABNORMAL
POTASSIUM SERPL-SCNC: 2.5 MMOL/L (ref 3.5–5.2)
POTASSIUM SERPL-SCNC: 3.1 MMOL/L (ref 3.5–5.2)
PROT SERPL-MCNC: 6.3 G/DL (ref 6–8.5)
PROT UR QL STRIP: NEGATIVE
RBC # BLD AUTO: 3.89 10*6/MM3 (ref 4.14–5.8)
SMUDGE CELLS BLD QL SMEAR: ABNORMAL
SODIUM SERPL-SCNC: 114 MMOL/L (ref 136–145)
SODIUM SERPL-SCNC: 117 MMOL/L (ref 136–145)
SODIUM UR-SCNC: <20 MMOL/L
SP GR UR STRIP: 1.01 (ref 1–1.03)
TSH SERPL DL<=0.05 MIU/L-ACNC: 2.55 UIU/ML (ref 0.27–4.2)
URATE SERPL-MCNC: 7.2 MG/DL (ref 3.4–7)
UROBILINOGEN UR QL STRIP: NORMAL
VARIANT LYMPHS NFR BLD MANUAL: 9.2 % (ref 19.6–45.3)
WBC NRBC COR # BLD: 6.99 10*3/MM3 (ref 3.4–10.8)

## 2023-09-15 PROCEDURE — 99285 EMERGENCY DEPT VISIT HI MDM: CPT

## 2023-09-15 PROCEDURE — 80307 DRUG TEST PRSMV CHEM ANLYZR: CPT | Performed by: EMERGENCY MEDICINE

## 2023-09-15 PROCEDURE — 84550 ASSAY OF BLOOD/URIC ACID: CPT | Performed by: EMERGENCY MEDICINE

## 2023-09-15 PROCEDURE — 85007 BL SMEAR W/DIFF WBC COUNT: CPT | Performed by: EMERGENCY MEDICINE

## 2023-09-15 PROCEDURE — 84300 ASSAY OF URINE SODIUM: CPT | Performed by: EMERGENCY MEDICINE

## 2023-09-15 PROCEDURE — 80048 BASIC METABOLIC PNL TOTAL CA: CPT | Performed by: INTERNAL MEDICINE

## 2023-09-15 PROCEDURE — 82077 ASSAY SPEC XCP UR&BREATH IA: CPT | Performed by: EMERGENCY MEDICINE

## 2023-09-15 PROCEDURE — 81003 URINALYSIS AUTO W/O SCOPE: CPT | Performed by: EMERGENCY MEDICINE

## 2023-09-15 PROCEDURE — 80050 GENERAL HEALTH PANEL: CPT | Performed by: EMERGENCY MEDICINE

## 2023-09-15 PROCEDURE — 82948 REAGENT STRIP/BLOOD GLUCOSE: CPT

## 2023-09-15 PROCEDURE — 83735 ASSAY OF MAGNESIUM: CPT | Performed by: EMERGENCY MEDICINE

## 2023-09-15 PROCEDURE — 83935 ASSAY OF URINE OSMOLALITY: CPT | Performed by: EMERGENCY MEDICINE

## 2023-09-15 PROCEDURE — 70450 CT HEAD/BRAIN W/O DYE: CPT

## 2023-09-15 RX ORDER — SODIUM CHLORIDE 0.9 % (FLUSH) 0.9 %
10 SYRINGE (ML) INJECTION AS NEEDED
Status: DISCONTINUED | OUTPATIENT
Start: 2023-09-15 | End: 2023-09-18 | Stop reason: HOSPADM

## 2023-09-15 RX ORDER — ACETAMINOPHEN 325 MG/1
650 TABLET ORAL EVERY 6 HOURS PRN
Status: DISCONTINUED | OUTPATIENT
Start: 2023-09-15 | End: 2023-09-18 | Stop reason: HOSPADM

## 2023-09-15 RX ORDER — AMOXICILLIN 250 MG
2 CAPSULE ORAL 2 TIMES DAILY
Status: DISCONTINUED | OUTPATIENT
Start: 2023-09-15 | End: 2023-09-18 | Stop reason: HOSPADM

## 2023-09-15 RX ORDER — CETIRIZINE HYDROCHLORIDE 10 MG/1
10 TABLET ORAL DAILY
COMMUNITY

## 2023-09-15 RX ORDER — ACETAMINOPHEN 500 MG
1000 TABLET ORAL DAILY
COMMUNITY

## 2023-09-15 RX ORDER — SODIUM CHLORIDE 0.9 % (FLUSH) 0.9 %
10 SYRINGE (ML) INJECTION EVERY 12 HOURS SCHEDULED
Status: DISCONTINUED | OUTPATIENT
Start: 2023-09-15 | End: 2023-09-18 | Stop reason: HOSPADM

## 2023-09-15 RX ORDER — POLYETHYLENE GLYCOL 3350 17 G/17G
17 POWDER, FOR SOLUTION ORAL DAILY PRN
Status: DISCONTINUED | OUTPATIENT
Start: 2023-09-15 | End: 2023-09-18 | Stop reason: HOSPADM

## 2023-09-15 RX ORDER — BISACODYL 5 MG/1
5 TABLET, DELAYED RELEASE ORAL DAILY PRN
Status: DISCONTINUED | OUTPATIENT
Start: 2023-09-15 | End: 2023-09-18 | Stop reason: HOSPADM

## 2023-09-15 RX ORDER — BISACODYL 10 MG
10 SUPPOSITORY, RECTAL RECTAL DAILY PRN
Status: DISCONTINUED | OUTPATIENT
Start: 2023-09-15 | End: 2023-09-18 | Stop reason: HOSPADM

## 2023-09-15 RX ORDER — MULTIPLE VITAMINS W/ MINERALS TAB 9MG-400MCG
1 TAB ORAL DAILY
COMMUNITY
End: 2023-09-22 | Stop reason: HOSPADM

## 2023-09-15 RX ORDER — FOLIC ACID 1 MG/1
1 TABLET ORAL DAILY
Status: DISCONTINUED | OUTPATIENT
Start: 2023-09-15 | End: 2023-09-18 | Stop reason: HOSPADM

## 2023-09-15 RX ORDER — CALCIUM CARBONATE 500 MG/1
3 TABLET, CHEWABLE ORAL DAILY PRN
COMMUNITY

## 2023-09-15 RX ORDER — ONDANSETRON 2 MG/ML
4 INJECTION INTRAMUSCULAR; INTRAVENOUS EVERY 6 HOURS PRN
Status: DISCONTINUED | OUTPATIENT
Start: 2023-09-15 | End: 2023-09-18 | Stop reason: HOSPADM

## 2023-09-15 RX ORDER — CARBOXYMETHYLCELLULOSE SODIUM 5 MG/ML
1 SOLUTION/ DROPS OPHTHALMIC DAILY
COMMUNITY

## 2023-09-15 RX ORDER — SODIUM CHLORIDE 9 MG/ML
40 INJECTION, SOLUTION INTRAVENOUS AS NEEDED
Status: DISCONTINUED | OUTPATIENT
Start: 2023-09-15 | End: 2023-09-18 | Stop reason: HOSPADM

## 2023-09-15 RX ORDER — NITROGLYCERIN 0.4 MG/1
0.4 TABLET SUBLINGUAL
Status: DISCONTINUED | OUTPATIENT
Start: 2023-09-15 | End: 2023-09-18 | Stop reason: HOSPADM

## 2023-09-15 RX ORDER — SODIUM CHLORIDE 9 MG/ML
100 INJECTION, SOLUTION INTRAVENOUS CONTINUOUS
Status: DISCONTINUED | OUTPATIENT
Start: 2023-09-15 | End: 2023-09-16

## 2023-09-15 RX ORDER — ASCORBIC ACID 500 MG
1000 TABLET ORAL DAILY
COMMUNITY

## 2023-09-15 RX ORDER — POTASSIUM CHLORIDE 750 MG/1
40 TABLET, FILM COATED, EXTENDED RELEASE ORAL EVERY 4 HOURS
Status: COMPLETED | OUTPATIENT
Start: 2023-09-15 | End: 2023-09-16

## 2023-09-15 RX ADMIN — Medication 100 MG: at 21:58

## 2023-09-15 RX ADMIN — SODIUM CHLORIDE 100 ML/HR: 9 INJECTION, SOLUTION INTRAVENOUS at 19:14

## 2023-09-15 RX ADMIN — Medication 10 ML: at 20:40

## 2023-09-15 RX ADMIN — FOLIC ACID 1 MG: 1 TABLET ORAL at 21:58

## 2023-09-15 RX ADMIN — POTASSIUM CHLORIDE 40 MEQ: 750 TABLET, EXTENDED RELEASE ORAL at 20:48

## 2023-09-15 RX ADMIN — APIXABAN 5 MG: 5 TABLET, FILM COATED ORAL at 19:14

## 2023-09-15 NOTE — ED PROVIDER NOTES
EMERGENCY DEPARTMENT ENCOUNTER    Room Number:  18/18  PCP: Alysha Cosme MD  Patient Care Team:  Alysha Cosme MD as PCP - General (Internal Medicine)   Independent Historians: Patient    HPI:  Chief Complaint: Generalized weakness    A complete HPI/ROS/PMH/PSH/SH/FH are unobtainable due to: Nothing    Chronic or social conditions impacting patient care (Social Determinants of Health): None  (Financial Resource Strain / Food Insecurity / Transportation Needs / Physical Activity / Stress / Social Connections / Intimate Partner Violence / Housing Stability)    Context: Gamal Modi Sr. is a 78 y.o. male who presents to the ED c/o acute generalized weakness.  The patient reports that he checked himself into alcohol rehab today.  He states that his last drink was on Monday.  He reports he binge drinks.  He usually drinks for 2 weeks in a row before stopping.  He states that when he arrived at the rehab facility he told them that he has been having generalized weakness as well as unsteadiness with ambulation for the last week.  The patient was sent here for further evaluation.  The patient reports that he has had similar symptoms previously and was found to have hyponatremia.  He reports he had a mechanical fall 2 days ago but denies any injury from that.  He is not on blood thinners.  He denies any suicidal or homicidal ideation.    Review of prior external notes (non-ED) -and- Review of prior external test results outside of this encounter: Laboratory evaluation 3/22/2023 showed a sodium of 140.    Prescription drug monitoring program review:         PAST MEDICAL HISTORY  Active Ambulatory Problems     Diagnosis Date Noted    Gout 04/11/2016    Essential hypertension 04/11/2016    Annual physical exam 05/23/2016    Hypercholesterolemia 09/06/2017    Anemia 10/12/2020    Pulmonary asbestosis 10/12/2020    Paroxysmal atrial fibrillation 09/20/2021    Diastolic dysfunction 09/21/2021    Anemia 09/21/2021     "Fatty liver 09/21/2021    Abnormal thyroid function test 09/22/2021    Dizziness 07/29/2022    Hyponatremia 07/30/2022    Coronary artery calcification seen on CAT scan 03/28/2023     Resolved Ambulatory Problems     Diagnosis Date Noted    Accelerated hypertension 05/10/2018    Dyspnea 09/03/2019     Past Medical History:   Diagnosis Date    AF (atrial fibrillation)     Cataract 2019    Elevated cholesterol     Erectile dysfunction 2018    Hypertension     Long term exposure to asbestos     Sebaceous cyst          PAST SURGICAL HISTORY  Past Surgical History:   Procedure Laterality Date    COLON SURGERY      COLONOSCOPY  approx 2015    negative per pt     COLONOSCOPY N/A 5/12/2022    Procedure: COLONOSCOPY INTO CECUM/ TERMINAL ILEUM WITH POLYPECTOMY;  Surgeon: Aidan Branham MD;  Location:  [facility];  Service: Gastroenterology;  Laterality: N/A;  pre: anemia  post: hemorrhoids, normal TI, polyp, diverticulosis    ENDOSCOPY N/A 5/12/2022    Procedure: ESOPHAGOGASTRODUODENOSCOPY WITH BIOPSIES;  Surgeon: Aidan Branham MD;  Location:  [facility];  Service: Gastroenterology;  Laterality: N/A;  pre: anemia  post: hiatal hernia, bnormal ampulla    OTHER SURGICAL HISTORY      BOWEL BLOCKAGE         FAMILY HISTORY  Family History   Problem Relation Age of Onset    Diabetes Other     Cancer Mother     Diabetes Father     Cancer Sister          SOCIAL HISTORY  Social History     Socioeconomic History    Marital status:    Tobacco Use    Smoking status: Never    Smokeless tobacco: Never   Vaping Use    Vaping Use: Never used   Substance and Sexual Activity    Alcohol use: Yes     Comment: pt reports he drinks \"about a quart of whiskey a day\" and his last drink was approx 2 days ago    Drug use: Never    Sexual activity: Yes     Partners: Female         ALLERGIES  Patient has no known allergies.        REVIEW OF SYSTEMS  Review of Systems  Included in HPI  All systems reviewed and negative except for " those discussed in HPI.      PHYSICAL EXAM    I have reviewed the triage vital signs and nursing notes.    ED Triage Vitals [09/15/23 1311]   Temp Heart Rate Resp BP SpO2   98 °F (36.7 °C) 68 16 130/70 96 %      Temp src Heart Rate Source Patient Position BP Location FiO2 (%)   -- -- -- -- --       Physical Exam  GENERAL: Awake, alert, no acute distress.  Mild slowness of thought.  Seems slightly confused.  SKIN: Warm, dry  HENT: Normocephalic, atraumatic  EYES: no scleral icterus  CV: regular rhythm, regular rate  RESPIRATORY: normal effort, lungs clear  ABDOMEN: soft, nontender, nondistended  MUSCULOSKELETAL: no deformity  NEURO: alert, moves all extremities, follows commands, cranial nerves II through XII intact.  Equal upper and lower extremity strength and sensation.  Normal finger-nose bilaterally.  Normal heel shin bilaterally.                                                               LAB RESULTS  Recent Results (from the past 24 hour(s))   Urine Drug Screen - Urine, Clean Catch    Collection Time: 09/15/23  2:31 PM    Specimen: Urine, Clean Catch   Result Value Ref Range    Amphet/Methamphet, Screen Negative Negative    Barbiturates Screen, Urine Negative Negative    Benzodiazepine Screen, Urine Negative Negative    Cocaine Screen, Urine Negative Negative    Opiate Screen Negative Negative    THC, Screen, Urine Negative Negative    Methadone Screen, Urine Negative Negative    Oxycodone Screen, Urine Negative Negative    Fentanyl, Urine Negative Negative   Sodium, Urine, Random - Urine, Clean Catch    Collection Time: 09/15/23  2:31 PM    Specimen: Urine, Clean Catch   Result Value Ref Range    Sodium, Urine <20 mmol/L   Urinalysis With Microscopic If Indicated (No Culture) - Urine, Clean Catch    Collection Time: 09/15/23  2:31 PM    Specimen: Urine, Clean Catch   Result Value Ref Range    Color, UA Yellow Yellow, Straw    Appearance, UA Clear Clear    pH, UA 6.0 5.0 - 8.0    Specific Gravity, UA 1.009  1.005 - 1.030    Glucose, UA Negative Negative    Ketones, UA Negative Negative    Bilirubin, UA Negative Negative    Blood, UA Negative Negative    Protein, UA Negative Negative    Leuk Esterase, UA Negative Negative    Nitrite, UA Negative Negative    Urobilinogen, UA 1.0 E.U./dL 0.2 - 1.0 E.U./dL   Comprehensive Metabolic Panel    Collection Time: 09/15/23  2:38 PM    Specimen: Arm, Right; Blood   Result Value Ref Range    Glucose 117 (H) 65 - 99 mg/dL    BUN 13 8 - 23 mg/dL    Creatinine 0.90 0.76 - 1.27 mg/dL    Sodium 114 (C) 136 - 145 mmol/L    Potassium 3.1 (L) 3.5 - 5.2 mmol/L    Chloride 72 (L) 98 - 107 mmol/L    CO2 30.0 (H) 22.0 - 29.0 mmol/L    Calcium 8.9 8.6 - 10.5 mg/dL    Total Protein 6.3 6.0 - 8.5 g/dL    Albumin 4.1 3.5 - 5.2 g/dL    ALT (SGPT) 41 1 - 41 U/L    AST (SGOT) 58 (H) 1 - 40 U/L    Alkaline Phosphatase 110 39 - 117 U/L    Total Bilirubin 1.1 0.0 - 1.2 mg/dL    Globulin 2.2 gm/dL    A/G Ratio 1.9 g/dL    BUN/Creatinine Ratio 14.4 7.0 - 25.0    Anion Gap 12.0 5.0 - 15.0 mmol/L    eGFR 88.5 >60.0 mL/min/1.73   Ethanol    Collection Time: 09/15/23  2:38 PM    Specimen: Arm, Right; Blood   Result Value Ref Range    Ethanol <10 0 - 10 mg/dL    Ethanol % <0.010 %   Magnesium    Collection Time: 09/15/23  2:38 PM    Specimen: Arm, Right; Blood   Result Value Ref Range    Magnesium 2.2 1.6 - 2.4 mg/dL   CBC Auto Differential    Collection Time: 09/15/23  2:38 PM    Specimen: Arm, Right; Blood   Result Value Ref Range    WBC 6.99 3.40 - 10.80 10*3/mm3    RBC 3.89 (L) 4.14 - 5.80 10*6/mm3    Hemoglobin 12.6 (L) 13.0 - 17.7 g/dL    Hematocrit 34.1 (L) 37.5 - 51.0 %    MCV 87.7 79.0 - 97.0 fL    MCH 32.4 26.6 - 33.0 pg    MCHC 37.0 (H) 31.5 - 35.7 g/dL    RDW 12.4 12.3 - 15.4 %    RDW-SD 39.3 37.0 - 54.0 fl    MPV 9.5 6.0 - 12.0 fL    Platelets 234 140 - 450 10*3/mm3    nRBC 0.0 0.0 - 0.2 /100 WBC   Manual Differential    Collection Time: 09/15/23  2:38 PM    Specimen: Arm, Right; Blood   Result  Value Ref Range    Neutrophil % 68.4 42.7 - 76.0 %    Lymphocyte % 9.2 (L) 19.6 - 45.3 %    Monocyte % 21.4 (H) 5.0 - 12.0 %    Basophil % 1.0 0.0 - 1.5 %    Neutrophils Absolute 4.78 1.70 - 7.00 10*3/mm3    Lymphocytes Absolute 0.64 (L) 0.70 - 3.10 10*3/mm3    Monocytes Absolute 1.50 (H) 0.10 - 0.90 10*3/mm3    Basophils Absolute 0.07 0.00 - 0.20 10*3/mm3    Poikilocytes Mod/2+ None Seen    Smudge Cells Slight/1+ None Seen    Platelet Morphology Normal Normal   TSH    Collection Time: 09/15/23  2:38 PM    Specimen: Arm, Right; Blood   Result Value Ref Range    TSH 2.550 0.270 - 4.200 uIU/mL       Ordered the above labs and independently reviewed the results.        RADIOLOGY  CT Head Without Contrast    Result Date: 9/15/2023  CT HEAD WITHOUT CONTRAST  HISTORY: Fall 4 days ago, off balance.  COMPARISON: MRI brain 08/04/2023.  FINDINGS: The brain and ventricles are symmetrical. There is age-appropriate atrophy. There is no evidence of hemorrhage, hydrocephalus or of a focal area of decreased attenuation to suggest acute infarction. Mild vascular calcification is noted.      There is no evidence of fracture, intracranial hemorrhage or a focal area of decreased attenuation to suggest acute infarction. Further evaluation could be performed with a MRI examination of the brain as indicated.    Radiation dose reduction techniques were utilized, including automated exposure control and exposure modulation based on body size.        I ordered the above noted radiological studies. Reviewed by me and discussed with radiologist.  See dictation for official radiology interpretation.      PROCEDURES    Procedures      MEDICATIONS GIVEN IN ER    Medications   sodium chloride 0.9 % flush 10 mL (has no administration in time range)         ORDERS PLACED DURING THIS VISIT:  Orders Placed This Encounter   Procedures    CT Head Without Contrast    Comprehensive Metabolic Panel    Urine Drug Screen - Urine, Clean Catch    Ethanol     Magnesium    CBC Auto Differential    Manual Differential    Osmolality, Urine - Urine, Clean Catch    Sodium, Urine, Random - Urine, Clean Catch    TSH    Urinalysis With Microscopic If Indicated (No Culture) - Urine, Clean Catch    Uric Acid    Monitor Blood Pressure    Orthostatic Vitals    Pulse Oximetry, Continuous    Pulmonology (on-call MD unless specified)    Nephrology (on -call MD unless specified)    Insert Peripheral IV    Inpatient Admission    CBC & Differential         PROGRESS, DATA ANALYSIS, CONSULTS, AND MEDICAL DECISION MAKING    All labs have been independently interpreted by me.  All radiology studies have been reviewed by me and discussed with radiologist dictating the report.   EKG's independently viewed and interpreted by me.  Discussion below represents my analysis of pertinent findings related to patient's condition, differential diagnosis, treatment plan and final disposition.    Differential diagnosis includes but is not limited to hyponatremia, dehydration, stroke, TIA, intracranial hemorrhage.    ED Course as of 09/15/23 1603   Fri Sep 15, 2023   1517 Sodium(!!): 114 [TR]   1518 CT Head Without Contrast  My independent interpretation of the CT of the head is no acute hemorrhage [TR]   1532 Discussing with Dr. sIsa with nephrology.  He agrees to consult. [TR]   1601 Discussing with Dr Schneider with pulmonary ICU. Agrees to admit. [TR]   1602 Reviewed the work-up and findings with the patient at the bedside.  Answered all questions.  Plan admission.  He is agreeable. [TR]      ED Course User Index  [TR] Ja Bell MD             AS OF 16:03 EDT VITALS:    BP - 122/59  HR - 64  TEMP - 98 °F (36.7 °C)  O2 SATS - 99%    Critical care provider statement:     Critical care time (minutes): 30-74.    Critical care time was exclusive of:  Separately billable procedures and treating other patients    Critical care was necessary to treat or prevent imminent or life-threatening deterioration  of the following conditions: Metabolic failure    Critical care was time spent personally by me on the following activities:  Development of treatment plan with patient or surrogate, discussions with consultants, evaluation of patient's response to treatment, examination of patient, obtaining history from patient or surrogate, ordering and performing treatments and interventions, ordering and review of laboratory studies, ordering and review of radiographic studies, pulse oximetry, re-evaluation of patient's condition and review of old charts    DIAGNOSIS  Final diagnoses:   Hyponatremia   Alcoholism         DISPOSITION  ED Disposition       ED Disposition   Decision to Admit    Condition   --    Comment   Level of Care: Telemetry [5]   Diagnosis: Hyponatremia [686010]   Admitting Physician: KARLA CANELA [[id number]]   Attending Physician: KARLA CANELA [[id number]]   Certification: I Certify That Inpatient Hospital Services Are Medically Necessary For Greater Than 2 Midnights                    Note Disclaimer: At [facility], we believe that sharing information builds trust and better relationships. You are receiving this note because you recently visited [facility]. It is possible you will see health information before a provider has talked with you about it. This kind of information can be easy to misunderstand. To help you fully understand what it means for your health, we urge you to discuss this note with your provider.         Ja Bell MD  09/15/23 6193

## 2023-09-15 NOTE — H&P
Patient Care Team:  Alysha Cosme MD as PCP - General (Internal Medicine)    Chief complaint:  Dizziness and weakness    History of present illness:  Subjective     This is a 78-year-old old male patient, alcoholic.  He has a history of hyponatremia.  He was hospitalized and July 2022 for severe hyponatremia with sodium of 117 improved to 125 with therapy.    Unfortunately, he continues to drink.  He drinks a 1/5 th of whiskey a day and has been drinking for a while.  He denies prior history of DT or withdrawal.    He said that he stopped drinking Monday.  He denies anxiety or tremors since then.  He stated that he does not take any medications to of that.  However he said that he has poor appetite since then.  He continues to drink water as usual about 6 glasses a day per his reports.  He denies nausea, vomiting or diarrhea.  He stated that he tripped on his dog yesterday and went down but did not hit his head or injure himself.    He reported dizziness which started yesterday and got worse today.  His son picked him up to [facility] today and he was noted to be significantly dizzy and wobbly and therefore he was sent to the ED.    Vitals in the ED were stable but sodium level was 114.  Patient was noted to be a little slow to respond but otherwise did not appear to have significant neurological symptoms or deficits.    Review of Systems:  Constitutional: No fever or chills.   ENMT: No sinus congestion  Cardiovascular: No chest pain, palpitation or legs swelling.    Respiratory: No dyspnea, cough or wheezing.  Gastrointestinal: No constipation, diarrhea or abdominal pain   Neurology: No headache but dizziness and generalized weakness.  Musculoskeletal: No joints pain, stiffness or swelling.   Psychiatry: No depression.  Genitourinary: No dysuria or frequent urination  Endo: No weight changes. No cold or warm intolerance.  Lymphatic: No swollen glands.  Integumentary: No rash.    History  Past Medical  "History:   Diagnosis Date    AF (atrial fibrillation)     Cataract 2019    Elevated cholesterol     Erectile dysfunction 2018    Gout     Hypertension     Long term exposure to asbestos     3 years    Sebaceous cyst      Past Surgical History:   Procedure Laterality Date    COLON SURGERY      COLONOSCOPY  approx 2015    negative per pt     COLONOSCOPY N/A 5/12/2022    Procedure: COLONOSCOPY INTO CECUM/ TERMINAL ILEUM WITH POLYPECTOMY;  Surgeon: Aidan Branham MD;  Location:  [facility];  Service: Gastroenterology;  Laterality: N/A;  pre: anemia  post: hemorrhoids, normal TI, polyp, diverticulosis    ENDOSCOPY N/A 5/12/2022    Procedure: ESOPHAGOGASTRODUODENOSCOPY WITH BIOPSIES;  Surgeon: Aidan Branham MD;  Location:  [facility];  Service: Gastroenterology;  Laterality: N/A;  pre: anemia  post: hiatal hernia, bnormal ampulla    OTHER SURGICAL HISTORY      BOWEL BLOCKAGE     Family History   Problem Relation Age of Onset    Diabetes Other     Cancer Mother     Diabetes Father     Cancer Sister      Social History     Tobacco Use    Smoking status: Never    Smokeless tobacco: Never   Vaping Use    Vaping Use: Never used   Substance Use Topics    Alcohol use: Yes     Comment: pt reports he drinks \"about a quart of whiskey a day\" and his last drink was approx 2 days ago    Drug use: Never     E-cigarette/Vaping    E-cigarette/Vaping Use Never User     Passive Exposure No     Counseling Given No      Medications Prior to Admission   Medication Sig Dispense Refill Last Dose    atorvastatin (LIPITOR) 20 MG tablet TAKE ONE TABLET BY MOUTH DAILY 90 tablet 3     cholecalciferol (VITAMIN D3) 1000 units tablet Take 1 tablet by mouth Daily.       dilTIAZem CD (CARDIZEM CD) 120 MG 24 hr capsule TAKE ONE CAPSULE BY MOUTH DAILY 90 capsule 3     Eliquis 5 MG tablet tablet TAKE ONE TABLET BY MOUTH EVERY 12 HOURS 180 tablet 3     ferrous sulfate 325 (65 FE) MG tablet Take 1 tablet by mouth Daily With Breakfast.       " furosemide (LASIX) 40 MG tablet TAKE ONE TABLET BY MOUTH DAILY 90 tablet 3     Lumigan 0.01 % ophthalmic drops Administer 1 drop into the left eye Every Night.       Multiple Vitamins-Minerals (OCUVITE EXTRA PO) Take  by mouth.       tadalafil (CIALIS) 5 MG tablet TAKE ONE TABLET BY MOUTH DAILY AS NEEDED FOR ERECTILE DYSFUNCTION 90 tablet 1      Allergies:  Patient has no known allergies.    Objective   Vital Signs  Temp:  [98 °F (36.7 °C)] 98 °F (36.7 °C)  Heart Rate:  [63-89] 63  Resp:  [16] 16  BP: (118-130)/(59-86) 118/71    PPE used per hospital policy    Physical Exam:  Constitutional: Not in acute distress.  Eyes: Injected conjunctivae, EOMI. Pupils equal and reactive to light.   ENMT: López 3. No oral thrush.  Dry tongue.  Neck: No thyromegaly.  Trachea midline  Heart: RRR, no murmur.  No pitting edema  Lungs: Good and equal air entry bilaterally.  Nonlabored breathing  Abdomen: Obese. Soft. No tenderness or dullness.  Positive bowel sounds  Extremities: No cyanosis, clubbing. Moves all extremities.  Warm extremities and well-perfused  Neuro: Conscious, alert, oriented x3.  Strength 5/5 overall  Psych: Appropriate mood and affect.    Integumentary: No rash or ecchymosis  Lymphatic: No palpable cervical or supraclavicular lymph nodes.            Diagnostic imaging:  I personally and independently reviewed the following images:   CT head 9/15/2023: Clear      Laboratory workup:  Results from last 7 days   Lab Units 09/15/23  1438   SODIUM mmol/L 114*   POTASSIUM mmol/L 3.1*   CHLORIDE mmol/L 72*   CO2 mmol/L 30.0*   BUN mg/dL 13   CREATININE mg/dL 0.90   GLUCOSE mg/dL 117*   CALCIUM mg/dL 8.9         Results from last 7 days   Lab Units 09/15/23  1438   WBC 10*3/mm3 6.99   HEMOGLOBIN g/dL 12.6*   HEMATOCRIT % 34.1*   PLATELETS 10*3/mm3 234               Assessment     Severe hyponatremia, acute on chronic.  Clinically significant.  Probably secondary to alcoholism and decreased intake  Hypokalemia  Elevated  bicarb.  Alcoholism  Mildly elevated AST, likely secondary to alcoholism  Pulmonary nodules, thought to be subpleural atelectasis.  Follows with Dr. Friedman and was last seen February 2023 with plan for repeat imaging in 1 year    HTN, on Lasix  A-fib, on Eliquis  Chronic anemia    Plan:  Admit to ICU.  Neuro check/monitoring due to severe hyponatremia.  Watch for seizure.  Check sodium every 6 hours but defer further adjustment to nephrology.  Check urine sodium and osmolality.  Consult nephrology.  Replace potassium. Hold Lasix  Thiamine and folate supplement.  Watch for alcohol withdrawal.  Will initiate Ativan protocol if needed.  Counseled against alcoholism.  Restart Eliquis  Repeat CMP in a.m.  Check VBG due to elevated bicarb    Genna Schneider MD  09/15/23  16:38 EDT    Time: Critical care 37 min      This note was dictated utilizing [employer]izer dictation

## 2023-09-15 NOTE — ED NOTES
Nursing report ED to floor  Gamal Modi Sr.  78 y.o.  male    HPI :   Chief Complaint   Patient presents with    Weakness - Generalized       Admitting doctor:   Genna Schneider MD    Admitting diagnosis:   The primary encounter diagnosis was Hyponatremia. A diagnosis of Alcoholism was also pertinent to this visit.    Code status:   Current Code Status       Date Active Code Status Order ID Comments User Context       Prior            Allergies:   Patient has no known allergies.    Isolation:   No active isolations    Intake and Output  No intake or output data in the 24 hours ending 09/15/23 1608    Weight:   There were no vitals filed for this visit.    Most recent vitals:   Vitals:    09/15/23 1311 09/15/23 1431 09/15/23 1501 09/15/23 1531   BP: 130/70 124/62 122/59 124/86   Pulse: 68 75 64 89   Resp: 16 16 16    Temp: 98 °F (36.7 °C)      SpO2: 96% 97% 99%        Active LDAs/IV Access:   Lines, Drains & Airways       Active LDAs       Name Placement date Placement time Site Days    Peripheral IV 07/29/22 1107 Right Antecubital 07/29/22  1107  Antecubital  413    Peripheral IV 09/15/23 1432 Right Antecubital 09/15/23  1432  Antecubital  less than 1                    Labs (abnormal labs have a star):   Labs Reviewed   COMPREHENSIVE METABOLIC PANEL - Abnormal; Notable for the following components:       Result Value    Glucose 117 (*)     Sodium 114 (*)     Potassium 3.1 (*)     Chloride 72 (*)     CO2 30.0 (*)     AST (SGOT) 58 (*)     All other components within normal limits    Narrative:     GFR Normal >60  Chronic Kidney Disease <60  Kidney Failure <15    The GFR formula is only valid for adults with stable renal function between ages 18 and 70.   CBC WITH AUTO DIFFERENTIAL - Abnormal; Notable for the following components:    RBC 3.89 (*)     Hemoglobin 12.6 (*)     Hematocrit 34.1 (*)     MCHC 37.0 (*)     All other components within normal limits   MANUAL DIFFERENTIAL - Abnormal; Notable for the  following components:    Lymphocyte % 9.2 (*)     Monocyte % 21.4 (*)     Lymphocytes Absolute 0.64 (*)     Monocytes Absolute 1.50 (*)     All other components within normal limits   URINE DRUG SCREEN - Normal    Narrative:     Negative Thresholds Per Drugs Screened:    Amphetamines                 500 ng/ml  Barbiturates                 200 ng/ml  Benzodiazepines              100 ng/ml  Cocaine                      300 ng/ml  Methadone                    300 ng/ml  Opiates                      300 ng/ml  Oxycodone                    100 ng/ml  THC                           50 ng/ml  Fentanyl                       5 ng/ml      The Normal Value for all drugs tested is negative. This report includes final unconfirmed screening results to be used for medical treatment purposes only. Unconfirmed results must not be used for non-medical purposes such as employment or legal testing. Clinical consideration should be applied to any drug of abuse test, particularly when unconfirmed results are used.           MAGNESIUM - Normal   TSH - Normal   URINALYSIS W/ MICROSCOPIC IF INDICATED (NO CULTURE) - Normal    Narrative:     Urine microscopic not indicated.   ETHANOL   SODIUM, URINE, RANDOM    Narrative:     Reference intervals for random urine have not been established.  Clinical usage is dependent upon physician's interpretation in combination with other laboratory tests.      OSMOLALITY, URINE   URIC ACID   CBC AND DIFFERENTIAL    Narrative:     The following orders were created for panel order CBC & Differential.  Procedure                               Abnormality         Status                     ---------                               -----------         ------                     CBC Auto Differential[[id number]]        Abnormal            Final result                 Please view results for these tests on the individual orders.       EKG:   No orders to display       Meds given in ED:   Medications   sodium chloride  "0.9 % flush 10 mL (has no administration in time range)       Imaging results:  CT Head Without Contrast    Result Date: 9/15/2023  There is no evidence of fracture, intracranial hemorrhage or a focal area of decreased attenuation to suggest acute infarction. Further evaluation could be performed with a MRI examination of the brain as indicated.    Radiation dose reduction techniques were utilized, including automated exposure control and exposure modulation based on body size.        Ambulatory status:   Up w/ assistance     Social issues:   Social History     Socioeconomic History    Marital status:    Tobacco Use    Smoking status: Never    Smokeless tobacco: Never   Vaping Use    Vaping Use: Never used   Substance and Sexual Activity    Alcohol use: Yes     Comment: pt reports he drinks \"about a quart of whiskey a day\" and his last drink was approx 2 days ago    Drug use: Never    Sexual activity: Yes     Partners: Female       NIH Stroke Scale:       Breanna Martinez RN  09/15/23 16:08 EDT       "

## 2023-09-15 NOTE — CONSULTS
[facility] Consult Note      Patient Name: Gamal Modi Sr.  : 1945  MRN: [id number]  Primary Care Physician:  Alysha Cosme MD  Referring Physician: No ref. provider found  Date of admission: 9/15/2023    Subjective     Reason for Consult: Severe hyponatremia    HPI:   Gamal Modi Sr. is a 78 y.o. male patient was admitted on 9/15/2023 where he presented with dizziness and weakness has been having gait difficulties for the past few days he has been drinking heavily and he admitted himself to alcohol rehab facility and he was sent from there because of hyponatremia noted to be 114.  Had an episode of severe hyponatremia and 2022.  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    Complains of weakness, no chest pain or shortness of air, no orthopnea or PND, no nausea or vomiting, no lower extremity edema, has been weak and having gait difficulties, no dysuria or gross hematuria and he is admitted to significant intake of water may be 8 to 10 cups of water a day.    Review of Systems:   14 point review of systems is otherwise negative except for mentioned above on HPI    Personal History     Past Medical History:   Diagnosis Date    AF (atrial fibrillation)     Cataract 2019    Elevated cholesterol     Erectile dysfunction 2018    Gout     Hypertension     Long term exposure to asbestos     3 years    Sebaceous cyst        Past Surgical History:   Procedure Laterality Date    COLON SURGERY      COLONOSCOPY  approx     negative per pt     COLONOSCOPY N/A 2022    Procedure: COLONOSCOPY INTO CECUM/ TERMINAL ILEUM WITH POLYPECTOMY;  Surgeon: Aidan Branham MD;  Location: [facility] ENDOSCOPY;  Service: Gastroenterology;  Laterality: N/A;  pre: anemia  post: hemorrhoids, normal TI, polyp, diverticulosis    ENDOSCOPY N/A  5/12/2022    Procedure: ESOPHAGOGASTRODUODENOSCOPY WITH BIOPSIES;  Surgeon: Aidan Branham MD;  Location: [facility] ENDOSCOPY;  Service: Gastroenterology;  Laterality: N/A;  pre: anemia  post: hiatal hernia, bnormal ampulla    OTHER SURGICAL HISTORY      BOWEL BLOCKAGE       Family History: family history includes Cancer in his mother and sister; Diabetes in his father and another family member.    Social History:  reports that he has never smoked. He has never used smokeless tobacco. He reports current alcohol use. He reports that he does not use drugs.    Home Medications:  Prior to Admission medications    Medication Sig Start Date End Date Taking? Authorizing Provider   acetaminophen (TYLENOL) 500 MG tablet Take 2 tablets by mouth Daily.   Yes Rod Dela Cruz MD   Apoaequorin (Prevagen) 10 MG capsule Take 1 capsule by mouth Daily.   Yes Rod Dela Cruz MD   ascorbic acid (VITAMIN C) 500 MG tablet Take 2 tablets by mouth Daily.   Yes Rod Dela Cruz MD   atorvastatin (LIPITOR) 20 MG tablet TAKE ONE TABLET BY MOUTH DAILY 4/5/23  Yes Alysha Cosme MD   calcium carbonate (TUMS) 500 MG chewable tablet Chew 3 tablets Daily As Needed for Indigestion or Heartburn.   Yes Rod Dela Cruz MD   carboxymethylcellulose (Lubricant Eye Drops) 0.5 % solution Administer 1 drop to both eyes Daily.   Yes Rod Dela Cruz MD   cetirizine (zyrTEC) 10 MG tablet Take 1 tablet by mouth Daily.   Yes Rod Dela Cruz MD   cholecalciferol (VITAMIN D3) 1000 units tablet Take 1 tablet by mouth Daily.   Yes Rod Dela Cruz MD   dilTIAZem CD (CARDIZEM CD) 120 MG 24 hr capsule TAKE ONE CAPSULE BY MOUTH DAILY 1/23/23  Yes Alysha Cosme MD   Eliquis 5 MG tablet tablet TAKE ONE TABLET BY MOUTH EVERY 12 HOURS 2/15/23  Yes Alysha Cosme MD   ferrous sulfate 325 (65 FE) MG tablet Take 1 tablet by mouth Daily With Breakfast.   Yes Rod Dela Cruz MD   furosemide (LASIX) 40 MG tablet TAKE ONE TABLET  BY MOUTH DAILY 5/4/23  Yes Alysha Cosme MD   Multiple Vitamins-Minerals (OCUVITE EXTRA PO) Take 1 tablet by mouth Daily.   Yes ProviderRod MD   multivitamin with minerals (MULTIVITAMIN ADULT PO) Take 1 tablet by mouth Daily.   Yes Rod Dela Cruz MD   tadalafil (CIALIS) 5 MG tablet TAKE ONE TABLET BY MOUTH DAILY AS NEEDED FOR ERECTILE DYSFUNCTION 3/24/23  Yes Alysha Cosme MD   Lumigan 0.01 % ophthalmic drops Administer 1 drop into the left eye Every Night. 5/7/22 9/15/23  Rod Dela Cruz MD       Allergies:  No Known Allergies    Objective     Vitals:   Temp:  [97.9 °F (36.6 °C)-98 °F (36.7 °C)] 97.9 °F (36.6 °C)  Heart Rate:  [63-89] 75  Resp:  [16] 16  BP: (118-130)/(59-86) 129/68    Intake/Output Summary (Last 24 hours) at 9/15/2023 1819  Last data filed at 9/15/2023 1723  Gross per 24 hour   Intake --   Output 500 ml   Net -500 ml       Physical Exam:   Constitutional: Awake, alert, no acute distress.  Chronically ill  HEENT: Sclera anicteric, no conjunctival injection, his face is flushed  Neck: Supple, no thyromegaly, no lymphadenopathy, trachea at midline, no JVD  Respiratory: Clear to auscultation bilaterally, nonlabored respiration  Cardiovascular: Irregularly irregular, no murmurs, no rubs or gallops, no carotid bruit  Gastrointestinal: Positive bowel sounds, abdomen is soft, nontender and nondistended  : No palpable bladder  Musculoskeletal: No edema, no clubbing or cyanosis  Psychiatric: Appropriate affect, cooperative  Neurologic: Oriented x3, moving all extremities, normal speech and mental status  Skin: Warm and dry       Scheduled Meds:     apixaban, 5 mg, Oral, Q12H  folic acid, 1 mg, Oral, Daily  senna-docusate sodium, 2 tablet, Oral, BID  sodium chloride, 10 mL, Intravenous, Q12H  thiamine, 100 mg, Oral, Daily      IV Meds:        Results Reviewed:   I have personally reviewed the results from the time of this admission to 9/15/2023 18:19 EDT     Lab Results    Component Value Date    GLUCOSE 117 (H) 09/15/2023    CALCIUM 8.9 09/15/2023     (C) 09/15/2023    K 3.1 (L) 09/15/2023    CO2 30.0 (H) 09/15/2023    CL 72 (L) 09/15/2023    BUN 13 09/15/2023    CREATININE 0.90 09/15/2023    EGFRIFAFRI 106 01/18/2022    EGFRIFNONA 92 01/18/2022    BCR 14.4 09/15/2023    ANIONGAP 12.0 09/15/2023      Lab Results   Component Value Date    MG 2.2 09/15/2023    PHOS 2.5 07/31/2022    ALBUMIN 4.1 09/15/2023           Assessment / Plan       Hyponatremia      ASSESSMENT:  Severe hyponatremia which been symptomatic with gait difficulties and weakness.  The patient has urine sodium less than 20 and his urine osmolality 182.  Uric acid 7.6 this is not consistent with SIADH associated with poor 3 habits and excessive alcohol use also drinking large amount of water.  Does not meet the criteria for water intoxication.  Chronic A-fib, with controlled rate  History of hypertension but blood pressure appears to be within acceptable range but I have not checked his orthostatic pressures at this time  Alcohol abuse    PLAN:  I will give the patient normal saline 100 cc/h x 2 L  I will check orthostatic blood pressures  I will check his sodium very closely the goal is not to allow the sodium to rise by more than 6 to 8 mEq per 24 hours to avoid osmotic demyelination  Surveillance labs    Reviewed the chart and other providers notes reviewed imaging and lab data.  Discussed the case with the patient    Thank you for involving us in the care of Gamal Modi Sr..  Please feel free to call with any questions.    Chago Issa MD  09/15/23  18:19 EDT    [facility]  [phone]      Please note that portions of this note were completed with a voice recognition program.

## 2023-09-15 NOTE — ED TRIAGE NOTES
Patient from [facility] via ems; call was for generalized weakness/unsteadiness in both legs. Patient had also had mechanical fall two days ago prior to going to facility today. Denies any injury from the fall. Patient is on blood thinners. Patient is a binge drinker and will drink a quart of whiskey per day. Last drink was four days ago.

## 2023-09-16 LAB
ALBUMIN SERPL-MCNC: 3.3 G/DL (ref 3.5–5.2)
ALBUMIN/GLOB SERPL: 1.4 G/DL
ALP SERPL-CCNC: 97 U/L (ref 39–117)
ALT SERPL W P-5'-P-CCNC: 36 U/L (ref 1–41)
ANION GAP SERPL CALCULATED.3IONS-SCNC: 10 MMOL/L (ref 5–15)
ANION GAP SERPL CALCULATED.3IONS-SCNC: 10.1 MMOL/L (ref 5–15)
ANION GAP SERPL CALCULATED.3IONS-SCNC: 10.6 MMOL/L (ref 5–15)
ANION GAP SERPL CALCULATED.3IONS-SCNC: 9 MMOL/L (ref 5–15)
AST SERPL-CCNC: 45 U/L (ref 1–40)
BASOPHILS # BLD MANUAL: 0.05 10*3/MM3 (ref 0–0.2)
BASOPHILS NFR BLD MANUAL: 1 % (ref 0–1.5)
BILIRUB SERPL-MCNC: 0.7 MG/DL (ref 0–1.2)
BUN SERPL-MCNC: 10 MG/DL (ref 8–23)
BUN SERPL-MCNC: 11 MG/DL (ref 8–23)
BUN SERPL-MCNC: 13 MG/DL (ref 8–23)
BUN SERPL-MCNC: 9 MG/DL (ref 8–23)
BUN/CREAT SERPL: 13.8 (ref 7–25)
BUN/CREAT SERPL: 15.9 (ref 7–25)
BUN/CREAT SERPL: 16.9 (ref 7–25)
BUN/CREAT SERPL: 17.3 (ref 7–25)
CALCIUM SPEC-SCNC: 8.5 MG/DL (ref 8.6–10.5)
CALCIUM SPEC-SCNC: 8.6 MG/DL (ref 8.6–10.5)
CALCIUM SPEC-SCNC: 8.9 MG/DL (ref 8.6–10.5)
CALCIUM SPEC-SCNC: 9 MG/DL (ref 8.6–10.5)
CHLORIDE SERPL-SCNC: 79 MMOL/L (ref 98–107)
CHLORIDE SERPL-SCNC: 84 MMOL/L (ref 98–107)
CHLORIDE SERPL-SCNC: 85 MMOL/L (ref 98–107)
CHLORIDE SERPL-SCNC: 88 MMOL/L (ref 98–107)
CO2 SERPL-SCNC: 29.4 MMOL/L (ref 22–29)
CO2 SERPL-SCNC: 29.9 MMOL/L (ref 22–29)
CO2 SERPL-SCNC: 30 MMOL/L (ref 22–29)
CO2 SERPL-SCNC: 31 MMOL/L (ref 22–29)
CREAT SERPL-MCNC: 0.59 MG/DL (ref 0.76–1.27)
CREAT SERPL-MCNC: 0.65 MG/DL (ref 0.76–1.27)
CREAT SERPL-MCNC: 0.69 MG/DL (ref 0.76–1.27)
CREAT SERPL-MCNC: 0.75 MG/DL (ref 0.76–1.27)
DEPRECATED RDW RBC AUTO: 41.7 FL (ref 37–54)
EGFRCR SERPLBLD CKD-EPI 2021: 100.6 ML/MIN/1.73
EGFRCR SERPLBLD CKD-EPI 2021: 93.5 ML/MIN/1.73
EGFRCR SERPLBLD CKD-EPI 2021: 95.9 ML/MIN/1.73
EGFRCR SERPLBLD CKD-EPI 2021: 97.7 ML/MIN/1.73
EOSINOPHIL # BLD MANUAL: 0.1 10*3/MM3 (ref 0–0.4)
EOSINOPHIL NFR BLD MANUAL: 2.1 % (ref 0.3–6.2)
ERYTHROCYTE [DISTWIDTH] IN BLOOD BY AUTOMATED COUNT: 12.9 % (ref 12.3–15.4)
GLOBULIN UR ELPH-MCNC: 2.4 GM/DL
GLUCOSE SERPL-MCNC: 108 MG/DL (ref 65–99)
GLUCOSE SERPL-MCNC: 110 MG/DL (ref 65–99)
GLUCOSE SERPL-MCNC: 115 MG/DL (ref 65–99)
GLUCOSE SERPL-MCNC: 127 MG/DL (ref 65–99)
HCT VFR BLD AUTO: 31.9 % (ref 37.5–51)
HGB BLD-MCNC: 11.6 G/DL (ref 13–17.7)
LYMPHOCYTES # BLD MANUAL: 0.57 10*3/MM3 (ref 0.7–3.1)
LYMPHOCYTES NFR BLD MANUAL: 21.6 % (ref 5–12)
MAGNESIUM SERPL-MCNC: 2.1 MG/DL (ref 1.6–2.4)
MCH RBC QN AUTO: 32.9 PG (ref 26.6–33)
MCHC RBC AUTO-ENTMCNC: 36.4 G/DL (ref 31.5–35.7)
MCV RBC AUTO: 90.4 FL (ref 79–97)
METAMYELOCYTES NFR BLD MANUAL: 1 % (ref 0–0)
MONOCYTES # BLD: 1 10*3/MM3 (ref 0.1–0.9)
NEUTROPHILS # BLD AUTO: 2.86 10*3/MM3 (ref 1.7–7)
NEUTROPHILS NFR BLD MANUAL: 61.9 % (ref 42.7–76)
NRBC BLD AUTO-RTO: 0 /100 WBC (ref 0–0.2)
PHOSPHATE SERPL-MCNC: 3 MG/DL (ref 2.5–4.5)
PLAT MORPH BLD: NORMAL
PLATELET # BLD AUTO: 226 10*3/MM3 (ref 140–450)
PMV BLD AUTO: 9.6 FL (ref 6–12)
POTASSIUM SERPL-SCNC: 2.8 MMOL/L (ref 3.5–5.2)
POTASSIUM SERPL-SCNC: 3.2 MMOL/L (ref 3.5–5.2)
POTASSIUM SERPL-SCNC: 3.2 MMOL/L (ref 3.5–5.2)
POTASSIUM SERPL-SCNC: 3.7 MMOL/L (ref 3.5–5.2)
PROT SERPL-MCNC: 5.7 G/DL (ref 6–8.5)
RBC # BLD AUTO: 3.53 10*6/MM3 (ref 4.14–5.8)
RBC MORPH BLD: NORMAL
SMUDGE CELLS BLD QL SMEAR: ABNORMAL
SODIUM SERPL-SCNC: 119 MMOL/L (ref 136–145)
SODIUM SERPL-SCNC: 124 MMOL/L (ref 136–145)
SODIUM SERPL-SCNC: 125 MMOL/L (ref 136–145)
SODIUM SERPL-SCNC: 128 MMOL/L (ref 136–145)
URATE SERPL-MCNC: 7.2 MG/DL (ref 3.4–7)
VARIANT LYMPHS NFR BLD MANUAL: 12.4 % (ref 19.6–45.3)
WBC NRBC COR # BLD: 4.62 10*3/MM3 (ref 3.4–10.8)

## 2023-09-16 PROCEDURE — 85007 BL SMEAR W/DIFF WBC COUNT: CPT | Performed by: INTERNAL MEDICINE

## 2023-09-16 PROCEDURE — 80053 COMPREHEN METABOLIC PANEL: CPT | Performed by: INTERNAL MEDICINE

## 2023-09-16 PROCEDURE — 85025 COMPLETE CBC W/AUTO DIFF WBC: CPT | Performed by: INTERNAL MEDICINE

## 2023-09-16 PROCEDURE — 83735 ASSAY OF MAGNESIUM: CPT | Performed by: INTERNAL MEDICINE

## 2023-09-16 PROCEDURE — 84550 ASSAY OF BLOOD/URIC ACID: CPT | Performed by: INTERNAL MEDICINE

## 2023-09-16 PROCEDURE — 84100 ASSAY OF PHOSPHORUS: CPT | Performed by: INTERNAL MEDICINE

## 2023-09-16 RX ORDER — DEXTROSE MONOHYDRATE 50 MG/ML
50 INJECTION, SOLUTION INTRAVENOUS CONTINUOUS
Status: DISCONTINUED | OUTPATIENT
Start: 2023-09-16 | End: 2023-09-18

## 2023-09-16 RX ADMIN — Medication 10 ML: at 20:21

## 2023-09-16 RX ADMIN — Medication 10 ML: at 08:20

## 2023-09-16 RX ADMIN — DEXTROSE MONOHYDRATE 100 ML/HR: 50 INJECTION, SOLUTION INTRAVENOUS at 17:47

## 2023-09-16 RX ADMIN — Medication 100 MG: at 08:20

## 2023-09-16 RX ADMIN — POTASSIUM CHLORIDE 40 MEQ: 750 TABLET, EXTENDED RELEASE ORAL at 00:16

## 2023-09-16 RX ADMIN — SODIUM CHLORIDE 100 ML/HR: 9 INJECTION, SOLUTION INTRAVENOUS at 05:59

## 2023-09-16 RX ADMIN — DEXTROSE MONOHYDRATE 100 ML/HR: 50 INJECTION, SOLUTION INTRAVENOUS at 07:10

## 2023-09-16 RX ADMIN — SENNOSIDES AND DOCUSATE SODIUM 2 TABLET: 50; 8.6 TABLET ORAL at 20:21

## 2023-09-16 RX ADMIN — APIXABAN 5 MG: 5 TABLET, FILM COATED ORAL at 17:47

## 2023-09-16 RX ADMIN — POTASSIUM CHLORIDE 40 MEQ: 750 TABLET, EXTENDED RELEASE ORAL at 04:18

## 2023-09-16 RX ADMIN — APIXABAN 5 MG: 5 TABLET, FILM COATED ORAL at 06:51

## 2023-09-16 RX ADMIN — FOLIC ACID 1 MG: 1 TABLET ORAL at 08:20

## 2023-09-16 NOTE — NURSING NOTE
Patient remains in CICU. VSS. Sodium improving; currently at 124- awaiting new results. Adequate UOP today. Remains on 2L NC.

## 2023-09-16 NOTE — PROGRESS NOTES
[facility] Progress Note      Patient Name: Gamal Modi Sr.  : 1945  MRN: [id number]  Primary Care Physician:  Alysha Cosme MD  Date of admission: 9/15/2023    Subjective     Interval History:   Follow-up hyponatremia    The patient is feeling much better today, denies any chest pain or shortness of air, no orthopnea or PND, no nausea or vomiting, no abdominal pain, no dysuria or gross hematuria.  Sodium has increased from 114 on admission up to 124 that is a bit faster than desired so the IV fluid was discontinued and now he is on D5W.    Review of Systems:   As noted above    Objective     Vitals:   Temp:  [97.5 °F (36.4 °C)-98.2 °F (36.8 °C)] 97.7 °F (36.5 °C)  Heart Rate:  [58-90] 82  Resp:  [16-18] 16  BP: ()/(50-86) 122/57  Flow (L/min):  [2] 2    Intake/Output Summary (Last 24 hours) at 2023 0804  Last data filed at 2023 0600  Gross per 24 hour   Intake 2235 ml   Output 3300 ml   Net -1065 ml       Physical Exam:    General Appearance: alert, oriented x 3, no acute distress   Skin: warm and dry  HEENT: oral mucosa normal, nonicteric sclera  Neck: supple, no JVD  Lungs: CTA, unlabored breathing effort  Heart: Irregularly irregular, no rub  Abdomen: soft, nontender, nondistended, normoactive bowel  : no palpable bladder  Extremities: no edema, cyanosis or clubbing  Neuro: normal speech and mental status     Scheduled Meds:     apixaban, 5 mg, Oral, Q12H  folic acid, 1 mg, Oral, Daily  senna-docusate sodium, 2 tablet, Oral, BID  sodium chloride, 10 mL, Intravenous, Q12H  thiamine, 100 mg, Oral, Daily      IV Meds:   dextrose, 100 mL/hr, Last Rate: 100 mL/hr (23 0710)        Results Reviewed:   I have personally reviewed the results from the time of this admission to 2023 08:04 EDT     Results from last 7 days   Lab Units 23  0604 23  0016 09/15/23  1933 09/15/23  1438   SODIUM mmol/L 124* 119* 117* 114*   POTASSIUM mmol/L  3.2* 2.8* 2.5* 3.1*   CHLORIDE mmol/L 84* 79* 74* 72*   CO2 mmol/L 30.0* 31.0* 31.3* 30.0*   BUN mg/dL 11 13 11 13   CREATININE mg/dL 0.69* 0.75* 0.74* 0.90   CALCIUM mg/dL 8.9 8.5* 8.7 8.9   BILIRUBIN mg/dL 0.7  --   --  1.1   ALK PHOS U/L 97  --   --  110   ALT (SGPT) U/L 36  --   --  41   AST (SGOT) U/L 45*  --   --  58*   GLUCOSE mg/dL 108* 110* 120* 117*       Estimated Creatinine Clearance: 110.8 mL/min (A) (by C-G formula based on SCr of 0.69 mg/dL (L)).    Results from last 7 days   Lab Units 09/16/23  0418 09/15/23  1438   MAGNESIUM mg/dL 2.1 2.2   PHOSPHORUS mg/dL 3.0  --        Results from last 7 days   Lab Units 09/16/23  0418 09/15/23  1438   URIC ACID mg/dL 7.2* 7.2*       Results from last 7 days   Lab Units 09/16/23  0604 09/15/23  1438   WBC 10*3/mm3 4.62 6.99   HEMOGLOBIN g/dL 11.6* 12.6*   PLATELETS 10*3/mm3 226 234             Assessment / Plan     ASSESSMENT:  Severe symptomatic hyponatremia with gait difficulties and weakness improved significantly since admission sodium increased from 1 14-1 24 in less than 24 hours which is a bit faster than is desired.  Hence IV fluids were discontinued and currently the patient is on D5W running at 100 cc an hour we will continue to monitor the sodium very closely.  The etiology of the hyponatremia is multifactorial but definitely not SIADH given the urine sodium and other urine electrolytes and uric acid level.  Chronic A-fib with controlled rate  Hypokalemia associated with poor oral intake, being replaced by protocol  History of hypertension, blood pressures well controlled  Alcohol abuse patient admitted himself to alcohol rehab facility yesterday and he was subsequently sent to the hospital for admission because of his hyponatremia and he is planning to go back to alcohol rehab after discharge        PLAN:  Continue the same treatment including D5W at 100 cc/h  Continue to check sodium every 6 hours the goal is to not allow the sodium to rise by more  than 6 to 8 mEq per 24 hours to avoid osmotic demyelination.  Surveillance labs    I reviewed the chart and other providers notes, reviewed imaging and lab data.    Thank you for involving us in the care of Gamal Modi Sr..  Please feel free to call with any questions.    Chago Issa MD  09/16/23  08:04 EDT    [facility]  [phone]    Please note that portions of this note were completed with a voice recognition program.

## 2023-09-16 NOTE — PLAN OF CARE
Goal Outcome Evaluation:  Plan of Care Reviewed With: patient, son        Progress: improving  Outcome Evaluation: Pt transferred to CICU overnight for hyponatremia management and monitoring for risk of neuro/seizure activity. No acute events overnight. VSS; on RA when awake; 2L NC when asleep. Good UOP. No BMs. Potassium replaced, may need further replacement per morning labs. sodium at 124, Dr. Issa called for change greater than 2 between two checks; NS switched to D5W at 100mL/hr as ordered. Plan of care to be reevaluated today; reviewed with pt and son at bedside.

## 2023-09-16 NOTE — PROGRESS NOTES
Intensive Care Unit Daily Progress Note.   [facility] CARDIAC INTENSIVE CARE  9/16/2023    Patient Name:  Gamal Modi Sr.  MRN:  [id number]  YOB: 1945  Age: 78 y.o.  Sex: male         Reason for Admission / Chief Complaint:  Dizziness and weakness    Hospital Course:   78-year-old male with alcohol abuse, hypertension, hyperlipidemia, atrial fibrillation who presented for weakness and found to have hyponatremia with sodium 114.  Suspected to be in the setting of heavy alcohol abuse, poor p.o. intake, excess water consumption.  Slowly improving sodium with the assistance of nephrology.    Interval History:  Admitted overnight for hyponatremia.  Sodium 114 on arrival, 124 this morning.  Feels weak  Denies any shortness of breath  Denies any chest pain  No nausea or vomiting    Physical Exam:  BP 98/69   Pulse 100   Temp 97.7 °F (36.5 °C)   Resp 16   Wt 102 kg (225 lb 12 oz)   SpO2 98%   BMI 31.50 kg/m²   Body mass index is 31.5 kg/m².    Intake/Output    Intake/Output Summary (Last 24 hours) at 9/16/2023 1403  Last data filed at 9/16/2023 1158  Gross per 24 hour   Intake 2715 ml   Output 4350 ml   Net -1635 ml     General: Alert, nontoxic, no acute distress  HEENT: NC/AT, EOMI, MMM  Neck: Supple, trachea midline  Cardiac: RRR, no murmur, gallops, rubs  Pulmonary: Clear to auscultation bilaterally, no adventitious breath sounds, normal respiratory effort  GI: Soft, non-tender, non-distended, normal bowel sounds  Extremities: Warm, well perfused, no LE edema  Skin: no visible rash  Neuro: CN II - XII grossly intact  Psychiatry: Normal mood and affect    Data Review:  Notable Labs:  Results from last 7 days   Lab Units 09/16/23  0604 09/15/23  1438   WBC 10*3/mm3 4.62 6.99   HEMOGLOBIN g/dL 11.6* 12.6*   PLATELETS 10*3/mm3 226 234     Results from last 7 days   Lab Units 09/16/23  0828 09/16/23  0604 09/16/23  0418 09/16/23  0016 09/15/23  1933 09/15/23  1438   SODIUM mmol/L  125* 124*  --  119* 117* 114*   POTASSIUM mmol/L 3.2* 3.2*  --  2.8* 2.5* 3.1*   CHLORIDE mmol/L 85* 84*  --  79* 74* 72*   CO2 mmol/L 29.4* 30.0*  --  31.0* 31.3* 30.0*   BUN mg/dL 10 11  --  13 11 13   CREATININE mg/dL 0.59* 0.69*  --  0.75* 0.74* 0.90   GLUCOSE mg/dL 115* 108*  --  110* 120* 117*   CALCIUM mg/dL 8.6 8.9  --  8.5* 8.7 8.9   MAGNESIUM mg/dL  --   --  2.1  --   --  2.2   PHOSPHORUS mg/dL  --   --  3.0  --   --   --    Estimated Creatinine Clearance: 129.6 mL/min (A) (by C-G formula based on SCr of 0.59 mg/dL (L)).    Results from last 7 days   Lab Units 09/16/23  0604 09/15/23  1438   AST (SGOT) U/L 45* 58*   ALT (SGPT) U/L 36 41   PLATELETS 10*3/mm3 226 234           Imaging:  CT head (9/15/2023): No acute process noted in CT head    ASSESSMENT  /  PLAN:    Severe hyponatremia  114 on presentation, normal previously  Hypokalemia  Metabolic alkalosis  Alcohol abuse  Transaminitis  Pulmonary nodules   Follows with Dr. Friedman, plan for repeat imaging in 1 year (February 2024)  Hypertension  Atrial fibrillation on Eliquis  Chronic anemia    - Hyponatremia was slowly improving on normal saline however more rapid than would be liked, switched to D5.  Need to monitor closely with frequent BMPs to ensure appropriate correction rate.  High risk for seizures/complications from rapid correction.  - Appreciate nephrology assistance in hyponatremia  - Hypokalemia improving, replete as needed  - Monitoring for alcohol withdrawal, alcohol level was negative admission  - Continue thiamine and folate supplement  - Continue Eliquis    GI prophylaxis: Not indicated  DVT prophylaxis: On therapeutic anticoagulation  Mcknight catheter: None  Bowel regimen: Ordered    All issues new to me today.  Prior hospital course, labs and imaging reviewed.    Discharge: Continue to monitor in the ICU due to critical status    Critical Care Time: 36 minutes    Gilberto Garber MD  [facility]  Pulmonary and Critical Care  Medicine, Interventional Pulmonology    Parts of this note may be an electronic transcription/translation of spoken language to printed text using the Dragon dictation system.

## 2023-09-17 PROBLEM — F10.10 ALCOHOL ABUSE: Status: ACTIVE | Noted: 2023-09-17

## 2023-09-17 LAB
ALBUMIN SERPL-MCNC: 3.4 G/DL (ref 3.5–5.2)
ANION GAP SERPL CALCULATED.3IONS-SCNC: 10.3 MMOL/L (ref 5–15)
ANION GAP SERPL CALCULATED.3IONS-SCNC: 11 MMOL/L (ref 5–15)
ANION GAP SERPL CALCULATED.3IONS-SCNC: 12 MMOL/L (ref 5–15)
ANION GAP SERPL CALCULATED.3IONS-SCNC: 9 MMOL/L (ref 5–15)
ANION GAP SERPL CALCULATED.3IONS-SCNC: 9 MMOL/L (ref 5–15)
BUN SERPL-MCNC: 10 MG/DL (ref 8–23)
BUN SERPL-MCNC: 6 MG/DL (ref 8–23)
BUN SERPL-MCNC: 7 MG/DL (ref 8–23)
BUN SERPL-MCNC: 9 MG/DL (ref 8–23)
BUN SERPL-MCNC: 9 MG/DL (ref 8–23)
BUN/CREAT SERPL: 10 (ref 7–25)
BUN/CREAT SERPL: 11.3 (ref 7–25)
BUN/CREAT SERPL: 12.2 (ref 7–25)
BUN/CREAT SERPL: 12.5 (ref 7–25)
BUN/CREAT SERPL: 12.5 (ref 7–25)
CALCIUM SPEC-SCNC: 8.6 MG/DL (ref 8.6–10.5)
CALCIUM SPEC-SCNC: 8.9 MG/DL (ref 8.6–10.5)
CALCIUM SPEC-SCNC: 9 MG/DL (ref 8.6–10.5)
CHLORIDE SERPL-SCNC: 90 MMOL/L (ref 98–107)
CHLORIDE SERPL-SCNC: 91 MMOL/L (ref 98–107)
CHLORIDE SERPL-SCNC: 91 MMOL/L (ref 98–107)
CO2 SERPL-SCNC: 27 MMOL/L (ref 22–29)
CO2 SERPL-SCNC: 27 MMOL/L (ref 22–29)
CO2 SERPL-SCNC: 28.7 MMOL/L (ref 22–29)
CO2 SERPL-SCNC: 30 MMOL/L (ref 22–29)
CO2 SERPL-SCNC: 30 MMOL/L (ref 22–29)
CREAT SERPL-MCNC: 0.6 MG/DL (ref 0.76–1.27)
CREAT SERPL-MCNC: 0.62 MG/DL (ref 0.76–1.27)
CREAT SERPL-MCNC: 0.72 MG/DL (ref 0.76–1.27)
CREAT SERPL-MCNC: 0.72 MG/DL (ref 0.76–1.27)
CREAT SERPL-MCNC: 0.82 MG/DL (ref 0.76–1.27)
EGFRCR SERPLBLD CKD-EPI 2021: 100 ML/MIN/1.73
EGFRCR SERPLBLD CKD-EPI 2021: 91 ML/MIN/1.73
EGFRCR SERPLBLD CKD-EPI 2021: 94.7 ML/MIN/1.73
EGFRCR SERPLBLD CKD-EPI 2021: 94.7 ML/MIN/1.73
EGFRCR SERPLBLD CKD-EPI 2021: 99.1 ML/MIN/1.73
GLUCOSE SERPL-MCNC: 124 MG/DL (ref 65–99)
GLUCOSE SERPL-MCNC: 138 MG/DL (ref 65–99)
GLUCOSE SERPL-MCNC: 138 MG/DL (ref 65–99)
GLUCOSE SERPL-MCNC: 140 MG/DL (ref 65–99)
GLUCOSE SERPL-MCNC: 171 MG/DL (ref 65–99)
MAGNESIUM SERPL-MCNC: 1.2 MG/DL (ref 1.6–2.4)
MAGNESIUM SERPL-MCNC: 3.7 MG/DL (ref 1.6–2.4)
PHOSPHATE SERPL-MCNC: 3.1 MG/DL (ref 2.5–4.5)
POTASSIUM SERPL-SCNC: 3.2 MMOL/L (ref 3.5–5.2)
POTASSIUM SERPL-SCNC: 3.6 MMOL/L (ref 3.5–5.2)
POTASSIUM SERPL-SCNC: 3.7 MMOL/L (ref 3.5–5.2)
POTASSIUM SERPL-SCNC: 4.1 MMOL/L (ref 3.5–5.2)
SODIUM SERPL-SCNC: 129 MMOL/L (ref 136–145)
SODIUM SERPL-SCNC: 130 MMOL/L (ref 136–145)
URATE SERPL-MCNC: 6.1 MG/DL (ref 3.4–7)

## 2023-09-17 PROCEDURE — 83735 ASSAY OF MAGNESIUM: CPT | Performed by: INTERNAL MEDICINE

## 2023-09-17 PROCEDURE — 84132 ASSAY OF SERUM POTASSIUM: CPT | Performed by: INTERNAL MEDICINE

## 2023-09-17 PROCEDURE — 97535 SELF CARE MNGMENT TRAINING: CPT

## 2023-09-17 PROCEDURE — 97165 OT EVAL LOW COMPLEX 30 MIN: CPT

## 2023-09-17 PROCEDURE — 97162 PT EVAL MOD COMPLEX 30 MIN: CPT

## 2023-09-17 PROCEDURE — 80069 RENAL FUNCTION PANEL: CPT | Performed by: INTERNAL MEDICINE

## 2023-09-17 PROCEDURE — 80048 BASIC METABOLIC PNL TOTAL CA: CPT | Performed by: INTERNAL MEDICINE

## 2023-09-17 PROCEDURE — 84550 ASSAY OF BLOOD/URIC ACID: CPT | Performed by: INTERNAL MEDICINE

## 2023-09-17 PROCEDURE — 97110 THERAPEUTIC EXERCISES: CPT

## 2023-09-17 PROCEDURE — 25010000002 MAGNESIUM SULFATE 2 GM/50ML SOLUTION: Performed by: INTERNAL MEDICINE

## 2023-09-17 RX ORDER — POTASSIUM CHLORIDE 750 MG/1
40 TABLET, FILM COATED, EXTENDED RELEASE ORAL EVERY 4 HOURS
Status: COMPLETED | OUTPATIENT
Start: 2023-09-17 | End: 2023-09-17

## 2023-09-17 RX ORDER — MAGNESIUM SULFATE HEPTAHYDRATE 40 MG/ML
2 INJECTION, SOLUTION INTRAVENOUS
Status: COMPLETED | OUTPATIENT
Start: 2023-09-17 | End: 2023-09-17

## 2023-09-17 RX ADMIN — MAGNESIUM SULFATE HEPTAHYDRATE 2 G: 2 INJECTION, SOLUTION INTRAVENOUS at 01:54

## 2023-09-17 RX ADMIN — POTASSIUM CHLORIDE 40 MEQ: 750 TABLET, EXTENDED RELEASE ORAL at 09:57

## 2023-09-17 RX ADMIN — MAGNESIUM SULFATE HEPTAHYDRATE 2 G: 2 INJECTION, SOLUTION INTRAVENOUS at 05:58

## 2023-09-17 RX ADMIN — SENNOSIDES AND DOCUSATE SODIUM 2 TABLET: 50; 8.6 TABLET ORAL at 20:24

## 2023-09-17 RX ADMIN — DEXTROSE MONOHYDRATE 150 ML/HR: 50 INJECTION, SOLUTION INTRAVENOUS at 07:41

## 2023-09-17 RX ADMIN — APIXABAN 5 MG: 5 TABLET, FILM COATED ORAL at 05:58

## 2023-09-17 RX ADMIN — APIXABAN 5 MG: 5 TABLET, FILM COATED ORAL at 18:12

## 2023-09-17 RX ADMIN — DEXTROSE MONOHYDRATE 150 ML/HR: 50 INJECTION, SOLUTION INTRAVENOUS at 00:40

## 2023-09-17 RX ADMIN — FOLIC ACID 1 MG: 1 TABLET ORAL at 08:04

## 2023-09-17 RX ADMIN — DEXTROSE MONOHYDRATE 50 ML/HR: 50 INJECTION, SOLUTION INTRAVENOUS at 23:11

## 2023-09-17 RX ADMIN — SENNOSIDES AND DOCUSATE SODIUM 2 TABLET: 50; 8.6 TABLET ORAL at 08:04

## 2023-09-17 RX ADMIN — ACETAMINOPHEN 650 MG: 325 TABLET, FILM COATED ORAL at 09:07

## 2023-09-17 RX ADMIN — POTASSIUM CHLORIDE 40 MEQ: 750 TABLET, EXTENDED RELEASE ORAL at 13:58

## 2023-09-17 RX ADMIN — Medication 10 ML: at 20:24

## 2023-09-17 RX ADMIN — ACETAMINOPHEN 650 MG: 325 TABLET, FILM COATED ORAL at 16:19

## 2023-09-17 RX ADMIN — MAGNESIUM SULFATE HEPTAHYDRATE 2 G: 2 INJECTION, SOLUTION INTRAVENOUS at 03:58

## 2023-09-17 RX ADMIN — Medication 100 MG: at 08:04

## 2023-09-17 RX ADMIN — Medication 10 ML: at 08:04

## 2023-09-17 RX ADMIN — ACETAMINOPHEN 650 MG: 325 TABLET, FILM COATED ORAL at 03:10

## 2023-09-17 NOTE — PROGRESS NOTES
Intensive Care Unit Daily Progress Note.   [facility] CARDIAC INTENSIVE CARE  9/17/2023    Patient Name:  Gamal Modi Sr.  MRN:  [id number]  YOB: 1945  Age: 78 y.o.  Sex: male         Reason for Admission / Chief Complaint:  Dizziness and weakness    Hospital Course:   78-year-old male with alcohol abuse, hypertension, hyperlipidemia, atrial fibrillation who presented for weakness and found to have hyponatremia with sodium 114.  Suspected to be in the setting of heavy alcohol abuse, poor p.o. intake, excess water consumption.  Slowly improving sodium with the assistance of nephrology.    Interval History:  No acute events overnight  Sodium slowly improving, D5 drip increased  Complaining of back pain overnight, slept in chair, improved with pain regimen  Denies any chest pain, palpitations  Denies any shortness of breath or cough    Physical Exam:  /57   Pulse 87   Temp 97.8 °F (36.6 °C) (Oral)   Resp 16   Wt 102 kg (225 lb 12 oz)   SpO2 100%   BMI 31.50 kg/m²   Body mass index is 31.5 kg/m².    Intake/Output    Intake/Output Summary (Last 24 hours) at 9/17/2023 0739  Last data filed at 9/17/2023 0550  Gross per 24 hour   Intake 3185 ml   Output 4725 ml   Net -1540 ml     General: Alert, nontoxic, no acute distress  HEENT: NC/AT, EOMI, MMM  Neck: Supple, trachea midline  Cardiac: RRR, no murmur, gallops, rubs  Pulmonary: Clear to auscultation bilaterally, no adventitious breath sounds, normal respiratory effort  GI: Soft, non-tender, non-distended, normal bowel sounds  Extremities: Warm, well perfused, no LE edema  Skin: no visible rash  Neuro: CN II - XII grossly intact  Psychiatry: Normal mood and affect    Data Review:  Notable Labs:  Results from last 7 days   Lab Units 09/16/23  0604 09/15/23  1438   WBC 10*3/mm3 4.62 6.99   HEMOGLOBIN g/dL 11.6* 12.6*   PLATELETS 10*3/mm3 226 234     Results from last 7 days   Lab Units 09/17/23  0051 09/16/23  7034  09/16/23  0828 09/16/23  0604 09/16/23  0418 09/16/23  0016 09/15/23  1933 09/15/23  1438   SODIUM mmol/L 129*  129* 128* 125* 124*  --  119* 117* 114*   POTASSIUM mmol/L 3.7  3.7 3.7 3.2* 3.2*  --  2.8* 2.5* 3.1*   CHLORIDE mmol/L 90*  90* 88* 85* 84*  --  79* 74* 72*   CO2 mmol/L 30.0*  30.0* 29.9* 29.4* 30.0*  --  31.0* 31.3* 30.0*   BUN mg/dL 9  9 9 10 11  --  13 11 13   CREATININE mg/dL 0.72*  0.72* 0.65* 0.59* 0.69*  --  0.75* 0.74* 0.90   GLUCOSE mg/dL 138*  138* 127* 115* 108*  --  110* 120* 117*   CALCIUM mg/dL 9.0  9.0 9.0 8.6 8.9  --  8.5* 8.7 8.9   MAGNESIUM mg/dL 1.2*  --   --   --  2.1  --   --  2.2   PHOSPHORUS mg/dL 3.1  --   --   --  3.0  --   --   --    Estimated Creatinine Clearance: 106.2 mL/min (A) (by C-G formula based on SCr of 0.72 mg/dL (L)).    Results from last 7 days   Lab Units 09/16/23  0604 09/15/23  1438   AST (SGOT) U/L 45* 58*   ALT (SGPT) U/L 36 41   PLATELETS 10*3/mm3 226 234           Imaging:  CT head (9/15/2023): No acute process noted in CT head    ASSESSMENT  /  PLAN:    Severe hyponatremia  114 on presentation, normal previously  Hypokalemia  Metabolic alkalosis  Alcohol abuse  Transaminitis  Pulmonary nodules   Follows with Dr. Friedman, plan for repeat imaging in 1 year (February 2024)  Hypertension  Atrial fibrillation on Eliquis  Chronic anemia    - Hyponatremia slowly improving, 129 as of this morning.  Continuing D5 drip  - Electrolyte derangements improving also with repletion  - Appreciate nephrology assistance in hyponatremia  - Monitoring for alcohol withdrawal, alcohol level was negative admission, no evidence of withdrawal at this time  - Continue thiamine and folate supplement  - Continue Eliquis    GI prophylaxis: Not indicated  DVT prophylaxis: On therapeutic anticoagulation  Mcknight catheter: None  Bowel regimen: Ordered    Discharge: Transfer to floor.  Medicine consulted to assume care.    Gilberto Garber MD  [facility]  Pulmonary and  Critical Care Medicine, Interventional Pulmonology    Parts of this note may be an electronic transcription/translation of spoken language to printed text using the Dragon dictation system.

## 2023-09-17 NOTE — PLAN OF CARE
Goal Outcome Evaluation:  Plan of Care Reviewed With: patient        Progress: improving  Outcome Evaluation: Doing well today. [facility]. Ambulating better with standby assist. Worked with PT. Patient reports gait is more steady and weakness is improving. Fluid rate decreased to 50ml/hr. Vitals stable. On room air most of day. Does desat to mid 80s with sleep - oxygen applied. Good appetite. Some tylenol provided for chronic back pain. No further issues at this time.

## 2023-09-17 NOTE — PLAN OF CARE
Goal Outcome Evaluation:  Plan of Care Reviewed With: patient           Outcome Evaluation: Pt is a 79 yo male adm after a fall, dx with hyponatremia. Pt with a hx of ETOH abuse and was in an alcohol treatment center, plans to return. Pt is doing fairly well with mobility, he does not use an assistive device at baseline, and today his gait was unsteady without one, he c/o B hip pain and stiffness and walked with a wide based gait with short, choppy steps, rec a rolling walker for pt at discharge. PT will follow to cont to work on balance and gait with assistive device      Anticipated Discharge Disposition (PT):  (substance abuse treatment center)

## 2023-09-17 NOTE — PLAN OF CARE
Goal Outcome Evaluation:  Plan of Care Reviewed With: patient           Outcome Evaluation: VSS, IVF increased due to NA rising to quickly, up to chair majority of night, replacing mag, check labs after mag complete, BMP Q 6 hours, CIWA remains 0, Tylenol PRN for back pain, will continue to monitor

## 2023-09-17 NOTE — PROGRESS NOTES
Spoke with hospitalist service and they have graciously accepted to assume care of this patient. Pulmonary will sign off at this time. Please call if any questions or concerns arise.     Gilberto Garber MD  [facility], [facility]  Pulmonary and Critical Care Medicine, Interventional Pulmonology

## 2023-09-17 NOTE — PROGRESS NOTES
[facility] Progress Note      Patient Name: Gamal Modi Sr.  : 1945  MRN: [id number]  Primary Care Physician:  Alysha Cosme MD  Date of admission: 9/15/2023    Subjective     Interval History:   Follow-up hyponatremia    The patient is feeling much better, he has very large urine output, currently on D5W and his sodium stabilized at 129, has been stable for the past 24 hours.  He denies any chest pain or shortness of air, no orthopnea or PND, no nausea or vomiting.  No dysuria or gross hematuria, no lightheadedness.    Review of Systems:   As noted above    Objective     Vitals:   Temp:  [97.8 °F (36.6 °C)-98.4 °F (36.9 °C)] 97.8 °F (36.6 °C)  Heart Rate:  [] 91  BP: ()/(53-96) 109/78  Flow (L/min):  [2] 2    Intake/Output Summary (Last 24 hours) at 2023 0859  Last data filed at 2023 0800  Gross per 24 hour   Intake 3425 ml   Output 5225 ml   Net -1800 ml         Physical Exam:    General Appearance: alert, oriented x 3, no acute distress   Skin: warm and dry  HEENT: oral mucosa normal, nonicteric sclera  Neck: supple, no JVD  Lungs: CTA, unlabored breathing effort  Heart: Irregularly irregular, no rub  Abdomen: soft, nontender, nondistended, normoactive bowel  : no palpable bladder  Extremities: no edema, cyanosis or clubbing  Neuro: normal speech and mental status     Scheduled Meds:     apixaban, 5 mg, Oral, Q12H  folic acid, 1 mg, Oral, Daily  senna-docusate sodium, 2 tablet, Oral, BID  sodium chloride, 10 mL, Intravenous, Q12H  thiamine, 100 mg, Oral, Daily      IV Meds:   dextrose, 50 mL/hr, Last Rate: 150 mL/hr (23 0741)        Results Reviewed:   I have personally reviewed the results from the time of this admission to 2023 08:59 EDT     Results from last 7 days   Lab Units 23  0805 23  0051 23  1824 23  0828 23  0604 09/15/23  1933 09/15/23  1438   SODIUM mmol/L 129* 129*  129* 128*   < > 124*    < > 114*   POTASSIUM mmol/L 3.2* 3.7  3.7 3.7   < > 3.2*   < > 3.1*   CHLORIDE mmol/L 91* 90*  90* 88*   < > 84*   < > 72*   CO2 mmol/L 27.0 30.0*  30.0* 29.9*   < > 30.0*   < > 30.0*   BUN mg/dL 6* 9  9 9   < > 11   < > 13   CREATININE mg/dL 0.60* 0.72*  0.72* 0.65*   < > 0.69*   < > 0.90   CALCIUM mg/dL 8.6 9.0  9.0 9.0   < > 8.9   < > 8.9   BILIRUBIN mg/dL  --   --   --   --  0.7  --  1.1   ALK PHOS U/L  --   --   --   --  97  --  110   ALT (SGPT) U/L  --   --   --   --  36  --  41   AST (SGOT) U/L  --   --   --   --  45*  --  58*   GLUCOSE mg/dL 124* 138*  138* 127*   < > 108*   < > 117*    < > = values in this interval not displayed.         Estimated Creatinine Clearance: 127.4 mL/min (A) (by C-G formula based on SCr of 0.6 mg/dL (L)).    Results from last 7 days   Lab Units 09/17/23  0805 09/17/23  0051 09/16/23  0418   MAGNESIUM mg/dL 3.7* 1.2* 2.1   PHOSPHORUS mg/dL  --  3.1 3.0         Results from last 7 days   Lab Units 09/17/23  0051 09/16/23  0418 09/15/23  1438   URIC ACID mg/dL 6.1 7.2* 7.2*         Results from last 7 days   Lab Units 09/16/23  0604 09/15/23  1438   WBC 10*3/mm3 4.62 6.99   HEMOGLOBIN g/dL 11.6* 12.6*   PLATELETS 10*3/mm3 226 234               Assessment / Plan     ASSESSMENT:  Severe symptomatic hyponatremia with gait difficulties and weakness improved significantly since admission sodium increased from 1 14-1 24 in less than 24 hours which is a bit faster than is desired.  Currently on D5W sodium 129 potassium 3.2 his sodium has been very stable for the past 24 hours hence will decrease IV fluid because he is much less risk of developing osmotic demyelination   Chronic A-fib with controlled rate  Hypokalemia associated with poor oral intake, being replaced by protocol  History of hypertension, blood pressures well controlled  Alcohol abuse patient admitted himself to alcohol rehab facility yesterday and he was subsequently sent to the hospital for admission because of his  hyponatremia and he is planning to go back to alcohol rehab after discharge        PLAN:  Decrease D5W to 50 cc/h  Since his sodium is 129 the risk of osmotic demyelination is very minimal now.  Surveillance labs    I reviewed the chart and other providers notes, reviewed imaging and lab data.  I discussed the case with the patient  Copied text in this note has been reviewed and is accurate as of 09/17/23.       Thank you for involving us in the care of Gamal Modi Sr..  Please feel free to call with any questions.    Chago Issa MD  09/17/23  08:59 EDT    [facility]  [phone]    Please note that portions of this note were completed with a voice recognition program.

## 2023-09-17 NOTE — PLAN OF CARE
Goal Outcome Evaluation:  Plan of Care Reviewed With: patient           Outcome Evaluation: Pt is a 79 y/o M admitted after fall, dx with hyponatremia. Pt with etoh abuse and recently checked himself into rehab facility. He is typically indep with ADLs and fxl mobility w/o AD and lives with his spouse. He presents today sitting UIC. Is able to don socks independently and perform fxl ambulation into bathroom using RW with SBA. ADL/grooming tasks at the sink with set-upA/spv. Returns to chair with no LOB. Strength and coordination WFL. No acute OT needs identified at this time. Will sign off and recommend d/c back to alcohol rehab facility

## 2023-09-17 NOTE — CONSULTS
Patient Name:  Gamal Modi Sr.  YOB: 1945  MRN:  [id number]  Date of Admission:  9/15/2023  Date of Consult:  9/17/2023  Patient Care Team:  Alysha Cosme MD as PCP - General (Internal Medicine)    Inpatient Internal Medicine Consult  Consult performed by: Maddie Xiong APRN  Consult ordered by: Gilberto Garber MD      Evaluate status and make recommendations regarding treatment for   Subjective   History of Present Illness  Mr. Modi is a 78 y.o. male with past medical history of alcohol abuse that has been admitted to [facility] due to severe hyponatremia.  He voluntarily presented to rehab center for help to stop drinking.  The staff there found him to be weak and walking unsteady and unable to get around safely without assistance.  He was sent to the hospital.  He has been admitted by the intensivist service due to severe hyponatremia with initial sodium level of 114.He has been evaluated by nephrology.  Sodium level improved today and it was determined he was stable to come out of critical care.  We were asked to see and assist with medical management out of critical care unit.  He reported a fall prior to arrival and denied injury or hitting his head.      He drinks about 1/5 of whiskey every day.  He denies any history of withdrawal.  He has plans to continue rehab at discharge.    He denies any symptoms of confusion, dizziness or lightheadedness.  He does feel very weak with any activity or ambulation and this appears to be chronic for many weeks.  Additionally, reported poor appetite prior to arrival.  No known weight loss.    The patient is asking to be cleaned up for incontinent episode and denies further complaints.        Past Medical History:   Diagnosis Date    AF (atrial fibrillation)     Cataract 2019    Elevated cholesterol     Erectile dysfunction 2018    Gout     Hypertension     Long term exposure to asbestos     3 years     Sebaceous cyst      Past Surgical History:   Procedure Laterality Date    COLON SURGERY      COLONOSCOPY  approx 2015    negative per pt     COLONOSCOPY N/A 5/12/2022    Procedure: COLONOSCOPY INTO CECUM/ TERMINAL ILEUM WITH POLYPECTOMY;  Surgeon: Aidan Branham MD;  Location: [facility] ENDOSCOPY;  Service: Gastroenterology;  Laterality: N/A;  pre: anemia  post: hemorrhoids, normal TI, polyp, diverticulosis    ENDOSCOPY N/A 5/12/2022    Procedure: ESOPHAGOGASTRODUODENOSCOPY WITH BIOPSIES;  Surgeon: Aidan Branham MD;  Location: [facility] ENDOSCOPY;  Service: Gastroenterology;  Laterality: N/A;  pre: anemia  post: hiatal hernia, bnormal ampulla    OTHER SURGICAL HISTORY      BOWEL BLOCKAGE     Family History   Problem Relation Age of Onset    Diabetes Other     Cancer Mother     Diabetes Father     Cancer Sister      Social History     Tobacco Use    Smoking status: Never    Smokeless tobacco: Never   Vaping Use    Vaping Use: Never used   Substance Use Topics    Alcohol use: Yes     Comment: pt reports he drinks a fifth of whiskey a day, is a binge drinker, had not drank for 11 months then 2 weeks ago drank again, last drink 4 days ago    Drug use: Never     Medications Prior to Admission   Medication Sig Dispense Refill Last Dose    acetaminophen (TYLENOL) 500 MG tablet Take 2 tablets by mouth Daily.       Apoaequorin (Prevagen) 10 MG capsule Take 1 capsule by mouth Daily.       ascorbic acid (VITAMIN C) 500 MG tablet Take 2 tablets by mouth Daily.       atorvastatin (LIPITOR) 20 MG tablet TAKE ONE TABLET BY MOUTH DAILY 90 tablet 3     calcium carbonate (TUMS) 500 MG chewable tablet Chew 3 tablets Daily As Needed for Indigestion or Heartburn.       carboxymethylcellulose (Lubricant Eye Drops) 0.5 % solution Administer 1 drop to both eyes Daily.       cetirizine (zyrTEC) 10 MG tablet Take 1 tablet by mouth Daily.       cholecalciferol (VITAMIN D3) 1000 units tablet Take 1 tablet by mouth Daily.       dilTIAZem CD  (CARDIZEM CD) 120 MG 24 hr capsule TAKE ONE CAPSULE BY MOUTH DAILY 90 capsule 3     Eliquis 5 MG tablet tablet TAKE ONE TABLET BY MOUTH EVERY 12 HOURS 180 tablet 3     ferrous sulfate 325 (65 FE) MG tablet Take 1 tablet by mouth Daily With Breakfast.       furosemide (LASIX) 40 MG tablet TAKE ONE TABLET BY MOUTH DAILY 90 tablet 3     Multiple Vitamins-Minerals (OCUVITE EXTRA PO) Take 1 tablet by mouth Daily.       multivitamin with minerals (MULTIVITAMIN ADULT PO) Take 1 tablet by mouth Daily.       tadalafil (CIALIS) 5 MG tablet TAKE ONE TABLET BY MOUTH DAILY AS NEEDED FOR ERECTILE DYSFUNCTION 90 tablet 1      Allergies:  Patient has no known allergies.        Objective      Vital Signs  Temp:  [97.8 °F (36.6 °C)-98.4 °F (36.9 °C)] 97.8 °F (36.6 °C)  Heart Rate:  [] 91  BP: ()/(53-96) 109/78  Body mass index is 31.49 kg/m².    Physical Exam  Constitutional:       General: He is not in acute distress.     Comments: Weak, frail, chronically ill-appearing   HENT:      Head: Normocephalic and atraumatic.      Comments: Alopecia     Mouth/Throat:      Mouth: Mucous membranes are moist.   Eyes:      Conjunctiva/sclera: Conjunctivae normal.      Pupils: Pupils are equal, round, and reactive to light.   Cardiovascular:      Rate and Rhythm: Normal rate and regular rhythm.      Pulses: Normal pulses.      Heart sounds: Normal heart sounds.   Pulmonary:      Effort: Pulmonary effort is normal. No respiratory distress.      Breath sounds: Normal breath sounds.   Abdominal:      General: Bowel sounds are normal.   Musculoskeletal:         General: No swelling or tenderness. Normal range of motion.      Cervical back: Normal range of motion.   Skin:     General: Skin is warm and dry.      Coloration: Skin is pale. Skin is not jaundiced.   Neurological:      General: No focal deficit present.      Mental Status: He is alert and oriented to person, place, and time.      Comments: Slow verbal responses   Psychiatric:       Comments: Pleasant, cooperative       Results Review:   I reviewed the patient's new clinical results.           Assessment/Plan     Active Hospital Problems    Diagnosis  POA    **Hyponatremia [E87.1]  Yes    Alcohol abuse [F10.10]  Unknown    Diastolic dysfunction [I51.89]  Yes    Paroxysmal atrial fibrillation [I48.0]  Yes    Essential hypertension [I10]  Yes       Hyponatremia  Na 130, up from 114  On D5W @ 50 per nephrology.   Hypokalemia  Resolved.  Weakness  Falls  Physical and Occupational Therapy.  He hopes to get strong enough to go to alcohol rehab.  Metabolic alkalosis  Alcohol abuse  Continue vitamin supplements, thiamine, folic acid.  CIWA monitoring.  Transaminitis  AST 45.  Follow-up with PCP.  Pulmonary nodules   Follows with Dr. Friedman, plan for repeat imaging in 1 year (February 2024)  Hypertension  Stable, controlled.  On no medications.  Atrial fibrillation on Eliquis  Continue Eliquis. HR stable.  Chronic anemia  Hemoglobin stable, 11.6.    Thank you very much for asking A to be involved in this patient's care.       MATT Luo  [facility]  09/17/23  12:58 EDT

## 2023-09-17 NOTE — THERAPY EVALUATION
Patient Name: Gamal Modi Sr.  : 1945    MRN: [id number]                              Today's Date: 2023       Admit Date: 9/15/2023    Visit Dx:     ICD-10-CM ICD-9-CM   1. Hyponatremia  E87.1 276.1   2. Alcoholism  F10.20 303.90     Patient Active Problem List   Diagnosis    Gout    Essential hypertension    Annual physical exam    Hypercholesterolemia    Anemia    Pulmonary asbestosis    Paroxysmal atrial fibrillation    Diastolic dysfunction    Anemia    Fatty liver    Abnormal thyroid function test    Dizziness    Hyponatremia    Coronary artery calcification seen on CAT scan    Alcohol abuse     Past Medical History:   Diagnosis Date    AF (atrial fibrillation)     Cataract 2019    Elevated cholesterol     Erectile dysfunction 2018    Gout     Hypertension     Long term exposure to asbestos     3 years    Sebaceous cyst      Past Surgical History:   Procedure Laterality Date    COLON SURGERY      COLONOSCOPY  approx     negative per pt     COLONOSCOPY N/A 2022    Procedure: COLONOSCOPY INTO CECUM/ TERMINAL ILEUM WITH POLYPECTOMY;  Surgeon: Aidan Branham MD;  Location: [facility] ENDOSCOPY;  Service: Gastroenterology;  Laterality: N/A;  pre: anemia  post: hemorrhoids, normal TI, polyp, diverticulosis    ENDOSCOPY N/A 2022    Procedure: ESOPHAGOGASTRODUODENOSCOPY WITH BIOPSIES;  Surgeon: Aidan Branham MD;  Location: [facility] ENDOSCOPY;  Service: Gastroenterology;  Laterality: N/A;  pre: anemia  post: hiatal hernia, bnormal ampulla    OTHER SURGICAL HISTORY      BOWEL BLOCKAGE      General Information       Row Name 23 1531          Physical Therapy Time and Intention    Document Type evaluation  -PC     Mode of Treatment physical therapy  -PC       Row Name 23 1531          General Information    Existing Precautions/Restrictions fall  -PC       Row Name 23 1531          Living Environment    People in Home spouse  -PC       Row Name 23 1531           Cognition    Orientation Status (Cognition) oriented x 3  -PC               User Key  (r) = Recorded By, (t) = Taken By, (c) = Cosigned By      Initials Name Provider Type    PC Bharti Schaefer PT Physical Therapist                   Mobility       [facility] Name 09/17/23 1531          Bed Mobility    Bed Mobility supine-sit  -PC     Supine-Sit [place] (Bed Mobility) standby assist  -PC       Row Name 09/17/23 1531          Sit-Stand Transfer    Sit-Stand [place] (Transfers) standby assist  -PC       Row Name 09/17/23 1531          Gait/Stairs (Locomotion)    [place] Level (Gait) contact guard  -PC     Distance in Feet (Gait) 100 ft, attempted to walk without assistive device as pt does not use one at baseline, pt was unsteady, walked with a wide-based gait, short, choppy steps. pt c/o B hip soreness, feel pt would benefit from a rolling walker  -PC               User Key  (r) = Recorded By, (t) = Taken By, (c) = Cosigned By      Initials Name Provider Type    PC Bharti Schaefer, PT Physical Therapist                   Obj/Interventions       Row Name 09/17/23 1534          Range of Motion Comprehensive    General Range of Motion no range of motion deficits identified  -PC       Row Name 09/17/23 1534          Strength Comprehensive (MMT)    General Manual Muscle Testing (MMT) Assessment no strength deficits identified  -PC       Row Name 09/17/23 1534          Balance    Static Standing Balance standby assist  -PC     Dynamic Standing Balance contact guard  -PC               User Key  (r) = Recorded By, (t) = Taken By, (c) = Cosigned By      Initials Name Provider Type    PC Bharti Schaefer PT Physical Therapist                   Goals/Plan       Row Name 09/17/23 1540          Gait Training Goal 1 (PT)    Activity/Assistive Device (Gait Training Goal 1, PT) gait (walking locomotion);assistive device use;diminish gait deviation;decrease fall risk;decrease asymmetrical patterns;improve balance and  speed;increase endurance/gait distance  -PC     [place] Level (Gait Training Goal 1, PT) standby assist  -PC     Distance (Gait Training Goal 1, PT) 100 ft  -PC     Time Frame (Gait Training Goal 1, PT) 1 week  -PC       Row Name 09/17/23 8970          Therapy Assessment/Plan (PT)    Planned Therapy Interventions (PT) balance training;gait training;strengthening  -PC               User Key  (r) = Recorded By, (t) = Taken By, (c) = Cosigned By      Initials Name Provider Type    PC Bharti Schaefer, PT Physical Therapist                   Clinical Impression       Row Name 09/17/23 5668          Pain    Pain Location - hip  -PC     Pre/Posttreatment Pain Comment pt dd not rate but c/o B hip pain/stiffness  -PC       Row Name 09/17/23 2279          Plan of Care Review    Plan of Care Reviewed With patient  -PC     Outcome Evaluation Pt is a 77 yo male adm after a fall, dx with hyponatremia. Pt with a hx of ETOH abuse and was in an alcohol treatment center, plans to return. Pt is doing fairly well with mobility, he does not use an assistive device at baseline, and today his gait was unsteady without one, he c/o B hip pain and stiffness and walked with a wide based gait with short, choppy steps, rec a rolling walker for pt at discharge. PT will follow to cont to work on balance and gait with assistive device  -PC       Row Name 09/17/23 2942          Therapy Assessment/Plan (PT)    Rehab Potential (PT) good, to achieve stated therapy goals  -PC     Criteria for Skilled Interventions Met (PT) yes;meets criteria  -PC     Therapy Frequency (PT) 5 times/wk  -PC       Row Name 09/17/23 8292          Positioning and Restraints    Pre-Treatment Position in bed  -PC     Post Treatment Position bed  -PC     In Bed supine;call light within reach;encouraged to call for assist;exit alarm on;with family/caregiver  -PC               User Key  (r) = Recorded By, (t) = Taken By, (c) = Cosigned By      Initials Name Provider Type     Bharti Moore, PT Physical Therapist                   Outcome Measures       Row Name 09/17/23 1540 09/17/23 0800       How much help from another person do you currently need...    Turning from your back to your side while in flat bed without using bedrails? 4  -PC 4  -CC    Moving from lying on back to sitting on the side of a flat bed without bedrails? 4  -PC 4  -CC    Moving to and from a bed to a chair (including a wheelchair)? 4  -PC 4  -CC    Standing up from a chair using your arms (e.g., wheelchair, bedside chair)? 3  -PC 3  -CC    Climbing 3-5 steps with a railing? 2  -PC 2  -CC    To walk in hospital room? 3  -PC 3  -CC    AM-PAC 6 Clicks Score (PT) 20  -PC 20  -CC    Highest level of mobility 6 --> Walked 10 steps or more  -PC 6 --> Walked 10 steps or more  -CC      Row Name 09/17/23 1540 09/17/23 1203       Functional Assessment    Outcome Measure Options AM-PAC 6 Clicks Basic Mobility (PT)  -PC AM-PAC 6 Clicks Daily Activity (OT)  -JW              User Key  (r) = Recorded By, (t) = Taken By, (c) = Cosigned By      Initials Name Provider Type    Bharti Moore, PT Physical Therapist    Gaviota Cronin, RN Registered Nurse    Stephanie Mcknight, OT Occupational Therapist                                 Physical Therapy Education       Title: PT OT SLP Therapies (In Progress)       Topic: Physical Therapy (In Progress)       Point: Mobility training (Done)       Learning Progress Summary             Patient Acceptance, E,D, DU by PC at 9/17/2023 1541                         Point: Home exercise program (Not Started)       Learner Progress:  Not documented in this visit.              Point: Body mechanics (Done)       Learning Progress Summary             Patient Acceptance, E,D, DU by PC at 9/17/2023 1541                         Point: Precautions (Done)       Learning Progress Summary             Patient Acceptance, E,D, DU by PC at 9/17/2023 1541                                          User Key       Initials Effective Dates Name Provider Type Discipline    PC 06/16/21 -  Bharti Schaefer PT Physical Therapist PT                  PT Recommendation and Plan  Planned Therapy Interventions (PT): balance training, gait training, strengthening  Plan of Care Reviewed With: patient  Outcome Evaluation: Pt is a 77 yo male adm after a fall, dx with hyponatremia. Pt with a hx of ETOH abuse and was in an alcohol treatment center, plans to return. Pt is doing fairly well with mobility, he does not use an assistive device at baseline, and today his gait was unsteady without one, he c/o B hip pain and stiffness and walked with a wide based gait with short, choppy steps, rec a rolling walker for pt at discharge. PT will follow to cont to work on balance and gait with assistive device     Time Calculation:         PT Charges       Row Name 09/17/23 1541             Time Calculation    Start Time 1508  -PC      Stop Time 1530  -PC      Time Calculation (min) 22 min  -PC      PT Received On 09/17/23  -PC      PT - Next Appointment 09/18/23  -PC      PT Goal Re-Cert Due Date 09/24/23  -PC                User Key  (r) = Recorded By, (t) = Taken By, (c) = Cosigned By      Initials Name Provider Type    PC Bharti Schaefer PT Physical Therapist                  Therapy Charges for Today       Code Description Service Date Service Provider Modifiers Qty    18180611655 HC PT EVAL MOD COMPLEXITY 2 9/17/2023 Bharti Schaefer, PT GP 1    44114478211 HC PT THER PROC EA 15 MIN 9/17/2023 Bharti Schaefer, PT GP 1            PT G-Codes  Outcome Measure Options: AM-PAC 6 Clicks Basic Mobility (PT)  AM-PAC 6 Clicks Score (PT): 20  AM-PAC 6 Clicks Score (OT): 23  PT Discharge Summary  Anticipated Discharge Disposition (PT):  (substance abuse treatment center)    Bharti Schaefer PT  9/17/2023

## 2023-09-17 NOTE — THERAPY EVALUATION
Patient Name: Gamal Modi Sr.  : 1945    MRN: [id number]                              Today's Date: 2023       Admit Date: 9/15/2023    Visit Dx:     ICD-10-CM ICD-9-CM   1. Hyponatremia  E87.1 276.1   2. Alcoholism  F10.20 303.90     Patient Active Problem List   Diagnosis    Gout    Essential hypertension    Annual physical exam    Hypercholesterolemia    Anemia    Pulmonary asbestosis    Paroxysmal atrial fibrillation    Diastolic dysfunction    Anemia    Fatty liver    Abnormal thyroid function test    Dizziness    Hyponatremia    Coronary artery calcification seen on CAT scan     Past Medical History:   Diagnosis Date    AF (atrial fibrillation)     Cataract 2019    Elevated cholesterol     Erectile dysfunction 2018    Gout     Hypertension     Long term exposure to asbestos     3 years    Sebaceous cyst      Past Surgical History:   Procedure Laterality Date    COLON SURGERY      COLONOSCOPY  approx     negative per pt     COLONOSCOPY N/A 2022    Procedure: COLONOSCOPY INTO CECUM/ TERMINAL ILEUM WITH POLYPECTOMY;  Surgeon: Aidan Branham MD;  Location: [facility] ENDOSCOPY;  Service: Gastroenterology;  Laterality: N/A;  pre: anemia  post: hemorrhoids, normal TI, polyp, diverticulosis    ENDOSCOPY N/A 2022    Procedure: ESOPHAGOGASTRODUODENOSCOPY WITH BIOPSIES;  Surgeon: Aidan Branham MD;  Location[facility] ENDOSCOPY;  Service: Gastroenterology;  Laterality: N/A;  pre: anemia  post: hiatal hernia, bnormal ampulla    OTHER SURGICAL HISTORY      BOWEL BLOCKAGE      General Information       Row Name 23 1157          OT Time and Intention    Document Type discharge evaluation/summary;evaluation  -JW     Mode of Treatment occupational therapy  -JW       Row Name 23 1157          General Information    Patient Profile Reviewed yes  -JW     Prior Level of Function independent:;ADL's;all household mobility  w/o AD  -JW     Existing Precautions/Restrictions fall  -JW      Barriers to Rehab none identified  -       Row Name 09/17/23 1157          Living Environment    People in Home spouse  -[facility] Name 09/17/23 1157          Cognition    Orientation Status (Cognition) oriented x 3  -               User Key  (r) = Recorded By, (t) = Taken By, (c) = Cosigned By      Initials Name Provider Type    Stephanie Mcknight OT Occupational Therapist                     Mobility/ADL's       Row Name 09/17/23 1157          Bed Mobility    Comment, (Bed Mobility) UIC upon entry  -[facility] Name 09/17/23 1157          Transfers    Transfers sit-stand transfer  -[facility] Name 09/17/23 1157          Sit-Stand Transfer    Sit-Stand [place] (Transfers) standby assist  -     Assistive Device (Sit-Stand Transfers) walker, front-wheeled  -[facility] Name 09/17/23 1157          Functional Mobility    Functional Mobility- Ind. Level standby assist  -     Functional Mobility- Device walker, front-wheeled  -     Functional Mobility- Comment performs fxl ambulation into bathroom using RW with SBA  -[facility] Name 09/17/23 1157          Activities of Daily Living    BADL Assessment/Intervention grooming;lower body dressing  -[facility] Name 09/17/23 1157          Grooming Assessment/Training    [place] Level (Grooming) oral care regimen;wash face, hands;set up;supervision  -     Position (Grooming) sink side;unsupported standing  -JW       Row Name 09/17/23 1157          Lower Body Dressing Assessment/Training    [place] Level (Lower Body Dressing) don;socks;independent  -     Position (Lower Body Dressing) supported sitting  -               User Key  (r) = Recorded By, (t) = Taken By, (c) = Cosigned By      Initials Name Provider Type    Stephanie Mcknight OT Occupational Therapist                   Obj/Interventions       Row Name 09/17/23 1158          Sensory Assessment (Somatosensory)    Sensory Assessment (Somatosensory) sensation intact  -       Row Name  09/17/23 1158          Vision Assessment/Intervention    Visual Impairment/Limitations WNL  -[facility] Name 09/17/23 1158          Range of Motion Comprehensive    General Range of Motion bilateral upper extremity ROM WNL  -[facility] Name 09/17/23 1158          Strength Comprehensive (MMT)    General Manual Muscle Testing (MMT) Assessment no strength deficits identified  -JW       Row Name 09/17/23 1158          Balance    Balance Assessment standing static balance;standing dynamic balance  -     Static Standing Balance supervision;standby assist  -     Dynamic Standing Balance standby assist;contact guard  -     Position/Device Used, Standing Balance walker, front-wheeled  -     Balance Interventions occupation based/functional task  -               User Key  (r) = Recorded By, (t) = Taken By, (c) = Cosigned By      Initials Name Provider Type    Stephanie Mcknight OT Occupational Therapist                   Goals/Plan       Row Name 09/17/23 1203          Transfer Goal 1 (OT)    Activity/Assistive Device (Transfer Goal 1, OT) transfers, all  -     [place] Level/Cues Needed (Transfer Goal 1, OT) supervision required  -     Time Frame (Transfer Goal 1, OT) short term goal (STG);2 weeks  -     Progress/Outcome (Transfer Goal 1, OT) goal met  -               User Key  (r) = Recorded By, (t) = Taken By, (c) = Cosigned By      Initials Name Provider Type    Stephanie Mcknight OT Occupational Therapist                   Clinical Impression       Row Name 09/17/23 1159          Pain Assessment    Pretreatment Pain Rating 2/10  -     Posttreatment Pain Rating 2/10  -     Pain Location - back;hip  -     Pre/Posttreatment Pain Comment reports some back and hip pain/stiffness with mobility  -     Pain Intervention(s) Repositioned;Ambulation/increased activity  -[facility] Name 09/17/23 1159          Plan of Care Review    Plan of Care Reviewed With patient  -     Outcome Evaluation Pt is a  77 y/o M admitted after fall, dx with hyponatremia. Pt with etoh abuse and recently checked himself into rehab facility. He is typically indep with ADLs and fxl mobility w/o AD and lives with his spouse. He presents today sitting UIC. Is able to don socks independently and perform fxl ambulation into bathroom using RW with SBA. ADL/grooming tasks at the sink with set-upA/spv. Returns to chair with no LOB. Strength and coordination WFL. No acute OT needs identified at this time. Will sign off and recommend d/c back to alcohol rehab facility  -       Row Name 09/17/23 1159          Therapy Assessment/Plan (OT)    Criteria for Skilled Therapeutic Interventions Met (OT) no problems identified which require skilled intervention  -     Therapy Frequency (OT) evaluation only  -       Row Name 09/17/23 1158          Positioning and Restraints    Pre-Treatment Position sitting in chair/recliner  -     Post Treatment Position chair  -JW     In Chair notified nsg;sitting;call light within reach;encouraged to call for assist;exit alarm on  -               User Key  (r) = Recorded By, (t) = Taken By, (c) = Cosigned By      Initials Name Provider Type    Stephanie Mcknight, ANURADHA Occupational Therapist                   Outcome Measures       Row Name 09/17/23 1203          How much help from another is currently needed...    Putting on and taking off regular lower body clothing? 4  -JW     Bathing (including washing, rinsing, and drying) 3  -JW     Toileting (which includes using toilet bed pan or urinal) 4  -JW     Putting on and taking off regular upper body clothing 4  -JW     Taking care of personal grooming (such as brushing teeth) 4  -JW     Eating meals 4  -JW     AM-PAC 6 Clicks Score (OT) 23  -       Row Name 09/17/23 0800          How much help from another person do you currently need...    Turning from your back to your side while in flat bed without using bedrails? 4  -CC     Moving from lying on back to  sitting on the side of a flat bed without bedrails? 4  -CC     Moving to and from a bed to a chair (including a wheelchair)? 4  -CC     Standing up from a chair using your arms (e.g., wheelchair, bedside chair)? 3  -CC     Climbing 3-5 steps with a railing? 2  -CC     To walk in hospital room? 3  -CC     AM-PAC 6 Clicks Score (PT) 20  -CC     Highest level of mobility 6 --> Walked 10 steps or more  -CC       Row Name 09/17/23 1203          Functional Assessment    Outcome Measure Options AM-PAC 6 Clicks Daily Activity (OT)  -NIKO               User Key  (r) = Recorded By, (t) = Taken By, (c) = Cosigned By      Initials Name Provider Type     Gaviota Angeles, RN Registered Nurse    Stephanie Mcknight OT Occupational Therapist                    Occupational Therapy Education       Title: PT OT SLP Therapies (In Progress)       Topic: Occupational Therapy (In Progress)       Point: ADL training (Done)       Description:   Instruct learner(s) on proper safety adaptation and remediation techniques during self care or transfers.   Instruct in proper use of assistive devices.                  Learning Progress Summary             Patient Acceptance, E, VU by NIKO at 9/17/2023 1204    Comment: role of OT, safety                         Point: Home exercise program (Not Started)       Description:   Instruct learner(s) on appropriate technique for monitoring, assisting and/or progressing therapeutic exercises/activities.                  Learner Progress:  Not documented in this visit.              Point: Precautions (Done)       Description:   Instruct learner(s) on prescribed precautions during self-care and functional transfers.                  Learning Progress Summary             Patient Acceptance, E, VU by NIKO at 9/17/2023 1204    Comment: role of OT, safety                         Point: Body mechanics (Done)       Description:   Instruct learner(s) on proper positioning and spine alignment during self-care,  functional mobility activities and/or exercises.                  Learning Progress Summary             Patient Acceptance, E, VU by  at 9/17/2023 1204    Comment: role of OT, safety                                         User Key       Initials Effective Dates Name Provider Type Discipline     06/10/21 -  Stephanie Brantley OT Occupational Therapist OT                  OT Recommendation and Plan  Therapy Frequency (OT): evaluation only  Plan of Care Review  Plan of Care Reviewed With: patient  Outcome Evaluation: Pt is a 79 y/o M admitted after fall, dx with hyponatremia. Pt with etoh abuse and recently checked himself into rehab facility. He is typically indep with ADLs and fxl mobility w/o AD and lives with his spouse. He presents today sitting UIC. Is able to don socks independently and perform fxl ambulation into bathroom using RW with SBA. ADL/grooming tasks at the sink with set-upA/spv. Returns to chair with no LOB. Strength and coordination WFL. No acute OT needs identified at this time. Will sign off and recommend d/c back to alcohol rehab facility     Time Calculation:   Evaluation Complexity (OT)  Review Occupational Profile/Medical/Therapy History Complexity: brief/low complexity  Assessment, Occupational Performance/Identification of Deficit Complexity: 1-3 performance deficits  Clinical Decision Making Complexity (OT): problem focused assessment/low complexity  Overall Complexity of Evaluation (OT): low complexity     Time Calculation- OT       Row Name 09/17/23 1204             Time Calculation- OT    OT Start Time 0902  -      OT Stop Time 0920  -      OT Time Calculation (min) 18 min  -      Total Timed Code Minutes- OT 10 minute(s)  -      OT Received On 09/17/23  -         Timed Charges    84015 - OT Self Care/Mgmt Minutes 10  -         Untimed Charges    OT Eval/Re-eval Minutes 8  -JW         Total Minutes    Timed Charges Total Minutes 10  -      Untimed Charges Total Minutes 8  -JW        Total Minutes 18  -JW                User Key  (r) = Recorded By, (t) = Taken By, (c) = Cosigned By      Initials Name Provider Type    Stephanie Mcknight OT Occupational Therapist                  Therapy Charges for Today       Code Description Service Date Service Provider Modifiers Qty    06862076453  OT SELF CARE/MGMT/TRAIN EA 15 MIN 9/17/2023 Stephanie Brantley OT GO 1    [id number]  OT EVAL LOW COMPLEXITY 3 9/17/2023 Stephanie Brantley OT GO 1                 Stephanie Brantley OT  9/17/2023

## 2023-09-18 ENCOUNTER — READMISSION MANAGEMENT (OUTPATIENT)
Dept: CALL CENTER | Facility: HOSPITAL | Age: 78
End: 2023-09-18
Payer: COMMERCIAL

## 2023-09-18 VITALS
WEIGHT: 217.15 LBS | BODY MASS INDEX: 30.4 KG/M2 | SYSTOLIC BLOOD PRESSURE: 134 MMHG | TEMPERATURE: 98.2 F | HEIGHT: 71 IN | HEART RATE: 83 BPM | RESPIRATION RATE: 18 BRPM | DIASTOLIC BLOOD PRESSURE: 76 MMHG | OXYGEN SATURATION: 99 %

## 2023-09-18 LAB
ALBUMIN SERPL-MCNC: 3.4 G/DL (ref 3.5–5.2)
ALBUMIN/GLOB SERPL: 1.2 G/DL
ALP SERPL-CCNC: 89 U/L (ref 39–117)
ALT SERPL W P-5'-P-CCNC: 25 U/L (ref 1–41)
ANION GAP SERPL CALCULATED.3IONS-SCNC: 11.2 MMOL/L (ref 5–15)
ANION GAP SERPL CALCULATED.3IONS-SCNC: 11.2 MMOL/L (ref 5–15)
ANION GAP SERPL CALCULATED.3IONS-SCNC: 9.5 MMOL/L (ref 5–15)
AST SERPL-CCNC: 24 U/L (ref 1–40)
BILIRUB SERPL-MCNC: 0.7 MG/DL (ref 0–1.2)
BUN SERPL-MCNC: 10 MG/DL (ref 8–23)
BUN SERPL-MCNC: 9 MG/DL (ref 8–23)
BUN SERPL-MCNC: 9 MG/DL (ref 8–23)
BUN/CREAT SERPL: 13.8 (ref 7–25)
BUN/CREAT SERPL: 13.8 (ref 7–25)
BUN/CREAT SERPL: 16.4 (ref 7–25)
CALCIUM SPEC-SCNC: 8.9 MG/DL (ref 8.6–10.5)
CALCIUM SPEC-SCNC: 9.1 MG/DL (ref 8.6–10.5)
CALCIUM SPEC-SCNC: 9.1 MG/DL (ref 8.6–10.5)
CHLORIDE SERPL-SCNC: 91 MMOL/L (ref 98–107)
CHLORIDE SERPL-SCNC: 93 MMOL/L (ref 98–107)
CHLORIDE SERPL-SCNC: 93 MMOL/L (ref 98–107)
CO2 SERPL-SCNC: 27.5 MMOL/L (ref 22–29)
CO2 SERPL-SCNC: 29.8 MMOL/L (ref 22–29)
CO2 SERPL-SCNC: 29.8 MMOL/L (ref 22–29)
CREAT SERPL-MCNC: 0.61 MG/DL (ref 0.76–1.27)
CREAT SERPL-MCNC: 0.65 MG/DL (ref 0.76–1.27)
CREAT SERPL-MCNC: 0.65 MG/DL (ref 0.76–1.27)
DEPRECATED RDW RBC AUTO: 40.3 FL (ref 37–54)
EGFRCR SERPLBLD CKD-EPI 2021: 97.7 ML/MIN/1.73
EGFRCR SERPLBLD CKD-EPI 2021: 97.7 ML/MIN/1.73
EGFRCR SERPLBLD CKD-EPI 2021: 99.5 ML/MIN/1.73
ERYTHROCYTE [DISTWIDTH] IN BLOOD BY AUTOMATED COUNT: 12.3 % (ref 12.3–15.4)
GLOBULIN UR ELPH-MCNC: 2.9 GM/DL
GLUCOSE SERPL-MCNC: 103 MG/DL (ref 65–99)
GLUCOSE SERPL-MCNC: 103 MG/DL (ref 65–99)
GLUCOSE SERPL-MCNC: 119 MG/DL (ref 65–99)
HCT VFR BLD AUTO: 32.5 % (ref 37.5–51)
HGB BLD-MCNC: 11.4 G/DL (ref 13–17.7)
MAGNESIUM SERPL-MCNC: 2 MG/DL (ref 1.6–2.4)
MCH RBC QN AUTO: 32.1 PG (ref 26.6–33)
MCHC RBC AUTO-ENTMCNC: 35.1 G/DL (ref 31.5–35.7)
MCV RBC AUTO: 91.5 FL (ref 79–97)
PHOSPHATE SERPL-MCNC: 3.1 MG/DL (ref 2.5–4.5)
PLATELET # BLD AUTO: 279 10*3/MM3 (ref 140–450)
PMV BLD AUTO: 9.5 FL (ref 6–12)
POTASSIUM SERPL-SCNC: 4 MMOL/L (ref 3.5–5.2)
POTASSIUM SERPL-SCNC: 4 MMOL/L (ref 3.5–5.2)
POTASSIUM SERPL-SCNC: 4.2 MMOL/L (ref 3.5–5.2)
PROT SERPL-MCNC: 6.3 G/DL (ref 6–8.5)
RBC # BLD AUTO: 3.55 10*6/MM3 (ref 4.14–5.8)
SODIUM SERPL-SCNC: 128 MMOL/L (ref 136–145)
SODIUM SERPL-SCNC: 134 MMOL/L (ref 136–145)
SODIUM SERPL-SCNC: 134 MMOL/L (ref 136–145)
URATE SERPL-MCNC: 5.1 MG/DL (ref 3.4–7)
WBC NRBC COR # BLD: 6.71 10*3/MM3 (ref 3.4–10.8)

## 2023-09-18 PROCEDURE — 85027 COMPLETE CBC AUTOMATED: CPT | Performed by: NURSE PRACTITIONER

## 2023-09-18 PROCEDURE — 84100 ASSAY OF PHOSPHORUS: CPT | Performed by: INTERNAL MEDICINE

## 2023-09-18 PROCEDURE — 80053 COMPREHEN METABOLIC PANEL: CPT | Performed by: INTERNAL MEDICINE

## 2023-09-18 PROCEDURE — 84550 ASSAY OF BLOOD/URIC ACID: CPT | Performed by: INTERNAL MEDICINE

## 2023-09-18 PROCEDURE — 83735 ASSAY OF MAGNESIUM: CPT | Performed by: INTERNAL MEDICINE

## 2023-09-18 RX ORDER — LANOLIN ALCOHOL/MO/W.PET/CERES
100 CREAM (GRAM) TOPICAL DAILY
Qty: 10 TABLET | Refills: 0 | Status: SHIPPED | OUTPATIENT
Start: 2023-09-19 | End: 2023-09-29

## 2023-09-18 RX ADMIN — FOLIC ACID 1 MG: 1 TABLET ORAL at 10:21

## 2023-09-18 RX ADMIN — APIXABAN 5 MG: 5 TABLET, FILM COATED ORAL at 05:42

## 2023-09-18 RX ADMIN — Medication 100 MG: at 10:21

## 2023-09-18 RX ADMIN — Medication 10 ML: at 10:23

## 2023-09-18 NOTE — NURSING NOTE
Patient seen by [facility] d/t ETOH. The patient declined evaluation. The patient is set for discharge. The patient's plan is to go to [facility] and is going to be transported by his son. The  has been in contact with [facility] and the patient can return. The patient denied any other outpatient needs. The patient was given information on [facility].

## 2023-09-18 NOTE — PLAN OF CARE
Patient was alert and orientedx4, calm, cooperative, and pleasant. Vital signs were stable, with 2LPM of oxygen as his O2 saturation was 89-91% on room air. No shortness of breath reported at rest. No reports of pain. IV fluids was on going. CIWA score of 0, GCS 15. Patient was refusing his non skid socks and bed alarm, reinforced their importance, he verbalized understanding.    Goal Outcome Evaluation:  Plan of Care Reviewed With: patient        Progress: improving

## 2023-09-18 NOTE — DISCHARGE SUMMARY
PHYSICIAN DISCHARGE SUMMARY                                                                        [facility]    Patient Identification:  Name: Gamal Modi Sr.  Age: 78 y.o.  Sex: male  :  1945  MRN: [id number]  Primary Care Physician: Alysha Cosme MD    Admit date: 9/15/2023  Discharge date and time: 2023     Discharged Condition: good    Discharge Diagnoses:  Hyponatremia    Essential hypertension    Paroxysmal atrial fibrillation    Diastolic dysfunction    Alcohol abuse  Chronic hypoxic respiratory failure.  Pulmonary nodule: Outpatient follow-up.    Hospital Course:  Pleasant 78-year-old gentleman with a longstanding history of alcohol abuse and actually voluntarily signed in for alcohol rehab facility.  He was noted to be unsteady and confused.  He was sent to this facility for further evaluation where he is noted to have a sodium level of only 114.  Drug screen was negative including alcohol.  Due to the severity of the hyponatremia he was initially admitted to the ICU.  Hyponatremia is corrected very easily and pretty much on its own.  In fact D5W was periodically given to prevent an overly rapid correction.  He has done very well and this morning deciding levels 134.  He has remained fully alert and oriented and feeling well the last couple days that we have seen him after his ICU transfer.  He is cleared for discharge at this point.  His plans are to go back to the alcohol rehabilitation center.  During work-up he was incidentally noted to have a pulmonary nodule.  He should follow-up with pulmonology concerning this as an outpatient.    Consults:     Consults       Date and Time Order Name Status Description    2023  7:43 AM Inpatient Internal Medicine Consult Completed     9/15/2023  3:19 PM Nephrology (on -call MD unless specified) Completed     9/15/2023  3:18 PM Pulmonology (on-call MD unless  specified)                Discharge Exam:  Afebrile vital signs stable.  Well-developed well-nourished male in no apparent distress.  Lungs clear to auscultation good air movement.  Heart regular rate and rhythm.  Extremities no clubbing cyanosis or edema..  Alert oriented converse cooperative and pleasant.     Disposition:  Home    Patient Instructions:      Discharge Medications        New Medications        Instructions Start Date   thiamine 100 MG tablet  Commonly known as: VITAMIN B1   100 mg, Oral, Daily   Start Date: September 19, 2023            Continue These Medications        Instructions Start Date   acetaminophen 500 MG tablet  Commonly known as: TYLENOL   1,000 mg, Oral, Daily      ascorbic acid 500 MG tablet  Commonly known as: VITAMIN C   1,000 mg, Oral, Daily      atorvastatin 20 MG tablet  Commonly known as: LIPITOR   TAKE ONE TABLET BY MOUTH DAILY      calcium carbonate 500 MG chewable tablet  Commonly known as: TUMS   3 tablets, Oral, Daily PRN      cetirizine 10 MG tablet  Commonly known as: zyrTEC   10 mg, Oral, Daily      cholecalciferol 25 MCG (1000 UT) tablet  Commonly known as: VITAMIN D3   1,000 Units, Oral, Daily      dilTIAZem  MG 24 hr capsule  Commonly known as: CARDIZEM CD   TAKE ONE CAPSULE BY MOUTH DAILY      Eliquis 5 MG tablet tablet  Generic drug: apixaban   TAKE ONE TABLET BY MOUTH EVERY 12 HOURS      ferrous sulfate 325 (65 FE) MG tablet   325 mg, Oral, Daily With Breakfast      Lubricant Eye Drops 0.5 % solution  Generic drug: carboxymethylcellulose   1 drop, Both Eyes, Daily      multivitamin with minerals tablet tablet   1 tablet, Oral, Daily      multivitamin with minerals tablet tablet   1 tablet, Oral, Daily      Prevagen 10 MG capsule  Generic drug: Apoaequorin   1 capsule, Oral, Daily      tadalafil 5 MG tablet  Commonly known as: CIALIS   TAKE ONE TABLET BY MOUTH DAILY AS NEEDED FOR ERECTILE DYSFUNCTION             Stop These Medications      furosemide 40 MG  tablet  Commonly known as: LASIX            Diet Instructions       Diet: Cardiac Diets; Healthy Heart (2-3 Na+); Regular Texture (IDDSI 7); Thin (IDDSI 0)      Discharge Diet: Cardiac Diets    Cardiac Diet: Healthy Heart (2-3 Na+)    Texture: Regular Texture (IDDSI 7)    Fluid Consistency: Thin (IDDSI 0)          Future Appointments   Date Time Provider Department Center   10/11/2023  8:15 AM [facility]   10/16/2023 10:00 AM Alysha Cosme MD [facility]   3/29/2024  9:15 AM Ochoa Murrell MD [facility]     Additional Instructions for the Follow-ups that You Need to Schedule       Discharge Follow-up with PCP   As directed       Currently Documented PCP:    Alysha Cosme MD    PCP Phone Number:    [phone]     Follow Up Details: 1 week        Discharge Follow-up with Specialty: Nephrology   As directed      Specialty: Nephrology   Follow Up Details: As directed.               Follow-up Information       Alysha Cosme MD .    Specialty: Internal Medicine  Why: 1 week  Contact information:  [id number] [place] [address]  [phone]                           Discharge Order (From admission, onward)       Start     Ordered    09/18/23 1114  Discharge patient  Once        Expected Discharge Date: 09/18/23   Discharge Disposition: Home or Self Care   Physician of Record for Attribution - Please select from Treatment Team: SEBASTIÁN VARGAS [[id number]]   Review needed by CMO to determine Physician of Record: No      Question Answer Comment   Physician of Record for Attribution - Please select from Treatment Team SEBASTIÁN VARGAS.    Review needed by CMO to determine Physician of Record No        09/18/23 1117                      Total time spent discharging patient including evaluation,post hospitalization follow up,  medication and post hospitalization instructions and education total time exceeds 30 minutes.    Signed:  Sebastián Vargas  MD  9/18/2023  11:22 EDT    EMR Dragon/Transcription disclaimer:   Much of this encounter note is an electronic transcription/translation of spoken language to printed text. The electronic translation of spoken language may permit erroneous, or at times, nonsensical words or phrases to be inadvertently transcribed; Although I have reviewed the note for such errors, some may still exist.

## 2023-09-18 NOTE — PROGRESS NOTES
[facility] Progress Note      Patient Name: Gamal Modi Sr.  : 1945  MRN: [id number]  Primary Care Physician:  Alysha Cosme MD  Date of admission: 9/15/2023    Subjective     Interval History:   Follow-up hyponatremia  The patient has no new issues today.  Denies any nausea or vomiting.  No fever no chills.  No chest pain or shortness of breath    Review of Systems:   As noted above    Objective     Vitals:   Temp:  [97.3 °F (36.3 °C)-98.2 °F (36.8 °C)] 98.2 °F (36.8 °C)  Heart Rate:  [83-87] 83  Resp:  [18] 18  BP: (115-134)/(74-76) 134/76  Flow (L/min):  [2] 2    Intake/Output Summary (Last 24 hours) at 2023 1041  Last data filed at 2023 0041  Gross per 24 hour   Intake 720 ml   Output 1730 ml   Net -1010 ml         Physical Exam:    General Appearance: alert, oriented x 3, no acute distress   Skin: warm and dry  HEENT: oral mucosa normal, nonicteric sclera  Neck: supple, no JVD  Lungs: CTA, unlabored breathing effort  Heart: Irregularly irregular, no rub  Abdomen: soft, nontender, nondistended, normoactive bowel  : no palpable bladder  Extremities: no edema, cyanosis or clubbing  Neuro: normal speech and mental status     Scheduled Meds:     apixaban, 5 mg, Oral, Q12H  folic acid, 1 mg, Oral, Daily  senna-docusate sodium, 2 tablet, Oral, BID  sodium chloride, 10 mL, Intravenous, Q12H  thiamine, 100 mg, Oral, Daily      IV Meds:   dextrose, 50 mL/hr, Last Rate: 50 mL/hr (23 2311)        Results Reviewed:   I have personally reviewed the results from the time of this admission to 2023 10:41 EDT     Results from last 7 days   Lab Units 23  0545 23  0004 23  1748 23  0828 23  0604 09/15/23  1933 09/15/23  1438   SODIUM mmol/L 134*  134* 128* 129*   < > 124*   < > 114*   POTASSIUM mmol/L 4.0  4.0 4.2 4.1   < > 3.2*   < > 3.1*   CHLORIDE mmol/L 93*  93* 91* 90*   < > 84*   < > 72*   CO2 mmol/L 29.8*  29.8* 27.5  27.0   < > 30.0*   < > 30.0*   BUN mg/dL 9  9 10 10   < > 11   < > 13   CREATININE mg/dL 0.65*  0.65* 0.61* 0.82   < > 0.69*   < > 0.90   CALCIUM mg/dL 9.1  9.1 8.9 9.0   < > 8.9   < > 8.9   BILIRUBIN mg/dL 0.7  --   --   --  0.7  --  1.1   ALK PHOS U/L 89  --   --   --  97  --  110   ALT (SGPT) U/L 25  --   --   --  36  --  41   AST (SGOT) U/L 24  --   --   --  45*  --  58*   GLUCOSE mg/dL 103*  103* 119* 171*   < > 108*   < > 117*    < > = values in this interval not displayed.         Estimated Creatinine Clearance: 115.7 mL/min (A) (by C-G formula based on SCr of 0.65 mg/dL (L)).    Results from last 7 days   Lab Units 09/18/23  0545 09/17/23  0805 09/17/23  0051 09/16/23  0418   MAGNESIUM mg/dL 2.0 3.7* 1.2* 2.1   PHOSPHORUS mg/dL 3.1  --  3.1 3.0         Results from last 7 days   Lab Units 09/18/23  0545 09/17/23  0051 09/16/23  0418 09/15/23  1438   URIC ACID mg/dL 5.1 6.1 7.2* 7.2*         Results from last 7 days   Lab Units 09/18/23  0545 09/16/23  0604 09/15/23  1438   WBC 10*3/mm3 6.71 4.62 6.99   HEMOGLOBIN g/dL 11.4* 11.6* 12.6*   PLATELETS 10*3/mm3 279 226 234               Assessment / Plan     ASSESSMENT:  Severe symptomatic hyponatremia with gait difficulties and weakness improved significantly since admission sodium increased from 114 with rapid correction necessitating  on D5W.  Most recent sodium was 134  Correction for sodium neurology member chronic A-fib with controlled rate  Hypokalemia associated with poor oral intake, being replaced by protocol.  Now resolved  History of hypertension, blood pressures well controlled  Alcohol abuse patient admitted himself to alcohol rehab facility  and he was subsequently sent to the hospital for admission because of his hyponatremia and he is planning to go back to alcohol rehab after discharge        PLAN:  Sodium continues to improve.  We will stop D5 water  Continue fluid restriction  Okay to discharge from nephrology standpoint we will arrange  for follow-up in 1 to 2 weeks    I reviewed the chart and other providers notes, reviewed imaging and lab data.  I discussed the case with the patient  Copied text in this note has been reviewed and is accurate as of 09/18/23.       Thank you for involving us in the care of Gamal Modi Sr..  Please feel free to call with any questions.    Crow Lopez MD  09/18/23  10:41 EDT    [facility]  [phone]    Please note that portions of this note were completed with a voice recognition program.

## 2023-09-18 NOTE — CASE MANAGEMENT/SOCIAL WORK
Discharge Planning Assessment  [facility]     Patient Name: Gamal Modi Sr.  MRN: [id number]  Today's Date: 9/18/2023    Admit Date: 9/15/2023    Plan: Plans to return to [facility]   Discharge Needs Assessment       Row Name 09/18/23 0926       Living Environment    People in Home spouse    Unique Family Situation from ETOH treatment center and plans to return    Current Living Arrangements home    Potentially Unsafe Housing Conditions none    Primary Care Provided by self    Provides Primary Care For no one    Family Caregiver if Needed spouse    Quality of Family Relationships helpful;involved    Able to Return to Prior Arrangements yes       Resource/Environmental Concerns    Resource/Environmental Concerns none       Transition Planning    Patient/Family Anticipates Transition to inpatient rehabilitation facility    Patient/Family Anticipated Services at Transition rehabilitation services    Transportation Anticipated family or friend will provide       Discharge Needs Assessment    Readmission Within the Last 30 Days no previous admission in last 30 days    Current Outpatient/Agency/Support Group other (see comments)  inpatient ETOH treatment    Equipment Currently Used at Home oxygen    Concerns to be Addressed discharge planning;substance/tobacco abuse/use    Equipment Needed After Discharge none    Outpatient/Agency/Support Group Needs inpatient rehabilitation facility    Discharge Facility/Level of Care Needs rehabilitation facility    Provided Post Acute Provider List? N/A    Discharge Coordination/Progress Plans to return back to inpatient tx center                   Discharge Plan       Row Name 09/18/23 0940       Plan    Plan Plans to return to [facility]    Patient/Family in Agreement with Plan yes    Plan Comments Spoke with pt bedside. Confirmed facesheet correct. Explained role of CCP. Pt reports he lives with his wife. He is IADLs no DME used  to ambulate. He has home O2 from [place]. Pt reports he has no HH or SNF history. His PCP is Dr. Cosme and he uses [facility] with no issues. pt reports Friday 9/15 he was admitted to [facility] in [facility] but was transferred to [facility] upon admission. Pt reports his plan is to return back to inpatient recovery and his son plans to transport him. Spoke with Dania [phone], nurse at [facility] to make sure pt can return at d/c. She is going to find out and give CCP a call. Access has also been consulted to assist as needed.                  Continued Care and Services - Admitted Since 9/15/2023    Coordination has not been started for this encounter.       Expected Discharge Date and Time       Expected Discharge Date Expected Discharge Time    Sep 18, 2023            Demographic Summary    No documentation.                  Functional Status    No documentation.                  Psychosocial    No documentation.                  Abuse/Neglect    No documentation.                  Legal    No documentation.                  Substance Abuse    No documentation.                  Patient Forms    No documentation.                     SHELBY Duarte

## 2023-09-18 NOTE — CASE MANAGEMENT/SOCIAL WORK
Continued Stay Note  [facility]     Patient Name: Gamal Modi Sr.  MRN: [id number]  Today's Date: 9/18/2023    Admit Date: 9/15/2023    Plan: Return to [facility]; family to transport   Discharge Plan       Row Name 09/18/23 1111       Plan    Plan Return to [facility]; family to transport    Patient/Family in Agreement with Plan yes    Plan Comments Spoke with Faraz at [facility] who reports pt can return, nurse will need to call report. Requested PT/OT notes be faxed to [phone] along with d/c summary. Spoke with wife who is in agreement with plan. Son will transport. packet placed on chart. Messaged RN.      Row Name 09/18/23 0922       Plan    Plan Plans to return to [facility]    Patient/Family in Agreement with Plan yes    Plan Comments Spoke with pt bedside. Confirmed facesheet correct. Explained role of CCP. Pt reports he lives with his wife. He is IADLs no DME used to ambulate. He has home O2 from [place]. Pt reports he has no HH or SNF history. His PCP is Dr. Cosme and he uses [facility] with no issues. pt reports Friday 9/15 he was admitted to [facility] in [facility] but was transferred to [facility] upon admission. Pt reports his plan is to return back to inpatient recovery and his son plans to transport him. Spoke with Dania [phone], nurse at [facility] to make sure pt can return at d/c. She is going to find out and give CCP a call. Access has also been consulted to assist as needed.                   Discharge Codes    No documentation.                 Expected Discharge Date and Time       Expected Discharge Date Expected Discharge Time    Sep 19, 2023               SHELBY Duarte

## 2023-09-18 NOTE — PAYOR COMM NOTE
"Gamal Munoz JOHN Sr. (78 y.o. Male)     PLEASE SEE ATTACHED FOR INPT AUTH     REF #   [id number]    PLEASE CALL WILFRED LY RN/ DEPT @ [phone]  OR FAX  DEPARTMENT @  [phone]    THANK YOU   WILFRED LY RN  [facility]          Date of Birth   1945    Social Security Number       Address   [address] [place] [address]    Home Phone   [phone]    MRN   [id number]       Latter-day   Alevism    Marital Status                               Admission Date   9/15/23    Admission Type   Emergency    Admitting Provider   Genna Schneider MD    Attending Provider       Department, Room/Bed   [address], S512/1       Discharge Date   9/18/2023    Discharge Disposition   Home or Self Care    Discharge Destination                                 Attending Provider: (none)   Allergies: No Known Allergies    Isolation: None   Infection: None   Code Status: CPR    Ht: 180.3 cm (71\")   Wt: 98.5 kg (217 lb 2.5 oz)    Admission Cmt: None   Principal Problem: Hyponatremia [E87.1]                   Active Insurance as of 9/15/2023       Primary Coverage       Payor Plan Insurance Group Employer/Plan Group    ANTHEM BLUE CROSS ANTHEM BLUE CROSS BLUE SHIELD PPO [id number]       Payor Plan Address Payor Plan Phone Number Payor Plan Fax Number Effective Dates    [address] [phone]  1/1/2020 - None Entered    [facility] [id number]         Subscriber Name Subscriber Birth Date Member ID       JAMEE MUNOZ 9/21/1961 [id number]                     Emergency Contacts        (Rel.) Home Phone Work Phone Mobile Phone    Jamee Munoz (Spouse) [phone] -- --              [place]: NPI [id number]  Tax ID [id number]     History & Physical        Genna Schneider MD at 09/15/23 1638                        Patient Care Team:  Alysha Cosme MD as PCP - General (Internal Medicine)    Chief complaint:  Dizziness and " weakness    History of present illness:  Subjective    This is a 78-year-old old male patient, alcoholic.  He has a history of hyponatremia.  He was hospitalized and July 2022 for severe hyponatremia with sodium of 117 improved to 125 with therapy.    Unfortunately, he continues to drink.  He drinks a 1/5 th of whiskey a day and has been drinking for a while.  He denies prior history of DT or withdrawal.    He said that he stopped drinking Monday.  He denies anxiety or tremors since then.  He stated that he does not take any medications to of that.  However he said that he has poor appetite since then.  He continues to drink water as usual about 6 glasses a day per his reports.  He denies nausea, vomiting or diarrhea.  He stated that he tripped on his dog yesterday and went down but did not hit his head or injure himself.    He reported dizziness which started yesterday and got worse today.  His son picked him up to [facility] today and he was noted to be significantly dizzy and wobbly and therefore he was sent to the ED.    Vitals in the ED were stable but sodium level was 114.  Patient was noted to be a little slow to respond but otherwise did not appear to have significant neurological symptoms or deficits.    Review of Systems:  Constitutional: No fever or chills.   ENMT: No sinus congestion  Cardiovascular: No chest pain, palpitation or legs swelling.    Respiratory: No dyspnea, cough or wheezing.  Gastrointestinal: No constipation, diarrhea or abdominal pain   Neurology: No headache but dizziness and generalized weakness.  Musculoskeletal: No joints pain, stiffness or swelling.   Psychiatry: No depression.  Genitourinary: No dysuria or frequent urination  Endo: No weight changes. No cold or warm intolerance.  Lymphatic: No swollen glands.  Integumentary: No rash.    History  Past Medical History:   Diagnosis Date    AF (atrial fibrillation)     Cataract 2019    Elevated cholesterol     Erectile dysfunction 2018     "Gout     Hypertension     Long term exposure to asbestos     3 years    Sebaceous cyst      Past Surgical History:   Procedure Laterality Date    COLON SURGERY      COLONOSCOPY  approx 2015    negative per pt     COLONOSCOPY N/A 5/12/2022    Procedure: COLONOSCOPY INTO CECUM/ TERMINAL ILEUM WITH POLYPECTOMY;  Surgeon: Aidan Branham MD;  Location:  [facility];  Service: Gastroenterology;  Laterality: N/A;  pre: anemia  post: hemorrhoids, normal TI, polyp, diverticulosis    ENDOSCOPY N/A 5/12/2022    Procedure: ESOPHAGOGASTRODUODENOSCOPY WITH BIOPSIES;  Surgeon: Aidan Branham MD;  Location: [facility];  Service: Gastroenterology;  Laterality: N/A;  pre: anemia  post: hiatal hernia, bnormal ampulla    OTHER SURGICAL HISTORY      BOWEL BLOCKAGE     Family History   Problem Relation Age of Onset    Diabetes Other     Cancer Mother     Diabetes Father     Cancer Sister      Social History     Tobacco Use    Smoking status: Never    Smokeless tobacco: Never   Vaping Use    Vaping Use: Never used   Substance Use Topics    Alcohol use: Yes     Comment: pt reports he drinks \"about a quart of whiskey a day\" and his last drink was approx 2 days ago    Drug use: Never     E-cigarette/Vaping    E-cigarette/Vaping Use Never User     Passive Exposure No     Counseling Given No      Medications Prior to Admission   Medication Sig Dispense Refill Last Dose    atorvastatin (LIPITOR) 20 MG tablet TAKE ONE TABLET BY MOUTH DAILY 90 tablet 3     cholecalciferol (VITAMIN D3) 1000 units tablet Take 1 tablet by mouth Daily.       dilTIAZem CD (CARDIZEM CD) 120 MG 24 hr capsule TAKE ONE CAPSULE BY MOUTH DAILY 90 capsule 3     Eliquis 5 MG tablet tablet TAKE ONE TABLET BY MOUTH EVERY 12 HOURS 180 tablet 3     ferrous sulfate 325 (65 FE) MG tablet Take 1 tablet by mouth Daily With Breakfast.       furosemide (LASIX) 40 MG tablet TAKE ONE TABLET BY MOUTH DAILY 90 tablet 3     Lumigan 0.01 % ophthalmic drops Administer 1 drop " into the left eye Every Night.       Multiple Vitamins-Minerals (OCUVITE EXTRA PO) Take  by mouth.       tadalafil (CIALIS) 5 MG tablet TAKE ONE TABLET BY MOUTH DAILY AS NEEDED FOR ERECTILE DYSFUNCTION 90 tablet 1      Allergies:  Patient has no known allergies.    Objective  Vital Signs  Temp:  [98 °F (36.7 °C)] 98 °F (36.7 °C)  Heart Rate:  [63-89] 63  Resp:  [16] 16  BP: (118-130)/(59-86) 118/71    PPE used per hospital policy    Physical Exam:  Constitutional: Not in acute distress.  Eyes: Injected conjunctivae, EOMI. Pupils equal and reactive to light.   ENMT: López 3. No oral thrush.  Dry tongue.  Neck: No thyromegaly.  Trachea midline  Heart: RRR, no murmur.  No pitting edema  Lungs: Good and equal air entry bilaterally.  Nonlabored breathing  Abdomen: Obese. Soft. No tenderness or dullness.  Positive bowel sounds  Extremities: No cyanosis, clubbing. Moves all extremities.  Warm extremities and well-perfused  Neuro: Conscious, alert, oriented x3.  Strength 5/5 overall  Psych: Appropriate mood and affect.    Integumentary: No rash or ecchymosis  Lymphatic: No palpable cervical or supraclavicular lymph nodes.            Diagnostic imaging:  I personally and independently reviewed the following images:   CT head 9/15/2023: Clear      Laboratory workup:  Results from last 7 days   Lab Units 09/15/23  1438   SODIUM mmol/L 114*   POTASSIUM mmol/L 3.1*   CHLORIDE mmol/L 72*   CO2 mmol/L 30.0*   BUN mg/dL 13   CREATININE mg/dL 0.90   GLUCOSE mg/dL 117*   CALCIUM mg/dL 8.9         Results from last 7 days   Lab Units 09/15/23  1438   WBC 10*3/mm3 6.99   HEMOGLOBIN g/dL 12.6*   HEMATOCRIT % 34.1*   PLATELETS 10*3/mm3 234               Assessment    Severe hyponatremia, acute on chronic.  Clinically significant.  Probably secondary to alcoholism and decreased intake  Hypokalemia  Elevated bicarb.  Alcoholism  Mildly elevated AST, likely secondary to alcoholism  Pulmonary nodules, thought to be subpleural atelectasis.   Follows with Dr. Friedman and was last seen February 2023 with plan for repeat imaging in 1 year    HTN, on Lasix  A-fib, on Eliquis  Chronic anemia    Plan:  Admit to ICU.  Neuro check/monitoring due to severe hyponatremia.  Watch for seizure.  Check sodium every 6 hours but defer further adjustment to nephrology.  Check urine sodium and osmolality.  Consult nephrology.  Replace potassium. Hold Lasix  Thiamine and folate supplement.  Watch for alcohol withdrawal.  Will initiate Ativan protocol if needed.  Counseled against alcoholism.  Restart Eliquis  Repeat CMP in a.m.  Check VBG due to elevated bicarb    Genna Schneider MD  09/15/23  16:38 EDT    Time: Critical care 37 min      This note was dictated utilizing Dragon dictation     Electronically signed by Genna Schneider MD at 09/15/23 1808          Emergency Department Notes        Breanna Martinez, RN at 09/15/23 1608          Nursing report ED to floor  Gamal Modi Sr.  78 y.o.  male    HPI :   Chief Complaint   Patient presents with    Weakness - Generalized       Admitting doctor:   Genna Schneider MD    Admitting diagnosis:   The primary encounter diagnosis was Hyponatremia. A diagnosis of Alcoholism was also pertinent to this visit.    Code status:   Current Code Status       Date Active Code Status Order ID Comments User Context       Prior            Allergies:   Patient has no known allergies.    Isolation:   No active isolations    Intake and Output  No intake or output data in the 24 hours ending 09/15/23 1608    Weight:   There were no vitals filed for this visit.    Most recent vitals:   Vitals:    09/15/23 1311 09/15/23 1431 09/15/23 1501 09/15/23 1531   BP: 130/70 124/62 122/59 124/86   Pulse: 68 75 64 89   Resp: 16 16 16    Temp: 98 °F (36.7 °C)      SpO2: 96% 97% 99%        Active LDAs/IV Access:   Lines, Drains & Airways       Active LDAs       Name Placement date Placement time Site Days    Peripheral IV 07/29/22 1107 Right Antecubital  07/29/22  1107  Antecubital  413    Peripheral IV 09/15/23 1432 Right Antecubital 09/15/23  1432  Antecubital  less than 1                    Labs (abnormal labs have a star):   Labs Reviewed   COMPREHENSIVE METABOLIC PANEL - Abnormal; Notable for the following components:       Result Value    Glucose 117 (*)     Sodium 114 (*)     Potassium 3.1 (*)     Chloride 72 (*)     CO2 30.0 (*)     AST (SGOT) 58 (*)     All other components within normal limits    Narrative:     GFR Normal >60  Chronic Kidney Disease <60  Kidney Failure <15    The GFR formula is only valid for adults with stable renal function between ages 18 and 70.   CBC WITH AUTO DIFFERENTIAL - Abnormal; Notable for the following components:    RBC 3.89 (*)     Hemoglobin 12.6 (*)     Hematocrit 34.1 (*)     MCHC 37.0 (*)     All other components within normal limits   MANUAL DIFFERENTIAL - Abnormal; Notable for the following components:    Lymphocyte % 9.2 (*)     Monocyte % 21.4 (*)     Lymphocytes Absolute 0.64 (*)     Monocytes Absolute 1.50 (*)     All other components within normal limits   URINE DRUG SCREEN - Normal    Narrative:     Negative Thresholds Per Drugs Screened:    Amphetamines                 500 ng/ml  Barbiturates                 200 ng/ml  Benzodiazepines              100 ng/ml  Cocaine                      300 ng/ml  Methadone                    300 ng/ml  Opiates                      300 ng/ml  Oxycodone                    100 ng/ml  THC                           50 ng/ml  Fentanyl                       5 ng/ml      The Normal Value for all drugs tested is negative. This report includes final unconfirmed screening results to be used for medical treatment purposes only. Unconfirmed results must not be used for non-medical purposes such as employment or legal testing. Clinical consideration should be applied to any drug of abuse test, particularly when unconfirmed results are used.           MAGNESIUM - Normal   TSH - Normal  "  URINALYSIS W/ MICROSCOPIC IF INDICATED (NO CULTURE) - Normal    Narrative:     Urine microscopic not indicated.   ETHANOL   SODIUM, URINE, RANDOM    Narrative:     Reference intervals for random urine have not been established.  Clinical usage is dependent upon physician's interpretation in combination with other laboratory tests.      OSMOLALITY, URINE   URIC ACID   CBC AND DIFFERENTIAL    Narrative:     The following orders were created for panel order CBC & Differential.  Procedure                               Abnormality         Status                     ---------                               -----------         ------                     CBC Auto Differential[[id number]]        Abnormal            Final result                 Please view results for these tests on the individual orders.       EKG:   No orders to display       Meds given in ED:   Medications   sodium chloride 0.9 % flush 10 mL (has no administration in time range)       Imaging results:  CT Head Without Contrast    Result Date: 9/15/2023  There is no evidence of fracture, intracranial hemorrhage or a focal area of decreased attenuation to suggest acute infarction. Further evaluation could be performed with a MRI examination of the brain as indicated.    Radiation dose reduction techniques were utilized, including automated exposure control and exposure modulation based on body size.        Ambulatory status:   Up w/ assistance     Social issues:   Social History     Socioeconomic History    Marital status:    Tobacco Use    Smoking status: Never    Smokeless tobacco: Never   Vaping Use    Vaping Use: Never used   Substance and Sexual Activity    Alcohol use: Yes     Comment: pt reports he drinks \"about a quart of whiskey a day\" and his last drink was approx 2 days ago    Drug use: Never    Sexual activity: Yes     Partners: Female       NIH Stroke Scale:       Breanna Martinez RN  09/15/23 16:08 EDT         Electronically signed " by Breanna Martinez RN at 09/15/23 1608       Ja Bell MD at 09/15/23 144           EMERGENCY DEPARTMENT ENCOUNTER    Room Number:  18/18  PCP: Alysha Cosme MD  Patient Care Team:  Alysha Cosme MD as PCP - General (Internal Medicine)   Independent Historians: Patient    HPI:  Chief Complaint: Generalized weakness    A complete HPI/ROS/PMH/PSH/SH/FH are unobtainable due to: Nothing    Chronic or social conditions impacting patient care (Social Determinants of Health): None  (Financial Resource Strain / Food Insecurity / Transportation Needs / Physical Activity / Stress / Social Connections / Intimate Partner Violence / Housing Stability)    Context: Gamal Modi Sr. is a 78 y.o. male who presents to the ED c/o acute generalized weakness.  The patient reports that he checked himself into alcohol rehab today.  He states that his last drink was on Monday.  He reports he binge drinks.  He usually drinks for 2 weeks in a row before stopping.  He states that when he arrived at the rehab facility he told them that he has been having generalized weakness as well as unsteadiness with ambulation for the last week.  The patient was sent here for further evaluation.  The patient reports that he has had similar symptoms previously and was found to have hyponatremia.  He reports he had a mechanical fall 2 days ago but denies any injury from that.  He is not on blood thinners.  He denies any suicidal or homicidal ideation.    Review of prior external notes (non-ED) -and- Review of prior external test results outside of this encounter: Laboratory evaluation 3/22/2023 showed a sodium of 140.    Prescription drug monitoring program review:         PAST MEDICAL HISTORY  Active Ambulatory Problems     Diagnosis Date Noted    Gout 04/11/2016    Essential hypertension 04/11/2016    Annual physical exam 05/23/2016    Hypercholesterolemia 09/06/2017    Anemia 10/12/2020    Pulmonary asbestosis 10/12/2020     "Paroxysmal atrial fibrillation 09/20/2021    Diastolic dysfunction 09/21/2021    Anemia 09/21/2021    Fatty liver 09/21/2021    Abnormal thyroid function test 09/22/2021    Dizziness 07/29/2022    Hyponatremia 07/30/2022    Coronary artery calcification seen on CAT scan 03/28/2023     Resolved Ambulatory Problems     Diagnosis Date Noted    Accelerated hypertension 05/10/2018    Dyspnea 09/03/2019     Past Medical History:   Diagnosis Date    AF (atrial fibrillation)     Cataract 2019    Elevated cholesterol     Erectile dysfunction 2018    Hypertension     Long term exposure to asbestos     Sebaceous cyst          PAST SURGICAL HISTORY  Past Surgical History:   Procedure Laterality Date    COLON SURGERY      COLONOSCOPY  approx 2015    negative per pt     COLONOSCOPY N/A 5/12/2022    Procedure: COLONOSCOPY INTO CECUM/ TERMINAL ILEUM WITH POLYPECTOMY;  Surgeon: Aidan Branham MD;  Location: [facility] END[facility];  Service: Gastroenterology;  Laterality: N/A;  pre: anemia  post: hemorrhoids, normal TI, polyp, diverticulosis    ENDOSCOPY N/A 5/12/2022    Procedure: ESOPHAGOGASTRODUODENOSCOPY WITH BIOPSIES;  Surgeon: Aidan Branham MD;  Location: [facility] END[facility]COPY;  Service: Gastroenterology;  Laterality: N/A;  pre: anemia  post: hiatal hernia, bnormal ampulla    OTHER SURGICAL HISTORY      BOWEL BLOCKAGE         FAMILY HISTORY  Family History   Problem Relation Age of Onset    Diabetes Other     Cancer Mother     Diabetes Father     Cancer Sister          SOCIAL HISTORY  Social History     Socioeconomic History    Marital status:    Tobacco Use    Smoking status: Never    Smokeless tobacco: Never   Vaping Use    Vaping Use: Never used   Substance and Sexual Activity    Alcohol use: Yes     Comment: pt reports he drinks \"about a quart of whiskey a day\" and his last drink was approx 2 days ago    Drug use: Never    Sexual activity: Yes     Partners: Female         ALLERGIES  Patient has no known " allergies.        REVIEW OF SYSTEMS  Review of Systems  Included in HPI  All systems reviewed and negative except for those discussed in HPI.      PHYSICAL EXAM    I have reviewed the triage vital signs and nursing notes.    ED Triage Vitals [09/15/23 1311]   Temp Heart Rate Resp BP SpO2   98 °F (36.7 °C) 68 16 130/70 96 %      Temp src Heart Rate Source Patient Position BP Location FiO2 (%)   -- -- -- -- --       Physical Exam  GENERAL: Awake, alert, no acute distress.  Mild slowness of thought.  Seems slightly confused.  SKIN: Warm, dry  HENT: Normocephalic, atraumatic  EYES: no scleral icterus  CV: regular rhythm, regular rate  RESPIRATORY: normal effort, lungs clear  ABDOMEN: soft, nontender, nondistended  MUSCULOSKELETAL: no deformity  NEURO: alert, moves all extremities, follows commands, cranial nerves II through XII intact.  Equal upper and lower extremity strength and sensation.  Normal finger-nose bilaterally.  Normal heel shin bilaterally.                                                               LAB RESULTS  Recent Results (from the past 24 hour(s))   Urine Drug Screen - Urine, Clean Catch    Collection Time: 09/15/23  2:31 PM    Specimen: Urine, Clean Catch   Result Value Ref Range    Amphet/Methamphet, Screen Negative Negative    Barbiturates Screen, Urine Negative Negative    Benzodiazepine Screen, Urine Negative Negative    Cocaine Screen, Urine Negative Negative    Opiate Screen Negative Negative    THC, Screen, Urine Negative Negative    Methadone Screen, Urine Negative Negative    Oxycodone Screen, Urine Negative Negative    Fentanyl, Urine Negative Negative   Sodium, Urine, Random - Urine, Clean Catch    Collection Time: 09/15/23  2:31 PM    Specimen: Urine, Clean Catch   Result Value Ref Range    Sodium, Urine <20 mmol/L   Urinalysis With Microscopic If Indicated (No Culture) - Urine, Clean Catch    Collection Time: 09/15/23  2:31 PM    Specimen: Urine, Clean Catch   Result Value Ref Range     Color, UA Yellow Yellow, Straw    Appearance, UA Clear Clear    pH, UA 6.0 5.0 - 8.0    Specific Gravity, UA 1.009 1.005 - 1.030    Glucose, UA Negative Negative    Ketones, UA Negative Negative    Bilirubin, UA Negative Negative    Blood, UA Negative Negative    Protein, UA Negative Negative    Leuk Esterase, UA Negative Negative    Nitrite, UA Negative Negative    Urobilinogen, UA 1.0 E.U./dL 0.2 - 1.0 E.U./dL   Comprehensive Metabolic Panel    Collection Time: 09/15/23  2:38 PM    Specimen: Arm, Right; Blood   Result Value Ref Range    Glucose 117 (H) 65 - 99 mg/dL    BUN 13 8 - 23 mg/dL    Creatinine 0.90 0.76 - 1.27 mg/dL    Sodium 114 (C) 136 - 145 mmol/L    Potassium 3.1 (L) 3.5 - 5.2 mmol/L    Chloride 72 (L) 98 - 107 mmol/L    CO2 30.0 (H) 22.0 - 29.0 mmol/L    Calcium 8.9 8.6 - 10.5 mg/dL    Total Protein 6.3 6.0 - 8.5 g/dL    Albumin 4.1 3.5 - 5.2 g/dL    ALT (SGPT) 41 1 - 41 U/L    AST (SGOT) 58 (H) 1 - 40 U/L    Alkaline Phosphatase 110 39 - 117 U/L    Total Bilirubin 1.1 0.0 - 1.2 mg/dL    Globulin 2.2 gm/dL    A/G Ratio 1.9 g/dL    BUN/Creatinine Ratio 14.4 7.0 - 25.0    Anion Gap 12.0 5.0 - 15.0 mmol/L    eGFR 88.5 >60.0 mL/min/1.73   Ethanol    Collection Time: 09/15/23  2:38 PM    Specimen: Arm, Right; Blood   Result Value Ref Range    Ethanol <10 0 - 10 mg/dL    Ethanol % <0.010 %   Magnesium    Collection Time: 09/15/23  2:38 PM    Specimen: Arm, Right; Blood   Result Value Ref Range    Magnesium 2.2 1.6 - 2.4 mg/dL   CBC Auto Differential    Collection Time: 09/15/23  2:38 PM    Specimen: Arm, Right; Blood   Result Value Ref Range    WBC 6.99 3.40 - 10.80 10*3/mm3    RBC 3.89 (L) 4.14 - 5.80 10*6/mm3    Hemoglobin 12.6 (L) 13.0 - 17.7 g/dL    Hematocrit 34.1 (L) 37.5 - 51.0 %    MCV 87.7 79.0 - 97.0 fL    MCH 32.4 26.6 - 33.0 pg    MCHC 37.0 (H) 31.5 - 35.7 g/dL    RDW 12.4 12.3 - 15.4 %    RDW-SD 39.3 37.0 - 54.0 fl    MPV 9.5 6.0 - 12.0 fL    Platelets 234 140 - 450 10*3/mm3    nRBC 0.0 0.0  - 0.2 /100 WBC   Manual Differential    Collection Time: 09/15/23  2:38 PM    Specimen: Arm, Right; Blood   Result Value Ref Range    Neutrophil % 68.4 42.7 - 76.0 %    Lymphocyte % 9.2 (L) 19.6 - 45.3 %    Monocyte % 21.4 (H) 5.0 - 12.0 %    Basophil % 1.0 0.0 - 1.5 %    Neutrophils Absolute 4.78 1.70 - 7.00 10*3/mm3    Lymphocytes Absolute 0.64 (L) 0.70 - 3.10 10*3/mm3    Monocytes Absolute 1.50 (H) 0.10 - 0.90 10*3/mm3    Basophils Absolute 0.07 0.00 - 0.20 10*3/mm3    Poikilocytes Mod/2+ None Seen    Smudge Cells Slight/1+ None Seen    Platelet Morphology Normal Normal   TSH    Collection Time: 09/15/23  2:38 PM    Specimen: Arm, Right; Blood   Result Value Ref Range    TSH 2.550 0.270 - 4.200 uIU/mL       Ordered the above labs and independently reviewed the results.        RADIOLOGY  CT Head Without Contrast    Result Date: 9/15/2023  CT HEAD WITHOUT CONTRAST  HISTORY: Fall 4 days ago, off balance.  COMPARISON: MRI brain 08/04/2023.  FINDINGS: The brain and ventricles are symmetrical. There is age-appropriate atrophy. There is no evidence of hemorrhage, hydrocephalus or of a focal area of decreased attenuation to suggest acute infarction. Mild vascular calcification is noted.      There is no evidence of fracture, intracranial hemorrhage or a focal area of decreased attenuation to suggest acute infarction. Further evaluation could be performed with a MRI examination of the brain as indicated.    Radiation dose reduction techniques were utilized, including automated exposure control and exposure modulation based on body size.        I ordered the above noted radiological studies. Reviewed by me and discussed with radiologist.  See dictation for official radiology interpretation.      PROCEDURES    Procedures      MEDICATIONS GIVEN IN ER    Medications   sodium chloride 0.9 % flush 10 mL (has no administration in time range)         ORDERS PLACED DURING THIS VISIT:  Orders Placed This Encounter   Procedures     CT Head Without Contrast    Comprehensive Metabolic Panel    Urine Drug Screen - Urine, Clean Catch    Ethanol    Magnesium    CBC Auto Differential    Manual Differential    Osmolality, Urine - Urine, Clean Catch    Sodium, Urine, Random - Urine, Clean Catch    TSH    Urinalysis With Microscopic If Indicated (No Culture) - Urine, Clean Catch    Uric Acid    Monitor Blood Pressure    Orthostatic Vitals    Pulse Oximetry, Continuous    Pulmonology (on-call MD unless specified)    Nephrology (on -call MD unless specified)    Insert Peripheral IV    Inpatient Admission    CBC & Differential         PROGRESS, DATA ANALYSIS, CONSULTS, AND MEDICAL DECISION MAKING    All labs have been independently interpreted by me.  All radiology studies have been reviewed by me and discussed with radiologist dictating the report.   EKG's independently viewed and interpreted by me.  Discussion below represents my analysis of pertinent findings related to patient's condition, differential diagnosis, treatment plan and final disposition.    Differential diagnosis includes but is not limited to hyponatremia, dehydration, stroke, TIA, intracranial hemorrhage.    ED Course as of 09/15/23 1603   Fri Sep 15, 2023   1517 Sodium(!!): 114 [TR]   1518 CT Head Without Contrast  My independent interpretation of the CT of the head is no acute hemorrhage [TR]   1532 Discussing with Dr. Issa with nephrology.  He agrees to consult. [TR]   1601 Discussing with Dr Schneider with pulmonary ICU. Agrees to admit. [TR]   1602 Reviewed the work-up and findings with the patient at the bedside.  Answered all questions.  Plan admission.  He is agreeable. [TR]      ED Course User Index  [TR] Ja Bell MD             AS OF 16:03 EDT VITALS:    BP - 122/59  HR - 64  TEMP - 98 °F (36.7 °C)  O2 SATS - 99%    Critical care provider statement:     Critical care time (minutes): 30-74.    Critical care time was exclusive of:  Separately billable procedures and  treating other patients    Critical care was necessary to treat or prevent imminent or life-threatening deterioration of the following conditions: Metabolic failure    Critical care was time spent personally by me on the following activities:  Development of treatment plan with patient or surrogate, discussions with consultants, evaluation of patient's response to treatment, examination of patient, obtaining history from patient or surrogate, ordering and performing treatments and interventions, ordering and review of laboratory studies, ordering and review of radiographic studies, pulse oximetry, re-evaluation of patient's condition and review of old charts    DIAGNOSIS  Final diagnoses:   Hyponatremia   Alcoholism         DISPOSITION  ED Disposition       ED Disposition   Decision to Admit    Condition   --    Comment   Level of Care: Telemetry [5]   Diagnosis: Hyponatremia [270440]   Admitting Physician: KARLA CANELA [[id number]]   Attending Physician: KARLA CANELA [[id number]]   Certification: I Certify That Inpatient Hospital Services Are Medically Necessary For Greater Than 2 Midnights                    Note Disclaimer: At [facility], we believe that sharing information builds trust and better relationships. You are receiving this note because you recently visited [facility]. It is possible you will see health information before a provider has talked with you about it. This kind of information can be easy to misunderstand. To help you fully understand what it means for your health, we urge you to discuss this note with your provider.         Ja Bell MD  09/15/23 1603      Electronically signed by Ja Bell MD at 09/15/23 1609       Christy Haywood RN at 09/15/23 1308          [facility]; call was for generalized weakness/unsteadiness in both legs. Patient had also had mechanical fall two days ago prior to going to facility today. Denies any injury from the  fall. Patient is on blood thinners. Patient is a binge drinker and will drink a quart of whiskey per day. Last drink was four days ago.     Electronically signed by Christy Haywood RN at 09/15/23 1316          Physician Progress Notes (last 72 hours)        Crow Lopez MD at 23 1041              [facility] Progress Note      Patient Name: Gamal Modi Sr.  : 1945  MRN: [id number]  Primary Care Physician:  Alysha Cosme MD  Date of admission: 9/15/2023    Subjective     Interval History:   Follow-up hyponatremia  The patient has no new issues today.  Denies any nausea or vomiting.  No fever no chills.  No chest pain or shortness of breath    Review of Systems:   As noted above    Objective     Vitals:   Temp:  [97.3 °F (36.3 °C)-98.2 °F (36.8 °C)] 98.2 °F (36.8 °C)  Heart Rate:  [83-87] 83  Resp:  [18] 18  BP: (115-134)/(74-76) 134/76  Flow (L/min):  [2] 2    Intake/Output Summary (Last 24 hours) at 2023 1041  Last data filed at 2023 0041  Gross per 24 hour   Intake 720 ml   Output 1730 ml   Net -1010 ml         Physical Exam:    General Appearance: alert, oriented x 3, no acute distress   Skin: warm and dry  HEENT: oral mucosa normal, nonicteric sclera  Neck: supple, no JVD  Lungs: CTA, unlabored breathing effort  Heart: Irregularly irregular, no rub  Abdomen: soft, nontender, nondistended, normoactive bowel  : no palpable bladder  Extremities: no edema, cyanosis or clubbing  Neuro: normal speech and mental status     Scheduled Meds:     apixaban, 5 mg, Oral, Q12H  folic acid, 1 mg, Oral, Daily  senna-docusate sodium, 2 tablet, Oral, BID  sodium chloride, 10 mL, Intravenous, Q12H  thiamine, 100 mg, Oral, Daily      IV Meds:   dextrose, 50 mL/hr, Last Rate: 50 mL/hr (23 0111)        Results Reviewed:   I have personally reviewed the results from the time of this admission to 2023 10:41 EDT     Results from last 7 days   Lab Units  09/18/23  0545 09/18/23  0004 09/17/23  1748 09/16/23  0828 09/16/23  0604 09/15/23  1933 09/15/23  1438   SODIUM mmol/L 134*  134* 128* 129*   < > 124*   < > 114*   POTASSIUM mmol/L 4.0  4.0 4.2 4.1   < > 3.2*   < > 3.1*   CHLORIDE mmol/L 93*  93* 91* 90*   < > 84*   < > 72*   CO2 mmol/L 29.8*  29.8* 27.5 27.0   < > 30.0*   < > 30.0*   BUN mg/dL 9  9 10 10   < > 11   < > 13   CREATININE mg/dL 0.65*  0.65* 0.61* 0.82   < > 0.69*   < > 0.90   CALCIUM mg/dL 9.1  9.1 8.9 9.0   < > 8.9   < > 8.9   BILIRUBIN mg/dL 0.7  --   --   --  0.7  --  1.1   ALK PHOS U/L 89  --   --   --  97  --  110   ALT (SGPT) U/L 25  --   --   --  36  --  41   AST (SGOT) U/L 24  --   --   --  45*  --  58*   GLUCOSE mg/dL 103*  103* 119* 171*   < > 108*   < > 117*    < > = values in this interval not displayed.         Estimated Creatinine Clearance: 115.7 mL/min (A) (by C-G formula based on SCr of 0.65 mg/dL (L)).    Results from last 7 days   Lab Units 09/18/23  0545 09/17/23  0805 09/17/23 0051 09/16/23  0418   MAGNESIUM mg/dL 2.0 3.7* 1.2* 2.1   PHOSPHORUS mg/dL 3.1  --  3.1 3.0         Results from last 7 days   Lab Units 09/18/23  0545 09/17/23  0051 09/16/23 0418 09/15/23  1438   URIC ACID mg/dL 5.1 6.1 7.2* 7.2*         Results from last 7 days   Lab Units 09/18/23  0545 09/16/23  0604 09/15/23  1438   WBC 10*3/mm3 6.71 4.62 6.99   HEMOGLOBIN g/dL 11.4* 11.6* 12.6*   PLATELETS 10*3/mm3 279 226 234               Assessment / Plan     ASSESSMENT:  Severe symptomatic hyponatremia with gait difficulties and weakness improved significantly since admission sodium increased from 114 with rapid correction necessitating  on D5W.  Most recent sodium was 134  Correction for sodium neurology member chronic A-fib with controlled rate  Hypokalemia associated with poor oral intake, being replaced by protocol.  Now resolved  History of hypertension, blood pressures well controlled  Alcohol abuse patient admitted himself to alcohol rehab  facility  and he was subsequently sent to the hospital for admission because of his hyponatremia and he is planning to go back to alcohol rehab after discharge        PLAN:  Sodium continues to improve.  We will stop D5 water  Continue fluid restriction  Okay to discharge from nephrology standpoint we will arrange for follow-up in 1 to 2 weeks    I reviewed the chart and other providers notes, reviewed imaging and lab data.  I discussed the case with the patient  Copied text in this note has been reviewed and is accurate as of 23.       Thank you for involving us in the care of Gamal Modi Sr..  Please feel free to call with any questions.    Crow Lopez MD  23  10:41 EDT    Nephrology [facility]  [phone]    Please note that portions of this note were completed with a voice recognition program.    Electronically signed by Crow Lopez MD at 23 1044       Gilberto Garber MD at 23 [phone]          Spoke with hospitalist service and they have graciously accepted to assume care of this patient. Pulmonary will sign off at this time. Please call if any questions or concerns arise.     Gilberto Garber MD  [facility], [facility]  Pulmonary and Critical Care Medicine, Interventional Pulmonology      Electronically signed by Gilberto Garber MD at 23 3134       Chago Issa MD at 23 [phone]              Nephrology [facility] Progress Note      Patient Name: Gamal Modi Sr.  : 1945  MRN: [id number]  Primary Care Physician:  Alysha Cosme MD  Date of admission: 9/15/2023    Subjective     Interval History:   Follow-up hyponatremia    The patient is feeling much better, he has very large urine output, currently on D5W and his sodium stabilized at 129, has been stable for the past 24 hours.  He denies any chest pain or shortness of air, no orthopnea or PND, no nausea or vomiting.  No dysuria or gross  hematuria, no lightheadedness.    Review of Systems:   As noted above    Objective     Vitals:   Temp:  [97.8 °F (36.6 °C)-98.4 °F (36.9 °C)] 97.8 °F (36.6 °C)  Heart Rate:  [] 91  BP: ()/(53-96) 109/78  Flow (L/min):  [2] 2    Intake/Output Summary (Last 24 hours) at 9/17/2023 0859  Last data filed at 9/17/2023 0800  Gross per 24 hour   Intake 3425 ml   Output 5225 ml   Net -1800 ml         Physical Exam:    General Appearance: alert, oriented x 3, no acute distress   Skin: warm and dry  HEENT: oral mucosa normal, nonicteric sclera  Neck: supple, no JVD  Lungs: CTA, unlabored breathing effort  Heart: Irregularly irregular, no rub  Abdomen: soft, nontender, nondistended, normoactive bowel  : no palpable bladder  Extremities: no edema, cyanosis or clubbing  Neuro: normal speech and mental status     Scheduled Meds:     apixaban, 5 mg, Oral, Q12H  folic acid, 1 mg, Oral, Daily  senna-docusate sodium, 2 tablet, Oral, BID  sodium chloride, 10 mL, Intravenous, Q12H  thiamine, 100 mg, Oral, Daily      IV Meds:   dextrose, 50 mL/hr, Last Rate: 150 mL/hr (09/17/23 0741)        Results Reviewed:   I have personally reviewed the results from the time of this admission to 9/17/2023 08:59 EDT     Results from last 7 days   Lab Units 09/17/23  0805 09/17/23  0051 09/16/23  1824 09/16/23  0828 09/16/23  0604 09/15/23  1933 09/15/23  1438   SODIUM mmol/L 129* 129*  129* 128*   < > 124*   < > 114*   POTASSIUM mmol/L 3.2* 3.7  3.7 3.7   < > 3.2*   < > 3.1*   CHLORIDE mmol/L 91* 90*  90* 88*   < > 84*   < > 72*   CO2 mmol/L 27.0 30.0*  30.0* 29.9*   < > 30.0*   < > 30.0*   BUN mg/dL 6* 9  9 9   < > 11   < > 13   CREATININE mg/dL 0.60* 0.72*  0.72* 0.65*   < > 0.69*   < > 0.90   CALCIUM mg/dL 8.6 9.0  9.0 9.0   < > 8.9   < > 8.9   BILIRUBIN mg/dL  --   --   --   --  0.7  --  1.1   ALK PHOS U/L  --   --   --   --  97  --  110   ALT (SGPT) U/L  --   --   --   --  36  --  41   AST (SGOT) U/L  --   --   --   --  45*   --  58*   GLUCOSE mg/dL 124* 138*  138* 127*   < > 108*   < > 117*    < > = values in this interval not displayed.         Estimated Creatinine Clearance: 127.4 mL/min (A) (by C-G formula based on SCr of 0.6 mg/dL (L)).    Results from last 7 days   Lab Units 09/17/23  0805 09/17/23  0051 09/16/23  0418   MAGNESIUM mg/dL 3.7* 1.2* 2.1   PHOSPHORUS mg/dL  --  3.1 3.0         Results from last 7 days   Lab Units 09/17/23  0051 09/16/23  0418 09/15/23  1438   URIC ACID mg/dL 6.1 7.2* 7.2*         Results from last 7 days   Lab Units 09/16/23  0604 09/15/23  1438   WBC 10*3/mm3 4.62 6.99   HEMOGLOBIN g/dL 11.6* 12.6*   PLATELETS 10*3/mm3 226 234               Assessment / Plan     ASSESSMENT:  Severe symptomatic hyponatremia with gait difficulties and weakness improved significantly since admission sodium increased from 1 14-1 24 in less than 24 hours which is a bit faster than is desired.  Currently on D5W sodium 129 potassium 3.2 his sodium has been very stable for the past 24 hours hence will decrease IV fluid because he is much less risk of developing osmotic demyelination   Chronic A-fib with controlled rate  Hypokalemia associated with poor oral intake, being replaced by protocol  History of hypertension, blood pressures well controlled  Alcohol abuse patient admitted himself to alcohol rehab facility yesterday and he was subsequently sent to the hospital for admission because of his hyponatremia and he is planning to go back to alcohol rehab after discharge        PLAN:  Decrease D5W to 50 cc/h  Since his sodium is 129 the risk of osmotic demyelination is very minimal now.  Surveillance labs    I reviewed the chart and other providers notes, reviewed imaging and lab data.  I discussed the case with the patient  Copied text in this note has been reviewed and is accurate as of 09/17/23.       Thank you for involving us in the care of Gamal Modi Sr..  Please feel free to call with any questions.    Chago  Fidencio Issa MD  09/17/23  08:59 EDT    [facility]  [phone]    Please note that portions of this note were completed with a voice recognition program.    Electronically signed by Chago Issa MD at 09/17/23 0901       Gilberto Garber MD at 09/17/23 0739            Intensive Care Unit Daily Progress Note.   [facility] CARDIAC INTENSIVE CARE  9/17/2023    Patient Name:  Gamal Modi Sr.  MRN:  [id number]  YOB: 1945  Age: 78 y.o.  Sex: male         Reason for Admission / Chief Complaint:  Dizziness and weakness    Hospital Course:   78-year-old male with alcohol abuse, hypertension, hyperlipidemia, atrial fibrillation who presented for weakness and found to have hyponatremia with sodium 114.  Suspected to be in the setting of heavy alcohol abuse, poor p.o. intake, excess water consumption.  Slowly improving sodium with the assistance of nephrology.    Interval History:  No acute events overnight  Sodium slowly improving, D5 drip increased  Complaining of back pain overnight, slept in chair, improved with pain regimen  Denies any chest pain, palpitations  Denies any shortness of breath or cough    Physical Exam:  /57   Pulse 87   Temp 97.8 °F (36.6 °C) (Oral)   Resp 16   Wt 102 kg (225 lb 12 oz)   SpO2 100%   BMI 31.50 kg/m²   Body mass index is 31.5 kg/m².    Intake/Output    Intake/Output Summary (Last 24 hours) at 9/17/2023 0739  Last data filed at 9/17/2023 0550  Gross per 24 hour   Intake 3185 ml   Output 4725 ml   Net -1540 ml     General: Alert, nontoxic, no acute distress  HEENT: NC/AT, EOMI, MMM  Neck: Supple, trachea midline  Cardiac: RRR, no murmur, gallops, rubs  Pulmonary: Clear to auscultation bilaterally, no adventitious breath sounds, normal respiratory effort  GI: Soft, non-tender, non-distended, normal bowel sounds  Extremities: Warm, well perfused, no LE edema  Skin: no visible rash  Neuro: CN II - XII grossly  intact  Psychiatry: Normal mood and affect    Data Review:  Notable Labs:  Results from last 7 days   Lab Units 09/16/23  0604 09/15/23  1438   WBC 10*3/mm3 4.62 6.99   HEMOGLOBIN g/dL 11.6* 12.6*   PLATELETS 10*3/mm3 226 234     Results from last 7 days   Lab Units 09/17/23  0051 09/16/23  1824 09/16/23  0828 09/16/23  0604 09/16/23  0418 09/16/23  0016 09/15/23  1933 09/15/23  1438   SODIUM mmol/L 129*  129* 128* 125* 124*  --  119* 117* 114*   POTASSIUM mmol/L 3.7  3.7 3.7 3.2* 3.2*  --  2.8* 2.5* 3.1*   CHLORIDE mmol/L 90*  90* 88* 85* 84*  --  79* 74* 72*   CO2 mmol/L 30.0*  30.0* 29.9* 29.4* 30.0*  --  31.0* 31.3* 30.0*   BUN mg/dL 9  9 9 10 11  --  13 11 13   CREATININE mg/dL 0.72*  0.72* 0.65* 0.59* 0.69*  --  0.75* 0.74* 0.90   GLUCOSE mg/dL 138*  138* 127* 115* 108*  --  110* 120* 117*   CALCIUM mg/dL 9.0  9.0 9.0 8.6 8.9  --  8.5* 8.7 8.9   MAGNESIUM mg/dL 1.2*  --   --   --  2.1  --   --  2.2   PHOSPHORUS mg/dL 3.1  --   --   --  3.0  --   --   --    Estimated Creatinine Clearance: 106.2 mL/min (A) (by C-G formula based on SCr of 0.72 mg/dL (L)).    Results from last 7 days   Lab Units 09/16/23  0604 09/15/23  1438   AST (SGOT) U/L 45* 58*   ALT (SGPT) U/L 36 41   PLATELETS 10*3/mm3 226 234           Imaging:  CT head (9/15/2023): No acute process noted in CT head    ASSESSMENT  /  PLAN:    Severe hyponatremia  114 on presentation, normal previously  Hypokalemia  Metabolic alkalosis  Alcohol abuse  Transaminitis  Pulmonary nodules   Follows with Dr. Friedman, plan for repeat imaging in 1 year (February 2024)  Hypertension  Atrial fibrillation on Eliquis  Chronic anemia    - Hyponatremia slowly improving, 129 as of this morning.  Continuing D5 drip  - Electrolyte derangements improving also with repletion  - Appreciate nephrology assistance in hyponatremia  - Monitoring for alcohol withdrawal, alcohol level was negative admission, no evidence of withdrawal at this time  - Continue thiamine and folate  supplement  - Continue Eliquis    GI prophylaxis: Not indicated  DVT prophylaxis: On therapeutic anticoagulation  Mcknight catheter: None  Bowel regimen: Ordered    Discharge: Transfer to floor.  Medicine consulted to assume care.    Gilberto Garber MD  [facility]  Pulmonary and Critical Care Medicine, Interventional Pulmonology    Parts of this note may be an electronic transcription/translation of spoken language to printed text using the Dragon dictation system.         Electronically signed by Gilberto Garber MD at 09/17/23 0745       Gilberto Garber MD at 09/16/23 1403            Intensive Care Unit Daily Progress Note.   [facility] CARDIAC INTENSIVE CARE  9/16/2023    Patient Name:  Gamal Modi Sr.  MRN:  [id number]  YOB: 1945  Age: 78 y.o.  Sex: male         Reason for Admission / Chief Complaint:  Dizziness and weakness    Hospital Course:   78-year-old male with alcohol abuse, hypertension, hyperlipidemia, atrial fibrillation who presented for weakness and found to have hyponatremia with sodium 114.  Suspected to be in the setting of heavy alcohol abuse, poor p.o. intake, excess water consumption.  Slowly improving sodium with the assistance of nephrology.    Interval History:  Admitted overnight for hyponatremia.  Sodium 114 on arrival, 124 this morning.  Feels weak  Denies any shortness of breath  Denies any chest pain  No nausea or vomiting    Physical Exam:  BP 98/69   Pulse 100   Temp 97.7 °F (36.5 °C)   Resp 16   Wt 102 kg (225 lb 12 oz)   SpO2 98%   BMI 31.50 kg/m²   Body mass index is 31.5 kg/m².    Intake/Output    Intake/Output Summary (Last 24 hours) at 9/16/2023 1403  Last data filed at 9/16/2023 1158  Gross per 24 hour   Intake 2715 ml   Output 4350 ml   Net -1635 ml     General: Alert, nontoxic, no acute distress  HEENT: NC/AT, EOMI, MMM  Neck: Supple, trachea midline  Cardiac: RRR, no murmur, gallops, rubs  Pulmonary: Clear to  auscultation bilaterally, no adventitious breath sounds, normal respiratory effort  GI: Soft, non-tender, non-distended, normal bowel sounds  Extremities: Warm, well perfused, no LE edema  Skin: no visible rash  Neuro: CN II - XII grossly intact  Psychiatry: Normal mood and affect    Data Review:  Notable Labs:  Results from last 7 days   Lab Units 09/16/23  0604 09/15/23  1438   WBC 10*3/mm3 4.62 6.99   HEMOGLOBIN g/dL 11.6* 12.6*   PLATELETS 10*3/mm3 226 234     Results from last 7 days   Lab Units 09/16/23  0828 09/16/23  0604 09/16/23  0418 09/16/23  0016 09/15/23  1933 09/15/23  1438   SODIUM mmol/L 125* 124*  --  119* 117* 114*   POTASSIUM mmol/L 3.2* 3.2*  --  2.8* 2.5* 3.1*   CHLORIDE mmol/L 85* 84*  --  79* 74* 72*   CO2 mmol/L 29.4* 30.0*  --  31.0* 31.3* 30.0*   BUN mg/dL 10 11  --  13 11 13   CREATININE mg/dL 0.59* 0.69*  --  0.75* 0.74* 0.90   GLUCOSE mg/dL 115* 108*  --  110* 120* 117*   CALCIUM mg/dL 8.6 8.9  --  8.5* 8.7 8.9   MAGNESIUM mg/dL  --   --  2.1  --   --  2.2   PHOSPHORUS mg/dL  --   --  3.0  --   --   --    Estimated Creatinine Clearance: 129.6 mL/min (A) (by C-G formula based on SCr of 0.59 mg/dL (L)).    Results from last 7 days   Lab Units 09/16/23  0604 09/15/23  1438   AST (SGOT) U/L 45* 58*   ALT (SGPT) U/L 36 41   PLATELETS 10*3/mm3 226 234           Imaging:  CT head (9/15/2023): No acute process noted in CT head    ASSESSMENT  /  PLAN:    Severe hyponatremia  114 on presentation, normal previously  Hypokalemia  Metabolic alkalosis  Alcohol abuse  Transaminitis  Pulmonary nodules   Follows with Dr. Friedman, plan for repeat imaging in 1 year (February 2024)  Hypertension  Atrial fibrillation on Eliquis  Chronic anemia    - Hyponatremia was slowly improving on normal saline however more rapid than would be liked, switched to D5.  Need to monitor closely with frequent BMPs to ensure appropriate correction rate.  High risk for seizures/complications from rapid correction.  - Appreciate  nephrology assistance in hyponatremia  - Hypokalemia improving, replete as needed  - Monitoring for alcohol withdrawal, alcohol level was negative admission  - Continue thiamine and folate supplement  - Continue Eliquis    GI prophylaxis: Not indicated  DVT prophylaxis: On therapeutic anticoagulation  Mcknight catheter: None  Bowel regimen: Ordered    All issues new to me today.  Prior hospital course, labs and imaging reviewed.    Discharge: Continue to monitor in the ICU due to critical status    Critical Care Time: 36 minutes    Gilberto Garber MD  [facility]  Pulmonary and Critical Care Medicine, Interventional Pulmonology    Parts of this note may be an electronic transcription/translation of spoken language to printed text using the Dragon dictation system.         Electronically signed by Gilberto Garber MD at 23 1415       Chago Issa MD at 23 0803              [facility] Progress Note      Patient Name: Gamal Modi Sr.  : 1945  MRN: [id number]  Primary Care Physician:  Alysha Cosme MD  Date of admission: 9/15/2023    Subjective     Interval History:   Follow-up hyponatremia    The patient is feeling much better today, denies any chest pain or shortness of air, no orthopnea or PND, no nausea or vomiting, no abdominal pain, no dysuria or gross hematuria.  Sodium has increased from 114 on admission up to 124 that is a bit faster than desired so the IV fluid was discontinued and now he is on D5W.    Review of Systems:   As noted above    Objective     Vitals:   Temp:  [97.5 °F (36.4 °C)-98.2 °F (36.8 °C)] 97.7 °F (36.5 °C)  Heart Rate:  [58-90] 82  Resp:  [16-18] 16  BP: ()/(50-86) 122/57  Flow (L/min):  [2] 2    Intake/Output Summary (Last 24 hours) at 2023 0804  Last data filed at 2023 0600  Gross per 24 hour   Intake 2235 ml   Output 3300 ml   Net -1065 ml       Physical Exam:    General Appearance: alert,  oriented x 3, no acute distress   Skin: warm and dry  HEENT: oral mucosa normal, nonicteric sclera  Neck: supple, no JVD  Lungs: CTA, unlabored breathing effort  Heart: Irregularly irregular, no rub  Abdomen: soft, nontender, nondistended, normoactive bowel  : no palpable bladder  Extremities: no edema, cyanosis or clubbing  Neuro: normal speech and mental status     Scheduled Meds:     apixaban, 5 mg, Oral, Q12H  folic acid, 1 mg, Oral, Daily  senna-docusate sodium, 2 tablet, Oral, BID  sodium chloride, 10 mL, Intravenous, Q12H  thiamine, 100 mg, Oral, Daily      IV Meds:   dextrose, 100 mL/hr, Last Rate: 100 mL/hr (09/16/23 0710)        Results Reviewed:   I have personally reviewed the results from the time of this admission to 9/16/2023 08:04 EDT     Results from last 7 days   Lab Units 09/16/23  0604 09/16/23  0016 09/15/23  1933 09/15/23  1438   SODIUM mmol/L 124* 119* 117* 114*   POTASSIUM mmol/L 3.2* 2.8* 2.5* 3.1*   CHLORIDE mmol/L 84* 79* 74* 72*   CO2 mmol/L 30.0* 31.0* 31.3* 30.0*   BUN mg/dL 11 13 11 13   CREATININE mg/dL 0.69* 0.75* 0.74* 0.90   CALCIUM mg/dL 8.9 8.5* 8.7 8.9   BILIRUBIN mg/dL 0.7  --   --  1.1   ALK PHOS U/L 97  --   --  110   ALT (SGPT) U/L 36  --   --  41   AST (SGOT) U/L 45*  --   --  58*   GLUCOSE mg/dL 108* 110* 120* 117*       Estimated Creatinine Clearance: 110.8 mL/min (A) (by C-G formula based on SCr of 0.69 mg/dL (L)).    Results from last 7 days   Lab Units 09/16/23  0418 09/15/23  1438   MAGNESIUM mg/dL 2.1 2.2   PHOSPHORUS mg/dL 3.0  --        Results from last 7 days   Lab Units 09/16/23 0418 09/15/23  1438   URIC ACID mg/dL 7.2* 7.2*       Results from last 7 days   Lab Units 09/16/23  0604 09/15/23  1438   WBC 10*3/mm3 4.62 6.99   HEMOGLOBIN g/dL 11.6* 12.6*   PLATELETS 10*3/mm3 226 234             Assessment / Plan     ASSESSMENT:  Severe symptomatic hyponatremia with gait difficulties and weakness improved significantly since admission sodium increased from 1 14-1  24 in less than 24 hours which is a bit faster than is desired.  Hence IV fluids were discontinued and currently the patient is on D5W running at 100 cc an hour we will continue to monitor the sodium very closely.  The etiology of the hyponatremia is multifactorial but definitely not SIADH given the urine sodium and other urine electrolytes and uric acid level.  Chronic A-fib with controlled rate  Hypokalemia associated with poor oral intake, being replaced by protocol  History of hypertension, blood pressures well controlled  Alcohol abuse patient admitted himself to alcohol rehab facility yesterday and he was subsequently sent to the hospital for admission because of his hyponatremia and he is planning to go back to alcohol rehab after discharge        PLAN:  Continue the same treatment including D5W at 100 cc/h  Continue to check sodium every 6 hours the goal is to not allow the sodium to rise by more than 6 to 8 mEq per 24 hours to avoid osmotic demyelination.  Surveillance labs    I reviewed the chart and other providers notes, reviewed imaging and lab data.    Thank you for involving us in the care of Gamal Modi SrWilson.  Please feel free to call with any questions.    Chago Issa MD  09/16/23  08:04 EDT    [facility]  [phone]    Please note that portions of this note were completed with a voice recognition program.    Electronically signed by Chago Issa MD at 09/16/23 0810          Consult Notes (last 72 hours)        Maddie Xiong APRN at 09/17/23 1217        Consult Orders    1. Inpatient Internal Medicine Consult [[id number]] ordered by Gilberto Garber MD              Attestation signed by Sebastián Vargas MD at 09/17/23 1292    I have reviewed this documentation and agree.    Patient evaluated and all test results reviewed.  Patient presently has no complaints.  Feeling well.  Afebrile vital signs stable.  Well-developed  well-nourished male in no apparent distress.  Lungs clear to auscultation good air movement.  Heart regular rate and rhythm.  Extremities with no clubbing cyanosis or edema.  He is alert oriented conversant cooperative and pleasant.  No evidence of tremors.    Agree with assessment and plans as outlined below.  Nephrology input greatly appreciated.                      Patient Name:  Gamal Modi Sr.  YOB: 1945  MRN:  [id number]  Date of Admission:  9/15/2023  Date of Consult:  9/17/2023  Patient Care Team:  Alysha Cosme MD as PCP - General (Internal Medicine)    Inpatient Internal Medicine Consult  Consult performed by: Maddie Xiong APRN  Consult ordered by: Gilberto Garber MD      Evaluate status and make recommendations regarding treatment for   Subjective   History of Present Illness  Mr. Modi is a 78 y.o. male with past medical history of alcohol abuse that has been admitted to [facility] due to severe hyponatremia.  He voluntarily presented to rehab center for help to stop drinking.  The staff there found him to be weak and walking unsteady and unable to get around safely without assistance.  He was sent to the hospital.  He has been admitted by the intensivist service due to severe hyponatremia with initial sodium level of 114.He has been evaluated by nephrology.  Sodium level improved today and it was determined he was stable to come out of critical care.  We were asked to see and assist with medical management out of critical care unit.  He reported a fall prior to arrival and denied injury or hitting his head.      He drinks about 1/5 of whiskey every day.  He denies any history of withdrawal.  He has plans to continue rehab at discharge.    He denies any symptoms of confusion, dizziness or lightheadedness.  He does feel very weak with any activity or ambulation and this appears to be chronic for many weeks.  Additionally, reported poor  appetite prior to arrival.  No known weight loss.    The patient is asking to be cleaned up for incontinent episode and denies further complaints.        Past Medical History:   Diagnosis Date    AF (atrial fibrillation)     Cataract 2019    Elevated cholesterol     Erectile dysfunction 2018    Gout     Hypertension     Long term exposure to asbestos     3 years    Sebaceous cyst      Past Surgical History:   Procedure Laterality Date    COLON SURGERY      COLONOSCOPY  approx 2015    negative per pt     COLONOSCOPY N/A 5/12/2022    Procedure: COLONOSCOPY INTO CECUM/ TERMINAL ILEUM WITH POLYPECTOMY;  Surgeon: Aidan Branham MD;  Location: [facility] ENDOSCOPY;  Service: Gastroenterology;  Laterality: N/A;  pre: anemia  post: hemorrhoids, normal TI, polyp, diverticulosis    ENDOSCOPY N/A 5/12/2022    Procedure: ESOPHAGOGASTRODUODENOSCOPY WITH BIOPSIES;  Surgeon: Aidan Branham MD;  Location: [facility] ENDOSCOPY;  Service: Gastroenterology;  Laterality: N/A;  pre: anemia  post: hiatal hernia, bnormal ampulla    OTHER SURGICAL HISTORY      BOWEL BLOCKAGE     Family History   Problem Relation Age of Onset    Diabetes Other     Cancer Mother     Diabetes Father     Cancer Sister      Social History     Tobacco Use    Smoking status: Never    Smokeless tobacco: Never   Vaping Use    Vaping Use: Never used   Substance Use Topics    Alcohol use: Yes     Comment: pt reports he drinks a fifth of whiskey a day, is a binge drinker, had not drank for 11 months then 2 weeks ago drank again, last drink 4 days ago    Drug use: Never     Medications Prior to Admission   Medication Sig Dispense Refill Last Dose    acetaminophen (TYLENOL) 500 MG tablet Take 2 tablets by mouth Daily.       Apoaequorin (Prevagen) 10 MG capsule Take 1 capsule by mouth Daily.       ascorbic acid (VITAMIN C) 500 MG tablet Take 2 tablets by mouth Daily.       atorvastatin (LIPITOR) 20 MG tablet TAKE ONE TABLET BY MOUTH DAILY 90 tablet 3     calcium carbonate  (TUMS) 500 MG chewable tablet Chew 3 tablets Daily As Needed for Indigestion or Heartburn.       carboxymethylcellulose (Lubricant Eye Drops) 0.5 % solution Administer 1 drop to both eyes Daily.       cetirizine (zyrTEC) 10 MG tablet Take 1 tablet by mouth Daily.       cholecalciferol (VITAMIN D3) 1000 units tablet Take 1 tablet by mouth Daily.       dilTIAZem CD (CARDIZEM CD) 120 MG 24 hr capsule TAKE ONE CAPSULE BY MOUTH DAILY 90 capsule 3     Eliquis 5 MG tablet tablet TAKE ONE TABLET BY MOUTH EVERY 12 HOURS 180 tablet 3     ferrous sulfate 325 (65 FE) MG tablet Take 1 tablet by mouth Daily With Breakfast.       furosemide (LASIX) 40 MG tablet TAKE ONE TABLET BY MOUTH DAILY 90 tablet 3     Multiple Vitamins-Minerals (OCUVITE EXTRA PO) Take 1 tablet by mouth Daily.       multivitamin with minerals (MULTIVITAMIN ADULT PO) Take 1 tablet by mouth Daily.       tadalafil (CIALIS) 5 MG tablet TAKE ONE TABLET BY MOUTH DAILY AS NEEDED FOR ERECTILE DYSFUNCTION 90 tablet 1      Allergies:  Patient has no known allergies.        Objective      Vital Signs  Temp:  [97.8 °F (36.6 °C)-98.4 °F (36.9 °C)] 97.8 °F (36.6 °C)  Heart Rate:  [] 91  BP: ()/(53-96) 109/78  Body mass index is 31.49 kg/m².    Physical Exam  Constitutional:       General: He is not in acute distress.     Comments: Weak, frail, chronically ill-appearing   HENT:      Head: Normocephalic and atraumatic.      Comments: Alopecia     Mouth/Throat:      Mouth: Mucous membranes are moist.   Eyes:      Conjunctiva/sclera: Conjunctivae normal.      Pupils: Pupils are equal, round, and reactive to light.   Cardiovascular:      Rate and Rhythm: Normal rate and regular rhythm.      Pulses: Normal pulses.      Heart sounds: Normal heart sounds.   Pulmonary:      Effort: Pulmonary effort is normal. No respiratory distress.      Breath sounds: Normal breath sounds.   Abdominal:      General: Bowel sounds are normal.   Musculoskeletal:         General: No  swelling or tenderness. Normal range of motion.      Cervical back: Normal range of motion.   Skin:     General: Skin is warm and dry.      Coloration: Skin is pale. Skin is not jaundiced.   Neurological:      General: No focal deficit present.      Mental Status: He is alert and oriented to person, place, and time.      Comments: Slow verbal responses   Psychiatric:      Comments: Pleasant, cooperative       Results Review:   I reviewed the patient's new clinical results.          Assessment/Plan     Active Hospital Problems    Diagnosis  POA    **Hyponatremia [E87.1]  Yes    Alcohol abuse [F10.10]  Unknown    Diastolic dysfunction [I51.89]  Yes    Paroxysmal atrial fibrillation [I48.0]  Yes    Essential hypertension [I10]  Yes       Hyponatremia  Na 130, up from 114  On D5W @ 50 per nephrology.   Hypokalemia  Resolved.  Weakness  Falls  Physical and Occupational Therapy.  He hopes to get strong enough to go to alcohol rehab.  Metabolic alkalosis  Alcohol abuse  Continue vitamin supplements, thiamine, folic acid.  CIWA monitoring.  Transaminitis  AST 45.  Follow-up with PCP.  Pulmonary nodules   Follows with Dr. Friedman, plan for repeat imaging in 1 year (2024)  Hypertension  Stable, controlled.  On no medications.  Atrial fibrillation on Eliquis  Continue Eliquis. HR stable.  Chronic anemia  Hemoglobin stable, 11.6.    Thank you very much for asking A to be involved in this patient's care.       MATT Luo  [facility]  23  12:58 EDT    Electronically signed by Sebastián Vargas MD at 23 1502       Chago Issa MD at 09/15/23 1819        Consult Orders    1. Nephrology (on -call MD unless specified) [[id number]] ordered by Ja Bell MD at 09/15/23 1519                   [facility] Consult Note      Patient Name: Gamal Modi Sr.  : 1945  MRN: [id number]  Primary Care Physician:  Alysha Cosme  MD  Referring Physician: No ref. provider found  Date of admission: 9/15/2023    Subjective     Reason for Consult: Severe hyponatremia    HPI:   Gamal Modi Sr. is a 78 y.o. male patient was admitted on 9/15/2023 where he presented with dizziness and weakness has been having gait difficulties for the past few days he has been drinking heavily and he admitted himself to alcohol rehab facility and he was sent from there because of hyponatremia noted to be 114.  Had an episode of severe hyponatremia and July 2022.  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    Complains of weakness, no chest pain or shortness of air, no orthopnea or PND, no nausea or vomiting, no lower extremity edema, has been weak and having gait difficulties, no dysuria or gross hematuria and he is admitted to significant intake of water may be 8 to 10 cups of water a day.    Review of Systems:   14 point review of systems is otherwise negative except for mentioned above on HPI    Personal History     Past Medical History:   Diagnosis Date    AF (atrial fibrillation)     Cataract 2019    Elevated cholesterol     Erectile dysfunction 2018    Gout     Hypertension     Long term exposure to asbestos     3 years    Sebaceous cyst        Past Surgical History:   Procedure Laterality Date    COLON SURGERY      COLONOSCOPY  approx 2015    negative per pt     COLONOSCOPY N/A 5/12/2022    Procedure: COLONOSCOPY INTO CECUM/ TERMINAL ILEUM WITH POLYPECTOMY;  Surgeon: Aidan Branham MD;  Location: [facility] ENDOSCOPY;  Service: Gastroenterology;  Laterality: N/A;  pre: anemia  post: hemorrhoids, normal TI, polyp, diverticulosis    ENDOSCOPY N/A 5/12/2022    Procedure: ESOPHAGOGASTRODUODENOSCOPY WITH BIOPSIES;  Surgeon: Aidan Branham MD;  Location: [facility] ENDOSCOPY;  Service: Gastroenterology;  Laterality: N/A;   pre: anemia  post: hiatal hernia, bnormal ampulla    OTHER SURGICAL HISTORY      BOWEL BLOCKAGE       Family History: family history includes Cancer in his mother and sister; Diabetes in his father and another family member.    Social History:  reports that he has never smoked. He has never used smokeless tobacco. He reports current alcohol use. He reports that he does not use drugs.    Home Medications:  Prior to Admission medications    Medication Sig Start Date End Date Taking? Authorizing Provider   acetaminophen (TYLENOL) 500 MG tablet Take 2 tablets by mouth Daily.   Yes Rod Dela Cruz MD   Apoaequorin (Prevagen) 10 MG capsule Take 1 capsule by mouth Daily.   Yes Rod Dela Cruz MD   ascorbic acid (VITAMIN C) 500 MG tablet Take 2 tablets by mouth Daily.   Yes Rod Dela Cruz MD   atorvastatin (LIPITOR) 20 MG tablet TAKE ONE TABLET BY MOUTH DAILY 4/5/23  Yes Alysha Cosme MD   calcium carbonate (TUMS) 500 MG chewable tablet Chew 3 tablets Daily As Needed for Indigestion or Heartburn.   Yes Rod Dela Cruz MD   carboxymethylcellulose (Lubricant Eye Drops) 0.5 % solution Administer 1 drop to both eyes Daily.   Yes Rod Dela Cruz MD   cetirizine (zyrTEC) 10 MG tablet Take 1 tablet by mouth Daily.   Yes Rod Dela Cruz MD   cholecalciferol (VITAMIN D3) 1000 units tablet Take 1 tablet by mouth Daily.   Yes Rod Dela Cruz MD   dilTIAZem CD (CARDIZEM CD) 120 MG 24 hr capsule TAKE ONE CAPSULE BY MOUTH DAILY 1/23/23  Yes Alysha Cosme MD   Eliquis 5 MG tablet tablet TAKE ONE TABLET BY MOUTH EVERY 12 HOURS 2/15/23  Yes Alysha Cosme MD   ferrous sulfate 325 (65 FE) MG tablet Take 1 tablet by mouth Daily With Breakfast.   Yes Rod Dela Cruz MD   furosemide (LASIX) 40 MG tablet TAKE ONE TABLET BY MOUTH DAILY 5/4/23  Yes Alysha Cosme MD   Multiple Vitamins-Minerals (OCUVITE EXTRA PO) Take 1 tablet by mouth Daily.   Yes Rod Dela Cruz MD   multivitamin  with minerals (MULTIVITAMIN ADULT PO) Take 1 tablet by mouth Daily.   Yes ProviderRod MD   tadalafil (CIALIS) 5 MG tablet TAKE ONE TABLET BY MOUTH DAILY AS NEEDED FOR ERECTILE DYSFUNCTION 3/24/23  Yes Alysha Cosme MD   Lumigan 0.01 % ophthalmic drops Administer 1 drop into the left eye Every Night. 5/7/22 9/15/23  ProviderRod MD       Allergies:  No Known Allergies    Objective     Vitals:   Temp:  [97.9 °F (36.6 °C)-98 °F (36.7 °C)] 97.9 °F (36.6 °C)  Heart Rate:  [63-89] 75  Resp:  [16] 16  BP: (118-130)/(59-86) 129/68    Intake/Output Summary (Last 24 hours) at 9/15/2023 1819  Last data filed at 9/15/2023 1723  Gross per 24 hour   Intake --   Output 500 ml   Net -500 ml       Physical Exam:   Constitutional: Awake, alert, no acute distress.  Chronically ill  HEENT: Sclera anicteric, no conjunctival injection, his face is flushed  Neck: Supple, no thyromegaly, no lymphadenopathy, trachea at midline, no JVD  Respiratory: Clear to auscultation bilaterally, nonlabored respiration  Cardiovascular: Irregularly irregular, no murmurs, no rubs or gallops, no carotid bruit  Gastrointestinal: Positive bowel sounds, abdomen is soft, nontender and nondistended  : No palpable bladder  Musculoskeletal: No edema, no clubbing or cyanosis  Psychiatric: Appropriate affect, cooperative  Neurologic: Oriented x3, moving all extremities, normal speech and mental status  Skin: Warm and dry       Scheduled Meds:     apixaban, 5 mg, Oral, Q12H  folic acid, 1 mg, Oral, Daily  senna-docusate sodium, 2 tablet, Oral, BID  sodium chloride, 10 mL, Intravenous, Q12H  thiamine, 100 mg, Oral, Daily      IV Meds:        Results Reviewed:   I have personally reviewed the results from the time of this admission to 9/15/2023 18:19 EDT     Lab Results   Component Value Date    GLUCOSE 117 (H) 09/15/2023    CALCIUM 8.9 09/15/2023     (C) 09/15/2023    K 3.1 (L) 09/15/2023    CO2 30.0 (H) 09/15/2023    CL 72 (L) 09/15/2023     BUN 13 09/15/2023    CREATININE 0.90 09/15/2023    EGFRIFAFRI 106 01/18/2022    EGFRIFNONA 92 01/18/2022    BCR 14.4 09/15/2023    ANIONGAP 12.0 09/15/2023      Lab Results   Component Value Date    MG 2.2 09/15/2023    PHOS 2.5 07/31/2022    ALBUMIN 4.1 09/15/2023           Assessment / Plan       Hyponatremia      ASSESSMENT:  Severe hyponatremia which been symptomatic with gait difficulties and weakness.  The patient has urine sodium less than 20 and his urine osmolality 182.  Uric acid 7.6 this is not consistent with SIADH associated with poor 3 habits and excessive alcohol use also drinking large amount of water.  Does not meet the criteria for water intoxication.  Chronic A-fib, with controlled rate  History of hypertension but blood pressure appears to be within acceptable range but I have not checked his orthostatic pressures at this time  Alcohol abuse    PLAN:  I will give the patient normal saline 100 cc/h x 2 L  I will check orthostatic blood pressures  I will check his sodium very closely the goal is not to allow the sodium to rise by more than 6 to 8 mEq per 24 hours to avoid osmotic demyelination  Surveillance labs    Reviewed the chart and other providers notes reviewed imaging and lab data.  Discussed the case with the patient    Thank you for involving us in the care of Gamal Modi Sr..  Please feel free to call with any questions.    Chago Issa MD  09/15/23  18:19 EDT    [facility]  [phone]      Please note that portions of this note were completed with a voice recognition program.    Electronically signed by Chago Issa MD at 09/15/23 6906

## 2023-09-21 ENCOUNTER — APPOINTMENT (OUTPATIENT)
Dept: GENERAL RADIOLOGY | Facility: HOSPITAL | Age: 78
End: 2023-09-21
Payer: COMMERCIAL

## 2023-09-21 ENCOUNTER — APPOINTMENT (OUTPATIENT)
Dept: MRI IMAGING | Facility: HOSPITAL | Age: 78
End: 2023-09-21
Payer: COMMERCIAL

## 2023-09-21 ENCOUNTER — HOSPITAL ENCOUNTER (OUTPATIENT)
Facility: HOSPITAL | Age: 78
Setting detail: OBSERVATION
Discharge: HOME | End: 2023-09-22
Attending: EMERGENCY MEDICINE | Admitting: EMERGENCY MEDICINE
Payer: COMMERCIAL

## 2023-09-21 DIAGNOSIS — I10 ESSENTIAL HYPERTENSION: ICD-10-CM

## 2023-09-21 DIAGNOSIS — I48.0 PAROXYSMAL ATRIAL FIBRILLATION: ICD-10-CM

## 2023-09-21 DIAGNOSIS — S32.050A CLOSED COMPRESSION FRACTURE OF L5 LUMBAR VERTEBRA, INITIAL ENCOUNTER: ICD-10-CM

## 2023-09-21 DIAGNOSIS — M25.551 RIGHT HIP PAIN: Primary | ICD-10-CM

## 2023-09-21 DIAGNOSIS — Z79.01 ANTICOAGULATED BY ANTICOAGULATION TREATMENT: ICD-10-CM

## 2023-09-21 PROBLEM — S32.10XA SACRAL FRACTURE, CLOSED: Status: ACTIVE | Noted: 2023-09-21

## 2023-09-21 PROBLEM — F10.20 ALCOHOL DEPENDENCE: Status: ACTIVE | Noted: 2023-09-21

## 2023-09-21 LAB
ALBUMIN SERPL-MCNC: 3.6 G/DL (ref 3.5–5.2)
ALBUMIN/GLOB SERPL: 1.2 G/DL
ALP SERPL-CCNC: 95 U/L (ref 39–117)
ALT SERPL W P-5'-P-CCNC: 17 U/L (ref 1–41)
ANION GAP SERPL CALCULATED.3IONS-SCNC: 10.7 MMOL/L (ref 5–15)
AST SERPL-CCNC: 18 U/L (ref 1–40)
BASOPHILS # BLD AUTO: 0.1 10*3/MM3 (ref 0–0.2)
BASOPHILS NFR BLD AUTO: 1.5 % (ref 0–1.5)
BILIRUB SERPL-MCNC: 0.4 MG/DL (ref 0–1.2)
BUN SERPL-MCNC: 10 MG/DL (ref 8–23)
BUN/CREAT SERPL: 15.4 (ref 7–25)
CALCIUM SPEC-SCNC: 9.2 MG/DL (ref 8.6–10.5)
CHLORIDE SERPL-SCNC: 99 MMOL/L (ref 98–107)
CO2 SERPL-SCNC: 25.3 MMOL/L (ref 22–29)
CREAT SERPL-MCNC: 0.65 MG/DL (ref 0.76–1.27)
DEPRECATED RDW RBC AUTO: 42.4 FL (ref 37–54)
EGFRCR SERPLBLD CKD-EPI 2021: 97.7 ML/MIN/1.73
EOSINOPHIL # BLD AUTO: 0.21 10*3/MM3 (ref 0–0.4)
EOSINOPHIL NFR BLD AUTO: 3.1 % (ref 0.3–6.2)
ERYTHROCYTE [DISTWIDTH] IN BLOOD BY AUTOMATED COUNT: 12.3 % (ref 12.3–15.4)
GLOBULIN UR ELPH-MCNC: 3 GM/DL
GLUCOSE SERPL-MCNC: 104 MG/DL (ref 65–99)
HCT VFR BLD AUTO: 33.8 % (ref 37.5–51)
HGB BLD-MCNC: 11.4 G/DL (ref 13–17.7)
IMM GRANULOCYTES # BLD AUTO: 0.03 10*3/MM3 (ref 0–0.05)
IMM GRANULOCYTES NFR BLD AUTO: 0.4 % (ref 0–0.5)
INR PPP: 1.1 (ref 0.9–1.1)
LYMPHOCYTES # BLD AUTO: 1.28 10*3/MM3 (ref 0.7–3.1)
LYMPHOCYTES NFR BLD AUTO: 18.9 % (ref 19.6–45.3)
MCH RBC QN AUTO: 32.1 PG (ref 26.6–33)
MCHC RBC AUTO-ENTMCNC: 33.7 G/DL (ref 31.5–35.7)
MCV RBC AUTO: 95.2 FL (ref 79–97)
MONOCYTES # BLD AUTO: 1.24 10*3/MM3 (ref 0.1–0.9)
MONOCYTES NFR BLD AUTO: 18.3 % (ref 5–12)
NEUTROPHILS NFR BLD AUTO: 3.91 10*3/MM3 (ref 1.7–7)
NEUTROPHILS NFR BLD AUTO: 57.8 % (ref 42.7–76)
NRBC BLD AUTO-RTO: 0 /100 WBC (ref 0–0.2)
PLATELET # BLD AUTO: 422 10*3/MM3 (ref 140–450)
PMV BLD AUTO: 8.6 FL (ref 6–12)
POTASSIUM SERPL-SCNC: 3.9 MMOL/L (ref 3.5–5.2)
PROT SERPL-MCNC: 6.6 G/DL (ref 6–8.5)
PROTHROMBIN TIME: 14.3 SECONDS (ref 11.7–14.2)
RBC # BLD AUTO: 3.55 10*6/MM3 (ref 4.14–5.8)
SODIUM SERPL-SCNC: 135 MMOL/L (ref 136–145)
WBC NRBC COR # BLD: 6.77 10*3/MM3 (ref 3.4–10.8)

## 2023-09-21 PROCEDURE — 85025 COMPLETE CBC W/AUTO DIFF WBC: CPT | Performed by: EMERGENCY MEDICINE

## 2023-09-21 PROCEDURE — 73523 X-RAY EXAM HIPS BI 5/> VIEWS: CPT

## 2023-09-21 PROCEDURE — 96376 TX/PRO/DX INJ SAME DRUG ADON: CPT

## 2023-09-21 PROCEDURE — 99284 EMERGENCY DEPT VISIT MOD MDM: CPT

## 2023-09-21 PROCEDURE — 73721 MRI JNT OF LWR EXTRE W/O DYE: CPT

## 2023-09-21 PROCEDURE — G0378 HOSPITAL OBSERVATION PER HR: HCPCS

## 2023-09-21 PROCEDURE — 25010000002 HYDROMORPHONE PER 4 MG: Performed by: EMERGENCY MEDICINE

## 2023-09-21 PROCEDURE — 85610 PROTHROMBIN TIME: CPT | Performed by: EMERGENCY MEDICINE

## 2023-09-21 PROCEDURE — 72148 MRI LUMBAR SPINE W/O DYE: CPT

## 2023-09-21 PROCEDURE — 96374 THER/PROPH/DIAG INJ IV PUSH: CPT

## 2023-09-21 PROCEDURE — 36415 COLL VENOUS BLD VENIPUNCTURE: CPT

## 2023-09-21 PROCEDURE — 99214 OFFICE O/P EST MOD 30 MIN: CPT

## 2023-09-21 PROCEDURE — 80053 COMPREHEN METABOLIC PANEL: CPT | Performed by: EMERGENCY MEDICINE

## 2023-09-21 RX ORDER — OXYCODONE HYDROCHLORIDE AND ACETAMINOPHEN 5; 325 MG/1; MG/1
2 TABLET ORAL EVERY 4 HOURS PRN
Status: DISCONTINUED | OUTPATIENT
Start: 2023-09-21 | End: 2023-09-21

## 2023-09-21 RX ORDER — OXYCODONE HYDROCHLORIDE AND ACETAMINOPHEN 5; 325 MG/1; MG/1
2 TABLET ORAL EVERY 4 HOURS PRN
Status: DISCONTINUED | OUTPATIENT
Start: 2023-09-21 | End: 2023-09-22 | Stop reason: HOSPADM

## 2023-09-21 RX ORDER — BISACODYL 5 MG/1
5 TABLET, DELAYED RELEASE ORAL DAILY PRN
Status: DISCONTINUED | OUTPATIENT
Start: 2023-09-21 | End: 2023-09-22 | Stop reason: HOSPADM

## 2023-09-21 RX ORDER — SODIUM CHLORIDE 9 MG/ML
40 INJECTION, SOLUTION INTRAVENOUS AS NEEDED
Status: DISCONTINUED | OUTPATIENT
Start: 2023-09-21 | End: 2023-09-22 | Stop reason: HOSPADM

## 2023-09-21 RX ORDER — ATORVASTATIN CALCIUM 20 MG/1
20 TABLET, FILM COATED ORAL DAILY
Status: DISCONTINUED | OUTPATIENT
Start: 2023-09-21 | End: 2023-09-22 | Stop reason: HOSPADM

## 2023-09-21 RX ORDER — SODIUM CHLORIDE 0.9 % (FLUSH) 0.9 %
10 SYRINGE (ML) INJECTION AS NEEDED
Status: DISCONTINUED | OUTPATIENT
Start: 2023-09-21 | End: 2023-09-22 | Stop reason: HOSPADM

## 2023-09-21 RX ORDER — OXYCODONE HYDROCHLORIDE AND ACETAMINOPHEN 5; 325 MG/1; MG/1
1 TABLET ORAL EVERY 4 HOURS PRN
Status: DISCONTINUED | OUTPATIENT
Start: 2023-09-21 | End: 2023-09-21

## 2023-09-21 RX ORDER — OXYCODONE HYDROCHLORIDE AND ACETAMINOPHEN 5; 325 MG/1; MG/1
2 TABLET ORAL ONCE
Status: COMPLETED | OUTPATIENT
Start: 2023-09-21 | End: 2023-09-21

## 2023-09-21 RX ORDER — POLYETHYLENE GLYCOL 3350 17 G/17G
17 POWDER, FOR SOLUTION ORAL DAILY PRN
Status: DISCONTINUED | OUTPATIENT
Start: 2023-09-21 | End: 2023-09-22 | Stop reason: HOSPADM

## 2023-09-21 RX ORDER — OXYCODONE HYDROCHLORIDE AND ACETAMINOPHEN 5; 325 MG/1; MG/1
1 TABLET ORAL ONCE
Status: COMPLETED | OUTPATIENT
Start: 2023-09-21 | End: 2023-09-21

## 2023-09-21 RX ORDER — SODIUM CHLORIDE 0.9 % (FLUSH) 0.9 %
10 SYRINGE (ML) INJECTION AS NEEDED
Status: DISCONTINUED | OUTPATIENT
Start: 2023-09-21 | End: 2023-09-21

## 2023-09-21 RX ORDER — HYDROMORPHONE HYDROCHLORIDE 1 MG/ML
0.5 INJECTION, SOLUTION INTRAMUSCULAR; INTRAVENOUS; SUBCUTANEOUS
Status: DISCONTINUED | OUTPATIENT
Start: 2023-09-21 | End: 2023-09-21

## 2023-09-21 RX ORDER — SODIUM CHLORIDE 0.9 % (FLUSH) 0.9 %
10 SYRINGE (ML) INJECTION EVERY 12 HOURS SCHEDULED
Status: DISCONTINUED | OUTPATIENT
Start: 2023-09-21 | End: 2023-09-22 | Stop reason: HOSPADM

## 2023-09-21 RX ORDER — DILTIAZEM HYDROCHLORIDE 120 MG/1
120 CAPSULE, COATED, EXTENDED RELEASE ORAL DAILY
Status: DISCONTINUED | OUTPATIENT
Start: 2023-09-21 | End: 2023-09-22 | Stop reason: HOSPADM

## 2023-09-21 RX ORDER — AMOXICILLIN 250 MG
2 CAPSULE ORAL 2 TIMES DAILY
Status: DISCONTINUED | OUTPATIENT
Start: 2023-09-21 | End: 2023-09-22 | Stop reason: HOSPADM

## 2023-09-21 RX ORDER — BISACODYL 10 MG
10 SUPPOSITORY, RECTAL RECTAL DAILY PRN
Status: DISCONTINUED | OUTPATIENT
Start: 2023-09-21 | End: 2023-09-22 | Stop reason: HOSPADM

## 2023-09-21 RX ADMIN — ATORVASTATIN CALCIUM 20 MG: 20 TABLET, FILM COATED ORAL at 08:50

## 2023-09-21 RX ADMIN — DILTIAZEM HYDROCHLORIDE 120 MG: 120 CAPSULE, COATED, EXTENDED RELEASE ORAL at 08:50

## 2023-09-21 RX ADMIN — SENNOSIDES AND DOCUSATE SODIUM 2 TABLET: 50; 8.6 TABLET ORAL at 16:38

## 2023-09-21 RX ADMIN — HYDROMORPHONE HYDROCHLORIDE 0.5 MG: 1 INJECTION, SOLUTION INTRAMUSCULAR; INTRAVENOUS; SUBCUTANEOUS at 08:50

## 2023-09-21 RX ADMIN — Medication 10 ML: at 08:54

## 2023-09-21 RX ADMIN — OXYCODONE HYDROCHLORIDE AND ACETAMINOPHEN 1 TABLET: 5; 325 TABLET ORAL at 15:34

## 2023-09-21 RX ADMIN — OXYCODONE HYDROCHLORIDE AND ACETAMINOPHEN 1 TABLET: 5; 325 TABLET ORAL at 16:33

## 2023-09-21 RX ADMIN — SENNOSIDES AND DOCUSATE SODIUM 2 TABLET: 50; 8.6 TABLET ORAL at 20:03

## 2023-09-21 RX ADMIN — Medication 10 ML: at 20:04

## 2023-09-21 RX ADMIN — Medication 10 ML: at 03:01

## 2023-09-21 RX ADMIN — HYDROMORPHONE HYDROCHLORIDE 0.5 MG: 1 INJECTION, SOLUTION INTRAMUSCULAR; INTRAVENOUS; SUBCUTANEOUS at 14:01

## 2023-09-21 RX ADMIN — OXYCODONE HYDROCHLORIDE AND ACETAMINOPHEN 2 TABLET: 5; 325 TABLET ORAL at 01:04

## 2023-09-21 RX ADMIN — APIXABAN 5 MG: 5 TABLET, FILM COATED ORAL at 03:01

## 2023-09-21 RX ADMIN — APIXABAN 5 MG: 5 TABLET, FILM COATED ORAL at 15:34

## 2023-09-21 RX ADMIN — HYDROMORPHONE HYDROCHLORIDE 0.5 MG: 1 INJECTION, SOLUTION INTRAMUSCULAR; INTRAVENOUS; SUBCUTANEOUS at 03:02

## 2023-09-21 RX ADMIN — OXYCODONE HYDROCHLORIDE AND ACETAMINOPHEN 2 TABLET: 5; 325 TABLET ORAL at 20:29

## 2023-09-21 NOTE — CASE MANAGEMENT/SOCIAL WORK
Discharge Planning Assessment  [facility]     Patient Name: Gamal Modi Sr.  MRN: [id number]  Today's Date: 9/21/2023    Admit Date: 9/21/2023    Plan: Plans to return to [facility] in [facility] at d/Zaheer Orantes RN   Discharge Needs Assessment       Row Name 09/21/23 0929       Living Environment    People in Home spouse    Name(s) of People in Home Jamee    Current Living Arrangements home    Potentially Unsafe Housing Conditions none    Primary Care Provided by self    Provides Primary Care For no one    Family Caregiver if Needed spouse    Family Caregiver Names Jamee [phone]    Quality of Family Relationships supportive    Able to Return to Prior Arrangements other (see comments)    Living Arrangement Comments Currently at [facility] in [facility] and plans to return at d/c       Resource/Environmental Concerns    Resource/Environmental Concerns none       Transition Planning    Patient/Family Anticipated Services at Transition none    Transportation Anticipated other (see comments)  states that [facility] will provide transport       Discharge Needs Assessment    Equipment Currently Used at Home bp cuff;oxygen  O2 2.5L at night    Concerns to be Addressed no discharge needs identified    Anticipated Changes Related to Illness none    Equipment Needed After Discharge none    Provided Post Acute Provider List? N/A    Provided Post Acute Provider Quality & Resource List? N/A                   Discharge Plan       Row Name 09/21/23 0932       Plan    Plan Plans to return to [facility] in [facility] at d/cPARESH Orantes RN    Patient/Family in Agreement with Plan yes    Provided Post Acute Provider List? N/A    Provided Post Acute Provider Quality & Resource List? N/A    Plan Comments Spoke w/ pt at bedside w/ his permission. Introduced self and explained role. All info on facesheet, including PCP as DELMA Cosme, verified. Lives in two story home w/ no steps to enter, w/ wife Jamee; able to navigate steps and around home w/o  difficulty. Independent w/ ADLs. Uses B/P cuff and O2 2.5L at night at home. Denies need for any DME or community resources at d/c. Uses [facility] in [place] and is able to obtain and pay for meds. Currently staying at [facility] in [facility] and plans to return at d/c; states that he is to call them when he is dc'd and they will provide transport; agreeable w/ plan. RN aware. CM will continue to follow-DELMA Orantes RN                  Continued Care and Services - Admitted Since 9/21/2023    Coordination has not been started for this encounter.          Demographic Summary       Row Name 09/21/23 0928       General Information    Admission Type observation    Arrived From emergency department    Required Notices Provided Observation Status Notice    Referral Source admission list;nursing    Reason for Consult discharge planning    Preferred Language English       Contact Information    Permission Granted to Share Info With                    Functional Status       Row Name 09/21/23 0928       Functional Status    Usual Activity Tolerance good    Current Activity Tolerance good       Functional Status, IADL    Medications independent    IADL Comments wife does most meal prep, housekeeping, laundry and shopping       Mental Status    General Appearance WDL WDL       Mental Status Summary    Recent Changes in Mental Status/Cognitive Functioning no changes                   Psychosocial       Row Name 09/21/23 0929       Behavior WDL    Behavior WDL WDL       Emotion Mood WDL    Emotion/Mood/Affect WDL WDL       Speech WDL    Speech WDL WDL       Perceptual State WDL    Perceptual State WDL WDL       Thought Process WDL    Thought Process WDL WDL       Intellectual Performance WDL    Intellectual Performance WDL WDL                   Abuse/Neglect    No documentation.                  Legal    No documentation.                  Substance Abuse    No documentation.                  Patient Forms    No  documentation.                     Mily Orantes RN

## 2023-09-21 NOTE — H&P
. [facility]   HISTORY AND PHYSICAL    Patient Name: Gamal Modi Sr.  : 1945  MRN: [id number]  Primary Care Physician:  Alysha Cosme MD  Date of admission: 2023    Subjective   Subjective     Chief Complaint: Right hip pain    HPI:    Gamal Modi Sr. is a 78 y.o. male with past medical history including but limited to A-fib on Eliquis, hyperlipidemia, hypertension and history of alcohol abuse presents to [facility] with right hip pain.  Patient reports that he had fallen about a week ago when he was intoxicated and since then developed right hip.  Reports difficulty ambulating secondary to hip pain.  Patient currently in alcohol treatment program and had a probable x-ray that was concerning for possible right and left subcapital hip fracture therefore was sent to the ED for further evaluation.  Patient denies history of hip fracture or replacement.  Denies chest pain or palpitation.  Denies abdominal pain, nausea, vomit, diarrhea.  Denies cough, chills, fever, lower extremity edema.    Per chart review patient was recently hospitalized for hyponatremia very much corrected itself and was discharged to rehab facility for alcohol dependency that he had voluntarily signed up for.    Laboratory evaluation in the ED shows sodium 135, creatinine 2.65, INR 1.10, WBC 6.7, hemoglobin 11.4 and platelets 422.  X-ray of bilateral hips shows no evidence of fracture or subluxation    Review of Systems   All systems were reviewed and negative except for: That mentioned above in HPI    Personal History     Past Medical History:   Diagnosis Date    AF (atrial fibrillation)     Cataract 2019    Elevated cholesterol     Erectile dysfunction 2018    Gout     Hypertension     Long term exposure to asbestos     3 years    Sebaceous cyst        Past Surgical History:   Procedure Laterality Date    COLON SURGERY      COLONOSCOPY  approx     negative per pt     COLONOSCOPY N/A 2022     Procedure: COLONOSCOPY INTO CECUM/ TERMINAL ILEUM WITH POLYPECTOMY;  Surgeon: Aidan Branham MD;  Location:  [facility];  Service: Gastroenterology;  Laterality: N/A;  pre: anemia  post: hemorrhoids, normal TI, polyp, diverticulosis    ENDOSCOPY N/A 5/12/2022    Procedure: ESOPHAGOGASTRODUODENOSCOPY WITH BIOPSIES;  Surgeon: Aidan Branham MD;  Location:  [facility];  Service: Gastroenterology;  Laterality: N/A;  pre: anemia  post: hiatal hernia, bnormal ampulla    OTHER SURGICAL HISTORY      BOWEL BLOCKAGE       Family History: family history includes Cancer in his mother and sister; Diabetes in his father and another family member. Otherwise pertinent FHx was reviewed and not pertinent to current issue.    Social History:  reports that he has never smoked. He has never used smokeless tobacco. He reports current alcohol use. He reports that he does not use drugs.    Home Medications:  Apoaequorin, acetaminophen, apixaban, ascorbic acid, atorvastatin, calcium carbonate, carboxymethylcellulose, cetirizine, cholecalciferol, dilTIAZem CD, ferrous sulfate, multivitamin with minerals, tadalafil, and thiamine    Allergies:  No Known Allergies    Objective   Objective     Vitals:   Temp:  [98.2 °F (36.8 °C)] 98.2 °F (36.8 °C)  Heart Rate:  [92-97] 97  Resp:  [16-19] 16  BP: (123)/(75-82) 123/75  Physical Exam    Constitutional: Awake, alert   Eyes: PERRLA, sclerae anicteric, no conjunctival injection   HENT: NCAT, mucous membranes moist   Neck: Supple, no thyromegaly, no lymphadenopathy, trachea midline   Respiratory: Clear to auscultation bilaterally, nonlabored respirations    Cardiovascular: RRR, no murmurs, rubs, or gallops, palpable pedal pulses bilaterally   Gastrointestinal: Positive bowel sounds, soft, nontender, nondistended   Musculoskeletal: No bilateral ankle edema, no clubbing or cyanosis to extremities   Psychiatric: Appropriate affect, cooperative   Neurologic: Oriented x 3, strength  symmetric in all extremities, Cranial Nerves grossly intact to confrontation, speech clear   Skin: No rashes     Result Review    Result Review:  I have personally reviewed the results from the time of this admission to 9/21/2023 02:07 EDT and agree with these findings:  []  Laboratory list / accordion  []  Microbiology  []  Radiology  []  EKG/Telemetry   []  Cardiology/Vascular   []  Pathology  []  Old records  []  Other:    Assessment & Plan   Assessment / Plan     Brief Patient Summary:  Gamal Modi Sr. is a 78 y.o. male who was seen and evaluated the ED for right hip pain.    Active Hospital Problems:  Active Hospital Problems    Diagnosis     **Right hip pain      Plan:   Right hip pain  -Bilateral hip x-ray shows no evidence of fracture or subluxation  -MRI right hip pending  -Analgesic as needed  -Orthopedic consult    Paroxysmal A-fib  -Continue Eliquis  -Cardiac monitoring    Hyperlipidemia  -Continue statin    Hypertension  -Continue Cardizem  -Vital signs per nursing    DVT prophylaxis:  Mechanical DVT prophylaxis orders are present.    CODE STATUS:    Level Of Support Discussed With: Patient  Code Status (Patient has no pulse and is not breathing): CPR (Attempt to Resuscitate)  Medical Interventions (Patient has pulse or is breathing): Full Support    Admission Status:  I believe this patient meets observation status.    72 minutes has been spent by [facility] providers in the care of this patient while under observation status    .During patient visit, I utilized appropriate personal protective equipment including gloves. Appropriate PPE was worn during the entire visit.  Hand hygiene was completed before and after    Electronically signed by MATT Salinas, 09/21/23, 2:07 AM EDT.

## 2023-09-21 NOTE — PAYOR COMM NOTE
"Ly Gamal JOHN Sr. (78 y.o. Male)       PLEASE SEE ATTACHED FOR DC NOTICE    REF #  [id number]     THANK YOU  WILFRED LY RN/ DEPT  [facility]   [phone]  [phone]        Date of Birth   1945    Social Security Number       Address   [address] [place] [address]    Home Phone   [phone]    MRN   [id number]       Christianity   Sikhism    Marital Status                               Admission Date   9/15/23    Admission Type   Emergency    Admitting Provider   Genna Schneider MD    Attending Provider       Department, Room/Bed   [facility], S512/1       Discharge Date   9/18/2023    Discharge Disposition   Home or Self Care    Discharge Destination                                 Attending Provider: (none)   Allergies: No Known Allergies    Isolation: None   Infection: None   Code Status: CPR    Ht: 180.3 cm (71\")   Wt: 98.5 kg (217 lb 2.5 oz)    Admission Cmt: None   Principal Problem: Hyponatremia [E87.1]                   Active Insurance as of 9/15/2023       Primary Coverage       Payor Plan Insurance Group Employer/Plan Group    [employer]HE[employer] BLUE CROSS ANTHEM BLUE CROSS BLUE SHIELD PPO [id number]       Payor Plan Address Payor Plan Phone Number Payor Plan Fax Number Effective Dates    [address] [phone]  1/1/2020 - None Entered    [facility] [id number]         Subscriber Name Subscriber Birth Date Member ID       JAMEE MUNOZ 9/21/1961 [id number]                     Emergency Contacts        (Rel.) Home Phone Work Phone Mobile Phone    Jamee Munoz (Spouse) [phone] -- --              [place]: [facility] [id number]  Tax ID [id number]     Discharge Summary        Sebastián Vargas MD at 09/18/23 1122                                                                             PHYSICIAN DISCHARGE SUMMARY                                                                        [facility]" [place]    Patient Identification:  Name: Gamal Modi Sr.  Age: 78 y.o.  Sex: male  :  1945  MRN: [id number]  Primary Care Physician: Alysha Cosme MD    Admit date: 9/15/2023  Discharge date and time: 2023     Discharged Condition: good    Discharge Diagnoses:  Hyponatremia    Essential hypertension    Paroxysmal atrial fibrillation    Diastolic dysfunction    Alcohol abuse  Chronic hypoxic respiratory failure.  Pulmonary nodule: Outpatient follow-up.    Hospital Course:  Pleasant 78-year-old gentleman with a longstanding history of alcohol abuse and actually voluntarily signed in for alcohol rehab facility.  He was noted to be unsteady and confused.  He was sent to this facility for further evaluation where he is noted to have a sodium level of only 114.  Drug screen was negative including alcohol.  Due to the severity of the hyponatremia he was initially admitted to the ICU.  Hyponatremia is corrected very easily and pretty much on its own.  In fact D5W was periodically given to prevent an overly rapid correction.  He has done very well and this morning deciding levels 134.  He has remained fully alert and oriented and feeling well the last couple days that we have seen him after his ICU transfer.  He is cleared for discharge at this point.  His plans are to go back to the alcohol rehabilitation center.  During work-up he was incidentally noted to have a pulmonary nodule.  He should follow-up with pulmonology concerning this as an outpatient.    Consults:     Consults       Date and Time Order Name Status Description    2023  7:43 AM Inpatient Internal Medicine Consult Completed     9/15/2023  3:19 PM Nephrology (on -call MD unless specified) Completed     9/15/2023  3:18 PM Pulmonology (on-call MD unless specified)                Discharge Exam:  Afebrile vital signs stable.  Well-developed well-nourished male in no apparent distress.  Lungs clear to auscultation good air  movement.  Heart regular rate and rhythm.  Extremities no clubbing cyanosis or edema..  Alert oriented converse cooperative and pleasant.     Disposition:  Home    Patient Instructions:      Discharge Medications        New Medications        Instructions Start Date   thiamine 100 MG tablet  Commonly known as: VITAMIN B1   100 mg, Oral, Daily   Start Date: September 19, 2023            Continue These Medications        Instructions Start Date   acetaminophen 500 MG tablet  Commonly known as: TYLENOL   1,000 mg, Oral, Daily      ascorbic acid 500 MG tablet  Commonly known as: VITAMIN C   1,000 mg, Oral, Daily      atorvastatin 20 MG tablet  Commonly known as: LIPITOR   TAKE ONE TABLET BY MOUTH DAILY      calcium carbonate 500 MG chewable tablet  Commonly known as: TUMS   3 tablets, Oral, Daily PRN      cetirizine 10 MG tablet  Commonly known as: zyrTEC   10 mg, Oral, Daily      cholecalciferol 25 MCG (1000 UT) tablet  Commonly known as: VITAMIN D3   1,000 Units, Oral, Daily      dilTIAZem  MG 24 hr capsule  Commonly known as: CARDIZEM CD   TAKE ONE CAPSULE BY MOUTH DAILY      Eliquis 5 MG tablet tablet  Generic drug: apixaban   TAKE ONE TABLET BY MOUTH EVERY 12 HOURS      ferrous sulfate 325 (65 FE) MG tablet   325 mg, Oral, Daily With Breakfast      Lubricant Eye Drops 0.5 % solution  Generic drug: carboxymethylcellulose   1 drop, Both Eyes, Daily      multivitamin with minerals tablet tablet   1 tablet, Oral, Daily      multivitamin with minerals tablet tablet   1 tablet, Oral, Daily      Prevagen 10 MG capsule  Generic drug: Apoaequorin   1 capsule, Oral, Daily      tadalafil 5 MG tablet  Commonly known as: CIALIS   TAKE ONE TABLET BY MOUTH DAILY AS NEEDED FOR ERECTILE DYSFUNCTION             Stop These Medications      furosemide 40 MG tablet  Commonly known as: LASIX            Diet Instructions       Diet: Cardiac Diets; Healthy Heart (2-3 Na+); Regular Texture (IDDSI 7); Thin (IDDSI 0)      Discharge  Diet: Cardiac Diets    Cardiac Diet: Healthy Heart (2-3 Na+)    Texture: Regular Texture (IDDSI 7)    Fluid Consistency: Thin (IDDSI 0)          Future Appointments   Date Time Provider Department Center   10/11/2023  8:15 AM [facility]   10/16/2023 10:00 AM Alysha Cosme MD [facility]   3/29/2024  9:15 AM Ochoa Murrell MD [facility]     Additional Instructions for the Follow-ups that You Need to Schedule       Discharge Follow-up with PCP   As directed       Currently Documented PCP:    Alysha Cosme MD    PCP Phone Number:    [phone]     Follow Up Details: 1 week        Discharge Follow-up with Specialty: Nephrology   As directed      Specialty: Nephrology   Follow Up Details: As directed.               Follow-up Information       Alysha Cosme MD .    Specialty: Internal Medicine  Why: 1 week  Contact information:  [id number] [place] [address]  [phone]                           Discharge Order (From admission, onward)       Start     Ordered    09/18/23 1114  Discharge patient  Once        Expected Discharge Date: 09/18/23   Discharge Disposition: Home or Self Care   Physician of Record for Attribution - Please select from Treatment Team: SEBASTIÁN VARGAS [[id number]]   Review needed by CMO to determine Physician of Record: No      Question Answer Comment   Physician of Record for Attribution - Please select from Treatment Team SEBASTIÁN VARGAS    Review needed by CMO to determine Physician of Record No        09/18/23 1117                      Total time spent discharging patient including evaluation,post hospitalization follow up,  medication and post hospitalization instructions and education total time exceeds 30 minutes.    Signed:  Sebastián Vargas MD  9/18/2023  11:22 EDT    EMR Dragon/Transcription disclaimer:   Much of this encounter note is an electronic transcription/translation of spoken language to printed text. The  electronic translation of spoken language may permit erroneous, or at times, nonsensical words or phrases to be inadvertently transcribed; Although I have reviewed the note for such errors, some may still exist.       Electronically signed by Sebastián Vargas MD at 09/18/23 1121

## 2023-09-21 NOTE — CONSULTS
Orthopedic Consult      Patient: Gamal Modi Sr.    Date of Admission: 9/21/2023 12:15 AM    YOB: 1945    Medical Record Number: [id number]    Consulting Physician: Rick Jolly MD    Chief Complaints: Right hip pain     History of Present Illness: 78 y.o. male admitted to [facility] to services of Rick Jolly MD with complaints of right hip pain.  Patient is alert and oriented and appears in no acute distress.  Has a past medical of A-fib and is on Eliquis, hyperlipidemia, hypertension and history of alcohol abuse.  Patient states that he had been sober for about 11 months and approximately 3 weeks ago went on a binge drinking episode.  He does remember falling approximately a week or so ago however does not remember the mechanics of the fall.  He was recently in the hospital for hyponatremia and subsequently transferred to an alcohol treatment center in [place].  While in the treatment center he complained of right hip pain and had an x-ray that suggest probable hip fracture and was sent to the emergency room for further evaluation.      On exam patient is complaining of pain in the right buttock.  Denies any back pain or groin pain.  Has no palpable tenderness over his low back and sacral area however does have palpable tenderness to the right buttock.  Straight leg raises negative.  Has good motion and sensation to his foot and ankle.  Logroll test as well as compression test to the right hip does not produce any right hip pain.  He has full range of motion of the right hip.  Patient is comfortable in bed however does complain of pain with weightbearing.  Again the pain is primarily in the right buttock area.      Allergies: No Known Allergies    Home Medications:    Current Facility-Administered Medications:     apixaban (ELIQUIS) tablet 5 mg, 5 mg, Oral, Q12H, Qadah, Anuelkim, APRN, 5 mg at 09/21/23 0301    atorvastatin (LIPITOR) tablet 20 mg, 20 mg,  Oral, Daily, Qadah, Abdalhakim, APRN, 20 mg at 09/21/23 0850    sennosides-docusate (PERICOLACE) 8.6-50 MG per tablet 2 tablet, 2 tablet, Oral, BID **AND** polyethylene glycol (MIRALAX) packet 17 g, 17 g, Oral, Daily PRN **AND** bisacodyl (DULCOLAX) EC tablet 5 mg, 5 mg, Oral, Daily PRN **AND** bisacodyl (DULCOLAX) suppository 10 mg, 10 mg, Rectal, Daily PRN, Qadah, Abdalhakim, APRN    dilTIAZem CD (CARDIZEM CD) 24 hr capsule 120 mg, 120 mg, Oral, Daily, Qadah, Abdalhakim, APRN, 120 mg at 09/21/23 0850    HYDROmorphone (DILAUDID) injection 0.5 mg, 0.5 mg, Intravenous, Q2H PRN, Rick Jolly MD, 0.5 mg at 09/21/23 0850    sodium chloride 0.9 % flush 10 mL, 10 mL, Intravenous, Q12H, Qadah, Abdalhakim, APRN, 10 mL at 09/21/23 0854    sodium chloride 0.9 % flush 10 mL, 10 mL, Intravenous, PRN, Qadah, Abdalhakim, APRN    sodium chloride 0.9 % infusion 40 mL, 40 mL, Intravenous, PRN, Qadah, Abdalhakim, APRN    Current Medications:  Scheduled Meds:apixaban, 5 mg, Oral, Q12H  atorvastatin, 20 mg, Oral, Daily  dilTIAZem CD, 120 mg, Oral, Daily  senna-docusate sodium, 2 tablet, Oral, BID  sodium chloride, 10 mL, Intravenous, Q12H      Continuous Infusions:   PRN Meds:.  senna-docusate sodium **AND** polyethylene glycol **AND** bisacodyl **AND** bisacodyl    HYDROmorphone    sodium chloride    sodium chloride    Past Medical History:   Diagnosis Date    AF (atrial fibrillation)     Cataract 2019    Elevated cholesterol     Erectile dysfunction 2018    Gout     Hypertension     Long term exposure to asbestos     3 years    Sebaceous cyst        Past Surgical History:   Procedure Laterality Date    COLON SURGERY      COLONOSCOPY  approx 2015    negative per pt     COLONOSCOPY N/A 5/12/2022    Procedure: COLONOSCOPY INTO CECUM/ TERMINAL ILEUM WITH POLYPECTOMY;  Surgeon: Aidan Branham MD;  Location: [facility] ENDOSCOPY;  Service: Gastroenterology;  Laterality: N/A;  pre: anemia  post: hemorrhoids, normal TI, polyp,  diverticulosis    ENDOSCOPY N/A 5/12/2022    Procedure: ESOPHAGOGASTRODUODENOSCOPY WITH BIOPSIES;  Surgeon: Aidan Branham MD;  Location: [facility] ENDOSCOPY;  Service: Gastroenterology;  Laterality: N/A;  pre: anemia  post: hiatal hernia, bnormal ampulla    OTHER SURGICAL HISTORY      BOWEL BLOCKAGE       Social History     Occupational History    Not on file   Tobacco Use    Smoking status: Never    Smokeless tobacco: Never   Vaping Use    Vaping Use: Never used   Substance and Sexual Activity    Alcohol use: Yes     Comment: pt reports he drinks a fifth of whiskey a day, is a binge drinker, had not drank for 11 months then 2 weeks ago drank again, last drink 4 days ago    Drug use: Never    Sexual activity: Yes     Partners: Female      Social History     Social History Narrative    Not on file       Family History   Problem Relation Age of Onset    Diabetes Other     Cancer Mother     Diabetes Father     Cancer Sister        Review of Systems:     Constitutional:  Denies fever, shaking or chills   Eyes:  Denies change in visual acuity   HEENT:  Denies nasal congestion or sore throat   Respiratory:  Denies cough or shortness of breath   Cardiovascular:  Denies chest pain or edema  Endocrine: Denies tremors, palpitations, intolerance of heat or cold, polyuria, polydipsia.  GI:  Denies abdominal pain, nausea, vomiting, bloody stools or diarrhea  :  Denies frequency, urgency, incontinence, retention, or nocturia.  Musculoskeletal:  Denies numbness tingling or loss of motor function except as above  Integument:  Denies rash, lesion or ulceration   Neurologic:  Denies headache or focal weakness, deficits  Heme:  Denies epistaxis, spontaneous or excessive bleeding, hematuria, melena, fatigue, enlarged or tender lymph nodes.      All other pertinent positives and negatives as noted above in HPI.    Physical Exam: 78 y.o. male    Vitals:    09/21/23 0026 09/21/23 0215 09/21/23 0349 09/21/23 0734   BP: 123/75 120/68  "124/70 128/67   BP Location:  Left arm Left arm Left arm   Patient Position:  Lying Lying Lying   Pulse: 97 96 94 91   Resp: 16 18 18 18   Temp:  97.9 °F (36.6 °C) 97.9 °F (36.6 °C) 98.2 °F (36.8 °C)   TempSrc:  Oral Oral Oral   SpO2: 95% 94% 97%    Weight:  98.9 kg (218 lb)     Height:  180.3 cm (71\")       General:  Awake, alert. No acute distress.      Head/Neck:  Normocephalic, atraumatic.  Conjunctivae and sclerae clear.  Hearing adequate for the exam.  Neck is supple with normal ROM.    Psych:  Affect and demeanor appropriate.    CV:  Regular rate and rhythm.  Hemodynamically stable.    Lungs:  Good chest expansion, breathing unlabored.    Abdomen:  Soft.  Nontender, nondistended.    Extremities:  ***:  Skin appears benign without obvious lacerations, ulcerations or lesions.  No gross deformity of malalignment noted.  Compartments soft without evidence for DVT or compartment syndrome.  No atrophy.  No palpable masses or adenopathy.  Focal tenderness noted over ***.  ROM ***.   No obvious instability although exam is limited due to discomfort.  Strength well-preserved distally.  Sensation to light touch grossly intact distally.  Good skin turgor, brisk cap refill and good pulses distally.    All other extremities atraumatic without gross abnormality.     Diagnostic Tests:    Admission on 09/21/2023   Component Date Value Ref Range Status    Glucose 09/21/2023 104 (H)  65 - 99 mg/dL Final    BUN 09/21/2023 10  8 - 23 mg/dL Final    Creatinine 09/21/2023 0.65 (L)  0.76 - 1.27 mg/dL Final    Sodium 09/21/2023 135 (L)  136 - 145 mmol/L Final    Potassium 09/21/2023 3.9  3.5 - 5.2 mmol/L Final    Chloride 09/21/2023 99  98 - 107 mmol/L Final    CO2 09/21/2023 25.3  22.0 - 29.0 mmol/L Final    Calcium 09/21/2023 9.2  8.6 - 10.5 mg/dL Final    Total Protein 09/21/2023 6.6  6.0 - 8.5 g/dL Final    Albumin 09/21/2023 3.6  3.5 - 5.2 g/dL Final    ALT (SGPT) 09/21/2023 17  1 - 41 U/L Final    AST (SGOT) 09/21/2023 18  1 - " 40 U/L Final    Alkaline Phosphatase 09/21/2023 95  39 - 117 U/L Final    Total Bilirubin 09/21/2023 0.4  0.0 - 1.2 mg/dL Final    Globulin 09/21/2023 3.0  gm/dL Final    A/G Ratio 09/21/2023 1.2  g/dL Final    BUN/Creatinine Ratio 09/21/2023 15.4  7.0 - 25.0 Final    Anion Gap 09/21/2023 10.7  5.0 - 15.0 mmol/L Final    eGFR 09/21/2023 97.7  >60.0 mL/min/1.73 Final    Protime 09/21/2023 14.3 (H)  11.7 - 14.2 Seconds Final    INR 09/21/2023 1.10  0.90 - 1.10 Final    WBC 09/21/2023 6.77  3.40 - 10.80 10*3/mm3 Final    RBC 09/21/2023 3.55 (L)  4.14 - 5.80 10*6/mm3 Final    Hemoglobin 09/21/2023 11.4 (L)  13.0 - 17.7 g/dL Final    Hematocrit 09/21/2023 33.8 (L)  37.5 - 51.0 % Final    MCV 09/21/2023 95.2  79.0 - 97.0 fL Final    MCH 09/21/2023 32.1  26.6 - 33.0 pg Final    MCHC 09/21/2023 33.7  31.5 - 35.7 g/dL Final    RDW 09/21/2023 12.3  12.3 - 15.4 % Final    RDW-SD 09/21/2023 42.4  37.0 - 54.0 fl Final    MPV 09/21/2023 8.6  6.0 - 12.0 fL Final    Platelets 09/21/2023 422  140 - 450 10*3/mm3 Final    Neutrophil % 09/21/2023 57.8  42.7 - 76.0 % Final    Lymphocyte % 09/21/2023 18.9 (L)  19.6 - 45.3 % Final    Monocyte % 09/21/2023 18.3 (H)  5.0 - 12.0 % Final    Eosinophil % 09/21/2023 3.1  0.3 - 6.2 % Final    Basophil % 09/21/2023 1.5  0.0 - 1.5 % Final    Immature Grans % 09/21/2023 0.4  0.0 - 0.5 % Final    Neutrophils, Absolute 09/21/2023 3.91  1.70 - 7.00 10*3/mm3 Final    Lymphocytes, Absolute 09/21/2023 1.28  0.70 - 3.10 10*3/mm3 Final    Monocytes, Absolute 09/21/2023 1.24 (H)  0.10 - 0.90 10*3/mm3 Final    Eosinophils, Absolute 09/21/2023 0.21  0.00 - 0.40 10*3/mm3 Final    Basophils, Absolute 09/21/2023 0.10  0.00 - 0.20 10*3/mm3 Final    Immature Grans, Absolute 09/21/2023 0.03  0.00 - 0.05 10*3/mm3 Final    nRBC 09/21/2023 0.0  0.0 - 0.2 /100 WBC Final     Lab Results (last 24 hours)       Procedure Component Value Units Date/Time    Comprehensive Metabolic Panel [[id number]]  (Abnormal) Collected:  09/21/23 0107    Specimen: Blood Updated: 09/21/23 0136     Glucose 104 mg/dL      BUN 10 mg/dL      Creatinine 0.65 mg/dL      Sodium 135 mmol/L      Potassium 3.9 mmol/L      Chloride 99 mmol/L      CO2 25.3 mmol/L      Calcium 9.2 mg/dL      Total Protein 6.6 g/dL      Albumin 3.6 g/dL      ALT (SGPT) 17 U/L      AST (SGOT) 18 U/L      Alkaline Phosphatase 95 U/L      Total Bilirubin 0.4 mg/dL      Globulin 3.0 gm/dL      A/G Ratio 1.2 g/dL      BUN/Creatinine Ratio 15.4     Anion Gap 10.7 mmol/L      eGFR 97.7 mL/min/1.73     Narrative:      GFR Normal >60  Chronic Kidney Disease <60  Kidney Failure <15    The GFR formula is only valid for adults with stable renal function between ages 18 and 70.    Protime-INR [[id number]]  (Abnormal) Collected: 09/21/23 0107    Specimen: Blood Updated: 09/21/23 0124     Protime 14.3 Seconds      INR 1.10    CBC & Differential [[id number]]  (Abnormal) Collected: 09/21/23 0107    Specimen: Blood Updated: 09/21/23 0113    Narrative:      The following orders were created for panel order CBC & Differential.  Procedure                               Abnormality         Status                     ---------                               -----------         ------                     CBC Auto Differential[[id number]]        Abnormal            Final result                 Please view results for these tests on the individual orders.    CBC Auto Differential [[id number]]  (Abnormal) Collected: 09/21/23 0107    Specimen: Blood Updated: 09/21/23 0113     WBC 6.77 10*3/mm3      RBC 3.55 10*6/mm3      Hemoglobin 11.4 g/dL      Hematocrit 33.8 %      MCV 95.2 fL      MCH 32.1 pg      MCHC 33.7 g/dL      RDW 12.3 %      RDW-SD 42.4 fl      MPV 8.6 fL      Platelets 422 10*3/mm3      Neutrophil % 57.8 %      Lymphocyte % 18.9 %      Monocyte % 18.3 %      Eosinophil % 3.1 %      Basophil % 1.5 %      Immature Grans % 0.4 %      Neutrophils, Absolute 3.91 10*3/mm3      Lymphocytes, Absolute 1.28  10*3/mm3      Monocytes, Absolute 1.24 10*3/mm3      Eosinophils, Absolute 0.21 10*3/mm3      Basophils, Absolute 0.10 10*3/mm3      Immature Grans, Absolute 0.03 10*3/mm3      nRBC 0.0 /100 WBC             Imaging: Right hip x-ray shows no acute fracture.  He does have degenerative changes involving both hips.  Also some degenerative changes in the SI joints bilaterally.  MRI of the right hip shows osteoarthritis without fracture or osteonecrosis and acute fracture of the distal sacrum and coccyx near the sacrococcygeal junction.  He also has bilateral distal sacral ala fractures without displacement.  Also has acute compression fracture of L5 vertebral body with old compression deformity at L4.    Assessment: ***    Plan: Osteoarthritis both hips.  Pain does not appear to be coming from his right hip and most likely due to his sacral fractures as well as lumbar compression fracture.  Neurosurgery to see for further evaluation and recommendations    Date: 9/21/2023    Rhianna Huizar RN    CC: Rick Jolly MD

## 2023-09-21 NOTE — ED PROVIDER NOTES
EMERGENCY DEPARTMENT ENCOUNTER    Room Number:  21/21  Date seen:  9/21/2023  PCP: Alysha Cosme MD  Historian: Patient      HPI:  Chief Complaint: Right hip pain  A complete HPI/ROS/PMH/PSH/SH/FH are unobtainable due to:   Context: Gamal Modi Sr. is a 78 y.o. male who presents to the ED c/o right hip pain.  Patient reports a fall about 1 week ago when he was intoxicated.  He has had pain in the right hip ongoing over 1 week.  Denies any other fall.  He has had problems with alcohol abuse but has been sober over the last roughly 8 days and was hospitalized briefly related to some significant hyponatremia.  He is currently in alcohol treatment program.  At the alcohol treatment program they did a portable x-ray that was suggestive of possible right and left subcapital hip fractures and was sent here for further evaluation and treatment.  Patient does report pain is moderately severe particular when bearing weight on the right hip.  Denies back pain.  Denies recent fever.  States he is doing well with his alcohol treatment and is not using benzodiazepines.  Patient is motivated to go back to his alcohol treatment program once his right hip pain is improved.      MEDICAL RECORD REVIEW (non ED)  I reviewed prior medical records note the patient was hospitalized here several days ago with alcohol abuse and hyponatremia that was corrected.    PAST MEDICAL HISTORY  Active Ambulatory Problems     Diagnosis Date Noted   • Gout 04/11/2016   • Essential hypertension 04/11/2016   • Annual physical exam 05/23/2016   • Hypercholesterolemia 09/06/2017   • Anemia 10/12/2020   • Pulmonary asbestosis 10/12/2020   • Paroxysmal atrial fibrillation 09/20/2021   • Diastolic dysfunction 09/21/2021   • Anemia 09/21/2021   • Fatty liver 09/21/2021   • Abnormal thyroid function test 09/22/2021   • Dizziness 07/29/2022   • Hyponatremia 07/30/2022   • Coronary artery calcification seen on CAT scan 03/28/2023   • Alcohol abuse  09/17/2023     Resolved Ambulatory Problems     Diagnosis Date Noted   • Accelerated hypertension 05/10/2018   • Dyspnea 09/03/2019     Past Medical History:   Diagnosis Date   • AF (atrial fibrillation)    • Cataract 2019   • Elevated cholesterol    • Erectile dysfunction 2018   • Hypertension    • Long term exposure to asbestos    • Sebaceous cyst          PAST SURGICAL HISTORY  Past Surgical History:   Procedure Laterality Date   • COLON SURGERY     • COLONOSCOPY  approx 2015    negative per pt    • COLONOSCOPY N/A 5/12/2022    Procedure: COLONOSCOPY INTO CECUM/ TERMINAL ILEUM WITH POLYPECTOMY;  Surgeon: Aidan Branham MD;  Location: [facility] ENDOSCOPY;  Service: Gastroenterology;  Laterality: N/A;  pre: anemia  post: hemorrhoids, normal TI, polyp, diverticulosis   • ENDOSCOPY N/A 5/12/2022    Procedure: ESOPHAGOGASTRODUODENOSCOPY WITH BIOPSIES;  Surgeon: Aidan Branham MD;  Location: [facility] ENDOSCOPY;  Service: Gastroenterology;  Laterality: N/A;  pre: anemia  post: hiatal hernia, bnormal ampulla   • OTHER SURGICAL HISTORY      BOWEL BLOCKAGE         FAMILY HISTORY  Family History   Problem Relation Age of Onset   • Diabetes Other    • Cancer Mother    • Diabetes Father    • Cancer Sister          SOCIAL HISTORY  Social History     Socioeconomic History   • Marital status:    Tobacco Use   • Smoking status: Never   • Smokeless tobacco: Never   Vaping Use   • Vaping Use: Never used   Substance and Sexual Activity   • Alcohol use: Yes     Comment: pt reports he drinks a fifth of whiskey a day, is a binge drinker, had not drank for 11 months then 2 weeks ago drank again, last drink 4 days ago   • Drug use: Never   • Sexual activity: Yes     Partners: Female         ALLERGIES  Patient has no known allergies.        REVIEW OF SYSTEMS  Review of Systems   Constitutional:  Negative for fever.   Respiratory:  Negative for shortness of breath.    Cardiovascular:  Negative for chest pain.   Musculoskeletal:          Right hip pain as per HPI   All other systems reviewed and are negative.         PHYSICAL EXAM  ED Triage Vitals [09/21/23 0012]   Temp Heart Rate Resp BP SpO2   98.2 °F (36.8 °C) 92 19 123/82 96 %      Temp src Heart Rate Source Patient Position BP Location FiO2 (%)   -- -- -- -- --       Physical Exam    GENERAL: Alert and pleasant male no obvious distress, well-appearing.  HENT: nares patent  EYES: no scleral icterus  CV: regular rhythm, regular rate  RESPIRATORY: normal effort, clear to auscultation bilaterally  ABDOMEN: soft  MUSCULOSKELETAL: Spine-no significant tenderness palpation, good range of motion  Upper extremities-atraumatic  Lower extremities-there is tenderness palpation about the right hip.  Range of motion is intact.  Distal strength sensation pulses grossly intact.  NEURO: Strength sensation and coordination are grossly intact.  Speech and mentation are unremarkable  SKIN: warm, dry      Vital signs and nursing notes reviewed.          LAB RESULTS  Recent Results (from the past 24 hour(s))   Comprehensive Metabolic Panel    Collection Time: 09/21/23  1:07 AM    Specimen: Blood   Result Value Ref Range    Glucose 104 (H) 65 - 99 mg/dL    BUN 10 8 - 23 mg/dL    Creatinine 0.65 (L) 0.76 - 1.27 mg/dL    Sodium 135 (L) 136 - 145 mmol/L    Potassium 3.9 3.5 - 5.2 mmol/L    Chloride 99 98 - 107 mmol/L    CO2 25.3 22.0 - 29.0 mmol/L    Calcium 9.2 8.6 - 10.5 mg/dL    Total Protein 6.6 6.0 - 8.5 g/dL    Albumin 3.6 3.5 - 5.2 g/dL    ALT (SGPT) 17 1 - 41 U/L    AST (SGOT) 18 1 - 40 U/L    Alkaline Phosphatase 95 39 - 117 U/L    Total Bilirubin 0.4 0.0 - 1.2 mg/dL    Globulin 3.0 gm/dL    A/G Ratio 1.2 g/dL    BUN/Creatinine Ratio 15.4 7.0 - 25.0    Anion Gap 10.7 5.0 - 15.0 mmol/L    eGFR 97.7 >60.0 mL/min/1.73   Protime-INR    Collection Time: 09/21/23  1:07 AM    Specimen: Blood   Result Value Ref Range    Protime 14.3 (H) 11.7 - 14.2 Seconds    INR 1.10 0.90 - 1.10   CBC Auto Differential     Collection Time: 09/21/23  1:07 AM    Specimen: Blood   Result Value Ref Range    WBC 6.77 3.40 - 10.80 10*3/mm3    RBC 3.55 (L) 4.14 - 5.80 10*6/mm3    Hemoglobin 11.4 (L) 13.0 - 17.7 g/dL    Hematocrit 33.8 (L) 37.5 - 51.0 %    MCV 95.2 79.0 - 97.0 fL    MCH 32.1 26.6 - 33.0 pg    MCHC 33.7 31.5 - 35.7 g/dL    RDW 12.3 12.3 - 15.4 %    RDW-SD 42.4 37.0 - 54.0 fl    MPV 8.6 6.0 - 12.0 fL    Platelets 422 140 - 450 10*3/mm3    Neutrophil % 57.8 42.7 - 76.0 %    Lymphocyte % 18.9 (L) 19.6 - 45.3 %    Monocyte % 18.3 (H) 5.0 - 12.0 %    Eosinophil % 3.1 0.3 - 6.2 %    Basophil % 1.5 0.0 - 1.5 %    Immature Grans % 0.4 0.0 - 0.5 %    Neutrophils, Absolute 3.91 1.70 - 7.00 10*3/mm3    Lymphocytes, Absolute 1.28 0.70 - 3.10 10*3/mm3    Monocytes, Absolute 1.24 (H) 0.10 - 0.90 10*3/mm3    Eosinophils, Absolute 0.21 0.00 - 0.40 10*3/mm3    Basophils, Absolute 0.10 0.00 - 0.20 10*3/mm3    Immature Grans, Absolute 0.03 0.00 - 0.05 10*3/mm3    nRBC 0.0 0.0 - 0.2 /100 WBC       Ordered the above labs and independently interpreted results. My findings will be discussed in the medical decision making section below        RADIOLOGY  XR Hips Bilateral With or Without Pelvis 5 View    Result Date: 9/21/2023  AP VIEW OF THE PELVIS +5 VIEWS BILATERAL HIPS  HISTORY: Right hip fracture  COMPARISON: None available.  FINDINGS: No acute fracture or subluxation of the bony pelvis is identified. No acute fracture or subluxation of the either hip is seen. There are symmetric degenerative changes involving the hips. There are degenerative changes involving the SI joints bilaterally.      No acute fracture or subluxation identified.  This report was finalized on 9/21/2023 1:09 AM by Dr. Jo Gonsalez M.D.       I ordered and independently reviewed the above noted radiographic studies.      I viewed images of bilateral hips which showed subtle lucency at the right hip worrisome for possible nondisplaced fracture per my independent  interpretation.    See radiologist's dictation for official interpretation.             PROCEDURES  Procedures          MEDICATIONS GIVEN IN ER  Medications   sodium chloride 0.9 % flush 10 mL (has no administration in time range)   oxyCODONE-acetaminophen (PERCOCET) 5-325 MG per tablet 2 tablet (2 tablets Oral Given 9/21/23 0104)               MEDICAL DECISION MAKING, PROGRESS, and CONSULTS    All labs have been independently reviewed by me.  All radiology studies have been reviewed by me and I have also reviewed the radiology report.   EKG's independently viewed and interpreted by me.  Discussion below represents my analysis of pertinent findings related to patient's condition, differential diagnosis, treatment plan and final disposition.      Additional sources:  - Discussed/ obtained information from independent historians: Report from [facility] including portable x-ray report    - External (non-ED) record review: Please see documented above    - Chronic or social conditions impacting care: History of alcohol abuse, history of anticoagulation on Eliquis.    - Shared decision making: I discussed ED evaluation and treatment plan with patient who is in agreement.  With pretty significant right hip pain worse when bearing weight.  He relates his pain going back to a fall about 1 week ago while intoxicated.    X-rays taken portably at alcohol treatment [facility] suggested bilateral subcapital hip fractures and sent here for further evaluation.    Plain films obtained here in the ED did not show obvious displaced fracture.  Given patient's pain with bearing weight plan is to admit to the observation unit for MRI of the hip with possible orthopedic consultation.    Labs checked for worsening of hyponatremia or fairly unremarkable.  Patient is not showing signs to suggest acute alcohol withdrawal.      Orders placed during this visit:  Orders Placed This Encounter   Procedures   • XR Hips Bilateral With  or Without Pelvis 5 View   • MRI Hip Right Without Contrast   • Comprehensive Metabolic Panel   • Protime-INR   • CBC Auto Differential   • May Be Off Telemetry for Tests   • Insert Peripheral IV   • Initiate ED Observation Status   • CBC & Differential           Differential diagnosis:    Please see as documented below in ED course      Independent interpretation of labs, radiology studies, and discussions with consultants:  ED Course as of 09/21/23 0200   Thu Sep 21, 2023   0121 Plain films of the bilateral hips were reviewed and did not show obvious displaced fracture.  There is some subtle area at the subcapital region that could represent nondisplaced hip fracture.    Would consider CT scan or MRI of the hip for further evaluation.  MRI would have greater sensitivity and would lean to this study. [DB]   0159 Labs reviewed are pretty benign.  I do not see evidence of significant infection or anemia on the CBC.    Chemistries do not show significant hepatic or renal failure.  Electrolytes fairly unremarkable.  Prior hyponatremia is now improved.     [DB]   0200 I discussed treatment evaluation this patient with Nichelle Suarez from the ops unit.  Plan to admit to observation with goal to get MRI of the hip in a.m. [DB]      ED Course User Index  [DB] Naresh Overton MD               DIAGNOSIS  Final diagnoses:   Right hip pain   Essential hypertension   Paroxysmal atrial fibrillation   Anticoagulated by anticoagulation treatment         DISPOSITION  Admission            Latest Documented Vital Signs:  As of 02:00 EDT  BP- 123/75 HR- 97 Temp- 98.2 °F (36.8 °C) O2 sat- 95%              --    Please note that portions of this were completed with a voice recognition program.       Note Disclaimer: At [facility], we believe that sharing information builds trust and better relationships. You are receiving this note because you are receiving care at [facility] or recently visited. It is possible you will see [facility]  information before a provider has talked with you about it. This kind of information can be easy to misunderstand. To help you fully understand what it means for your health, we urge you to discuss this note with your provider.             Naresh Overton MD  09/21/23 0209

## 2023-09-21 NOTE — CONSULTS
[facility] NEUROSURGERY CONSULT NOTE    Patient name: Gamal Modi Sr.  Referring Provider: Marian Contreras PA-C   Reason for Consultation: L5 compression fracture, chronic L4 compression fracture, sacral/coccygeal fracture    Patient Care Team:  Alysha Cosme MD as PCP - General (Internal Medicine)    Chief complaint: Right low back/right hip pain    Subjective .     History of present illness:    Patient is a 78 y.o.  male with a history of alcohol abuse who states that he fell last Wednesday 9/13 due to being intoxicated with alcohol.  He does not remember falling nor does he remember if he blacked out or had any other associated issues besides his current symptoms.  The patient was also admitted to the hospital last week on 9/15 for hyponatremia secondary to alcoholism.  He later checked himself into an alcohol dependence treatment center.  He currently denies any leg pain, weakness, numbness.  He denies saddle anesthesia, bowel/bladder incontinence.  He does state that he has pain in his right low back and right hip region.  He states that it hurts him most when he rises up from sitting position.  Patient reports taking Eliquis.    Review of Systems  Review of Systems   Gastrointestinal:         No bowel issues   Genitourinary:  Negative for enuresis.   Musculoskeletal:  Positive for back pain (Low right back and right hip).   Neurological:  Negative for weakness and numbness.     History  PAST MEDICAL HISTORY  Past Medical History:   Diagnosis Date    AF (atrial fibrillation)     Cataract 2019    Elevated cholesterol     Erectile dysfunction 2018    Gout     Hypertension     Long term exposure to asbestos     3 years    Sebaceous cyst        PAST SURGICAL HISTORY  Past Surgical History:   Procedure Laterality Date    COLON SURGERY      COLONOSCOPY  approx 2015    negative per pt     COLONOSCOPY N/A 5/12/2022    Procedure: COLONOSCOPY INTO CECUM/ TERMINAL ILEUM WITH POLYPECTOMY;  Surgeon: Yonas  Aidan TOPETE MD;  Location: [facility] ENDOSCOPY;  Service: Gastroenterology;  Laterality: N/A;  pre: anemia  post: hemorrhoids, normal TI, polyp, diverticulosis    ENDOSCOPY N/A 5/12/2022    Procedure: ESOPHAGOGASTRODUODENOSCOPY WITH BIOPSIES;  Surgeon: Aidan Branham MD;  Location: [facility] ENDOSCOPY;  Service: Gastroenterology;  Laterality: N/A;  pre: anemia  post: hiatal hernia, bnormal ampulla    OTHER SURGICAL HISTORY      BOWEL BLOCKAGE       FAMILY HISTORY  Family History   Problem Relation Age of Onset    Diabetes Other     Cancer Mother     Diabetes Father     Cancer Sister        SOCIAL HISTORY  Social History     Tobacco Use    Smoking status: Never    Smokeless tobacco: Never   Vaping Use    Vaping Use: Never used   Substance Use Topics    Alcohol use: Yes     Comment: pt reports he drinks a fifth of whiskey a day, is a binge drinker, had not drank for 11 months then 2 weeks ago drank again, last drink 4 days ago    Drug use: Never         Allergies:  Patient has no known allergies.    MEDICATIONS:  Medications Prior to Admission   Medication Sig Dispense Refill Last Dose    acetaminophen (TYLENOL) 500 MG tablet Take 2 tablets by mouth Daily.   9/20/2023 at 1200    Apoaequorin (Prevagen) 10 MG capsule Take 1 capsule by mouth Daily.   9/20/2023 at 0800    ascorbic acid (VITAMIN C) 500 MG tablet Take 2 tablets by mouth Daily.   9/20/2023 at 0800    atorvastatin (LIPITOR) 20 MG tablet TAKE ONE TABLET BY MOUTH DAILY 90 tablet 3 9/20/2023 at 0800    calcium carbonate (TUMS) 500 MG chewable tablet Chew 3 tablets Daily As Needed for Indigestion or Heartburn.   Past Week    cetirizine (zyrTEC) 10 MG tablet Take 1 tablet by mouth Daily.   9/20/2023 at 0800    cholecalciferol (VITAMIN D3) 1000 units tablet Take 1 tablet by mouth Daily.   9/20/2023 at 0800    dilTIAZem CD (CARDIZEM CD) 120 MG 24 hr capsule TAKE ONE CAPSULE BY MOUTH DAILY 90 capsule 3 9/20/2023 at 0800    Eliquis 5 MG tablet tablet TAKE ONE TABLET BY MOUTH  EVERY 12 HOURS 180 tablet 3 9/20/2023 at 2000    ferrous sulfate 325 (65 FE) MG tablet Take 1 tablet by mouth Daily With Breakfast.   9/20/2023 at 0800    Multiple Vitamins-Minerals (OCUVITE EXTRA PO) Take 1 tablet by mouth Daily.   9/20/2023 at 0800    thiamine (VITAMIN B1) 100 MG tablet Take 1 tablet by mouth Daily for 10 days. 10 tablet 0 9/20/2023 at 0800    carboxymethylcellulose (REFRESH PLUS) 0.5 % solution Administer 1 drop to both eyes Daily.       multivitamin with minerals (MULTIVITAMIN ADULT PO) Take 1 tablet by mouth Daily.       tadalafil (CIALIS) 5 MG tablet TAKE ONE TABLET BY MOUTH DAILY AS NEEDED FOR ERECTILE DYSFUNCTION 90 tablet 1        Objective     Results Review:  LABS:    Admission on 09/21/2023   Component Date Value Ref Range Status    Glucose 09/21/2023 104 (H)  65 - 99 mg/dL Final    BUN 09/21/2023 10  8 - 23 mg/dL Final    Creatinine 09/21/2023 0.65 (L)  0.76 - 1.27 mg/dL Final    Sodium 09/21/2023 135 (L)  136 - 145 mmol/L Final    Potassium 09/21/2023 3.9  3.5 - 5.2 mmol/L Final    Chloride 09/21/2023 99  98 - 107 mmol/L Final    CO2 09/21/2023 25.3  22.0 - 29.0 mmol/L Final    Calcium 09/21/2023 9.2  8.6 - 10.5 mg/dL Final    Total Protein 09/21/2023 6.6  6.0 - 8.5 g/dL Final    Albumin 09/21/2023 3.6  3.5 - 5.2 g/dL Final    ALT (SGPT) 09/21/2023 17  1 - 41 U/L Final    AST (SGOT) 09/21/2023 18  1 - 40 U/L Final    Alkaline Phosphatase 09/21/2023 95  39 - 117 U/L Final    Total Bilirubin 09/21/2023 0.4  0.0 - 1.2 mg/dL Final    Globulin 09/21/2023 3.0  gm/dL Final    A/G Ratio 09/21/2023 1.2  g/dL Final    BUN/Creatinine Ratio 09/21/2023 15.4  7.0 - 25.0 Final    Anion Gap 09/21/2023 10.7  5.0 - 15.0 mmol/L Final    eGFR 09/21/2023 97.7  >60.0 mL/min/1.73 Final    Protime 09/21/2023 14.3 (H)  11.7 - 14.2 Seconds Final    INR 09/21/2023 1.10  0.90 - 1.10 Final    WBC 09/21/2023 6.77  3.40 - 10.80 10*3/mm3 Final    RBC 09/21/2023 3.55 (L)  4.14 - 5.80 10*6/mm3 Final    Hemoglobin  09/21/2023 11.4 (L)  13.0 - 17.7 g/dL Final    Hematocrit 09/21/2023 33.8 (L)  37.5 - 51.0 % Final    MCV 09/21/2023 95.2  79.0 - 97.0 fL Final    MCH 09/21/2023 32.1  26.6 - 33.0 pg Final    MCHC 09/21/2023 33.7  31.5 - 35.7 g/dL Final    RDW 09/21/2023 12.3  12.3 - 15.4 % Final    RDW-SD 09/21/2023 42.4  37.0 - 54.0 fl Final    MPV 09/21/2023 8.6  6.0 - 12.0 fL Final    Platelets 09/21/2023 422  140 - 450 10*3/mm3 Final    Neutrophil % 09/21/2023 57.8  42.7 - 76.0 % Final    Lymphocyte % 09/21/2023 18.9 (L)  19.6 - 45.3 % Final    Monocyte % 09/21/2023 18.3 (H)  5.0 - 12.0 % Final    Eosinophil % 09/21/2023 3.1  0.3 - 6.2 % Final    Basophil % 09/21/2023 1.5  0.0 - 1.5 % Final    Immature Grans % 09/21/2023 0.4  0.0 - 0.5 % Final    Neutrophils, Absolute 09/21/2023 3.91  1.70 - 7.00 10*3/mm3 Final    Lymphocytes, Absolute 09/21/2023 1.28  0.70 - 3.10 10*3/mm3 Final    Monocytes, Absolute 09/21/2023 1.24 (H)  0.10 - 0.90 10*3/mm3 Final    Eosinophils, Absolute 09/21/2023 0.21  0.00 - 0.40 10*3/mm3 Final    Basophils, Absolute 09/21/2023 0.10  0.00 - 0.20 10*3/mm3 Final    Immature Grans, Absolute 09/21/2023 0.03  0.00 - 0.05 10*3/mm3 Final    nRBC 09/21/2023 0.0  0.0 - 0.2 /100 WBC Final       DIAGNOSTICS:      MRI lumbar spine without contrast 9/21/2023: Pending      MRI right hip without contrast 9/21/2023:  Shows acute fracture of the distal sacrum/coccyx near sacrococcygeal junction and small bilateral distal sacral alar fractures without displacement.  There is an acute L5 vertebral compression fracture and what looks like a chronic L4 compression fracture.  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        Results Review:   I reviewed the patient's new clinical results.  I personally viewed and interpreted the patient's labs, imaging studies, medications and chart.    Vital Signs   Temp:  [97.9 °F (36.6 °C)-98.5 °F (36.9 °C)] 98.5 °F (36.9 °C)  Heart Rate:   "[91-97] 91  Resp:  [16-19] 18  BP: (114-128)/(67-95) 114/95    Physical Exam:  Physical Exam  Vitals reviewed.   Constitutional:       General: He is not in acute distress.     Appearance: Normal appearance. He is not ill-appearing, toxic-appearing or diaphoretic.   HENT:      Head: Normocephalic.      Nose: Nose normal.      Mouth/Throat:      Mouth: Mucous membranes are moist.      Pharynx: Oropharynx is clear.   Eyes:      Extraocular Movements: Extraocular movements intact.      Conjunctiva/sclera: Conjunctivae normal.   Cardiovascular:      Rate and Rhythm: Normal rate.   Pulmonary:      Effort: Pulmonary effort is normal.   Musculoskeletal:         General: Normal range of motion.      Cervical back: Normal range of motion.        Legs:       Comments: Reports general \"aching\" pain in this area    Right Da maneuver negative   Skin:     General: Skin is warm.   Neurological:      General: No focal deficit present.      Mental Status: He is alert and oriented to person, place, and time. Mental status is at baseline.      Deep Tendon Reflexes:      Reflex Scores:       Patellar reflexes are 2+ on the right side and 2+ on the left side.       Achilles reflexes are 2+ on the right side and 2+ on the left side.  Psychiatric:         Mood and Affect: Mood normal.         Speech: Speech normal.         Behavior: Behavior normal.         Thought Content: Thought content normal.         Judgment: Judgment normal.     Neurologic Exam     Mental Status   Oriented to person, place, and time.   Attention: normal. Concentration: normal.   Speech: speech is normal   Level of consciousness: alert  Knowledge: consistent with education.     Motor Exam   Muscle bulk: normal  Overall muscle tone: normal  Right arm tone: normal  Left arm tone: normal    Strength   Right iliopsoas: 5/5  Left iliopsoas: 5/5  Right quadriceps: 5/5  Left quadriceps: 5/5  Right hamstrin/5  Left hamstrin/5  Right anterior tibial: 5/5  Left " "anterior tibial: 5/5  Right posterior tibial: 5/5  Left posterior tibial: 5/5  Right gastroc: 5/5  Left gastroc: 5/5    Sensory Exam   Light touch normal.     Gait, Coordination, and Reflexes     Gait  Gait: (Gait deferred)    Reflexes   Right patellar: 2+  Left patellar: 2+  Right achilles: 2+  Left achilles: 2+  Right ankle clonus: absent  Left ankle clonus: absent    Assessment & Plan       Right hip pain    Alcohol dependence    Patient with fall last Wednesday found to have bilateral distal sacral alar and sacrococcygeal fractures along with L4 and L5 vertebral compression fractures.   Neurosurgery will review lumbar MRI to determine acuity of lumbar fractures and then make a decision on management.        PLAN:     lumbar MRI      I discussed the patient's findings and my recommendations with patient, nursing staff, and Dr. Jaramillo.    During patient visit, I utilized appropriate personal protective equipment including mask and gloves.  Mask used was standard procedure mask. Appropriate PPE was worn during the entire visit.  Hand hygiene was completed before and after.     Franck Teague, APRN  09/21/23  12:17 EDT    \"Dictated utilizing Dragon dictation\".    "

## 2023-09-21 NOTE — PLAN OF CARE
Goal Outcome Evaluation:      Patient admitted to the observation unit for a right hip possible fracture after a fall. MRI scheduled for this morning. Vss. Will continue to monitor for any changes

## 2023-09-21 NOTE — PROGRESS NOTES
ED OBSERVATION PROGRESS/DISCHARGE SUMMARY    Date of Admission: 9/21/2023   LOS: 0 days   PCP: Alysha Cosme MD      Subjective:  Reports he still having quite a bit of pain mainly around the right hip.  He denies any numbness or tingling.  Denies chest pain or shortness of breath.  Denies lightheadedness and dizziness.  Denies abdominal pain and nausea.    Hospital Outcome:   78-year-old male admitted to the observation unit for further evaluation of ongoing right hip discomfort after falling a week ago.  In the ER, plain films of the bilateral hips showed no acute fracture or subluxation identified.  An MRI of the right hip without revealed acute fracture of the distal sacrum/coccyx near the sacrococcygeal junction and small bilateral distal sacral alar fractures without displacement.  There is also note of an L5 compression fracture with recommendation for MRI of the lumbar spine.  Is also note of a chronic compression formerly at L4.    Orthopedics saw and evaluated the patient, he is a nonsurgical candidate.  No further work-up but advised for neurosurgery evaluation and recommendations.  Neurosurgery has been consulted.  An MRI of the lumbar spine is pending.  Awaiting results but tentative plan to have patient fitted for back brace at discharge if findings stable.    ROS:  General: no fevers, chills  Respiratory: no cough, dyspnea  Cardiovascular: no chest pain, palpitations  Abdomen: No abdominal pain, nausea, vomiting, or diarrhea  Neurologic: No focal weakness    Objective   Physical Exam:  I have reviewed the vital signs.  Temp:  [97.9 °F (36.6 °C)-98.5 °F (36.9 °C)] 98.2 °F (36.8 °C)  Heart Rate:  [86-97] 86  Resp:  [16-19] 17  BP: (114-136)/(67-95) 136/76  General Appearance:  78-year-old male in no acute distress on room air  Head:    Normocephalic, atraumatic  Eyes:    Sclerae anicteric  Neck:   Supple, no mass  Lungs: Clear to auscultation bilaterally, respirations unlabored  Heart: Regular rate  and rhythm, S1 and S2 normal, no murmur, rub or gallop  Abdomen:  Soft, non-tender, bowel sounds active, nondistended  Extremities: No clubbing, cyanosis, or edema to lower extremities  Pulses:  2+ and symmetric in distal lower extremities  Skin: No rashes   Neurologic: Oriented x3, Normal strength to extremities    Results Review:    I have reviewed the labs, radiology results and diagnostic studies.    Results from last 7 days   Lab Units 09/21/23  0107   WBC 10*3/mm3 6.77   HEMOGLOBIN g/dL 11.4*   HEMATOCRIT % 33.8*   PLATELETS 10*3/mm3 422     Results from last 7 days   Lab Units 09/21/23  0107 09/18/23  0545 09/18/23  0004 09/16/23  0828 09/16/23  0604   SODIUM mmol/L 135* 134*  134* 128*   < > 124*   POTASSIUM mmol/L 3.9 4.0  4.0 4.2   < > 3.2*   CHLORIDE mmol/L 99 93*  93* 91*   < > 84*   CO2 mmol/L 25.3 29.8*  29.8* 27.5   < > 30.0*   BUN mg/dL 10 9  9 10   < > 11   CREATININE mg/dL 0.65* 0.65*  0.65* 0.61*   < > 0.69*   CALCIUM mg/dL 9.2 9.1  9.1 8.9   < > 8.9   BILIRUBIN mg/dL 0.4 0.7  --   --  0.7   ALK PHOS U/L 95 89  --   --  97   ALT (SGPT) U/L 17 25  --   --  36   AST (SGOT) U/L 18 24  --   --  45*   GLUCOSE mg/dL 104* 103*  103* 119*   < > 108*    < > = values in this interval not displayed.     Imaging Results (Last 24 Hours)       Procedure Component Value Units Date/Time    MRI Hip Right Without Contrast [[id number]] Collected: 09/21/23 0751     Updated: 09/21/23 0935    Narrative:      MRI RIGHT HIP WITHOUT CONTRAST        HISTORY: Fall 1 week ago. Right hip pain.     TECHNIQUE:  MRI includes axial and coronal T1 and T2 fat-saturated or  STIR sequences through both hips and a sagittal PD weighted sequence  through the right hip.     COMPARISON: X-rays of the pelvis and both hips 09/21/2023.     FINDINGS: There is osteoarthritis of the right hip with degenerative  subchondral cyst formation within the lateral acetabular roof. There is  a degenerative tear of the superior lateral and  anterior labrum and a 12  mm paralabral cyst that extends above the superior lateral labrum. There  is marginal osteophyte formation.     There are also osteoarthritic changes of the left hip joint where there  is marginal spur formation. Degenerative subchondral cyst formation is  present within the lateral aspect of the left acetabular roof. There is  no hip fracture or osteonecrosis.     There is a transverse fracture through the distal sacrum/coccyx near the  sacrococcygeal junction with surrounding bone marrow edema. There is  also presacral edema. There are subtle fractures of the caudal aspect of  the sacral ala. There is no evidence for displacement.     There is also abnormal T1 hypointense and T2 hyperintense signal  throughout the L5 vertebral body. There is horizontal fracture plane  demonstrated within the superior aspect of L5 vertebral body and there  is approximately 40% diminished height. Mild T2 hyperintense signal is  present within the L4-5 disc space. There is chronic compression  deformity of L4.       Impression:      1. Acute fracture of the distal sacrum/coccyx near the sacrococcygeal  junction and small bilateral distal sacral alar fractures without  displacement.  2. Acute compression fracture of the L5 vertebral body. MRI lumbar spine  would be helpful for further evaluation.  3. Chronic compression deformity L4.  4. Osteoarthritis of both hips without hip fracture or osteonecrosis.     This report was finalized on 9/21/2023 9:32 AM by Dr. Joel Medrano M.D.       XR Hips Bilateral With or Without Pelvis 5 View [[id number]] Collected: 09/21/23 0107     Updated: 09/21/23 0112    Narrative:      AP VIEW OF THE PELVIS +5 VIEWS BILATERAL HIPS     HISTORY: Right hip fracture     COMPARISON: None available.     FINDINGS:  No acute fracture or subluxation of the bony pelvis is identified. No  acute fracture or subluxation of the either hip is seen. There are  symmetric degenerative changes  involving the hips. There are  degenerative changes involving the SI joints bilaterally.       Impression:      No acute fracture or subluxation identified.     This report was finalized on 9/21/2023 1:09 AM by Dr. Jo Gonsalez M.D.               I have reviewed the medications.  ---------------------------------------------------------------------------------------------  Assessment & Plan   Assessment/Problem List    Right hip pain    Alcohol dependence    Closed compression fracture of L5 lumbar vertebra, initial encounter    Sacral fracture, closed, multiple      Plan:    Right hip pain  Multiple sacral fractures  L5 compression fracture  -Bilateral hip x-ray shows no evidence of fracture or subluxation  -MRI right hip shows osteoarthritis without fracture or osteonecrosis and acute fracture of the distal sacrum and coccyx near the sacrococcygeal junction, also has bilateral distal sacral Aller fractures without displacement as well as an acute compression fracture of the L5 vertebral body and old compression deformity at L4  -Analgesic as needed  -Orthopedic consulted, nonsurgical candidate  -Neurosurgery consulted, awaiting MRI  -MRI of the lumbar spine ordered     Paroxysmal A-fib  -Continue Eliquis  -Cardiac monitoring     Hyperlipidemia  -Continue statin     Hypertension  -Continue Cardizem  -Vital signs per nursing       Disposition: Awaiting MRI of the lumbar spine        55 minutes have been spent by [facility] providers in the care of this patient while under observation status.    Appropriate PPE worn during patient encounter.  Hand hygeine performed before and after seeing the patient.      Marian Contreras PA-C 09/21/23 16:01 EDT

## 2023-09-21 NOTE — PROGRESS NOTES
MD ATTESTATION NOTE    The NIKUNJ and I have discussed this patient's history, physical exam, and treatment plan.  I have reviewed the documentation and personally had a face to face interaction with the patient. I affirm the documentation and agree with the treatment and plan.  The attached note describes my personal findings.      I provided a substantive portion of the care of the patient.  I personally performed the physical exam in its entirety, and below are my findings.       Brief HPI: This patient is a 78-year-old male admitted to the observation unit today due to ongoing right hip discomfort.  He reports that 1 week ago he fell and ambulation has become markedly more difficult since that fall due to the right hip pain.  He denies any other injuries or any further trauma.      PHYSICAL EXAM  ED Triage Vitals   Temp Heart Rate Resp BP SpO2   09/21/23 0012 09/21/23 0012 09/21/23 0012 09/21/23 0012 09/21/23 0012   98.2 °F (36.8 °C) 92 19 123/82 96 %      Temp src Heart Rate Source Patient Position BP Location FiO2 (%)   09/21/23 0215 09/21/23 0215 09/21/23 0215 09/21/23 0215 --   Oral Monitor Lying Left arm        GENERAL: Resting comfortably and in no acute distress, nontoxic in appearance  HENT: nares patent  EYES: no scleral icterus  CV: regular rhythm, normal rate, no M/R/G  RESPIRATORY: normal effort, lungs clear bilaterally  ABDOMEN: soft, nontender, no rebound or guarding  MUSCULOSKELETAL: no deformity, no edema, no tenderness to palpation but mild increased discomfort with range of motion of the right lower extremity.  NEURO: alert, moves all extremities, follows commands  PSYCH:  calm, cooperative  SKIN: warm, dry    Vital signs and nursing notes reviewed.        Plan: The patient's right hip MRI does show an acute fracture at the sacrococcygeal region as well as at L5.  We will obtain an MRI of his lumbar spine and ask orthopedics and neurosurgery to consult.  We will reassess following.

## 2023-09-21 NOTE — ED TRIAGE NOTES
"Pt presents via EMS from [facility] after a fall last week. Pt started having bilateral hip pain on Sunday. Pt received portable hip xrays at the rehab that showed \"suspected bilateral hip fractures.\"  "

## 2023-09-22 ENCOUNTER — READMISSION MANAGEMENT (OUTPATIENT)
Dept: CALL CENTER | Facility: HOSPITAL | Age: 78
End: 2023-09-22
Payer: COMMERCIAL

## 2023-09-22 ENCOUNTER — TELEPHONE (OUTPATIENT)
Dept: NEUROSURGERY | Facility: CLINIC | Age: 78
End: 2023-09-22
Payer: COMMERCIAL

## 2023-09-22 VITALS
RESPIRATION RATE: 16 BRPM | TEMPERATURE: 97.5 F | DIASTOLIC BLOOD PRESSURE: 80 MMHG | HEIGHT: 71 IN | OXYGEN SATURATION: 95 % | HEART RATE: 88 BPM | WEIGHT: 218 LBS | SYSTOLIC BLOOD PRESSURE: 143 MMHG | BODY MASS INDEX: 30.52 KG/M2

## 2023-09-22 DIAGNOSIS — S32.050A CLOSED COMPRESSION FRACTURE OF L5 LUMBAR VERTEBRA, INITIAL ENCOUNTER: Primary | ICD-10-CM

## 2023-09-22 PROCEDURE — G0378 HOSPITAL OBSERVATION PER HR: HCPCS

## 2023-09-22 PROCEDURE — 99213 OFFICE O/P EST LOW 20 MIN: CPT

## 2023-09-22 PROCEDURE — 97162 PT EVAL MOD COMPLEX 30 MIN: CPT

## 2023-09-22 RX ORDER — OXYCODONE HYDROCHLORIDE AND ACETAMINOPHEN 5; 325 MG/1; MG/1
1 TABLET ORAL EVERY 6 HOURS PRN
Qty: 12 TABLET | Refills: 0 | Status: SHIPPED | OUTPATIENT
Start: 2023-09-22

## 2023-09-22 RX ADMIN — SENNOSIDES AND DOCUSATE SODIUM 2 TABLET: 50; 8.6 TABLET ORAL at 08:22

## 2023-09-22 RX ADMIN — APIXABAN 5 MG: 5 TABLET, FILM COATED ORAL at 04:01

## 2023-09-22 RX ADMIN — OXYCODONE HYDROCHLORIDE AND ACETAMINOPHEN 2 TABLET: 5; 325 TABLET ORAL at 09:47

## 2023-09-22 RX ADMIN — ATORVASTATIN CALCIUM 20 MG: 20 TABLET, FILM COATED ORAL at 08:22

## 2023-09-22 RX ADMIN — OXYCODONE HYDROCHLORIDE AND ACETAMINOPHEN 2 TABLET: 5; 325 TABLET ORAL at 04:34

## 2023-09-22 RX ADMIN — Medication 10 ML: at 08:24

## 2023-09-22 RX ADMIN — DILTIAZEM HYDROCHLORIDE 120 MG: 120 CAPSULE, COATED, EXTENDED RELEASE ORAL at 13:01

## 2023-09-22 NOTE — DISCHARGE SUMMARY
ED OBSERVATION PROGRESS/DISCHARGE SUMMARY    Date of Admission: 9/21/2023   LOS: 0 days   PCP: Alysha Cosme MD    Subjective:  Patient reports pain is well controlled today.  He was able to get up without much difficulty with walker.  He denies any numbness or tingling.  Denies chest pain or shortness of breath.  Denies headache and lightheadedness.  Denies abdominal pain and nausea.    Hospital Outcome:   78-year-old male admitted to the observation unit for further evaluation of ongoing right hip discomfort after falling a week ago.  In the ER, plain films of the bilateral hips showed no acute fracture or subluxation identified.  An MRI of the right hip without revealed acute fracture of the distal sacrum/coccyx near the sacrococcygeal junction and small bilateral distal sacral alar fractures without displacement.  There is also note of an L5 compression fracture with recommendation for MRI of the lumbar spine.  Is also note of a chronic compression formerly at L4.     Orthopedics saw and evaluated the patient, he is a nonsurgical candidate.  No further work-up but advised for neurosurgery evaluation and recommendations. Neurosurgery has been consulted.  An MRI of the lumbar spine shows an an acute burst compression fracture of L5 with 30 to 35% loss of height, and 2 to 3 mm retropulsion into the central spinal canal with increased fluid of the L4-5 interspinous space.    Neurosurgery saw and evaluated the patient today.  LSO brace have been placed and patient has been cleared for discharge with plans to follow-up in the neurosurgery clinic in 3 weeks for repeat x-rays.  PT saw and evaluated the patient and patient was able to ambulate safely with walker.  Patient will also have follow-up with orthopedics outpatient.  I discussed all the findings and plan with the patient who endorses understanding is in agreement.      ROS:  General: no fevers, chills  Respiratory: no cough, dyspnea  Cardiovascular: no chest  pain, palpitations  Abdomen: No abdominal pain, nausea, vomiting, or diarrhea  Neurologic: No focal weakness    Objective   Physical Exam:  I have reviewed the vital signs.  Temp:  [97.2 °F (36.2 °C)-98.4 °F (36.9 °C)] 97.2 °F (36.2 °C)  Heart Rate:  [79-88] 88  Resp:  [16-18] 16  BP: (136-146)/(72-83) 142/82  General Appearance:    Alert, cooperative, no distress  Head:    Normocephalic, atraumatic  Eyes:    Sclerae anicteric  Neck:   Supple, no mass  Lungs: Clear to auscultation bilaterally, respirations unlabored  Heart: Regular rate and rhythm, S1 and S2 normal, no murmur, rub or gallop  Abdomen:  Soft, non-tender, bowel sounds active, nondistended  Extremities: No clubbing, cyanosis, or edema to lower extremities, some tenderness to palpation the right hip and pain with some movement, LSO brace in place  Pulses:  2+ and symmetric in distal lower extremities  Skin: No rashes   Neurologic: Oriented x3, Normal strength to extremities    Results Review:    I have reviewed the labs, radiology results and diagnostic studies.    Results from last 7 days   Lab Units 09/21/23  0107   WBC 10*3/mm3 6.77   HEMOGLOBIN g/dL 11.4*   HEMATOCRIT % 33.8*   PLATELETS 10*3/mm3 422     Results from last 7 days   Lab Units 09/21/23  0107 09/18/23  0545 09/18/23  0004 09/16/23  0828 09/16/23  0604   SODIUM mmol/L 135* 134*  134* 128*   < > 124*   POTASSIUM mmol/L 3.9 4.0  4.0 4.2   < > 3.2*   CHLORIDE mmol/L 99 93*  93* 91*   < > 84*   CO2 mmol/L 25.3 29.8*  29.8* 27.5   < > 30.0*   BUN mg/dL 10 9  9 10   < > 11   CREATININE mg/dL 0.65* 0.65*  0.65* 0.61*   < > 0.69*   CALCIUM mg/dL 9.2 9.1  9.1 8.9   < > 8.9   BILIRUBIN mg/dL 0.4 0.7  --   --  0.7   ALK PHOS U/L 95 89  --   --  97   ALT (SGPT) U/L 17 25  --   --  36   AST (SGOT) U/L 18 24  --   --  45*   GLUCOSE mg/dL 104* 103*  103* 119*   < > 108*    < > = values in this interval not displayed.     Imaging Results (Last 24 Hours)       Procedure Component Value Units  Date/Time    MRI Lumbar Spine Without Contrast [[id number]] Collected: 09/21/23 1740     Updated: 09/21/23 1834    Narrative:      LUMBAR SPINE MRI     HISTORY: Low back pain after fall     TECHNIQUE: Multiphase multisequence MR images of the lumbar spine were  obtained without contrast.     FINDINGS NO COMPRESSION:  For the purposes of this dictation the last well-formed disc space will  be called L5-S1.     The conus medullaris ends at L1.     The spinal cord is normal in signal and morphology.     There is an acute burst compression fracture of L5 resulting in  approximately 30 to 35% loss of height. There is approximately 2 to 3 mm  retropulsion into the central spinal canal. There is increased fluid of  the L4-5 interspinous space. There is mild to moderate central canal  stenosis at L4-5. Mild to moderate neuroforaminal encroachment is  present bilaterally at L4-5 and L5-S1.           This report was finalized on 9/21/2023 6:31 PM by Dr. Constantino Murdock M.D.               I have reviewed the medications.  ---------------------------------------------------------------------------------------------  Assessment & Plan   Assessment/Problem List    Right hip pain    Alcohol dependence    Closed compression fracture of L5 lumbar vertebra, initial encounter    Sacral fracture, closed, multiple      Plan:    Right hip pain  Multiple sacral fractures  L5 compression fracture  -Bilateral hip x-ray shows no evidence of fracture or subluxation  -MRI right hip shows osteoarthritis without fracture or osteonecrosis and acute fracture of the distal sacrum and coccyx near the sacrococcygeal junction, also has bilateral distal sacral Aller fractures without displacement as well as an acute compression fracture of the L5 vertebral body and old compression deformity at L4  -Analgesic as needed  -Orthopedic consulted, nonsurgical candidate will schedule outpatient follow-up  -MRI of the lumbar spine shows an acute burst  compression fracture of L5 with 30 to 35% loss of height, 2 to 3 mm anterolisthesis into the central canal  -Neurosurgery has seen and evaluated the patient, LSO brace ordered and placed follow-up in their office in 3 weeks with repeat lumbar x-rays  -PT consulted, patient able to ambulate safely with walker    Paroxysmal A-fib  -Continue Eliquis  -Cardiac monitoring     Hyperlipidemia  -Continue statin     Hypertension  -Continue Cardizem  -Vital signs per nursing      Disposition: Home    Follow-up after Discharge: Neurosurgery, orthopedics, PCP    Marian Contreras PA-C 09/22/23 12:24 EDT    I have worn appropriate PPE during this patient encounter, sanitized my hands both with entering and exiting patient's room.      48 minutes has been spent by [facility] providers in the care of this patient while under observation status

## 2023-09-22 NOTE — DISCHARGE INSTRUCTIONS
You will have follow-up with neurosurgery in 3 weeks for repeat imaging, the office will contact you regarding this appointment.  You will have follow-up with orthopedics, the office will contact you regarding this appointment.      Return to the emergency department with worsening symptoms, uncontrolled pain, inability to tolerate oral liquids, fever greater than 101°F not controlled by Tylenol or as needed with emergent concerns.

## 2023-09-22 NOTE — PROGRESS NOTES
[facility] THORACIC/LUMBAR NEUROSURGERY PROGRESS NOTE      CC: Low right back and buttock pain      Subjective     Interval History: Patient does not report any new symptoms today.  Still continued to have mild to moderate low back and right buttock pain.  Feels he is ready to go back to his rehab facility.    ROS:  Constitutional: No fever, chills  MS: back pain  Neuro: No numbness, tingling, or weakness,  no balance difficulties  : No difficulty voiding, no incontinence    Objective     Vital signs in last 24 hours:  Temp:  [97.2 °F (36.2 °C)-98.5 °F (36.9 °C)] 97.2 °F (36.2 °C)  Heart Rate:  [79-91] 88  Resp:  [16-18] 16  BP: (114-146)/(72-95) 142/82    Intake/Output this shift:  I/O this shift:  In: 600 [P.O.:600]  Out: -     LABS:      IMAGING STUDIES:  MRI lumbar spine without contrast 9/21/2023:  Shows acute L5 compression fracture with slight 30 to 35% height loss and approximately 2 to 3 mm retropulsion in the central spinal canal.  There is increased fluid of the L4-5 interspinous space.  There is moderate central canal stenosis at L4-5 with mild to moderate neuroforaminal encroachment bilaterally at L4-5 and L5-S1.    I personally viewed and interpreted the patient's labs, imaging study, medications and chart.    Meds reviewed/changed: Yes    Current Facility-Administered Medications:     apixaban (ELIQUIS) tablet 5 mg, 5 mg, Oral, Q12H, Qadah, Abdalhakim, APRN, 5 mg at 09/22/23 0401    atorvastatin (LIPITOR) tablet 20 mg, 20 mg, Oral, Daily, Qadah, Abdalhakim, APRN, 20 mg at 09/22/23 0822    sennosides-docusate (PERICOLACE) 8.6-50 MG per tablet 2 tablet, 2 tablet, Oral, BID, 2 tablet at 09/22/23 0822 **AND** polyethylene glycol (MIRALAX) packet 17 g, 17 g, Oral, Daily PRN **AND** bisacodyl (DULCOLAX) EC tablet 5 mg, 5 mg, Oral, Daily PRN **AND** bisacodyl (DULCOLAX) suppository 10 mg, 10 mg, Rectal, Daily PRN, Zenon Suarez, MATT    dilTIAZem CD (CARDIZEM CD) 24 hr capsule 120 mg, 120 mg, Oral, Daily,  Qadah, Abdalhakim, APRN, 120 mg at 09/21/23 0850    oxyCODONE-acetaminophen (PERCOCET) 5-325 MG per tablet 2 tablet, 2 tablet, Oral, Q4H PRN, Rick Jolly MD, 2 tablet at 09/22/23 0434    sodium chloride 0.9 % flush 10 mL, 10 mL, Intravenous, Q12H, Qadah, Abdalhakim, APRN, 10 mL at 09/22/23 0824    sodium chloride 0.9 % flush 10 mL, 10 mL, Intravenous, PRN, Qadah, Abdalhakim, APRN    sodium chloride 0.9 % infusion 40 mL, 40 mL, Intravenous, PRN, Qadah, Abdalhakim, APRN      Physical Exam:    General:   Awake, alert.  Back:    Awaiting LSO brace.   Motor:    Normal muscle strength, bulk and tone in lower extremities.  No fasciculations, rigidity, spasticity, or abnormal movements.    Sensation:   Normal to light touch shahla LEs  Station and Gait:             Walks to the bathroom without difficulty per patient.  Extremities:   Wearing SCD      Assessment & Plan     ASSESSMENT:      Right hip pain    Alcohol dependence    Closed compression fracture of L5 lumbar vertebra, initial encounter    Sacral fracture, closed, multiple    Patient with fall last Wednesday found to have bilateral distal sacral alar and sacrococcygeal fractures along with acute L5 vertebral compression fracture.  He does not report any new symptoms today and feels like he is ready to go back to his rehab facility.  Neurosurgery has supplied the patient with an LSO brace.  He will call if he develops any new symptoms such as pain, weakness or numbness affecting the lower extremities or any new bowel/bladder incontinence/saddle anesthesia.  Follow-up with neurosurgery in 3 weeks with lumbar x-ray.      PLAN:     LSO brace  Neurosurgery follow-up in 3 weeks with lumbar x-ray      A LSO brace has been ordered due to diagnosis of L5 vertebral compression fracture.  The purpose of the brace is to support weak muscles, stabilize and restrict movement of the trunk to aid in healing and pain relief of (fracture/ postop).      No lift, push, pull more  "than 5 pounds, no swimming, no bending or twisting. No exertional or impact activity- walking is OK. Wear brace when sitting higher than 30 degrees, standing, walking. OK to remove brace for showers. (until directed by neurosurgery provider.)    I discussed the patient's findings and my recommendations with patient, consulting provider, and Dr. Jaramillo.    During patient visit, I utilized appropriate personal protective equipment including mask and gloves.  Mask used was standard procedure mask. Appropriate PPE was worn during the entire visit.  Hand hygiene was completed before and after.      LOS: 0 days       [facility], APRN 9/22/2023  09:43 EDT    \"Dictated utilizing Dragon dictation\".    "

## 2023-09-22 NOTE — THERAPY EVALUATION
Patient Name: Gamal Modi Sr.  : 1945    MRN: [id number]                              Today's Date: 2023       Admit Date: 2023    Visit Dx:     ICD-10-CM ICD-9-CM   1. Right hip pain  M25.551 719.45   2. Essential hypertension  I10 401.9   3. Paroxysmal atrial fibrillation  I48.0 427.31   4. Anticoagulated by anticoagulation treatment  Z79.01 V58.61   5. Closed compression fracture of L5 lumbar vertebra, initial encounter  S32.050A 805.4     Patient Active Problem List   Diagnosis    Gout    Essential hypertension    Annual physical exam    Hypercholesterolemia    Anemia    Pulmonary asbestosis    Paroxysmal atrial fibrillation    Diastolic dysfunction    Anemia    Fatty liver    Abnormal thyroid function test    Dizziness    Hyponatremia    Coronary artery calcification seen on CAT scan    Alcohol abuse    Right hip pain    Alcohol dependence    Closed compression fracture of L5 lumbar vertebra, initial encounter    Sacral fracture, closed, multiple     Past Medical History:   Diagnosis Date    AF (atrial fibrillation)     Cataract 2019    Elevated cholesterol     Erectile dysfunction 2018    Gout     Hypertension     Long term exposure to asbestos     3 years    Sebaceous cyst      Past Surgical History:   Procedure Laterality Date    COLON SURGERY      COLONOSCOPY  approx     negative per pt     COLONOSCOPY N/A 2022    Procedure: COLONOSCOPY INTO CECUM/ TERMINAL ILEUM WITH POLYPECTOMY;  Surgeon: Aidan Branham MD;  Location: [facility] ENDOSCOPY;  Service: Gastroenterology;  Laterality: N/A;  pre: anemia  post: hemorrhoids, normal TI, polyp, diverticulosis    ENDOSCOPY N/A 2022    Procedure: ESOPHAGOGASTRODUODENOSCOPY WITH BIOPSIES;  Surgeon: Aidan Branham MD;  Location: [facility] ENDOSCOPY;  Service: Gastroenterology;  Laterality: N/A;  pre: anemia  post: hiatal hernia, bnormal ampulla    OTHER SURGICAL HISTORY      BOWEL BLOCKAGE      General Information       Row Name  09/22/23 1302          Physical Therapy Time and Intention    Document Type evaluation  -EM     Mode of Treatment individual therapy;physical therapy  -EM       Row Name 09/22/23 1302          General Information    Patient Profile Reviewed yes  -EM     Prior Level of Function independent:  -EM     Existing Precautions/Restrictions fall  -EM       Row Name 09/22/23 1302          Living Environment    People in Home spouse  pt plans to return to chemical dependency rehab facility  -EM       Row Name 09/22/23 1302          Cognition    Orientation Status (Cognition) oriented x 4  -EM       Row Name 09/22/23 1302          Safety Issues, Functional Mobility    Impairments Affecting Function (Mobility) pain;endurance/activity tolerance  -EM               User Key  (r) = Recorded By, (t) = Taken By, (c) = Cosigned By      Initials Name Provider Type    Lauren Craven PT Physical Therapist                   Mobility       Row Name 09/22/23 1304          Bed Mobility    Comment, (Bed Mobility) not tested, sitting up on EOB  -EM       Row Name 09/22/23 1304          Sit-Stand Transfer    Sit-Stand [place] (Transfers) supervision  -EM     Assistive Device (Sit-Stand Transfers) walker, front-wheeled  -EM       Row Name 09/22/23 1304          Gait/Stairs (Locomotion)    [place] Level (Gait) standby assist  -EM     Assistive Device (Gait) walker, front-wheeled  -EM     Distance in Feet (Gait) 80  -EM     Deviations/Abnormal Patterns (Gait) antalgic;gait speed decreased  -EM               User Key  (r) = Recorded By, (t) = Taken By, (c) = Cosigned By      Initials Name Provider Type    Lauren Craven PT Physical Therapist                   Obj/Interventions       Row Name 09/22/23 1306          Range of Motion Comprehensive    General Range of Motion no range of motion deficits identified  -EM       Row Name 09/22/23 1306          Strength Comprehensive (MMT)    General Manual Muscle Testing (MMT)  Assessment other (see comments)  -EM     Comment, General Manual Muscle Testing (MMT) Assessment no focal deficits identified  -EM       Row Name 09/22/23 1306          Balance    Balance Assessment sitting dynamic balance;standing static balance;standing dynamic balance  -EM     Dynamic Sitting Balance independent  -EM     Position, Sitting Balance sitting edge of bed  -EM     Static Standing Balance supervision  -EM     Dynamic Standing Balance standby assist  -EM     Position/Device Used, Standing Balance walker, rolling  -EM       Row Name 09/22/23 1306          Sensory Assessment (Somatosensory)    Sensory Assessment (Somatosensory) sensation intact  -EM               User Key  (r) = Recorded By, (t) = Taken By, (c) = Cosigned By      Initials Name Provider Type    EM Lauren Worthy, PT Physical Therapist                   Goals/Plan    No documentation.                  Clinical Impression       Row Name 09/22/23 1307          Pain    Pretreatment Pain Rating 5/10  -EM     Pain Location - Side/Orientation Right  -EM     Pain Location - hip  -EM     Pain Intervention(s) Repositioned;Medication (See MAR)  -EM       Row Name 09/22/23 1309          Plan of Care Review    Plan of Care Reviewed With patient  -EM     Outcome Evaluation Pt is 77 yo male seen in obs unit where came in due to R hip pain, L5 compression fx, L4 compression fx, sacral/coccygeal fx. PMH significant for afib, HTN, ETOH abuse, recent hospitalization for hyponatremia. Pt admitted from alcohol rehab facility where he plans to return at d/c. Today, pt sitting up on EOB, LSO in place, performed sit to stand wtih SBA, ambulated short distance in hallway with rwx and CGA/SBA. Pt steady with ambulation, sligthly antalgic gait, rates pain at 5/10 level. Pt safe to return to facility at d/c, states he has a rollator he uses at that facility.  -EM       Row Name 09/22/23 1305          Therapy Assessment/Plan (PT)    Patient/Family Therapy Goals  Statement (PT) go back to rehab  -EM     Criteria for Skilled Interventions Met (PT) no problems identified which require skilled intervention  -EM     Therapy Frequency (PT) evaluation only  -EM       Row Name 09/22/23 1307          Positioning and Restraints    Pre-Treatment Position in bed  -EM     Post Treatment Position bed  -EM     In Bed sitting EOB;call light within reach  -EM               User Key  (r) = Recorded By, (t) = Taken By, (c) = Cosigned By      Initials Name Provider Type    Lauren Craven PT Physical Therapist                   Outcome Measures       Row Name 09/22/23 1313 09/22/23 0820       How much help from another person do you currently need...    Turning from your back to your side while in flat bed without using bedrails? 4  -EM 4  -RS    Moving from lying on back to sitting on the side of a flat bed without bedrails? 4  -EM 4  -RS    Moving to and from a bed to a chair (including a wheelchair)? 4  -EM 3  -RS    Standing up from a chair using your arms (e.g., wheelchair, bedside chair)? 4  -EM 4  -RS    Climbing 3-5 steps with a railing? 3  -EM 2  -RS    To walk in hospital room? 3  -EM 3  -RS    AM-PAC 6 Clicks Score (PT) 22  -EM 20  -RS    Highest level of mobility 7 --> Walked 25 feet or more  -EM 6 --> Walked 10 steps or more  -RS              User Key  (r) = Recorded By, (t) = Taken By, (c) = Cosigned By      Initials Name Provider Type    Lauren Craven PT Physical Therapist    RS Iva Eaton RN Registered Nurse                                   PT Recommendation and Plan     Plan of Care Reviewed With: patient  Outcome Evaluation: Pt is 79 yo male seen in obs unit where came in due to R hip pain, L5 compression fx, L4 compression fx, sacral/coccygeal fx. PMH significant for afib, HTN, ETOH abuse, recent hospitalization for hyponatremia. Pt admitted from alcohol rehab facility where he plans to return at d/c. Today, pt sitting up on EOB, LSO in place,  performed sit to stand wtih SBA, ambulated short distance in hallway with rwx and CGA/SBA. Pt steady with ambulation, sligthly antalgic gait, rates pain at 5/10 level. Pt safe to return to facility at d/c, states he has a rollator he uses at that facility.     Time Calculation:         PT Charges       Row Name 09/22/23 1313             Time Calculation    Start Time 1205  -EM      Stop Time 1215  -EM      Time Calculation (min) 10 min  -EM      PT Received On 09/22/23  -EM                User Key  (r) = Recorded By, (t) = Taken By, (c) = Cosigned By      Initials Name Provider Type    EM Lauren Worthy PT Physical Therapist                  Therapy Charges for Today       Code Description Service Date Service Provider Modifiers Qty    [id number] HC PT EVAL MOD COMPLEXITY 1 9/22/2023 Lauren Worthy PT GP 1            PT G-Codes  AM-PAC 6 Clicks Score (PT): 22  PT Discharge Summary  Anticipated Discharge Disposition (PT): other (see comments) (alcohol rehab center)    Lauren Worthy, PT  9/22/2023

## 2023-09-22 NOTE — TELEPHONE ENCOUNTER
----- Message from MATT Cisneros sent at 9/22/2023  8:31 AM EDT -----  Regarding: f/u with xrays  Please schedule 3  week follow-up with ordering of lumbar spine x-rays.  May follow-up with [facility]  Diagnosis: L5 vertebral compression fracture    Thanks Emanuel Pennington

## 2023-09-22 NOTE — PROGRESS NOTES
MD Attestation Note    I supervised care provided by the midlevel provider.    The NIKUNJ and I have discussed this patient's history, physical exam, and treatment plan. I have reviewed the documentation and personally had a face to face interaction with the patient  I affirm the documentation and agree with the treatment and plan.     I provided a substantive portion of the care of the patient.  I personally performed the physical exam in its entirety, and below are my findings.        Subjective  Pt is a 78 y.o. male admitted from Emergency Department for evaluation and treatment of right hip pain.  Patient came from alcohol treatment facility and had prior fall about 1 week ago.  Imaging studies at his alcohol treatment facility showed possible right hip fracture.    Physical Exam  GENERAL: Alert and in NAD, Vitals reviewed  HENT: nares patent  EYES: no scleral icterus  CV: regular rhythm, regular rate  RESPIRATORY: normal effort  ABDOMEN: soft  MUSCULOSKELETAL: Spine-no significant tenderness to palpation about the lumbar spine  Lower extremities-there is tenderness palpation about the right hip with pain with movement.  NEURO: Strength sensation and coordination are grossly intact.  Speech and mentation are unremarkable  SKIN: warm, dry    Assessment/Plan  I discussed tx and evaluation of this patient with YUNIOR Contreras.  MRI of the hips did not show any significant fracture of the femur but was noted to have lumbar compression fracture.  Dedicated MRI of the L-spine shows a L5 burst fracture with roughly 30% loss of height and some small retropulsion.  Appreciate neurosurgery consultation.  Plan nonoperative management at this time with eggshell brace which is being arranged currently.  Will discharge home with as needed pain medications to be taken judiciously.

## 2023-09-22 NOTE — OUTREACH NOTE
Prep Survey      Flowsheet Row Responses   [place] facility patient discharged from? [facility]   Is LACE score < 7 ? Yes   Eligibility Not Eligible   What are the reasons [facility] is not eligible? [facility]   Does the patient have one of the following disease processes/diagnoses(primary or secondary)? Other   Prep survey completed? Yes            Ema CENTENO - Registered Nurse

## 2023-09-22 NOTE — PLAN OF CARE
Goal Outcome Evaluation:  Plan of Care Reviewed With: patient           Outcome Evaluation: Pt is 77 yo male seen in obs unit where came in due to R hip pain, L5 compression fx, L4 compression fx, sacral/coccygeal fx. PMH significant for afib, HTN, ETOH abuse, recent hospitalization for hyponatremia. Pt admitted from alcohol rehab facility where he plans to return at d/c. Today, pt sitting up on EOB, LSO in place, performed sit to stand wtih SBA, ambulated short distance in hallway with rwx and CGA/SBA. Pt steady with ambulation, sligthly antalgic gait, rates pain at 5/10 level. Pt safe to return to facility at d/c, states he has a rollator he uses at that facility.      Anticipated Discharge Disposition (PT): other (see comments) (alcohol rehab center)

## 2023-09-22 NOTE — PLAN OF CARE
Problem: Fall Injury Risk  Goal: Absence of Fall and Fall-Related Injury  Outcome: Ongoing, Progressing  Intervention: Promote Injury-Free Environment  Recent Flowsheet Documentation  Taken 9/22/2023 0400 by Tamy Connors RN  Safety Promotion/Fall Prevention:   assistive device/personal items within reach   clutter free environment maintained   fall prevention program maintained   lighting adjusted   nonskid shoes/slippers when out of bed   room organization consistent   safety round/check completed  Taken 9/22/2023 0200 by Tamy Connors, RUDOLPH  Safety Promotion/Fall Prevention:   assistive device/personal items within reach   clutter free environment maintained   fall prevention program maintained   lighting adjusted   muscle strengthening facilitated   nonskid shoes/slippers when out of bed   room organization consistent   safety round/check completed  Taken 9/22/2023 0000 by Tamy Connors RN  Safety Promotion/Fall Prevention:   assistive device/personal items within reach   clutter free environment maintained   fall prevention program maintained   lighting adjusted   nonskid shoes/slippers when out of bed   room organization consistent   safety round/check completed  Taken 9/21/2023 2200 by Tamy Connors RN  Safety Promotion/Fall Prevention:   assistive device/personal items within reach   activity supervised   clutter free environment maintained   fall prevention program maintained   lighting adjusted   nonskid shoes/slippers when out of bed   room organization consistent   safety round/check completed  Taken 9/21/2023 2000 by Tamy Connors, RN  Safety Promotion/Fall Prevention:   assistive device/personal items within reach   fall prevention program maintained   clutter free environment maintained   activity supervised   lighting adjusted   nonskid shoes/slippers when out of bed   room organization consistent   safety round/check completed     Problem: Pain Acute  Goal: Acceptable Pain Control and Functional  Ability  Outcome: Ongoing, Progressing  Intervention: Optimize Psychosocial Wellbeing  Recent Flowsheet Documentation  Taken 9/22/2023 0200 by Tamy Connors RN  Diversional Activities: television  Taken 9/21/2023 2000 by Tamy Connors RN  Supportive Measures: active listening utilized  Diversional Activities: television     Problem: Adult Inpatient Plan of Care  Goal: Plan of Care Review  Outcome: Ongoing, Progressing  Flowsheets (Taken 9/22/2023 0550)  Progress: improving  Plan of Care Reviewed With: patient  Outcome Evaluation: patient has been alert and oriented. Administered 2LNC over the night due to low oxygen saturation. Patient had mri of the spine yesterday. Orthopedic surgery is on board. Treated pain with percocet. Family almanza to check on patient. Plan of care on-going.  Goal: Patient-Specific Goal (Individualized)  Outcome: Ongoing, Progressing  Goal: Absence of Hospital-Acquired Illness or Injury  Outcome: Ongoing, Progressing  Intervention: Identify and Manage Fall Risk  Recent Flowsheet Documentation  Taken 9/22/2023 0400 by Tamy Connors RN  Safety Promotion/Fall Prevention:   assistive device/personal items within reach   clutter free environment maintained   fall prevention program maintained   lighting adjusted   nonskid shoes/slippers when out of bed   room organization consistent   safety round/check completed  Taken 9/22/2023 0200 by Tamy Connors RN  Safety Promotion/Fall Prevention:   assistive device/personal items within reach   clutter free environment maintained   fall prevention program maintained   lighting adjusted   muscle strengthening facilitated   nonskid shoes/slippers when out of bed   room organization consistent   safety round/check completed  Taken 9/22/2023 0000 by Tamy Connors RN  Safety Promotion/Fall Prevention:   assistive device/personal items within reach   clutter free environment maintained   fall prevention program maintained   lighting adjusted   nonskid  shoes/slippers when out of bed   room organization consistent   safety round/check completed  Taken 9/21/2023 2200 by Tamy Connors RN  Safety Promotion/Fall Prevention:   assistive device/personal items within reach   activity supervised   clutter free environment maintained   fall prevention program maintained   lighting adjusted   nonskid shoes/slippers when out of bed   room organization consistent   safety round/check completed  Taken 9/21/2023 2000 by Tamy Connors RN  Safety Promotion/Fall Prevention:   assistive device/personal items within reach   fall prevention program maintained   clutter free environment maintained   activity supervised   lighting adjusted   nonskid shoes/slippers when out of bed   room organization consistent   safety round/check completed  Intervention: Prevent Skin Injury  Recent Flowsheet Documentation  Taken 9/22/2023 0400 by Tamy Connors RN  Body Position: position changed independently  Taken 9/22/2023 0200 by Tamy Connors RN  Body Position: position changed independently  Taken 9/22/2023 0000 by Tamy Connors RN  Body Position: position changed independently  Taken 9/21/2023 2200 by Tamy Connors RN  Body Position: position changed independently  Taken 9/21/2023 2000 by Tamy Connors RN  Body Position: position changed independently  Intervention: Prevent and Manage VTE (Venous Thromboembolism) Risk  Recent Flowsheet Documentation  Taken 9/22/2023 0400 by Tamy Connors RN  Activity Management: activity minimized  Taken 9/22/2023 0200 by Tamy Connors RN  Activity Management: activity minimized  Taken 9/22/2023 0000 by Tamy Connors RN  Activity Management: activity encouraged  Taken 9/21/2023 2200 by Tamy Connors RN  Activity Management: activity encouraged  Taken 9/21/2023 2000 by Tamy Connors RN  Activity Management: activity encouraged  VTE Prevention/Management: sequential compression devices off  Intervention: Prevent Infection  Recent Flowsheet  Documentation  Taken 9/22/2023 0200 by Tamy Connors RN  Infection Prevention:   hand hygiene promoted   rest/sleep promoted   single patient room provided  Taken 9/22/2023 0000 by Tamy Connors RN  Infection Prevention:   hand hygiene promoted   single patient room provided   rest/sleep promoted  Taken 9/21/2023 2200 by Tamy Connors RN  Infection Prevention:   hand hygiene promoted   rest/sleep promoted   single patient room provided  Goal: Optimal Comfort and Wellbeing  Outcome: Ongoing, Progressing  Intervention: Provide Person-Centered Care  Recent Flowsheet Documentation  Taken 9/21/2023 2000 by Tamy Connors RN  Trust Relationship/Rapport:   care explained   choices provided   questions answered   questions encouraged  Goal: Readiness for Transition of Care  Outcome: Ongoing, Progressing   Goal Outcome Evaluation:  Plan of Care Reviewed With: patient        Progress: improving  Outcome Evaluation: patient has been alert and oriented. Administered 2LNC over the night due to low oxygen saturation. Patient had mri of the spine yesterday. Orthopedic surgery is on board. Treated pain with percocet. Family almanza to check on patient. Plan of care on-going.

## 2023-09-29 ENCOUNTER — TELEPHONE (OUTPATIENT)
Dept: NEUROSURGERY | Facility: CLINIC | Age: 78
End: 2023-09-29
Payer: COMMERCIAL

## 2023-09-29 NOTE — TELEPHONE ENCOUNTER
Lvm to inform patient LVM to inform Genie will be out of the office on 10/17/2023, are you able to come into the office on 10/11/2023 at  10 AM instead?    Please leave response in this encounter and I will schedule.

## 2023-10-02 ENCOUNTER — TELEPHONE (OUTPATIENT)
Dept: NEUROSURGERY | Facility: CLINIC | Age: 78
End: 2023-10-02
Payer: COMMERCIAL

## 2023-10-09 NOTE — PROGRESS NOTES
Subjective   Patient ID: Gamal Modi Sr. is a 78 y.o. male is here today for follow-up with new xray's.     History of Present Illness    The following portions of the patient's history were reviewed and updated as appropriate: allergies, current medications, past family history, past medical history, past social history, past surgical history, and problem list.    Review of Systems   Constitutional:  Negative for activity change.   Gastrointestinal:         No bowel issues   Genitourinary:  Negative for enuresis.   Musculoskeletal:  Positive for back pain and myalgias.   Neurological:  Positive for numbness (Only on top of right foot). Negative for weakness.   Psychiatric/Behavioral:  Positive for sleep disturbance.        Mr. Modi is a very nice gentleman who was seen in the hospital recently after a fall related to ETOH intoxication.  He sustained an L5 vertebral compression fracture and was put in a LSO brace.  He presents today for postop 3-week x-rays of the lumbar spine.  He continues to report low back pain and right hip pain as well as some numbness experience on the top of his right foot.  Right foot numbness has been present since injury and has not changed.  He denies bowel or bladder issues, saddle anesthesia, weakness, or tingling.  He is walking with a cane.    Objective     Vitals:    10/17/23 1027   BP: 130/82   Pulse: 95   SpO2: 94%     There is no height or weight on file to calculate BMI.    Tobacco Use: Low Risk  (10/17/2023)    Patient History     Smoking Tobacco Use: Never     Smokeless Tobacco Use: Never     Passive Exposure: Not on file          Physical Exam  Constitutional:       General: He is not in acute distress.     Appearance: Normal appearance. He is not ill-appearing, toxic-appearing or diaphoretic.   HENT:      Head: Normocephalic.      Nose: Nose normal.      Mouth/Throat:      Mouth: Mucous membranes are moist.      Pharynx: Oropharynx is clear.   Eyes:       Extraocular Movements: Extraocular movements intact.      Conjunctiva/sclera: Conjunctivae normal.   Cardiovascular:      Rate and Rhythm: Normal rate.   Pulmonary:      Effort: Pulmonary effort is normal.   Musculoskeletal:         General: Normal range of motion.      Cervical back: Normal range of motion.      Lumbar back: No tenderness or bony tenderness.   Skin:     General: Skin is warm.   Neurological:      Mental Status: He is alert and oriented to person, place, and time.      Gait: Gait is intact.      Deep Tendon Reflexes:      Reflex Scores:       Patellar reflexes are 2+ on the right side and 2+ on the left side.       Achilles reflexes are 2+ on the right side and 2+ on the left side.  Psychiatric:         Mood and Affect: Mood normal.         Speech: Speech normal.         Behavior: Behavior normal.         Thought Content: Thought content normal.         Judgment: Judgment normal.     Neurologic Exam     Mental Status   Oriented to person, place, and time.   Attention: normal. Concentration: normal.   Speech: speech is normal   Level of consciousness: alert  Knowledge: consistent with education.     Motor Exam   Muscle bulk: normal  Overall muscle tone: normal  Right leg tone: normal  Left leg tone: normal    Strength   Right iliopsoas: 5/5  Left iliopsoas: 5/5  Right quadriceps: 5/5  Left quadriceps: 5/5  Right hamstrin/5  Left hamstrin/5  Right anterior tibial: 5/5  Left anterior tibial: 5/5  Right posterior tibial: 5/5  Left posterior tibial: 5/5  Right gastroc: 5/5  Left gastroc: 5/5    Sensory Exam   Mild decrease sensation on top of right foot     Gait, Coordination, and Reflexes     Gait  Gait: normal (Uses cane)    Reflexes   Right patellar: 2+  Left patellar: 2+  Right achilles: 2+  Left achilles: 2+  Right ankle clonus: absent  Left ankle clonus: absent          Assessment & Plan   Independent Review of Radiographic Studies:      I personally reviewed the images from the following  studies.    X-ray lumbar spine 10/17/2023:  Shows previously imaged L5 vertebral compression fracture.  There is no report currently but there does not appear to be any significant decrease in vertebral body height.    Medical Decision Making:      Mr. Modi has a history of fall with L5 vertebral compression fracture.  He is currently treating pain with gabapentin 300 mg TID and as needed Tylenol as he is on Eliquis and cannot take NSAIDs and his rehab facility will not allow him to take any stronger pain medications.  He continues to have low back pain along the waistline as well as right hip pain.  Also continues to complain of some numbness on the top of right foot.  He is not having any other red flag symptoms.  We will schedule another follow-up lumbar x-ray in 3 weeks and see him in the office.  I instructed him to call for any worsening low back pain or lower extremity pain, weakness, numbness, tingling, saddle anesthesia, or bowel/bladder incontinence.  He stated his understanding.  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  If he continues to experience current issues 3 weeks from now, we may consider referring him for some epidural steroid injections to aid his back pain and lower extremity dysesthesia.    Diagnoses and all orders for this visit:    1. Closed compression fracture of L5 lumbar vertebra, initial encounter (Primary)  -     XR Spine Lumbar 2 or 3 View; Future    2. Bilateral low back pain without sciatica, unspecified chronicity      Return in about 3 weeks (around 11/7/2023) for f/u after xray.

## 2023-10-11 ENCOUNTER — OFFICE VISIT (OUTPATIENT)
Dept: FAMILY MEDICINE CLINIC | Facility: CLINIC | Age: 78
End: 2023-10-11
Payer: COMMERCIAL

## 2023-10-11 VITALS
RESPIRATION RATE: 16 BRPM | HEIGHT: 71 IN | BODY MASS INDEX: 30.31 KG/M2 | WEIGHT: 216.5 LBS | DIASTOLIC BLOOD PRESSURE: 78 MMHG | HEART RATE: 85 BPM | OXYGEN SATURATION: 93 % | SYSTOLIC BLOOD PRESSURE: 142 MMHG | TEMPERATURE: 97.9 F

## 2023-10-11 DIAGNOSIS — J20.9 ACUTE BRONCHITIS, UNSPECIFIED ORGANISM: ICD-10-CM

## 2023-10-11 DIAGNOSIS — M54.50 ACUTE MIDLINE LOW BACK PAIN WITHOUT SCIATICA: Primary | ICD-10-CM

## 2023-10-11 PROCEDURE — 99214 OFFICE O/P EST MOD 30 MIN: CPT | Performed by: STUDENT IN AN ORGANIZED HEALTH CARE EDUCATION/TRAINING PROGRAM

## 2023-10-11 RX ORDER — TRAZODONE HYDROCHLORIDE 50 MG/1
TABLET ORAL
COMMUNITY
Start: 2023-09-19

## 2023-10-11 RX ORDER — METHOCARBAMOL 750 MG/1
TABLET, FILM COATED ORAL
COMMUNITY
Start: 2023-09-19

## 2023-10-11 RX ORDER — AZITHROMYCIN 250 MG/1
TABLET, FILM COATED ORAL
Qty: 6 TABLET | Refills: 0 | Status: SHIPPED | OUTPATIENT
Start: 2023-10-11 | End: 2023-10-16

## 2023-10-11 RX ORDER — GABAPENTIN 100 MG/1
100 CAPSULE ORAL 3 TIMES DAILY PRN
Qty: 90 CAPSULE | Refills: 0 | Status: SHIPPED | OUTPATIENT
Start: 2023-10-11 | End: 2023-10-16 | Stop reason: SDUPTHER

## 2023-10-11 RX ORDER — BIMATOPROST 0.1 MG/ML
SOLUTION/ DROPS OPHTHALMIC
COMMUNITY
Start: 2023-09-30

## 2023-10-11 NOTE — PROGRESS NOTES
"Chief Complaint  Alcohol Problem (Pt currently in a treatment center for alcoholism , pt fell and injured L5, pt requesting gabapentin for pain, but due to being in the treatment facility he is only able to have certain medications, that medication they will let him take in. ) and Nasal Congestion (Coughing up dark green phlegm and then feels better afterwards. )    Subjective        Gamal Modi SrWilson presents to [facility]  History of Present Illness  For follow up on pain.  Pt recently had fall which lead to L5 compression fracture and sacrococcygeal compression. Patient stated current he is living in alcohol rehab facility where they have limitations of even pain medication. They are not allowed to have narcotics other than gabapentin for nerve related pain.    Also complains of having chest congestion, on and off cough with green sputum production for more than a week. Denies having any fever, SOB, chest tightness or runny nose of ear or sinus issue.  Alcohol Problem        Objective   Vital Signs:  /78 (BP Location: Left arm, Patient Position: Sitting, Cuff Size: Adult)   Pulse 85   Temp 97.9 øF (36.6 øC) (Oral)   Resp 16   Ht 180.3 cm (71\")   Wt 98.2 kg (216 lb 8 oz)   SpO2 93%   BMI 30.20 kg/mý   Estimated body mass index is 30.2 kg/mý as calculated from the following:    Height as of this encounter: 180.3 cm (71\").    Weight as of this encounter: 98.2 kg (216 lb 8 oz).               Physical Exam  HENT:      Head: Normocephalic and atraumatic.      Mouth/Throat:      Mouth: Mucous membranes are moist.      Pharynx: Oropharynx is clear.   Eyes:      Extraocular Movements: Extraocular movements intact.      Conjunctiva/sclera: Conjunctivae normal.      Pupils: Pupils are equal, round, and reactive to light.   Cardiovascular:      Rate and Rhythm: Normal rate and regular rhythm.   Pulmonary:      Effort: Pulmonary effort is normal.      Breath sounds: Normal " breath sounds.   Abdominal:      General: Bowel sounds are normal.      Palpations: Abdomen is soft.   Musculoskeletal:         General: Normal range of motion.      Cervical back: Neck supple.      Comments: Some tenderness presnt on palpation of lower back, pt wore braces    Skin:     General: Skin is warm.      Capillary Refill: Capillary refill takes less than 2 seconds.   Neurological:      General: No focal deficit present.      Mental Status: He is alert and oriented to person, place, and time. Mental status is at baseline.   Psychiatric:         Mood and Affect: Mood normal.        Result Review :                   Assessment and Plan   Diagnoses and all orders for this visit:    1. Acute midline low back pain without sciatica (Primary)  -     gabapentin (NEURONTIN) 100 MG capsule; Take 1 capsule by mouth 3 (Three) Times a Day As Needed (back pain).  Dispense: 90 capsule; Refill: 0    2. Acute bronchitis, unspecified organism  -     azithromycin (Zithromax Z-Jonathon) 250 MG tablet; Take 2 tablets by mouth on day 1, then 1 tablet daily on days 2-5  Dispense: 6 tablet; Refill: 0    # Acute low back pain  OTC pain meds such as tylenol not working, pt is on blood thinner so not taking NSAIDS  Advise to take gabapentin as needed for severe pain. Also educated pt about side effects which include dependence, tolerance, ankle edema, memory issues if taken for long time.  Prescription sen to pt pharmacy    # acute Bronchitis  Pt prescribed Azithromycin to take for 5 days.  Rtc if symptoms worsens or persist         Follow Up   No follow-ups on file.  Patient was given instructions and counseling regarding his condition or for health maintenance advice. Please see specific information pulled into the AVS if appropriate.

## 2023-10-16 ENCOUNTER — OFFICE VISIT (OUTPATIENT)
Dept: FAMILY MEDICINE CLINIC | Facility: CLINIC | Age: 78
End: 2023-10-16
Payer: COMMERCIAL

## 2023-10-16 VITALS
RESPIRATION RATE: 16 BRPM | BODY MASS INDEX: 30.1 KG/M2 | HEIGHT: 71 IN | HEART RATE: 84 BPM | SYSTOLIC BLOOD PRESSURE: 142 MMHG | TEMPERATURE: 97.8 F | OXYGEN SATURATION: 97 % | DIASTOLIC BLOOD PRESSURE: 76 MMHG | WEIGHT: 215 LBS

## 2023-10-16 DIAGNOSIS — I25.10 CORONARY ARTERY CALCIFICATION SEEN ON CAT SCAN: ICD-10-CM

## 2023-10-16 DIAGNOSIS — I10 ESSENTIAL HYPERTENSION: ICD-10-CM

## 2023-10-16 DIAGNOSIS — Z12.5 PROSTATE CANCER SCREENING: ICD-10-CM

## 2023-10-16 DIAGNOSIS — I48.0 PAROXYSMAL ATRIAL FIBRILLATION: ICD-10-CM

## 2023-10-16 DIAGNOSIS — Z87.09 H/O ASBESTOSIS: ICD-10-CM

## 2023-10-16 DIAGNOSIS — Z00.00 ENCOUNTER FOR PREVENTIVE CARE: ICD-10-CM

## 2023-10-16 DIAGNOSIS — R73.09 ABNORMAL GLUCOSE: ICD-10-CM

## 2023-10-16 DIAGNOSIS — J61 PULMONARY ASBESTOSIS: ICD-10-CM

## 2023-10-16 DIAGNOSIS — Z23 NEED FOR IMMUNIZATION AGAINST INFLUENZA: ICD-10-CM

## 2023-10-16 DIAGNOSIS — Z00.00 ANNUAL PHYSICAL EXAM: Primary | ICD-10-CM

## 2023-10-16 DIAGNOSIS — M54.50 ACUTE MIDLINE LOW BACK PAIN WITHOUT SCIATICA: ICD-10-CM

## 2023-10-16 DIAGNOSIS — S32.050A CLOSED COMPRESSION FRACTURE OF L5 LUMBAR VERTEBRA, INITIAL ENCOUNTER: ICD-10-CM

## 2023-10-16 RX ORDER — GABAPENTIN 300 MG/1
300 CAPSULE ORAL 3 TIMES DAILY
Qty: 45 CAPSULE | Refills: 0 | Status: SHIPPED | OUTPATIENT
Start: 2023-10-16

## 2023-10-16 RX ORDER — CLONIDINE HYDROCHLORIDE 0.1 MG/1
TABLET ORAL
COMMUNITY
Start: 2023-09-19

## 2023-10-17 ENCOUNTER — HOSPITAL ENCOUNTER (OUTPATIENT)
Dept: GENERAL RADIOLOGY | Facility: HOSPITAL | Age: 78
Discharge: HOME | End: 2023-10-17
Payer: COMMERCIAL

## 2023-10-17 ENCOUNTER — OFFICE VISIT (OUTPATIENT)
Dept: NEUROSURGERY | Facility: CLINIC | Age: 78
End: 2023-10-17
Payer: COMMERCIAL

## 2023-10-17 VITALS — DIASTOLIC BLOOD PRESSURE: 82 MMHG | SYSTOLIC BLOOD PRESSURE: 130 MMHG | HEART RATE: 95 BPM | OXYGEN SATURATION: 94 %

## 2023-10-17 DIAGNOSIS — S32.050A CLOSED COMPRESSION FRACTURE OF L5 LUMBAR VERTEBRA, INITIAL ENCOUNTER: Primary | ICD-10-CM

## 2023-10-17 DIAGNOSIS — S32.050A CLOSED COMPRESSION FRACTURE OF L5 LUMBAR VERTEBRA, INITIAL ENCOUNTER: ICD-10-CM

## 2023-10-17 DIAGNOSIS — M54.50 BILATERAL LOW BACK PAIN WITHOUT SCIATICA, UNSPECIFIED CHRONICITY: ICD-10-CM

## 2023-10-17 PROCEDURE — 72100 X-RAY EXAM L-S SPINE 2/3 VWS: CPT

## 2023-10-23 DIAGNOSIS — N52.9 ERECTILE DYSFUNCTION, UNSPECIFIED ERECTILE DYSFUNCTION TYPE: ICD-10-CM

## 2023-10-23 RX ORDER — TADALAFIL 5 MG/1
5 TABLET ORAL DAILY PRN
Qty: 90 TABLET | Refills: 1 | Status: SHIPPED | OUTPATIENT
Start: 2023-10-23

## 2023-11-06 NOTE — PROGRESS NOTES
"Subjective   History of Present Illness: Gamal Modi Sr. is a 78 y.o. male is here today for follow-up with new lumbar xray's.    Today, Mr. Modi reports back pain that radiates into the right hip.  He is always had primarily right hip pain but his work-up in the hospital revealed no hip fracture or significant hip arthropathy both on plain films and MRI of the hip.  His low back pain is still troubling but it is slowly improving whereas he is having more pain from the right hip radiating into the anterior medial thigh.  He denies any weakness or numbness but does have discomfort that inhibits his activity some.  He has not been able to take pain medicine because he has been in alcohol rehab but now he is out and can have access to medications if necessary.  He has been wearing the brace appropriately.    History of Present Illness    Tobacco Use: Low Risk  (11/9/2023)    Patient History     Smoking Tobacco Use: Never     Smokeless Tobacco Use: Never     Passive Exposure: Not on file        The following portions of the patient's history were reviewed and updated as appropriate: allergies, current medications, past family history, past medical history, past social history, past surgical history and problem list.    Review of Systems    Objective     Vitals:    11/09/23 1018   BP: 110/72   Weight: 97.5 kg (215 lb)   Height: 180.3 cm (70.98\")     Body mass index is 30 kg/m².      Physical Exam  Neurologic Exam    Physical Exam:    CONSTITUTIONAL:  appears well developed, well-nourished and in no acute distress.    NECK: the neck is supple and symmetric. The trachea is midline with no masses.      PULMONARY: Respiratory effort is normal with no increased work of breathing or signs of respiratory distress.    CARDIOVASCULAR: Pedal pulses are +2/4 bilaterally. Examination of the extremities shows no edema or varicosities.    MUSCULOSKELETAL: Gait slightly wide-based, no tenderness to palpation in the lumbar " spine or SI joints    SKIN: The skin is warm, dry and intact.      NEUROLOGIC:   Normal motor strength noted. Muscle bulk and tone are normal.  Sensory exam is normal to fine touch but his subjective sense of numbness in the right foot with activity  Samuel's test is negative bilaterally  Straight leg raising test is negative bilaterally  Cortical function is intact and without deficits. Speech is normal.    PSYCHIATRIC: oriented to person, place and time. Patient's mood and affect are normal.    Assessment & Plan   Independent Review of Radiographic Studies:      I personally reviewed the images from the following studies.    Lumbar radiographs done on November 8, 2023 at [facility] reveal stable appearance of the L5 compression fracture the images below show yesterday's image on the left and the prior image on the right        Medical Decision Making:      His right leg pain is perplexing but with a completely negative hip work-up in September and the only finding was the acute L5 fracture he does have some lateral recess narrowing chronically at L4-5 and L5-S1 that may have been aggravated by the loss of height of L5.  He does not have any focal neurologic deficits so I think we can try physical therapy to see if some of the exercises can help strengthen his back muscles following the injury and wearing the brace and if that helps resolve the radicular symptoms.  If it does not I would have him follow-up with my complex spine partner Dr. Quesada for evaluation of his fracture to see if it he thinks its a component of his persistent right hip and leg pain.  He is try to be very careful given his alcohol substance abuse recovery but is asking for something to assist with his pain as he does not feel like the gabapentin is helping enough.  I gave him Ultram as an alternative pain medicine to see if that helps.    Return for complex spine evaluation with Dr. Quesada.    Diagnoses and all orders for this visit:    1.  Closed compression fracture of L5 lumbar vertebra, with routine healing, subsequent encounter (Primary)  -     Ambulatory Referral to Physical Therapy Evaluate and treat  -     traMADol (ULTRAM) 50 MG tablet; Take 1 tablet by mouth Every 6 (Six) Hours As Needed for Moderate Pain.  Dispense: 40 tablet; Refill: 0  -     Ambulatory Referral to Neurosurgery  -     XR Spine Lumbar 2 or 3 View; Future    2. Right hip pain  -     Ambulatory Referral to Physical Therapy Evaluate and treat  -     traMADol (ULTRAM) 50 MG tablet; Take 1 tablet by mouth Every 6 (Six) Hours As Needed for Moderate Pain.  Dispense: 40 tablet; Refill: 0  -     Ambulatory Referral to Neurosurgery  -     XR Spine Lumbar 2 or 3 View; Future             Issac Jaramillo MD FACS FAANS  Neurological Surgery

## 2023-11-08 ENCOUNTER — TELEPHONE (OUTPATIENT)
Dept: NEUROSURGERY | Facility: CLINIC | Age: 78
End: 2023-11-08
Payer: COMMERCIAL

## 2023-11-09 ENCOUNTER — HOSPITAL ENCOUNTER (OUTPATIENT)
Dept: GENERAL RADIOLOGY | Facility: HOSPITAL | Age: 78
Discharge: HOME | End: 2023-11-09
Payer: COMMERCIAL

## 2023-11-09 ENCOUNTER — OFFICE VISIT (OUTPATIENT)
Dept: NEUROSURGERY | Facility: CLINIC | Age: 78
End: 2023-11-09
Payer: COMMERCIAL

## 2023-11-09 VITALS
SYSTOLIC BLOOD PRESSURE: 110 MMHG | DIASTOLIC BLOOD PRESSURE: 72 MMHG | BODY MASS INDEX: 30.1 KG/M2 | WEIGHT: 215 LBS | HEIGHT: 71 IN

## 2023-11-09 DIAGNOSIS — S32.050D CLOSED COMPRESSION FRACTURE OF L5 LUMBAR VERTEBRA, WITH ROUTINE HEALING, SUBSEQUENT ENCOUNTER: Primary | ICD-10-CM

## 2023-11-09 DIAGNOSIS — S32.050A CLOSED COMPRESSION FRACTURE OF L5 LUMBAR VERTEBRA, INITIAL ENCOUNTER: ICD-10-CM

## 2023-11-09 DIAGNOSIS — M25.551 RIGHT HIP PAIN: ICD-10-CM

## 2023-11-09 PROCEDURE — 72100 X-RAY EXAM L-S SPINE 2/3 VWS: CPT

## 2023-11-09 RX ORDER — TRAMADOL HYDROCHLORIDE 50 MG/1
50 TABLET ORAL EVERY 6 HOURS PRN
Qty: 40 TABLET | Refills: 0 | Status: SHIPPED | OUTPATIENT
Start: 2023-11-09

## 2023-12-21 NOTE — PROGRESS NOTES
"Subjective   History of Present Illness: Gamal Modi Sr. is a 78 y.o. male is here today for follow-up. He has previously seen Dr. Jaramillo for L5 compression fracture. Today patient reports his back has improved with PT. patient suffered an L5 compression fracture sometime over the summer and developed significant low back pain as well as radicular symptoms.  Over the course of time his pain has improved significantly as well as his radicular symptoms.  He has completed PT.  No new issues.    Chief Complaint   Patient presents with    Back Pain    Leg Pain    Numbness    Tingling          Previous treatment: Gabapenin, PT, Robaxin, Tramadol    Previous neurosurgery:      Previous injections:     The following portions of the patient's history were reviewed and updated as appropriate: allergies, current medications, past family history, past medical history, past social history, past surgical history, and problem list.    Review of Systems   Constitutional:  Positive for activity change.   Respiratory:  Negative for chest tightness and shortness of breath.    Cardiovascular:  Negative for chest pain.   Musculoskeletal:  Positive for back pain, gait problem and myalgias.        Leg pain   Neurological:  Positive for numbness.        + tingling       Objective      Pulse 70   Temp 98 °F (36.7 °C)   Resp 18   Ht 180.3 cm (70.98\")   Wt 97.5 kg (215 lb)   SpO2 97%   BMI 30.00 kg/m²    Body mass index is 30 kg/m².  Vitals:    12/22/23 0947   PainSc:   3   PainLoc: Back  Comment: Leg pain           Neurologic Exam    Assessment & Plan   Independent Review of Radiographic Studies:      I personally reviewed and interpreted the images from the following studies.    MRI lumbar spine: Compression fracture at L5 with about 25% loss of height.  There is some central stenosis that is mild to moderate and no significant foraminal stenosis.    New lumbar x-ray: Stable compared to previous x-ray in terms of " fracture    Medical Decision Making:      Gamal Modi Sr. is a 78 y.o. male with L5 compression fracture who had severe pain and radicular symptoms that have improved significantly since they began several months ago.  No surgical intervention is indicated.  Patient may ramp up to normal activity.      Diagnoses and all orders for this visit:    1. Closed compression fracture of L5 lumbar vertebra, with routine healing, subsequent encounter (Primary)      No follow-ups on file.    This patient was examined wearing appropriate personal protective equipment.                      Dr. John Quesada IV    12/22/23  10:19 EST

## 2023-12-22 ENCOUNTER — OFFICE VISIT (OUTPATIENT)
Dept: NEUROSURGERY | Facility: CLINIC | Age: 78
End: 2023-12-22
Payer: COMMERCIAL

## 2023-12-22 ENCOUNTER — HOSPITAL ENCOUNTER (OUTPATIENT)
Dept: GENERAL RADIOLOGY | Facility: HOSPITAL | Age: 78
Discharge: HOME | End: 2023-12-22
Admitting: NEUROLOGICAL SURGERY
Payer: COMMERCIAL

## 2023-12-22 VITALS
HEIGHT: 71 IN | OXYGEN SATURATION: 97 % | BODY MASS INDEX: 30.1 KG/M2 | RESPIRATION RATE: 18 BRPM | WEIGHT: 215 LBS | TEMPERATURE: 98 F | HEART RATE: 70 BPM

## 2023-12-22 DIAGNOSIS — S32.050D CLOSED COMPRESSION FRACTURE OF L5 LUMBAR VERTEBRA, WITH ROUTINE HEALING, SUBSEQUENT ENCOUNTER: Primary | ICD-10-CM

## 2023-12-22 DIAGNOSIS — M25.551 RIGHT HIP PAIN: ICD-10-CM

## 2023-12-22 DIAGNOSIS — S32.050D CLOSED COMPRESSION FRACTURE OF L5 LUMBAR VERTEBRA, WITH ROUTINE HEALING, SUBSEQUENT ENCOUNTER: ICD-10-CM

## 2023-12-22 PROCEDURE — 72100 X-RAY EXAM L-S SPINE 2/3 VWS: CPT

## 2024-01-09 ENCOUNTER — TELEPHONE (OUTPATIENT)
Dept: FAMILY MEDICINE CLINIC | Facility: CLINIC | Age: 79
End: 2024-01-09

## 2024-01-09 NOTE — TELEPHONE ENCOUNTER
Caller: Gamal Modi Sr.    Relationship: Self    Best call back number: [phone] (Mobile)     What is the best time to reach you: ANYTIME    Who are you requesting to speak with (clinical staff, provider,  specific staff member): CLINICAL STAFF    Do you know the name of the person who called:      What was the call regarding:  PATIENT CALLED TO ADVISE THAT HIS HANDS WITH THE CARPAL TUNNEL IS WORSE AND WANTS TO SEE A HAND SURGEON FOR IT.     Is it okay if the provider responds through MyChart:          THANKS

## 2024-01-10 DIAGNOSIS — G56.03 BILATERAL CARPAL TUNNEL SYNDROME: Primary | ICD-10-CM

## 2024-01-17 DIAGNOSIS — I48.91 NEW ONSET ATRIAL FIBRILLATION: ICD-10-CM

## 2024-01-17 RX ORDER — DILTIAZEM HYDROCHLORIDE 120 MG/1
CAPSULE, COATED, EXTENDED RELEASE ORAL
Qty: 90 CAPSULE | Refills: 3 | Status: SHIPPED | OUTPATIENT
Start: 2024-01-17

## 2024-02-06 DIAGNOSIS — I48.91 NEW ONSET ATRIAL FIBRILLATION: ICD-10-CM

## 2024-02-06 RX ORDER — APIXABAN 5 MG/1
TABLET, FILM COATED ORAL
Qty: 180 TABLET | Refills: 3 | Status: SHIPPED | OUTPATIENT
Start: 2024-02-06

## 2024-03-28 DIAGNOSIS — I51.89 DIASTOLIC DYSFUNCTION: ICD-10-CM

## 2024-03-28 RX ORDER — ATORVASTATIN CALCIUM 20 MG/1
TABLET, FILM COATED ORAL
Qty: 90 TABLET | Refills: 3 | Status: SHIPPED | OUTPATIENT
Start: 2024-03-28

## 2024-03-29 ENCOUNTER — OFFICE VISIT (OUTPATIENT)
Dept: CARDIOLOGY | Facility: CLINIC | Age: 79
End: 2024-03-29
Payer: COMMERCIAL

## 2024-03-29 VITALS — BODY MASS INDEX: 29.82 KG/M2 | HEIGHT: 71 IN | WEIGHT: 213 LBS

## 2024-03-29 DIAGNOSIS — I10 ESSENTIAL HYPERTENSION: ICD-10-CM

## 2024-03-29 DIAGNOSIS — I48.0 PAROXYSMAL ATRIAL FIBRILLATION: ICD-10-CM

## 2024-03-29 DIAGNOSIS — I51.89 DIASTOLIC DYSFUNCTION: ICD-10-CM

## 2024-03-29 DIAGNOSIS — I25.10 CORONARY ARTERY CALCIFICATION SEEN ON CAT SCAN: Primary | ICD-10-CM

## 2024-03-29 DIAGNOSIS — E78.00 HYPERCHOLESTEROLEMIA: ICD-10-CM

## 2024-03-29 NOTE — PROGRESS NOTES
PATIENTINFORMATION    Date of Office Visit: 2024  Encounter Provider: Ochoa Murrell MD  Place of Service: [facility] CARDIOLOGY  Patient Name: Gamal Modi Sr.  : 1945    Subjective:     Encounter Date:2024      Patient ID: Gamal Modi Sr. is a 78 y.o. male.    No chief complaint on file.    HPI  Mr. Modi is a pleasant 78 years old gentleman who came to cardiology clinic for follow-up visit.  He is compliant with current medications and denies any significant side effects and denies any recent ER visits or hospitalization.  He denies any rest or exertional chest pain, shortness of breath, orthopnea, PND, palpitations, presyncope or syncope or extremity swelling.   He walks more than 3 miles every day without any limiting symptoms.  No bleeding on Eliquis.  Blood pressure once normal during home monitoring and office visits with other providers.      ROS  All systems reviewed and negative except as noted in HPI.    Past Medical History:   Diagnosis Date    AF (atrial fibrillation)     Cataract 2019    Elevated cholesterol     Erectile dysfunction 2018    Gout     Hypertension     Long term exposure to asbestos     3 years    Sebaceous cyst        Past Surgical History:   Procedure Laterality Date    COLON SURGERY      COLONOSCOPY  approx     negative per pt     COLONOSCOPY N/A 2022    Procedure: COLONOSCOPY INTO CECUM/ TERMINAL ILEUM WITH POLYPECTOMY;  Surgeon: Aidan Branham MD;  Location: [facility] ENDOSCOPY;  Service: Gastroenterology;  Laterality: N/A;  pre: anemia  post: hemorrhoids, normal TI, polyp, diverticulosis    ENDOSCOPY N/A 2022    Procedure: ESOPHAGOGASTRODUODENOSCOPY WITH BIOPSIES;  Surgeon: Aidan Branham MD;  Location: [facility] ENDOSCOPY;  Service: Gastroenterology;  Laterality: N/A;  pre: anemia  post: hiatal hernia, bnormal ampulla    OTHER SURGICAL HISTORY      BOWEL BLOCKAGE       Social History     Socioeconomic  "History    Marital status:    Tobacco Use    Smoking status: Never    Smokeless tobacco: Never   Vaping Use    Vaping status: Never Used   Substance and Sexual Activity    Alcohol use: Yes     Comment: pt reports he drinks a fifth of whiskey a day, is a binge drinker, had not drank for 11 months then 2 weeks ago drank again, last drink 4 days ago    Drug use: Never    Sexual activity: Yes     Partners: Female       Family History   Problem Relation Age of Onset    Diabetes Other     Cancer Mother     Diabetes Father     Cancer Sister            ECG 12 Lead    Date/Time: 3/29/2024 9:25 AM  Performed by: Ochoa Murrell MD    Authorized by: Ochoa Murrell MD  Comparison: compared with previous ECG from 3/28/2023  Similar to previous ECG  Rhythm: sinus rhythm  Rate: normal  Conduction: conduction normal  ST Segments: ST segments normal  T Waves: T waves normal  QRS axis: normal  Other: no other findings    Clinical impression: normal ECG             Objective:     Ht 180.3 cm (70.98\")   Wt 96.6 kg (213 lb)   BMI 29.72 kg/m²  Body mass index is 29.72 kg/m².     Constitutional:       General: Not in acute distress.     Appearance: Well-developed. Not diaphoretic.   Eyes:      Pupils: Pupils are equal, round, and reactive to light.   HENT:      Head: Normocephalic and atraumatic.   Neck:      Thyroid: No thyromegaly.   Pulmonary:      Effort: Pulmonary effort is normal. No respiratory distress.      Breath sounds: Normal breath sounds. No wheezing. No rales.   Chest:      Chest wall: Not tender to palpatation.   Cardiovascular:      Normal rate. Regular rhythm.      No gallop.    Pulses:     Intact distal pulses.   Edema:     Peripheral edema absent.   Abdominal:      General: Bowel sounds are normal. There is no distension.      Palpations: Abdomen is soft.      Tenderness: There is no guarding.   Musculoskeletal: Normal range of motion.         General: No deformity.      Cervical back: Normal " range of motion and neck supple. Skin:     General: Skin is warm and dry.      Findings: No rash.   Neurological:      Mental Status: Alert and oriented to person, place, and time.      Cranial Nerves: No cranial nerve deficit.      Deep Tendon Reflexes: Reflexes are normal and symmetric.   Psychiatric:         Judgment: Judgment normal.         Review Of Data: I have reviewed pertinent recent labs, images and documents and pertinent findings included in HPI or assessment below.    Lipid Panel          10/11/2023    08:08   Lipid Panel   Total Cholesterol 130    Triglycerides 78    HDL Cholesterol 51    VLDL Cholesterol 15    LDL Cholesterol  64          Assessment/Plan:         History of shortness of breath in the past likely from A. fib and diastolic dysfunction- resolved. Normal myocardial perfusion study on 10/1/2021. Echo on 9/21/2021 revealed preserved left ventricular ejection fraction, elevated left atrial filling pressure while patient was in A. Fib  Paroxysmal atrial fibrillation diagnosed in September 2021. ZIO revealed A. fib burden of about 1% after discharge in  September 2022.  On Eliquis and diltiazem  Hypertension-on diltiazem and clonidine  Pleural plaque-follows up with pulmonary and uses nocturnal oxygen  Coronary calcification on CT -normal myocardial perfusion study in October 2021.  On statin  Hypercholesterolemia-on statin with lipid panel at goal.  Prior hx of TIA and patient reports intermittent RUE numbness -no neurologic complaints today  Admitted with dizziness and symptomatic hyponatremia in July 2022-resolved    Mr. Modi is doing very well from cardiac standpoint other than elevated blood pressure during office visit.  Runs normal during home monitoring.  He will bring his machine and will check it in office.  Remains in sinus rhythm.  He will continue to walk regularly.  Continue current care otherwise.  Follow-up in 1 year or sooner with any concerning symptoms    Diagnosis and  plan of care discussed with patient and verbalized understanding.            Your medication list            Accurate as of March 29, 2024  9:26 AM. If you have any questions, ask your nurse or doctor.                CONTINUE taking these medications        Instructions Last Dose Given Next Dose Due   acetaminophen 500 MG tablet  Commonly known as: TYLENOL      Take 2 tablets by mouth Daily.       ascorbic acid 500 MG tablet  Commonly known as: VITAMIN C      Take 2 tablets by mouth Daily.       atorvastatin 20 MG tablet  Commonly known as: LIPITOR      TAKE ONE TABLET BY MOUTH DAILY       calcium carbonate 500 MG chewable tablet  Commonly known as: TUMS      Chew 3 tablets Daily As Needed for Indigestion or Heartburn.       carboxymethylcellulose 0.5 % solution  Commonly known as: REFRESH PLUS      Administer 1 drop to both eyes Daily.       cetirizine 10 MG tablet  Commonly known as: zyrTEC      Take 1 tablet by mouth Daily.       cholecalciferol 25 MCG (1000 UT) tablet      Take 1 tablet by mouth Daily.       cloNIDine 0.1 MG tablet  Commonly known as: CATAPRES           dilTIAZem  MG 24 hr capsule  Commonly known as: CARDIZEM CD      TAKE ONE CAPSULE BY MOUTH DAILY       Eliquis 5 MG tablet tablet  Generic drug: apixaban      TAKE ONE TABLET BY MOUTH EVERY 12 HOURS       ferrous sulfate 325 (65 FE) MG tablet      Take 1 tablet by mouth Daily With Breakfast.       Lumigan 0.01 % ophthalmic drops  Generic drug: bimatoprost      INSTILL 1 DROP IN LEFT EYE EVERY NIGHT AT BEDTIME       multivitamin with minerals tablet tablet      Take 1 tablet by mouth Daily.       Prevagen 10 MG capsule  Generic drug: Apoaequorin      Take 1 capsule by mouth Daily.       tadalafil 5 MG tablet  Commonly known as: CIALIS      TAKE 1 TABLET BY MOUTH DAILY AS NEEDED FOR ERECTILE DYSFUNCTION       traMADol 50 MG tablet  Commonly known as: ULTRAM      Take 1 tablet by mouth Every 6 (Six) Hours As Needed for Moderate Pain.        traZODone 50 MG tablet  Commonly known as: DESYREL                  STOP taking these medications      gabapentin 300 MG capsule  Commonly known as: NEURONTIN  Stopped by: MD Ochoa Newton MD  03/29/24  09:26 EDT

## 2024-04-01 DIAGNOSIS — S32.050D CLOSED COMPRESSION FRACTURE OF L5 LUMBAR VERTEBRA, WITH ROUTINE HEALING, SUBSEQUENT ENCOUNTER: ICD-10-CM

## 2024-04-01 DIAGNOSIS — N52.9 ERECTILE DYSFUNCTION, UNSPECIFIED ERECTILE DYSFUNCTION TYPE: ICD-10-CM

## 2024-04-01 DIAGNOSIS — M25.551 RIGHT HIP PAIN: ICD-10-CM

## 2024-04-01 DIAGNOSIS — M89.9 LESION OF LEFT HUMERUS: Primary | ICD-10-CM

## 2024-04-01 RX ORDER — AZITHROMYCIN 250 MG/1
TABLET, FILM COATED ORAL SEE ADMIN INSTRUCTIONS
Qty: 6 TABLET | OUTPATIENT
Start: 2024-04-01

## 2024-04-01 RX ORDER — TADALAFIL 5 MG/1
5 TABLET ORAL DAILY PRN
Qty: 90 TABLET | Refills: 1 | Status: SHIPPED | OUTPATIENT
Start: 2024-04-01

## 2024-04-01 RX ORDER — TRAMADOL HYDROCHLORIDE 50 MG/1
50 TABLET ORAL EVERY 6 HOURS PRN
Qty: 40 TABLET | OUTPATIENT
Start: 2024-04-01

## 2024-04-16 ENCOUNTER — OFFICE VISIT (OUTPATIENT)
Dept: FAMILY MEDICINE CLINIC | Facility: CLINIC | Age: 79
End: 2024-04-16
Payer: COMMERCIAL

## 2024-04-16 VITALS
HEIGHT: 71 IN | SYSTOLIC BLOOD PRESSURE: 138 MMHG | HEART RATE: 82 BPM | DIASTOLIC BLOOD PRESSURE: 70 MMHG | OXYGEN SATURATION: 93 % | WEIGHT: 224 LBS | RESPIRATION RATE: 16 BRPM | BODY MASS INDEX: 31.36 KG/M2

## 2024-04-16 DIAGNOSIS — I10 ESSENTIAL HYPERTENSION: Primary | ICD-10-CM

## 2024-04-16 DIAGNOSIS — E78.00 HYPERCHOLESTEROLEMIA: ICD-10-CM

## 2024-04-16 DIAGNOSIS — I48.0 PAROXYSMAL ATRIAL FIBRILLATION: ICD-10-CM

## 2024-04-16 DIAGNOSIS — D50.8 OTHER IRON DEFICIENCY ANEMIA: ICD-10-CM

## 2024-04-16 DIAGNOSIS — J61 PULMONARY ASBESTOSIS: ICD-10-CM

## 2024-04-16 DIAGNOSIS — I51.89 DIASTOLIC DYSFUNCTION: ICD-10-CM

## 2024-04-16 PROCEDURE — 99214 OFFICE O/P EST MOD 30 MIN: CPT | Performed by: STUDENT IN AN ORGANIZED HEALTH CARE EDUCATION/TRAINING PROGRAM

## 2024-04-16 NOTE — PROGRESS NOTES
"Chief Complaint  Follow-up and Hypertension    Subjective        Gamal Modi Sr. presents to [facility] PRIMARY CARE  History of Present Illness  78-year-old pleasant male who presented to the clinic for 6-month lab follow-up.  Patient is a known case of hypertension and chronic low back pain and right hip pain, paroxysmal atrial fibrillation, insomnia, mild memory impairment  Patient is on gabapentin teen for chronic low back pain.  On diltiazem and Eliquis for atrial fibrillation  On trazodone for insomnia  On Lipitor for hyperlipidemia on clonidine for hypertension    Patient stated he has not drank alcohol for almost 7 months now.  He went into alcohol addiction rehab center 7 months ago.  He is now in better shape and is walking 4 miles a day and feeling better than ever  Objective   Vital Signs:  /70   Pulse 82   Resp 16   Ht 180.3 cm (70.98\")   Wt 102 kg (224 lb)   SpO2 93%   BMI 31.26 kg/m²   Estimated body mass index is 31.26 kg/m² as calculated from the following:    Height as of this encounter: 180.3 cm (70.98\").    Weight as of this encounter: 102 kg (224 lb).               Physical Exam  HENT:      Mouth/Throat:      Mouth: Mucous membranes are moist.      Pharynx: Oropharynx is clear.   Eyes:      Extraocular Movements: Extraocular movements intact.      Pupils: Pupils are equal, round, and reactive to light.   Cardiovascular:      Rate and Rhythm: Normal rate.      Pulses: Normal pulses.      Heart sounds: Normal heart sounds.   Pulmonary:      Effort: Pulmonary effort is normal.      Breath sounds: Normal breath sounds.   Abdominal:      General: Bowel sounds are normal.      Palpations: Abdomen is soft.   Musculoskeletal:         General: Normal range of motion.   Skin:     General: Skin is warm.   Neurological:      Mental Status: He is alert and oriented to person, place, and time.   Psychiatric:         Mood and Affect: Mood normal.        Result Review " :    The following data was reviewed by: Alysha Cosme MD on 04/16/2024:  CMP          9/21/2023    01:07 10/11/2023    08:08 4/12/2024    09:22   CMP   Glucose 104  104  100    BUN 10  8  15    Creatinine 0.65  0.70  0.87    EGFR 97.7      Sodium 135  138  139    Potassium 3.9  4.2  4.3    Chloride 99  99  102    Calcium 9.2  10.2  9.9    Total Protein  6.8  6.6    Total Protein 6.6      Albumin 3.6  4.5  4.4    Globulin  2.3  2.2    Globulin 3.0      Total Bilirubin 0.4  0.4  0.6    Alkaline Phosphatase 95  99  94    AST (SGOT) 18  18  18    ALT (SGPT) 17  11  13    Albumin/Globulin Ratio 1.2      BUN/Creatinine Ratio 15.4  11.4  17.2    Anion Gap 10.7        CBC          9/21/2023    01:07 10/11/2023    08:08 4/12/2024    09:22   CBC   WBC 6.77  8.39  6.12    RBC 3.55  4.05  4.17    Hemoglobin 11.4  13.1  12.3    Hematocrit 33.8  38.2  37.8    MCV 95.2  94.3  90.6    MCH 32.1  32.3  29.5    MCHC 33.7  34.3  32.5    RDW 12.3  12.4  11.7    Platelets 422  477  347      Lipid Panel          10/11/2023    08:08 4/12/2024    09:22   Lipid Panel   Total Cholesterol 130  136    Triglycerides 78  53    HDL Cholesterol 51  55    VLDL Cholesterol 15  12    LDL Cholesterol  64  69      TSH          9/15/2023    14:38 10/11/2023    08:08 4/12/2024    09:22   TSH   TSH 2.550  1.910  1.950                   Assessment and Plan     Diagnoses and all orders for this visit:    1. Essential hypertension (Primary)  Comments:  Controlled continue same medication    2. Hypercholesterolemia  Comments:  Stable continue same medication    3. Other iron deficiency anemia    4. Pulmonary asbestosis    5. Paroxysmal atrial fibrillation  Comments:  Rate controlled, continue same medication    6. Diastolic dysfunction  Comments:  No leg swelling, asymptomatic         # Labs reviewed  PSA level within normal limit  Hemoglobin slightly dropped down to 12.3  Lymphocyte relative ratio 1 monocyte relative ratio is within normal limit this time  with absolute count also within normal limit  Fasting glucose borderline 100, creatinine/EGFR 0.87/89.4  Electrolytes within normal limit and LFTs within normal limit  Lipid profile stable LDL around 69  Hemoglobin A1c this time has trended up slightly 5.7  Thyroid function testing is 1.950 which is within normal limit    RTC in 6 months for wellness check with labs      Follow Up     No follow-ups on file.  Patient was given instructions and counseling regarding his condition or for health maintenance advice. Please see specific information pulled into the AVS if appropriate.

## 2024-04-26 DIAGNOSIS — I51.89 DIASTOLIC DYSFUNCTION: ICD-10-CM

## 2024-04-26 RX ORDER — FUROSEMIDE 40 MG/1
TABLET ORAL
Qty: 90 TABLET | Refills: 3 | OUTPATIENT
Start: 2024-04-26

## 2024-04-26 NOTE — TELEPHONE ENCOUNTER
Rx Refill Note  Requested Prescriptions     Pending Prescriptions Disp Refills    furosemide (LASIX) 40 MG tablet [Pharmacy Med Name: FUROSEMIDE 40 MG TABLET] 90 tablet 3     Sig: TAKE ONE TABLET BY MOUTH DAILY      Last office visit with prescribing clinician: 4/16/2024   Last telemedicine visit with prescribing clinician: Visit date not found   Next office visit with prescribing clinician: 11/20/2024                         Would you like a call back once the refill request has been completed: [] Yes [] No    If the office needs to give you a call back, can they leave a voicemail: [] Yes [] No    Yin Flannery MA  04/26/24, 08:21 EDT

## 2024-07-07 ENCOUNTER — HOSPITAL ENCOUNTER (OUTPATIENT)
Facility: HOSPITAL | Age: 79
Setting detail: OBSERVATION
Discharge: HOME | End: 2024-07-09
Attending: EMERGENCY MEDICINE | Admitting: INTERNAL MEDICINE
Payer: COMMERCIAL

## 2024-07-07 ENCOUNTER — APPOINTMENT (OUTPATIENT)
Dept: CT IMAGING | Facility: HOSPITAL | Age: 79
End: 2024-07-07
Payer: COMMERCIAL

## 2024-07-07 DIAGNOSIS — R31.9 URINARY TRACT INFECTION WITH HEMATURIA, SITE UNSPECIFIED: ICD-10-CM

## 2024-07-07 DIAGNOSIS — E87.1 HYPONATREMIA: Primary | ICD-10-CM

## 2024-07-07 DIAGNOSIS — R31.9 HEMATURIA, UNSPECIFIED TYPE: ICD-10-CM

## 2024-07-07 DIAGNOSIS — E87.6 HYPOKALEMIA: ICD-10-CM

## 2024-07-07 DIAGNOSIS — N39.0 URINARY TRACT INFECTION WITH HEMATURIA, SITE UNSPECIFIED: ICD-10-CM

## 2024-07-07 DIAGNOSIS — E83.42 HYPOMAGNESEMIA: ICD-10-CM

## 2024-07-07 LAB
ALBUMIN SERPL-MCNC: 3.9 G/DL (ref 3.5–5.2)
ALBUMIN/GLOB SERPL: 1.6 G/DL
ALP SERPL-CCNC: 104 U/L (ref 39–117)
ALT SERPL W P-5'-P-CCNC: 27 U/L (ref 1–41)
ANION GAP SERPL CALCULATED.3IONS-SCNC: 12.7 MMOL/L (ref 5–15)
ANION GAP SERPL CALCULATED.3IONS-SCNC: 13.4 MMOL/L (ref 5–15)
AST SERPL-CCNC: 40 U/L (ref 1–40)
BACTERIA UR QL AUTO: ABNORMAL /HPF
BASOPHILS # BLD AUTO: 0.03 10*3/MM3 (ref 0–0.2)
BASOPHILS NFR BLD AUTO: 0.6 % (ref 0–1.5)
BILIRUB SERPL-MCNC: 1.1 MG/DL (ref 0–1.2)
BILIRUB UR QL STRIP: ABNORMAL
BUN SERPL-MCNC: 6 MG/DL (ref 8–23)
BUN SERPL-MCNC: 7 MG/DL (ref 8–23)
BUN/CREAT SERPL: 10.2 (ref 7–25)
BUN/CREAT SERPL: 8.9 (ref 7–25)
CALCIUM SPEC-SCNC: 8.6 MG/DL (ref 8.6–10.5)
CALCIUM SPEC-SCNC: 8.9 MG/DL (ref 8.6–10.5)
CHLORIDE SERPL-SCNC: 80 MMOL/L (ref 98–107)
CHLORIDE SERPL-SCNC: 88 MMOL/L (ref 98–107)
CLARITY UR: ABNORMAL
CO2 SERPL-SCNC: 23.6 MMOL/L (ref 22–29)
CO2 SERPL-SCNC: 25.3 MMOL/L (ref 22–29)
COLOR UR: ABNORMAL
CREAT SERPL-MCNC: 0.59 MG/DL (ref 0.76–1.27)
CREAT SERPL-MCNC: 0.79 MG/DL (ref 0.76–1.27)
D-LACTATE SERPL-SCNC: 1 MMOL/L (ref 0.5–2)
DEPRECATED RDW RBC AUTO: 41.6 FL (ref 37–54)
EGFRCR SERPLBLD CKD-EPI 2021: 91.5 ML/MIN/1.73
EGFRCR SERPLBLD CKD-EPI 2021: 99.9 ML/MIN/1.73
EOSINOPHIL # BLD AUTO: 0.03 10*3/MM3 (ref 0–0.4)
EOSINOPHIL NFR BLD AUTO: 0.6 % (ref 0.3–6.2)
ERYTHROCYTE [DISTWIDTH] IN BLOOD BY AUTOMATED COUNT: 12.6 % (ref 12.3–15.4)
ETHANOL BLD-MCNC: <10 MG/DL (ref 0–10)
ETHANOL UR QL: <0.01 %
GEN 5 2HR TROPONIN T REFLEX: 25 NG/L
GLOBULIN UR ELPH-MCNC: 2.5 GM/DL
GLUCOSE SERPL-MCNC: 119 MG/DL (ref 65–99)
GLUCOSE SERPL-MCNC: 98 MG/DL (ref 65–99)
GLUCOSE UR STRIP-MCNC: NEGATIVE MG/DL
HCT VFR BLD AUTO: 34.5 % (ref 37.5–51)
HGB BLD-MCNC: 12.3 G/DL (ref 13–17.7)
HGB UR QL STRIP.AUTO: ABNORMAL
HOLD SPECIMEN: NORMAL
HYALINE CASTS UR QL AUTO: ABNORMAL /LPF
IMM GRANULOCYTES # BLD AUTO: 0.01 10*3/MM3 (ref 0–0.05)
IMM GRANULOCYTES NFR BLD AUTO: 0.2 % (ref 0–0.5)
INR PPP: 1.3
KETONES UR QL STRIP: ABNORMAL
LEUKOCYTE ESTERASE UR QL STRIP.AUTO: NEGATIVE
LIPASE SERPL-CCNC: 32 U/L (ref 13–60)
LYMPHOCYTES # BLD AUTO: 0.78 10*3/MM3 (ref 0.7–3.1)
LYMPHOCYTES NFR BLD AUTO: 15.3 % (ref 19.6–45.3)
MAGNESIUM SERPL-MCNC: 1.4 MG/DL (ref 1.6–2.4)
MAGNESIUM SERPL-MCNC: 1.9 MG/DL (ref 1.6–2.4)
MCH RBC QN AUTO: 31.7 PG (ref 26.6–33)
MCHC RBC AUTO-ENTMCNC: 35.7 G/DL (ref 31.5–35.7)
MCV RBC AUTO: 88.9 FL (ref 79–97)
MONOCYTES # BLD AUTO: 1.55 10*3/MM3 (ref 0.1–0.9)
MONOCYTES NFR BLD AUTO: 30.5 % (ref 5–12)
NEUTROPHILS NFR BLD AUTO: 2.69 10*3/MM3 (ref 1.7–7)
NEUTROPHILS NFR BLD AUTO: 52.8 % (ref 42.7–76)
NITRITE UR QL STRIP: NEGATIVE
OSMOLALITY UR: 220 MOSM/KG
PH UR STRIP.AUTO: 6 [PH] (ref 5–8)
PLATELET # BLD AUTO: 310 10*3/MM3 (ref 140–450)
PMV BLD AUTO: 8.7 FL (ref 6–12)
POTASSIUM SERPL-SCNC: 3 MMOL/L (ref 3.5–5.2)
POTASSIUM SERPL-SCNC: 3.3 MMOL/L (ref 3.5–5.2)
PROT SERPL-MCNC: 6.4 G/DL (ref 6–8.5)
PROT UR QL STRIP: ABNORMAL
PROTHROMBIN TIME: 14.3 SECONDS (ref 11–15)
QT INTERVAL: 458 MS
QTC INTERVAL: 512 MS
RBC # BLD AUTO: 3.88 10*6/MM3 (ref 4.14–5.8)
RBC # UR STRIP: ABNORMAL /HPF
REF LAB TEST METHOD: ABNORMAL
SODIUM SERPL-SCNC: 118 MMOL/L (ref 136–145)
SODIUM SERPL-SCNC: 125 MMOL/L (ref 136–145)
SODIUM UR-SCNC: <20 MMOL/L
SP GR UR STRIP: 1.01 (ref 1–1.03)
SQUAMOUS #/AREA URNS HPF: ABNORMAL /HPF
TROPONIN T DELTA: -2 NG/L
TROPONIN T SERPL HS-MCNC: 27 NG/L
UROBILINOGEN UR QL STRIP: ABNORMAL
WBC # UR STRIP: ABNORMAL /HPF
WBC NRBC COR # BLD AUTO: 5.09 10*3/MM3 (ref 3.4–10.8)

## 2024-07-07 PROCEDURE — 74177 CT ABD & PELVIS W/CONTRAST: CPT

## 2024-07-07 PROCEDURE — 96375 TX/PRO/DX INJ NEW DRUG ADDON: CPT

## 2024-07-07 PROCEDURE — 25010000002 MAGNESIUM SULFATE PER 500 MG OF MAGNESIUM: Performed by: EMERGENCY MEDICINE

## 2024-07-07 PROCEDURE — 96376 TX/PRO/DX INJ SAME DRUG ADON: CPT

## 2024-07-07 PROCEDURE — 85610 PROTHROMBIN TIME: CPT

## 2024-07-07 PROCEDURE — 25510000001 IOPAMIDOL PER 1 ML: Performed by: EMERGENCY MEDICINE

## 2024-07-07 PROCEDURE — 87086 URINE CULTURE/COLONY COUNT: CPT | Performed by: EMERGENCY MEDICINE

## 2024-07-07 PROCEDURE — 87040 BLOOD CULTURE FOR BACTERIA: CPT | Performed by: EMERGENCY MEDICINE

## 2024-07-07 PROCEDURE — 36415 COLL VENOUS BLD VENIPUNCTURE: CPT

## 2024-07-07 PROCEDURE — 96361 HYDRATE IV INFUSION ADD-ON: CPT

## 2024-07-07 PROCEDURE — 25010000002 THIAMINE PER 100 MG: Performed by: INTERNAL MEDICINE

## 2024-07-07 PROCEDURE — 80053 COMPREHEN METABOLIC PANEL: CPT | Performed by: EMERGENCY MEDICINE

## 2024-07-07 PROCEDURE — 83735 ASSAY OF MAGNESIUM: CPT | Performed by: EMERGENCY MEDICINE

## 2024-07-07 PROCEDURE — 81001 URINALYSIS AUTO W/SCOPE: CPT | Performed by: EMERGENCY MEDICINE

## 2024-07-07 PROCEDURE — G0378 HOSPITAL OBSERVATION PER HR: HCPCS

## 2024-07-07 PROCEDURE — 99285 EMERGENCY DEPT VISIT HI MDM: CPT

## 2024-07-07 PROCEDURE — 83605 ASSAY OF LACTIC ACID: CPT | Performed by: EMERGENCY MEDICINE

## 2024-07-07 PROCEDURE — 83690 ASSAY OF LIPASE: CPT | Performed by: EMERGENCY MEDICINE

## 2024-07-07 PROCEDURE — 93005 ELECTROCARDIOGRAM TRACING: CPT | Performed by: EMERGENCY MEDICINE

## 2024-07-07 PROCEDURE — 84300 ASSAY OF URINE SODIUM: CPT | Performed by: INTERNAL MEDICINE

## 2024-07-07 PROCEDURE — 83735 ASSAY OF MAGNESIUM: CPT | Performed by: INTERNAL MEDICINE

## 2024-07-07 PROCEDURE — 84484 ASSAY OF TROPONIN QUANT: CPT | Performed by: EMERGENCY MEDICINE

## 2024-07-07 PROCEDURE — 70450 CT HEAD/BRAIN W/O DYE: CPT

## 2024-07-07 PROCEDURE — 85025 COMPLETE CBC W/AUTO DIFF WBC: CPT | Performed by: EMERGENCY MEDICINE

## 2024-07-07 PROCEDURE — 99284 EMERGENCY DEPT VISIT MOD MDM: CPT | Performed by: EMERGENCY MEDICINE

## 2024-07-07 PROCEDURE — 83935 ASSAY OF URINE OSMOLALITY: CPT | Performed by: INTERNAL MEDICINE

## 2024-07-07 PROCEDURE — 90791 PSYCH DIAGNOSTIC EVALUATION: CPT | Performed by: SOCIAL WORKER

## 2024-07-07 PROCEDURE — 82077 ASSAY SPEC XCP UR&BREATH IA: CPT | Performed by: EMERGENCY MEDICINE

## 2024-07-07 PROCEDURE — 25810000003 SODIUM CHLORIDE 0.9 % SOLUTION: Performed by: EMERGENCY MEDICINE

## 2024-07-07 PROCEDURE — 25010000002 CEFTRIAXONE PER 250 MG: Performed by: EMERGENCY MEDICINE

## 2024-07-07 RX ORDER — AMOXICILLIN 250 MG
2 CAPSULE ORAL 2 TIMES DAILY PRN
Status: DISCONTINUED | OUTPATIENT
Start: 2024-07-07 | End: 2024-07-09 | Stop reason: HOSPADM

## 2024-07-07 RX ORDER — POTASSIUM CHLORIDE 750 MG/1
40 TABLET, FILM COATED, EXTENDED RELEASE ORAL EVERY 4 HOURS
Status: COMPLETED | OUTPATIENT
Start: 2024-07-07 | End: 2024-07-07

## 2024-07-07 RX ORDER — SODIUM CHLORIDE 9 MG/ML
40 INJECTION, SOLUTION INTRAVENOUS AS NEEDED
Status: DISCONTINUED | OUTPATIENT
Start: 2024-07-07 | End: 2024-07-09 | Stop reason: HOSPADM

## 2024-07-07 RX ORDER — LORAZEPAM 1 MG/1
1 TABLET ORAL
Status: DISCONTINUED | OUTPATIENT
Start: 2024-07-07 | End: 2024-07-09 | Stop reason: HOSPADM

## 2024-07-07 RX ORDER — MIDAZOLAM HYDROCHLORIDE 1 MG/ML
4 INJECTION INTRAMUSCULAR; INTRAVENOUS
Status: DISCONTINUED | OUTPATIENT
Start: 2024-07-07 | End: 2024-07-09 | Stop reason: HOSPADM

## 2024-07-07 RX ORDER — LORAZEPAM 1 MG/1
2 TABLET ORAL
Status: DISCONTINUED | OUTPATIENT
Start: 2024-07-07 | End: 2024-07-09 | Stop reason: HOSPADM

## 2024-07-07 RX ORDER — ONDANSETRON 2 MG/ML
4 INJECTION INTRAMUSCULAR; INTRAVENOUS EVERY 6 HOURS PRN
Status: DISCONTINUED | OUTPATIENT
Start: 2024-07-07 | End: 2024-07-09 | Stop reason: HOSPADM

## 2024-07-07 RX ORDER — SODIUM CHLORIDE 0.9 % (FLUSH) 0.9 %
10 SYRINGE (ML) INJECTION EVERY 12 HOURS SCHEDULED
Status: DISCONTINUED | OUTPATIENT
Start: 2024-07-07 | End: 2024-07-09 | Stop reason: HOSPADM

## 2024-07-07 RX ORDER — ONDANSETRON 4 MG/1
4 TABLET, ORALLY DISINTEGRATING ORAL EVERY 6 HOURS PRN
Status: DISCONTINUED | OUTPATIENT
Start: 2024-07-07 | End: 2024-07-09 | Stop reason: HOSPADM

## 2024-07-07 RX ORDER — MIDAZOLAM HYDROCHLORIDE 1 MG/ML
2 INJECTION INTRAMUSCULAR; INTRAVENOUS
Status: DISCONTINUED | OUTPATIENT
Start: 2024-07-07 | End: 2024-07-09 | Stop reason: HOSPADM

## 2024-07-07 RX ORDER — MAGNESIUM SULFATE HEPTAHYDRATE 500 MG/ML
1 INJECTION, SOLUTION INTRAMUSCULAR; INTRAVENOUS ONCE
Status: COMPLETED | OUTPATIENT
Start: 2024-07-07 | End: 2024-07-07

## 2024-07-07 RX ORDER — ATORVASTATIN CALCIUM 20 MG/1
20 TABLET, FILM COATED ORAL DAILY
Status: DISCONTINUED | OUTPATIENT
Start: 2024-07-07 | End: 2024-07-09 | Stop reason: HOSPADM

## 2024-07-07 RX ORDER — ACETAMINOPHEN 160 MG/5ML
650 SOLUTION ORAL EVERY 4 HOURS PRN
Status: DISCONTINUED | OUTPATIENT
Start: 2024-07-07 | End: 2024-07-09 | Stop reason: HOSPADM

## 2024-07-07 RX ORDER — POLYETHYLENE GLYCOL 3350 17 G/17G
17 POWDER, FOR SOLUTION ORAL DAILY PRN
Status: DISCONTINUED | OUTPATIENT
Start: 2024-07-07 | End: 2024-07-09 | Stop reason: HOSPADM

## 2024-07-07 RX ORDER — BISACODYL 5 MG/1
5 TABLET, DELAYED RELEASE ORAL DAILY PRN
Status: DISCONTINUED | OUTPATIENT
Start: 2024-07-07 | End: 2024-07-09 | Stop reason: HOSPADM

## 2024-07-07 RX ORDER — DILTIAZEM HYDROCHLORIDE 120 MG/1
120 CAPSULE, COATED, EXTENDED RELEASE ORAL DAILY
Status: DISCONTINUED | OUTPATIENT
Start: 2024-07-07 | End: 2024-07-09 | Stop reason: HOSPADM

## 2024-07-07 RX ORDER — THIAMINE HYDROCHLORIDE 100 MG/ML
200 INJECTION, SOLUTION INTRAMUSCULAR; INTRAVENOUS EVERY 8 HOURS SCHEDULED
Status: DISCONTINUED | OUTPATIENT
Start: 2024-07-07 | End: 2024-07-09 | Stop reason: HOSPADM

## 2024-07-07 RX ORDER — CLONIDINE HYDROCHLORIDE 0.1 MG/1
0.1 TABLET ORAL DAILY
Status: DISCONTINUED | OUTPATIENT
Start: 2024-07-08 | End: 2024-07-09 | Stop reason: HOSPADM

## 2024-07-07 RX ORDER — ACETAMINOPHEN 325 MG/1
650 TABLET ORAL EVERY 4 HOURS PRN
Status: DISCONTINUED | OUTPATIENT
Start: 2024-07-07 | End: 2024-07-09 | Stop reason: HOSPADM

## 2024-07-07 RX ORDER — NITROGLYCERIN 0.4 MG/1
0.4 TABLET SUBLINGUAL
Status: DISCONTINUED | OUTPATIENT
Start: 2024-07-07 | End: 2024-07-09 | Stop reason: HOSPADM

## 2024-07-07 RX ORDER — BISACODYL 10 MG
10 SUPPOSITORY, RECTAL RECTAL DAILY PRN
Status: DISCONTINUED | OUTPATIENT
Start: 2024-07-07 | End: 2024-07-09 | Stop reason: HOSPADM

## 2024-07-07 RX ORDER — ACETAMINOPHEN 650 MG/1
650 SUPPOSITORY RECTAL EVERY 4 HOURS PRN
Status: DISCONTINUED | OUTPATIENT
Start: 2024-07-07 | End: 2024-07-09 | Stop reason: HOSPADM

## 2024-07-07 RX ORDER — SODIUM CHLORIDE 0.9 % (FLUSH) 0.9 %
10 SYRINGE (ML) INJECTION AS NEEDED
Status: DISCONTINUED | OUTPATIENT
Start: 2024-07-07 | End: 2024-07-09 | Stop reason: HOSPADM

## 2024-07-07 RX ORDER — POTASSIUM CHLORIDE 1.5 G/1.58G
40 POWDER, FOR SOLUTION ORAL ONCE
Status: COMPLETED | OUTPATIENT
Start: 2024-07-07 | End: 2024-07-07

## 2024-07-07 RX ORDER — FOLIC ACID 1 MG/1
1 TABLET ORAL DAILY
Status: DISCONTINUED | OUTPATIENT
Start: 2024-07-07 | End: 2024-07-09 | Stop reason: HOSPADM

## 2024-07-07 RX ADMIN — MAGNESIUM SULFATE HEPTAHYDRATE 1 G: 500 INJECTION, SOLUTION INTRAMUSCULAR; INTRAVENOUS at 09:22

## 2024-07-07 RX ADMIN — SODIUM CHLORIDE 500 ML: 9 INJECTION, SOLUTION INTRAVENOUS at 10:09

## 2024-07-07 RX ADMIN — POTASSIUM CHLORIDE 40 MEQ: 750 TABLET, EXTENDED RELEASE ORAL at 17:23

## 2024-07-07 RX ADMIN — FOLIC ACID 1 MG: 1 TABLET ORAL at 17:23

## 2024-07-07 RX ADMIN — POTASSIUM CHLORIDE 40 MEQ: 1.5 POWDER, FOR SOLUTION ORAL at 09:15

## 2024-07-07 RX ADMIN — THIAMINE HYDROCHLORIDE 200 MG: 100 INJECTION, SOLUTION INTRAMUSCULAR; INTRAVENOUS at 21:49

## 2024-07-07 RX ADMIN — Medication 10 ML: at 21:49

## 2024-07-07 RX ADMIN — CEFTRIAXONE SODIUM 1000 MG: 1 INJECTION, POWDER, FOR SOLUTION INTRAMUSCULAR; INTRAVENOUS at 15:12

## 2024-07-07 RX ADMIN — IOPAMIDOL 100 ML: 755 INJECTION, SOLUTION INTRAVENOUS at 09:03

## 2024-07-07 RX ADMIN — THIAMINE HYDROCHLORIDE 200 MG: 100 INJECTION, SOLUTION INTRAMUSCULAR; INTRAVENOUS at 17:23

## 2024-07-07 RX ADMIN — SODIUM CHLORIDE 1000 ML: 9 INJECTION, SOLUTION INTRAVENOUS at 08:39

## 2024-07-07 RX ADMIN — POTASSIUM CHLORIDE 40 MEQ: 750 TABLET, EXTENDED RELEASE ORAL at 21:48

## 2024-07-07 NOTE — CONSULTS
[facility] Consult Note      Patient Name: Gamal Modi Sr.  : 1945  MRN: [id number]  Primary Care Physician:  Alysha Cosme MD  Referring Physician: Alysha Cosme MD  Date of admission: 2024    Subjective     Reason for Consult:  hypoNa    HPI:   Gamal Modi Sr. is a 79 y.o. male with normal renal function, chronic hyponatremia, binge alcoholism, and atrial fibrillation on Eliquis, admitted today for further evaluation of gross hematuria for the preceding 3-4 days, associated with some dysuria and urgency.  Urine showed TNTC RBCs, 21-50 WBCs, and greater than 300 mg/dL protein.  Hemoglobin 12.3.  Serum sodium 118 at 8AM and 125 at 3PM.  Full PMH outlined below.  Binge drinking for the past several weeks, though states last drink 8 days ago.  States that when he drinks heavily, he eats very little food.  He does try, though, to drink 6 or 7 glasses of Gatorade every day.  No use of diuretics or SSRIs at home.    Had been feeling groggy and having trouble processing over the last few days  Feels much better now, with appropriate wakefulness and no difficulty concentrating  Continues to have gross hematuria that is mostly painless  No recent falls  No shortness of breath, with apnea, chest pain, palpitations, or leg swelling    Review of Systems:   14 point review of systems is otherwise negative except for mentioned above on HPI    Personal History     Past Medical History:   Diagnosis Date    AF (atrial fibrillation)     Cataract 2019    Elevated cholesterol     Erectile dysfunction 2018    Gout     Hypertension     Long term exposure to asbestos     3 years    Sebaceous cyst        Past Surgical History:   Procedure Laterality Date    COLON SURGERY      COLONOSCOPY  approx     negative per pt     COLONOSCOPY N/A 2022    Procedure: COLONOSCOPY INTO CECUM/ TERMINAL ILEUM WITH POLYPECTOMY;  Surgeon: Aidan Branham MD;  Location: [facility] ENDOSCOPY;   Service: Gastroenterology;  Laterality: N/A;  pre: anemia  post: hemorrhoids, normal TI, polyp, diverticulosis    ENDOSCOPY N/A 5/12/2022    Procedure: ESOPHAGOGASTRODUODENOSCOPY WITH BIOPSIES;  Surgeon: Aidan Branham MD;  Location: [facility] ENDOSCOPY;  Service: Gastroenterology;  Laterality: N/A;  pre: anemia  post: hiatal hernia, bnormal ampulla    OTHER SURGICAL HISTORY      BOWEL BLOCKAGE       Family History: family history includes Cancer in his mother and sister; Diabetes in his father and another family member.    Social History:  reports that he has never smoked. He has never used smokeless tobacco. He reports current alcohol use. He reports that he does not use drugs.    Home Medications:  Prior to Admission medications    Medication Sig Start Date End Date Taking? Authorizing Provider   acetaminophen (TYLENOL) 500 MG tablet Take 2 tablets by mouth Daily.   Yes Rod Dela Cruz MD   ascorbic acid (VITAMIN C) 500 MG tablet Take 2 tablets by mouth Daily.   Yes Rod Dela Cruz MD   atorvastatin (LIPITOR) 20 MG tablet TAKE ONE TABLET BY MOUTH DAILY 3/28/24  Yes Alysha Cosme MD   calcium carbonate (TUMS) 500 MG chewable tablet Chew 3 tablets Daily As Needed for Indigestion or Heartburn.   Yes Rod Dela Cruz MD   carboxymethylcellulose (REFRESH PLUS) 0.5 % solution Administer 1 drop to both eyes Daily.   Yes Rod Dela Cruz MD   cholecalciferol (VITAMIN D3) 1000 units tablet Take 1 tablet by mouth Daily.   Yes Rod Dela Cruz MD   cloNIDine (CATAPRES) 0.1 MG tablet Take 1 tablet by mouth Daily. 9/19/23  Yes Rod Dela Cruz MD   dilTIAZem CD (CARDIZEM CD) 120 MG 24 hr capsule TAKE ONE CAPSULE BY MOUTH DAILY 1/17/24  Yes Alysha Cosme MD   Eliquis 5 MG tablet tablet TAKE ONE TABLET BY MOUTH EVERY 12 HOURS 2/6/24  Yes Alysha Cosme MD   ferrous sulfate 325 (65 FE) MG tablet Take 1 tablet by mouth Daily With Breakfast.   Yes Rod Dela Cruz MD Hydrolyzed Silk  (COGNIUM MEMORY PO) Take 1 tablet by mouth Daily.   Yes Rod Dela Cruz MD   Multiple Vitamins-Minerals (OCUVITE EXTRA PO) Take 1 tablet by mouth Daily.   Yes Rod Dela Cruz MD   tadalafil (CIALIS) 5 MG tablet TAKE 1 TABLET BY MOUTH DAILY AS NEEDED FOR ERECTILE DYSFUNCTION 4/1/24  Yes Alysha Cosme MD   Apoaequorin (Prevagen) 10 MG capsule Take 1 capsule by mouth Daily.  7/7/24  Rod Dela Cruz MD   cetirizine (zyrTEC) 10 MG tablet Take 1 tablet by mouth Daily.  7/7/24  Rod Dela Cruz MD   traZODone (DESYREL) 50 MG tablet  9/19/23 7/7/24  Rod Dela Cruz MD       Allergies:  No Known Allergies    Objective     Vitals:   Temp:  [97.5 °F (36.4 °C)-98.1 °F (36.7 °C)] 97.5 °F (36.4 °C)  Heart Rate:  [58-89] 64  Resp:  [18] 18  BP: (109-149)/(59-80) 116/59    Intake/Output Summary (Last 24 hours) at 7/7/2024 1834  Last data filed at 7/7/2024 1243  Gross per 24 hour   Intake 1500 ml   Output --   Net 1500 ml       Physical Exam:   Constitutional: Awake, alert, NAD, chronically ill  HEENT: Sclera anicteric, no conjunctival injection  Neck: Supple, no carotid bruit, trachea at midline, no JVD  Respiratory: Clear to auscultation bilaterally, nonlabored on RA  Cardiovascular: RRR, no rub  Gastrointestinal: BS +, soft, nontender and nondistended  : No palpable bladder  Musculoskeletal: No edema, no clubbing or cyanosis  Psychiatric: Appropriate affect, cooperative, oriented  Neurologic: No asterixis, moving all extremities, normal speech   Skin: Warm and dry       Scheduled Meds:     atorvastatin, 20 mg, Oral, Daily  [START ON 7/8/2024] cefTRIAXone, 1,000 mg, Intravenous, Q24H  [START ON 7/8/2024] cloNIDine, 0.1 mg, Oral, Daily  dilTIAZem CD, 120 mg, Oral, Daily  folic acid, 1 mg, Oral, Daily  potassium chloride ER, 40 mEq, Oral, Q4H  sodium chloride, 10 mL, Intravenous, Q12H  thiamine (B-1) IV, 200 mg, Intravenous, Q8H   Followed by  [START ON 7/12/2024] thiamine, 100 mg, Oral,  Daily      IV Meds:        Results Reviewed:   I have personally reviewed the results from the time of this admission to 7/7/2024 18:34 EDT     Lab Results   Component Value Date    GLUCOSE 98 07/07/2024    CALCIUM 8.6 07/07/2024     (L) 07/07/2024    K 3.3 (L) 07/07/2024    CO2 23.6 07/07/2024    CL 88 (L) 07/07/2024    BUN 6 (L) 07/07/2024    CREATININE 0.59 (L) 07/07/2024    EGFRIFAFRI 106 01/18/2022    EGFRIFNONA 92 01/18/2022    BCR 10.2 07/07/2024    ANIONGAP 13.4 07/07/2024      Lab Results   Component Value Date    MG 1.9 07/07/2024    PHOS 3.1 09/18/2023    ALBUMIN 3.9 07/07/2024           Assessment / Plan       Hyponatremia    Essential hypertension    Pulmonary asbestosis    Paroxysmal atrial fibrillation    Alcohol abuse    Hematuria      ASSESSMENT:  1.  Hyponatremia, acute on chronic, multifactorial from alcoholism, poor nutrition, and polydipsia.  Given his description of cognitive problems in the days preceding admission, he has had therefore an appropriate/necessary increase (delta 7) today.  Looks euvolemic now; preserved renal function  2.  Alcoholism  3.  Gross hematuria with pyuria and bacteriuria  4.  Hypotension  5.  Anemia  6.  Hypokalemia  7.  Atrial fibrillation on AC    PLAN:  1.  He is eating well, so no need for additional IVF  2.  Serial sodium levels.  Although he has had increase of 7 mEq/L in the span of an hours, this delta is appropriate as he was symptomatic on arrival  3.  Replace potassium  4.  Check phosphorus  5.  Urology evaluation underway  6.  Will quantify proteinuria after hematuria +/- UTI issues resolved    Thank you for involving us in the care of Gamal Modi Sr..  Please feel free to call with any questions.    Samuel Montenegro MD  07/07/24  18:34 EDT    [facility]  [phone]      Please note that portions of this note were completed with a voice recognition program.

## 2024-07-07 NOTE — [facility] PROVIDER NOTE
Subjective   History of Present Illness  79-year-old male with a history of atrial fibrillation presents with painless nocturia for the past 3 to 4 days.  Patient states no fevers chills shakes no nausea vomiting diarrhea no chest pains no shortness of breath.  Patient states no history of hematuria before or kidney stones and states no abdominal pain.  Patient states no pain with urination.  Patient also reportedly with some increased confusion and in particular forgetting details such as his Social Security number and other issues but patient has not exhibited any neurological issues otherwise.  Patient reports no weakness no paresthesias and no other focal issues.  Patient reportedly has had some slurred speech in the past but nothing recent.        Review of Systems   Genitourinary:  Positive for hematuria.   Neurological:  Positive for light-headedness.   All other systems reviewed and are negative.      Past Medical History:   Diagnosis Date    AF (atrial fibrillation)     Cataract 2019    Elevated cholesterol     Erectile dysfunction 2018    Gout     Hypertension     Long term exposure to asbestos     3 years    Sebaceous cyst        No Known Allergies    Past Surgical History:   Procedure Laterality Date    COLON SURGERY      COLONOSCOPY  approx 2015    negative per pt     COLONOSCOPY N/A 5/12/2022    Procedure: COLONOSCOPY INTO CECUM/ TERMINAL ILEUM WITH POLYPECTOMY;  Surgeon: Aidan Branham MD;  Location: [facility] ENDOSCOPY;  Service: Gastroenterology;  Laterality: N/A;  pre: anemia  post: hemorrhoids, normal TI, polyp, diverticulosis    ENDOSCOPY N/A 5/12/2022    Procedure: ESOPHAGOGASTRODUODENOSCOPY WITH BIOPSIES;  Surgeon: Aidan Branham MD;  Location: [facility] ENDOSCOPY;  Service: Gastroenterology;  Laterality: N/A;  pre: anemia  post: hiatal hernia, bnormal ampulla    OTHER SURGICAL HISTORY      BOWEL BLOCKAGE       Family History   Problem Relation Age of Onset    Diabetes Other     Cancer Mother      Diabetes Father     Cancer Sister        Social History     Socioeconomic History    Marital status:    Tobacco Use    Smoking status: Never    Smokeless tobacco: Never   Vaping Use    Vaping status: Never Used   Substance and Sexual Activity    Alcohol use: Yes     Comment: pt reports he drinks a fifth of whiskey a day, is a binge drinker, had not drank for 11 months then 2 weeks ago drank again, last drink 4 days ago    Drug use: Never    Sexual activity: Yes     Partners: Female           Objective   Physical Exam  Vitals and nursing note reviewed.   Constitutional:       Appearance: Normal appearance.   HENT:      Head: Normocephalic and atraumatic.      Right Ear: External ear normal.      Left Ear: External ear normal.      Nose: Nose normal.      Mouth/Throat:      Mouth: Mucous membranes are moist.      Pharynx: Oropharynx is clear.   Eyes:      Extraocular Movements: Extraocular movements intact.      Pupils: Pupils are equal, round, and reactive to light.   Cardiovascular:      Rate and Rhythm: Normal rate and regular rhythm.      Pulses: Normal pulses.      Heart sounds: Normal heart sounds.   Pulmonary:      Effort: Pulmonary effort is normal.      Breath sounds: Normal breath sounds.   Abdominal:      General: Abdomen is flat.   Musculoskeletal:         General: Normal range of motion.      Cervical back: Normal range of motion and neck supple.   Skin:     General: Skin is warm.   Neurological:      General: No focal deficit present.      Mental Status: He is alert.   Psychiatric:         Mood and Affect: Mood normal.         Behavior: Behavior normal.         Thought Content: Thought content normal.         Judgment: Judgment normal.         Procedures           ED Course  ED Course as of 07/07/24 1141   Sun Jul 07, 2024   0924 No acute findings on head CT. [TO]   0944 Patient is okay for admission. [TO]   0944 Patient with no acute findings on abdominal pelvis CT.  Patient with no obvious  infection noted on urinalysis and suspect this may be related to patient being on blood thinners.  Patient for follow-up with urology.  [TO]      ED Course User Index  [TO] [facility], Todd, DO                                           Medical Decision Making  79-year-old male presents with painless hematuria x 4 days.  Patient nontoxic hemodynamic stable with a very benign physical exam and suspect patient likely with possible urinary tract infection though will do CT to assess for painless kidney stone or cancer type concerns.  Patient otherwise nontoxic and hemodynamically stable and neurologically intact.  As per patient's reported increasing history of confusion I suspect this may be related to potential infection going on or electrolyte abnormalities but will do head CT to assure no other issues.  Patient is not altered at this time nor does a history point towards altered mental status.  Patient otherwise for supportive treatment and observation.    ECG atrial fibrillation with normal rate, no STEMI, no acute findings or changes, artifact noted    Patient okay with the admission though they want to drive themselves I advised against this but they will sign out for driving self there.  Patient case discussed with Dr. Samuel Allen and mina for admission.    Problems Addressed:  Hematuria, unspecified type: complicated acute illness or injury  Hypokalemia: complicated acute illness or injury  Hypomagnesemia: complicated acute illness or injury  Hyponatremia: complicated acute illness or injury    Amount and/or Complexity of Data Reviewed  Labs: ordered.  Radiology: ordered.  ECG/medicine tests: ordered.    Risk  Prescription drug management.        Final diagnoses:   None       ED Disposition  ED Disposition       None            No follow-up provider specified.       Medication List      No changes were made to your prescriptions during this visit.

## 2024-07-07 NOTE — Clinical Note
Level of Care: Med/Surg [1]   Diagnosis: Hyponatremia [198519]   Admitting Physician: ALONDRA PINEDA [[id number]]   Attending Physician: [facility], TODD [[id number]]   Certification: I Certify That Inpatient Hospital Services Are Medically Necessary For Greater Than 2 Midnights

## 2024-07-07 NOTE — H&P
Patient Name:  aGmal Modi Sr.  YOB: 1945  MRN:  [id number]  Admit Date:  7/7/2024  Patient Care Team:  Alysha Cosme MD as PCP - General (Internal Medicine)      Subjective   History Present Illness     Chief Complaint   Patient presents with    Blood in Urine    Altered Mental Status       Mr. Modi is a 79 y.o. with a history of A-fib, hypertension, hyperlipidemia, alcohol use who presents to [facility] complaining of initial report of hematuria for 3 days.  He did not report any fevers or chills.  He had CT at the emergency room that did not show any renal mass.  Urinalysis does show hematuria and also WBCs and trace bacteria.  He was given Rocephin there.  He has been having some lightheadedness but does not report any headache focal weakness or numbness.  He was found to be severely hyponatremic at the ER.  He reports that this has happened to him once before.  He binge drinks and had not drank for about 9 months but he reports he has been drinking for the past 2 weeks.  No nausea vomiting or abdominal pain reported.  No palpitations or shortness of breath reported.      Review of Systems   Constitutional:  Negative for chills and fever.   HENT:  Negative for sore throat and trouble swallowing.    Eyes:  Negative for pain and visual disturbance.   Respiratory:  Negative for cough, shortness of breath and wheezing.    Cardiovascular:  Negative for chest pain, palpitations and leg swelling.   Gastrointestinal:  Negative for constipation, diarrhea, nausea and vomiting.   Endocrine: Negative for cold intolerance and heat intolerance.   Genitourinary:  Positive for hematuria. Negative for dysuria and frequency.   Musculoskeletal:  Negative for neck pain and neck stiffness.   Skin:  Negative for pallor and rash.   Allergic/Immunologic: Negative for environmental allergies and food allergies.   Neurological:  Negative for seizures and syncope.   Hematological:   Negative for adenopathy. Does not bruise/bleed easily.   Psychiatric/Behavioral:  Negative for agitation and confusion.         Personal History     Past Medical History:   Diagnosis Date    AF (atrial fibrillation)     Cataract 2019    Elevated cholesterol     Erectile dysfunction 2018    Gout     Hypertension     Long term exposure to asbestos     3 years    Sebaceous cyst      Past Surgical History:   Procedure Laterality Date    COLON SURGERY      COLONOSCOPY  approx 2015    negative per pt     COLONOSCOPY N/A 5/12/2022    Procedure: COLONOSCOPY INTO CECUM/ TERMINAL ILEUM WITH POLYPECTOMY;  Surgeon: Aidan Branham MD;  Location:  [facility];  Service: Gastroenterology;  Laterality: N/A;  pre: anemia  post: hemorrhoids, normal TI, polyp, diverticulosis    ENDOSCOPY N/A 5/12/2022    Procedure: ESOPHAGOGASTRODUODENOSCOPY WITH BIOPSIES;  Surgeon: Aidan Branham MD;  Location:  [facility];  Service: Gastroenterology;  Laterality: N/A;  pre: anemia  post: hiatal hernia, bnormal ampulla    OTHER SURGICAL HISTORY      BOWEL BLOCKAGE     Family History   Problem Relation Age of Onset    Diabetes Other     Cancer Mother     Diabetes Father     Cancer Sister      Social History     Tobacco Use    Smoking status: Never    Smokeless tobacco: Never   Vaping Use    Vaping status: Never Used   Substance Use Topics    Alcohol use: Yes     Comment: pt reports he drinks a fifth of whiskey a day, is a binge drinker, had not drank for 11 months then 2 weeks ago drank again, last drink 4 days ago    Drug use: Never     No current facility-administered medications on file prior to encounter.     Current Outpatient Medications on File Prior to Encounter   Medication Sig Dispense Refill    acetaminophen (TYLENOL) 500 MG tablet Take 2 tablets by mouth Daily.      ascorbic acid (VITAMIN C) 500 MG tablet Take 2 tablets by mouth Daily.      atorvastatin (LIPITOR) 20 MG tablet TAKE ONE TABLET BY MOUTH DAILY 90 tablet 3     calcium carbonate (TUMS) 500 MG chewable tablet Chew 3 tablets Daily As Needed for Indigestion or Heartburn.      carboxymethylcellulose (REFRESH PLUS) 0.5 % solution Administer 1 drop to both eyes Daily.      cholecalciferol (VITAMIN D3) 1000 units tablet Take 1 tablet by mouth Daily.      cloNIDine (CATAPRES) 0.1 MG tablet Take 1 tablet by mouth Daily.      dilTIAZem CD (CARDIZEM CD) 120 MG 24 hr capsule TAKE ONE CAPSULE BY MOUTH DAILY 90 capsule 3    Eliquis 5 MG tablet tablet TAKE ONE TABLET BY MOUTH EVERY 12 HOURS 180 tablet 3    ferrous sulfate 325 (65 FE) MG tablet Take 1 tablet by mouth Daily With Breakfast.      Hydrolyzed Silk (COGNIUM MEMORY PO) Take 1 tablet by mouth Daily.      Multiple Vitamins-Minerals (OCUVITE EXTRA PO) Take 1 tablet by mouth Daily.      tadalafil (CIALIS) 5 MG tablet TAKE 1 TABLET BY MOUTH DAILY AS NEEDED FOR ERECTILE DYSFUNCTION 90 tablet 1    [DISCONTINUED] Apoaequorin (Prevagen) 10 MG capsule Take 1 capsule by mouth Daily.      [DISCONTINUED] cetirizine (zyrTEC) 10 MG tablet Take 1 tablet by mouth Daily.      [DISCONTINUED] traZODone (DESYREL) 50 MG tablet        No Known Allergies    Objective    Objective     Vital Signs  Temp:  [97.5 °F (36.4 °C)-98.1 °F (36.7 °C)] 97.5 °F (36.4 °C)  Heart Rate:  [58-89] 64  Resp:  [18] 18  BP: (109-149)/(59-80) 116/59  SpO2:  [93 %-99 %] 93 %  on   ;   Device (Oxygen Therapy): room air  Body mass index is 29.16 kg/m².    Physical Exam  Vitals and nursing note reviewed.   Constitutional:       General: He is not in acute distress.     Appearance: He is not ill-appearing or diaphoretic.   Eyes:      General: No scleral icterus.  Cardiovascular:      Rate and Rhythm: Normal rate and regular rhythm.      Pulses: Normal pulses.   Pulmonary:      Effort: Pulmonary effort is normal.      Breath sounds: No wheezing.   Abdominal:      General: There is no distension.      Palpations: Abdomen is soft.      Tenderness: There is no abdominal tenderness.  There is no guarding or rebound.   Musculoskeletal:         General: No swelling or tenderness.   Skin:     General: Skin is warm and dry.   Neurological:      Mental Status: He is alert.      Cranial Nerves: No cranial nerve deficit.   Psychiatric:         Mood and Affect: Mood normal.         Behavior: Behavior normal.         Results Review:  I reviewed the patient's new clinical results.  I reviewed the patient's new imaging results and agree with the interpretation.  I personally viewed and interpreted the patient's EKG/Telemetry data  I reviewed prior records.    Lab Results (last 24 hours)       Procedure Component Value Units Date/Time    CBC & Differential [[id number]]  (Abnormal) Collected: 07/07/24 0804    Specimen: Blood Updated: 07/07/24 0809    Narrative:      The following orders were created for panel order CBC & Differential.  Procedure                               Abnormality         Status                     ---------                               -----------         ------                     CBC Auto Differential[[id number]]        Abnormal            Final result                 Please view results for these tests on the individual orders.    High Sensitivity Troponin T [[id number]]  (Abnormal) Collected: 07/07/24 0804    Specimen: Blood Updated: 07/07/24 0831     HS Troponin T 27 ng/L     Narrative:      High Sensitive Troponin T Reference Range:  <14.0 ng/L- Negative Female for AMI  <22.0 ng/L- Negative Male for AMI  >=14 - Abnormal Female indicating possible myocardial injury.  >=22 - Abnormal Male indicating possible myocardial injury.   Clinicians would have to utilize clinical acumen, EKG, Troponin, and serial changes to determine if it is an Acute Myocardial Infarction or myocardial injury due to an underlying chronic condition.         Comprehensive Metabolic Panel [[id number]]  (Abnormal) Collected: 07/07/24 0804    Specimen: Blood Updated: 07/07/24 0833     Glucose 119 mg/dL      BUN 7  mg/dL      Creatinine 0.79 mg/dL      Sodium 118 mmol/L      Potassium 3.0 mmol/L      Chloride 80 mmol/L      CO2 25.3 mmol/L      Calcium 8.9 mg/dL      Total Protein 6.4 g/dL      Albumin 3.9 g/dL      ALT (SGPT) 27 U/L      AST (SGOT) 40 U/L      Alkaline Phosphatase 104 U/L      Total Bilirubin 1.1 mg/dL      Globulin 2.5 gm/dL      A/G Ratio 1.6 g/dL      BUN/Creatinine Ratio 8.9     Anion Gap 12.7 mmol/L      eGFR 91.5 mL/min/1.73     Narrative:      GFR Normal >60  Chronic Kidney Disease <60  Kidney Failure <15    The GFR formula is only valid for adults with stable renal function between ages 18 and 70.    Lipase [[id number]]  (Normal) Collected: 07/07/24 0804    Specimen: Blood Updated: 07/07/24 0825     Lipase 32 U/L     CBC Auto Differential [[id number]]  (Abnormal) Collected: 07/07/24 0804    Specimen: Blood Updated: 07/07/24 0809     WBC 5.09 10*3/mm3      RBC 3.88 10*6/mm3      Hemoglobin 12.3 g/dL      Hematocrit 34.5 %      MCV 88.9 fL      MCH 31.7 pg      MCHC 35.7 g/dL      RDW 12.6 %      RDW-SD 41.6 fl      MPV 8.7 fL      Platelets 310 10*3/mm3      Neutrophil % 52.8 %      Lymphocyte % 15.3 %      Monocyte % 30.5 %      Eosinophil % 0.6 %      Basophil % 0.6 %      Immature Grans % 0.2 %      Neutrophils, Absolute 2.69 10*3/mm3      Lymphocytes, Absolute 0.78 10*3/mm3      Monocytes, Absolute 1.55 10*3/mm3      Eosinophils, Absolute 0.03 10*3/mm3      Basophils, Absolute 0.03 10*3/mm3      Immature Grans, Absolute 0.01 10*3/mm3     Magnesium [[id number]]  (Abnormal) Collected: 07/07/24 0804    Specimen: Blood Updated: 07/07/24 0852     Magnesium 1.4 mg/dL     Ethanol [[id number]] Collected: 07/07/24 0804    Specimen: Blood Updated: 07/07/24 0927     Ethanol <10 mg/dL      Ethanol % <0.010 %     Urinalysis With Culture If Indicated - Urine, Clean Catch [[id number]]  (Abnormal) Collected: 07/07/24 0908    Specimen: Urine, Clean Catch Updated: 07/07/24 0914     Color, UA Red     Appearance, UA Turbid      pH, UA 6.0     Specific Gravity, UA 1.010     Glucose, UA Negative     Ketones, UA Trace     Bilirubin, UA Small (1+)     Blood, UA Large (3+)     Protein, UA >=300 mg/dL (3+)     Leuk Esterase, UA Negative     Nitrite, UA Negative     Urobilinogen, UA 0.2 E.U./dL    Narrative:      In absence of clinical symptoms, the presence of pyuria, bacteria, and/or nitrites on the urinalysis result does not correlate with infection.      Urinalysis, Microscopic Only - Urine, Clean Catch [[id number]]  (Abnormal) Collected: 07/07/24 0908    Specimen: Urine, Clean Catch Updated: 07/07/24 1330     RBC, UA Too Numerous to Count /HPF      WBC, UA 21-50 /HPF      Bacteria, UA Trace /HPF      Squamous Epithelial Cells, UA 3-6 /HPF      Hyaline Casts, UA None Seen /LPF      Methodology Automated Microscopy    [place] Urine Culture Tube - Urine, Clean Catch [[id number]] Collected: 07/07/24 0908    Specimen: Urine, Clean Catch Updated: 07/07/24 1308     Extra Tube Hold for add-ons.     Comment: Auto resulted.       Sodium, Urine, Random - Urine, Clean Catch [[id number]] Collected: 07/07/24 0908    Specimen: Urine, Clean Catch Updated: 07/07/24 1334     Sodium, Urine <20 mmol/L     Narrative:      Reference intervals for random urine have not been established.  Clinical usage is dependent upon physician's interpretation in combination with other laboratory tests.       Osmolality, Urine - Urine, Clean Catch [[id number]] Collected: 07/07/24 0908    Specimen: Urine, Clean Catch Updated: 07/07/24 1352     Osmolality, Urine 220 mOsm/kg     Narrative:      Osmo Normal Reference Ranges:    Random:  mOsm/kg H2O, depending on fluid intake.  Random: >850 mOsm/kg H20, after 12 hour fluid restriction.    24 Hour: 300-900 mOsm/kg H2O.    Urine Culture - Urine, Urine, Clean Catch [[id number]] Collected: 07/07/24 0908    Specimen: Urine, Clean Catch Updated: 07/07/24 1330    Protime-INR, Fingerstick [[id number]] Collected: 07/07/24 0919     Specimen: Capillary Blood Updated: 07/07/24 0920     Protime 14.3 Seconds      Comment: Serial Number: [id number]:  [id number]        INR 1.30    High Sensitivity Troponin T 2Hr [[id number]]  (Abnormal) Collected: 07/07/24 1012    Specimen: Blood Updated: 07/07/24 1034     HS Troponin T 25 ng/L      Troponin T Delta -2 ng/L     Narrative:      High Sensitive Troponin T Reference Range:  <14.0 ng/L- Negative Female for AMI  <22.0 ng/L- Negative Male for AMI  >=14 - Abnormal Female indicating possible myocardial injury.  >=22 - Abnormal Male indicating possible myocardial injury.   Clinicians would have to utilize clinical acumen, EKG, Troponin, and serial changes to determine if it is an Acute Myocardial Infarction or myocardial injury due to an underlying chronic condition.                 Imaging Results (Last 24 Hours)       Procedure Component Value Units Date/Time    CT Abdomen Pelvis With Contrast [[id number]] Collected: 07/07/24 0917     Updated: 07/07/24 0929    Narrative:      CT ABDOMEN PELVIS W CONTRAST-     INDICATION: Painless hematuria     COMPARISON: High-resolution chest CT February 20, 2023     TECHNIQUE:  Routine CT abdomen/pelvis with IV contrast. Coronal and sagittal  reformats. Radiation dose reduction techniques were utilized, including  automated exposure control and exposure modulation based on body size.     FINDINGS:      Lung bases: Trace loculated left pleural effusion with surrounding  pleural thickening, unchanged. Calcified pleural plaques at the lung  bases, suspect related to asbestos exposure. Subsegmental atelectasis or  scarring at the left lung base. Some septal thickening at the bases,  question asbestosis.     ABDOMEN: Multiple fluid attenuating hepatic cysts. Calcifications in the  liver and spleen, consistent with prior granulomatous infection. Normal  gallbladder. No biliary ductal dilatation. Spleen is normal in size. No  pancreatic mass or pancreatic ductal dilatation  seen. No adrenal  nodules.     Right kidney: Nonobstructing nephrolithiasis in the inferior pole,  series 3, axial mage 69, measures 2 mm. Few small fluid attenuating  cysts. No solid-appearing renal mass or hydronephrosis.     Left kidney: Nonobstructing nephrolithiasis in the inferior pole, series  3, axial image 64, measures 6 mm. No solid-appearing renal mass or  hydronephrosis.     Pelvis: Prostate is normal in size. Underdistended bladder. No bladder  calculus. Small amount of free fluid in the rectovesicular pouch.     Bowel: No obstruction. Colonic diverticulosis. Appendectomy.     Abdominal wall: Rectus diastases. Tiny fat-containing umbilical hernia.  Small fat-containing left inguinal hernia.     Retroperitoneum: No lymphadenopathy.     Vasculature: Patent. No abdominal aortic aneurysm.     Osseous structures: Small amount of bilateral hip osteoarthritis. Old  compression deformity at L5 with 80% collapse..       Impression:         1. Bilateral nonobstructing nephrolithiasis.  2. No solid-appearing renal mass or hydronephrosis.  3. Colonic diverticulosis.  4. Small amount of free fluid in the rectovesicular pouch.  5. Calcified pleural plaques at the bases, suspect related to asbestos  exposure     This report was finalized on 7/7/2024 9:26 AM by Dr. Gamal Kim M.D  on Workstation: [id number]       CT Head Without Contrast [[id number]] Collected: 07/07/24 0915     Updated: 07/07/24 0920    Narrative:      CT HEAD WO CONTRAST-     INDICATION: Increased fatigue, confusion     COMPARISON: CT head September 15, 2023     TECHNIQUE:  Routine CT head without IV contrast. Coronal and sagittal reformats.  Radiation dose reduction techniques were utilized, including automated  exposure control and exposure modulation based on body size.     FINDINGS:      Brain: No intraparenchymal hemorrhage. Chronic small vessel ischemic  changes. Preserved gray-white differentiation. No mass effect or midline  shift.      Ventricles: Mild ventriculomegaly, suspect ex vacuo dilatation from  central volume loss. No intraventricular hemorrhage.     Extra-axial spaces: No extra-axial hemorrhage or fluid collection.     Orbits: Cataract extractions.     Osseous structures: No fracture or bone lesion.     Sinuses: Patent mastoid air cells and middle ears. Patent paranasal  sinuses.       Impression:      No acute intracranial process.     This report was finalized on 7/7/2024 9:17 AM by Dr. Gamal Kim M.D  on Workstation: [id number]               Results for orders placed during the hospital encounter of 09/20/21    Adult Transthoracic Echo Complete W/ Cont if Necessary Per Protocol    Interpretation Summary  · Left ventricular ejection fraction appears to be 66 - 70%.  · Left ventricular diastolic function was indeterminate. Elevated left atrial filling pressure.  · Calculated right ventricular systolic pressure from tricuspid regurgitation is 35 mmHg.  · No significant valvular stenosis or regurgitation noted.  · Atrial fibrillation was the predominant rhythm observed during the procedure.      ECG 12 Lead Electrolyte Imbalance   Final Result   HEART RATE=75  bpm   RR Dxiojxyq=636  ms   OH Interval=  ms   P Horizontal Axis=  deg   P Front Axis=  deg   QRSD Interval=92  ms   QT Ljukmckd=306  ms   MCaQ=888  ms   QRS Axis=29  deg   T Wave Axis=9  deg   - ABNORMAL ECG -   Poor quality tracing, repeat.   Electronically Signed By: Caitlin Sen ([facility]) 2024-07-07 14:55:32   Date and Time of Study:2024-07-07 08:28:02      Telemetry Scan   Final Result           Assessment/Plan     Active Hospital Problems    Diagnosis  POA    **Hyponatremia [E87.1]  Yes    Hematuria [R31.9]  Yes    Alcohol abuse [F10.10]  Yes    Paroxysmal atrial fibrillation [I48.0]  Yes    Pulmonary asbestosis [J61]  Yes    Essential hypertension [I10]  Yes      Resolved Hospital Problems   No resolved problems to display.       Mr. Modi is a 79 y.o.      Hyponatremia: Likely complicated by alcohol abuse.  He received IV fluid bolus in the outside ER.  Have ordered stat BMP to monitor.  Fluid restrict.  Send urine studies.  Consult nephrology.  Hematuria: He does have WBCs present as well.  Urine culture is pending.  Continue Rocephin.  Hold Eliquis.  Consult urology.  Alcohol abuse: Given vitamin supplementation.  Not having any tremor currently.  Benzodiazepine if needed.  Consult access.  PAF: On diltiazem.  Eliquis held as above.  HLD: Statin  Hypertension: Resume home regimen and monitor  PPx: SCD until able to resume anticoagulation  I discussed the patient's findings and my recommendations with patient, consulting provider, and ED provider.    Samuel Vasquez MD  [facility]  07/07/24  15:06 EDT    Dictated portions of note using dragon dictation software.

## 2024-07-07 NOTE — CONSULTS
FIRST UROLOGY CONSULT      Patient Identification:  NAME:  Gamal Modi Sr.  Age:  79 y.o.   Sex:  male   :  1945   MRN:  [id number]     Chief complaint: Hematuria    History of present illness:      79-year-old male was admitted with 3-day history of painless hematuria.  He was severely hyponatremic with a history of alcohol abuse patient states he has no obstructive symptoms but he is voiding more frequently no burning.  No clots to the urine.  No nausea vomiting no fever or chills.  Urinalysis appears positive for UTI.  CT scan shows bilateral nonobstructing stones no hydronephrosis noted.  No renal masses noted.  No obvious bladder masses noted he is on Eliquis and it is now held.    In hospital:  -RAMEZ, good UOP  -WBC -5.09  -Creat -0.59  -Asked to see    Past medical history:  Past Medical History:   Diagnosis Date    AF (atrial fibrillation)     Cataract 2019    Elevated cholesterol     Erectile dysfunction 2018    Gout     Hypertension     Long term exposure to asbestos     3 years    Sebaceous cyst        Past surgical history:  Past Surgical History:   Procedure Laterality Date    COLON SURGERY      COLONOSCOPY  approx     negative per pt     COLONOSCOPY N/A 2022    Procedure: COLONOSCOPY INTO CECUM/ TERMINAL ILEUM WITH POLYPECTOMY;  Surgeon: Aidan Branham MD;  Location: [facility] ENDOSCOPY;  Service: Gastroenterology;  Laterality: N/A;  pre: anemia  post: hemorrhoids, normal TI, polyp, diverticulosis    ENDOSCOPY N/A 2022    Procedure: ESOPHAGOGASTRODUODENOSCOPY WITH BIOPSIES;  Surgeon: Aidan Branham MD;  Location: [facility] ENDOSCOPY;  Service: Gastroenterology;  Laterality: N/A;  pre: anemia  post: hiatal hernia, bnormal ampulla    OTHER SURGICAL HISTORY      BOWEL BLOCKAGE       Allergies:  Patient has no known allergies.    Home medications:  Medications Prior to Admission   Medication Sig Dispense Refill Last Dose    acetaminophen (TYLENOL) 500 MG tablet Take 2  tablets by mouth Daily.       ascorbic acid (VITAMIN C) 500 MG tablet Take 2 tablets by mouth Daily.       atorvastatin (LIPITOR) 20 MG tablet TAKE ONE TABLET BY MOUTH DAILY 90 tablet 3     calcium carbonate (TUMS) 500 MG chewable tablet Chew 3 tablets Daily As Needed for Indigestion or Heartburn.       carboxymethylcellulose (REFRESH PLUS) 0.5 % solution Administer 1 drop to both eyes Daily.       cholecalciferol (VITAMIN D3) 1000 units tablet Take 1 tablet by mouth Daily.       cloNIDine (CATAPRES) 0.1 MG tablet Take 1 tablet by mouth Daily.       dilTIAZem CD (CARDIZEM CD) 120 MG 24 hr capsule TAKE ONE CAPSULE BY MOUTH DAILY 90 capsule 3     Eliquis 5 MG tablet tablet TAKE ONE TABLET BY MOUTH EVERY 12 HOURS 180 tablet 3 7/7/2024    ferrous sulfate 325 (65 FE) MG tablet Take 1 tablet by mouth Daily With Breakfast.       Hydrolyzed Silk (COGNIUM MEMORY PO) Take 1 tablet by mouth Daily.       Multiple Vitamins-Minerals (OCUVITE EXTRA PO) Take 1 tablet by mouth Daily.       tadalafil (CIALIS) 5 MG tablet TAKE 1 TABLET BY MOUTH DAILY AS NEEDED FOR ERECTILE DYSFUNCTION 90 tablet 1         Hospital medications:  atorvastatin, 20 mg, Oral, Daily  [START ON 7/8/2024] cefTRIAXone, 1,000 mg, Intravenous, Q24H  [START ON 7/8/2024] cloNIDine, 0.1 mg, Oral, Daily  dilTIAZem CD, 120 mg, Oral, Daily  folic acid, 1 mg, Oral, Daily  potassium chloride ER, 40 mEq, Oral, Q4H  sodium chloride, 10 mL, Intravenous, Q12H  thiamine (B-1) IV, 200 mg, Intravenous, Q8H   Followed by  [START ON 7/12/2024] thiamine, 100 mg, Oral, Daily           acetaminophen **OR** acetaminophen **OR** acetaminophen    senna-docusate sodium **AND** polyethylene glycol **AND** bisacodyl **AND** bisacodyl    Calcium Replacement - Follow Nurse / BPA Driven Protocol    Glycerin-Hypromellose-    LORazepam **OR** midazolam **OR** LORazepam **OR** midazolam **OR** midazolam **OR** midazolam    Magnesium Standard Dose Replacement - Follow Nurse / BPA Driven  Protocol    nitroglycerin    ondansetron ODT **OR** ondansetron    Phosphorus Replacement - Follow Nurse / BPA Driven Protocol    Potassium Replacement - Follow Nurse / BPA Driven Protocol    sodium chloride    sodium chloride    Family history:  Family History   Problem Relation Age of Onset    Diabetes Other     Cancer Mother     Diabetes Father     Cancer Sister        Social history:  Social History     Tobacco Use    Smoking status: Never    Smokeless tobacco: Never   Vaping Use    Vaping status: Never Used   Substance Use Topics    Alcohol use: Yes     Comment: pt reports he drinks a fifth of whiskey a day, is a binge drinker, had not drank for 11 months then 2 weeks ago drank again, last drink 4 days ago    Drug use: Never       Review of systems:      Positive for: Hematuria  Negative for:  chest pain, cough, sob, o/w neg    Objective:  TMax 24 hours:   Temp (24hrs), Av.8 °F (36.6 °C), Min:97.5 °F (36.4 °C), Max:98.1 °F (36.7 °C)      Vitals Ranges:   Temp:  [97.5 °F (36.4 °C)-98.1 °F (36.7 °C)] 97.5 °F (36.4 °C)  Heart Rate:  [58-89] 64  Resp:  [18] 18  BP: (109-149)/(59-80) 116/59    Intake/Output Last 3 shifts:  No intake/output data recorded.     Physical Exam:    General Appearance:    Alert, cooperative, NAD   Back:     No CVA tenderness   Lungs:     Respirations unlabored, no wheezing    Heart:    RRR, intact peripheral pulses   Abdomen:     Soft, NDNT, no masses, no guarding   Neuro/Psych:   Orientation intact, mood/affect pleasant       Results review:   I reviewed the patient's new clinical results.    Data review:  Lab Results (last 24 hours)       Procedure Component Value Units Date/Time    Magnesium [[id number]]  (Normal) Collected: 24 1456    Specimen: Blood Updated: 24 1537     Magnesium 1.9 mg/dL     Basic Metabolic Panel [[id number]]  (Abnormal) Collected: 24 1456    Specimen: Blood Updated: 24 1537     Glucose 98 mg/dL      BUN 6 mg/dL      Creatinine 0.59 mg/dL       Sodium 125 mmol/L      Potassium 3.3 mmol/L      Chloride 88 mmol/L      CO2 23.6 mmol/L      Calcium 8.6 mg/dL      BUN/Creatinine Ratio 10.2     Anion Gap 13.4 mmol/L      eGFR 99.9 mL/min/1.73     Narrative:      GFR Normal >60  Chronic Kidney Disease <60  Kidney Failure <15    The GFR formula is only valid for adults with stable renal function between ages 18 and 70.    Lactic Acid, Plasma [[id number]]  (Normal) Collected: 07/07/24 1456    Specimen: Blood Updated: 07/07/24 1532     Lactate 1.0 mmol/L     Blood Culture - Blood, Arm, Right [[id number]] Collected: 07/07/24 1503    Specimen: Blood from Arm, Right Updated: 07/07/24 1511    Osmolality, Urine - Urine, Clean Catch [[id number]] Collected: 07/07/24 0908    Specimen: Urine, Clean Catch Updated: 07/07/24 1352     Osmolality, Urine 220 mOsm/kg     Narrative:      Osmo Normal Reference Ranges:    Random:  mOsm/kg H2O, depending on fluid intake.  Random: >850 mOsm/kg H20, after 12 hour fluid restriction.    24 Hour: 300-900 mOsm/kg H2O.    Sodium, Urine, Random - Urine, Clean Catch [[id number]] Collected: 07/07/24 0908    Specimen: Urine, Clean Catch Updated: 07/07/24 1334     Sodium, Urine <20 mmol/L     Narrative:      Reference intervals for random urine have not been established.  Clinical usage is dependent upon physician's interpretation in combination with other laboratory tests.       Urinalysis, Microscopic Only - Urine, Clean Catch [[id number]]  (Abnormal) Collected: 07/07/24 0908    Specimen: Urine, Clean Catch Updated: 07/07/24 1330     RBC, UA Too Numerous to Count /HPF      WBC, UA 21-50 /HPF      Bacteria, UA Trace /HPF      Squamous Epithelial Cells, UA 3-6 /HPF      Hyaline Casts, UA None Seen /LPF      Methodology Automated Microscopy    Urine Culture - Urine, Urine, Clean Catch [[id number]] Collected: 07/07/24 0908    Specimen: Urine, Clean Catch Updated: 07/07/24 1330    [place] Urine Culture Tube - Urine, Clean Catch [[id number]]  Collected: 07/07/24 0908    Specimen: Urine, Clean Catch Updated: 07/07/24 1308     Extra Tube Hold for add-ons.     Comment: Auto resulted.       High Sensitivity Troponin T 2Hr [[id number]]  (Abnormal) Collected: 07/07/24 1012    Specimen: Blood Updated: 07/07/24 1034     HS Troponin T 25 ng/L      Troponin T Delta -2 ng/L     Narrative:      High Sensitive Troponin T Reference Range:  <14.0 ng/L- Negative Female for AMI  <22.0 ng/L- Negative Male for AMI  >=14 - Abnormal Female indicating possible myocardial injury.  >=22 - Abnormal Male indicating possible myocardial injury.   Clinicians would have to utilize clinical acumen, EKG, Troponin, and serial changes to determine if it is an Acute Myocardial Infarction or myocardial injury due to an underlying chronic condition.         Ethanol [[id number]] Collected: 07/07/24 0804    Specimen: Blood Updated: 07/07/24 0927     Ethanol <10 mg/dL      Ethanol % <0.010 %     Protime-INR, Fingerstick [[id number]] Collected: 07/07/24 0919    Specimen: Capillary Blood Updated: 07/07/24 0920     Protime 14.3 Seconds      Comment: Serial Number: [id number]:  [id number]        INR 1.30    Urinalysis With Culture If Indicated - Urine, Clean Catch [[id number]]  (Abnormal) Collected: 07/07/24 0908    Specimen: Urine, Clean Catch Updated: 07/07/24 0914     Color, UA Red     Appearance, UA Turbid     pH, UA 6.0     Specific Gravity, UA 1.010     Glucose, UA Negative     Ketones, UA Trace     Bilirubin, UA Small (1+)     Blood, UA Large (3+)     Protein, UA >=300 mg/dL (3+)     Leuk Esterase, UA Negative     Nitrite, UA Negative     Urobilinogen, UA 0.2 E.U./dL    Narrative:      In absence of clinical symptoms, the presence of pyuria, bacteria, and/or nitrites on the urinalysis result does not correlate with infection.      Magnesium [[id number]]  (Abnormal) Collected: 07/07/24 0804    Specimen: Blood Updated: 07/07/24 0852     Magnesium 1.4 mg/dL     Comprehensive Metabolic Panel  [[id number]]  (Abnormal) Collected: 07/07/24 0804    Specimen: Blood Updated: 07/07/24 0833     Glucose 119 mg/dL      BUN 7 mg/dL      Creatinine 0.79 mg/dL      Sodium 118 mmol/L      Potassium 3.0 mmol/L      Chloride 80 mmol/L      CO2 25.3 mmol/L      Calcium 8.9 mg/dL      Total Protein 6.4 g/dL      Albumin 3.9 g/dL      ALT (SGPT) 27 U/L      AST (SGOT) 40 U/L      Alkaline Phosphatase 104 U/L      Total Bilirubin 1.1 mg/dL      Globulin 2.5 gm/dL      A/G Ratio 1.6 g/dL      BUN/Creatinine Ratio 8.9     Anion Gap 12.7 mmol/L      eGFR 91.5 mL/min/1.73     Narrative:      GFR Normal >60  Chronic Kidney Disease <60  Kidney Failure <15    The GFR formula is only valid for adults with stable renal function between ages 18 and 70.    High Sensitivity Troponin T [[id number]]  (Abnormal) Collected: 07/07/24 0804    Specimen: Blood Updated: 07/07/24 0831     HS Troponin T 27 ng/L     Narrative:      High Sensitive Troponin T Reference Range:  <14.0 ng/L- Negative Female for AMI  <22.0 ng/L- Negative Male for AMI  >=14 - Abnormal Female indicating possible myocardial injury.  >=22 - Abnormal Male indicating possible myocardial injury.   Clinicians would have to utilize clinical acumen, EKG, Troponin, and serial changes to determine if it is an Acute Myocardial Infarction or myocardial injury due to an underlying chronic condition.         Lipase [[id number]]  (Normal) Collected: 07/07/24 0804    Specimen: Blood Updated: 07/07/24 0825     Lipase 32 U/L     CBC & Differential [[id number]]  (Abnormal) Collected: 07/07/24 0804    Specimen: Blood Updated: 07/07/24 0809    Narrative:      The following orders were created for panel order CBC & Differential.  Procedure                               Abnormality         Status                     ---------                               -----------         ------                     CBC Auto Differential[[id number]]        Abnormal            Final result                 Please  view results for these tests on the individual orders.    CBC Auto Differential [[id number]]  (Abnormal) Collected: 07/07/24 0804    Specimen: Blood Updated: 07/07/24 0809     WBC 5.09 10*3/mm3      RBC 3.88 10*6/mm3      Hemoglobin 12.3 g/dL      Hematocrit 34.5 %      MCV 88.9 fL      MCH 31.7 pg      MCHC 35.7 g/dL      RDW 12.6 %      RDW-SD 41.6 fl      MPV 8.7 fL      Platelets 310 10*3/mm3      Neutrophil % 52.8 %      Lymphocyte % 15.3 %      Monocyte % 30.5 %      Eosinophil % 0.6 %      Basophil % 0.6 %      Immature Grans % 0.2 %      Neutrophils, Absolute 2.69 10*3/mm3      Lymphocytes, Absolute 0.78 10*3/mm3      Monocytes, Absolute 1.55 10*3/mm3      Eosinophils, Absolute 0.03 10*3/mm3      Basophils, Absolute 0.03 10*3/mm3      Immature Grans, Absolute 0.01 10*3/mm3              Imaging:  Imaging Results (Last 24 Hours)       Procedure Component Value Units Date/Time    CT Abdomen Pelvis With Contrast [[id number]] Collected: 07/07/24 0917     Updated: 07/07/24 0929    Narrative:      CT ABDOMEN PELVIS W CONTRAST-     INDICATION: Painless hematuria     COMPARISON: High-resolution chest CT February 20, 2023     TECHNIQUE:  Routine CT abdomen/pelvis with IV contrast. Coronal and sagittal  reformats. Radiation dose reduction techniques were utilized, including  automated exposure control and exposure modulation based on body size.     FINDINGS:      Lung bases: Trace loculated left pleural effusion with surrounding  pleural thickening, unchanged. Calcified pleural plaques at the lung  bases, suspect related to asbestos exposure. Subsegmental atelectasis or  scarring at the left lung base. Some septal thickening at the bases,  question asbestosis.     ABDOMEN: Multiple fluid attenuating hepatic cysts. Calcifications in the  liver and spleen, consistent with prior granulomatous infection. Normal  gallbladder. No biliary ductal dilatation. Spleen is normal in size. No  pancreatic mass or pancreatic ductal  dilatation seen. No adrenal  nodules.     Right kidney: Nonobstructing nephrolithiasis in the inferior pole,  series 3, axial mage 69, measures 2 mm. Few small fluid attenuating  cysts. No solid-appearing renal mass or hydronephrosis.     Left kidney: Nonobstructing nephrolithiasis in the inferior pole, series  3, axial image 64, measures 6 mm. No solid-appearing renal mass or  hydronephrosis.     Pelvis: Prostate is normal in size. Underdistended bladder. No bladder  calculus. Small amount of free fluid in the rectovesicular pouch.     Bowel: No obstruction. Colonic diverticulosis. Appendectomy.     Abdominal wall: Rectus diastases. Tiny fat-containing umbilical hernia.  Small fat-containing left inguinal hernia.     Retroperitoneum: No lymphadenopathy.     Vasculature: Patent. No abdominal aortic aneurysm.     Osseous structures: Small amount of bilateral hip osteoarthritis. Old  compression deformity at L5 with 80% collapse..       Impression:         1. Bilateral nonobstructing nephrolithiasis.  2. No solid-appearing renal mass or hydronephrosis.  3. Colonic diverticulosis.  4. Small amount of free fluid in the rectovesicular pouch.  5. Calcified pleural plaques at the bases, suspect related to asbestos  exposure     This report was finalized on 7/7/2024 9:26 AM by Dr. Gamal Kim M.D  on Workstation: [id number]       CT Head Without Contrast [[id number]] Collected: 07/07/24 0915     Updated: 07/07/24 0920    Narrative:      CT HEAD WO CONTRAST-     INDICATION: Increased fatigue, confusion     COMPARISON: CT head September 15, 2023     TECHNIQUE:  Routine CT head without IV contrast. Coronal and sagittal reformats.  Radiation dose reduction techniques were utilized, including automated  exposure control and exposure modulation based on body size.     FINDINGS:      Brain: No intraparenchymal hemorrhage. Chronic small vessel ischemic  changes. Preserved gray-white differentiation. No mass effect or  midline  shift.     Ventricles: Mild ventriculomegaly, suspect ex vacuo dilatation from  central volume loss. No intraventricular hemorrhage.     Extra-axial spaces: No extra-axial hemorrhage or fluid collection.     Orbits: Cataract extractions.     Osseous structures: No fracture or bone lesion.     Sinuses: Patent mastoid air cells and middle ears. Patent paranasal  sinuses.       Impression:      No acute intracranial process.     This report was finalized on 7/7/2024 9:17 AM by Dr. Gamal Kim M.D  on Workstation: [id number]                  Assessment:     Gross hematuria.  Bilateral nonobstructing renal stone  UTI  Plan:     Hematuria appears to be related to UTI and Eliquis.  We have held his Eliquis and will await the urine culture results.  He has no pain at this point no obstructing stones or hydronephrosis.  Will need to do a cystoscopy as an outpatient once infection is cleared.  Will follow.    James Purvis MD  07/07/24  17:26 EDT

## 2024-07-07 NOTE — CONSULTS
"[facility] evaluated 79-year-old male for alcohol abuse.  Patient states he is a \"binge drinker\" and has been drinking since the age of 14.  Patient states he recently had 9 months of sobriety which has been his longest sobriety.  Patient states he has not drank in the past 7 days but had had a 2-week binge before that.  Patient states he typically drinks a fifth of whiskey daily while he binges.  Patient states it is typical of him to drink for 2 weeks and then be sober for 6 months.  Patient states he is aware of his triggers and has to figure out how to intervene between the trigger and the drinking.  Patient has been a member of  for a long time and has a sponsor that he trusts.  Patient also meets for breakfast with AA members every Saturday morning.  Patient attends AA meetings every morning.  Patient states he completed 3 weeks of programming at the [facility] last year then spent 30 days in sober living through [facility] and then did a 60-day IOP with the [facility].  Patient states about a year ago he also did the IOP at the [facility].  Patient rates his anxiety and depressed mood as a 2/10.  Patient rates his sleep and appetite as \"good\".  Patient denies SI and denies any previous attempts.  Patient states he has had some marriage counseling through his [facility] programming.    Patient stated that this time around with his binge drinking it feels more shameful to him since he had 9 months of sobriety.  Patient is worried about losing his marriage at this time.  Patient lives in a house with his wife of 28 years.  Patient has an adult daughter and son and 2 grandchildren that all live in town and are supportive of him.  Patient also has 5 very close friends from  that are close to him.  Patient and his wife are active at [facility].  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  Patient states he likes to golf and he does volunteer work as well as stays involved with AA.  Patient is aware of how his drinking is affecting his health and is increasingly concerned about that.  Access encouraged patient to seek outpatient counseling that might also involve bringing his wife into the counseling with him at some point.  Patient was given outpatient resources for addiction counseling.  Access will follow.

## 2024-07-07 NOTE — ED NOTES
Pt was transported via wheel chair to car. Son is driving him to [facility]. No signs or symptoms of distress

## 2024-07-07 NOTE — PLAN OF CARE
Problem: Adult Inpatient Plan of Care  Goal: Plan of Care Review  7/7/2024 1625 by Deonna Santana, RN  Flowsheets (Taken 7/7/2024 1625)  Outcome Evaluation: AOx4. Room air. CIWA 0. Seizure precautions. Changes positions independently. Standby assist to bathroom, bed alarm on, call light within reach.   Goal Outcome Evaluation:              Outcome Evaluation: AOx4. Room air. CIWA 0. Seizure precautions. Changes positions independently. Standby assist to bathroom, bed alarm on, call light within reach.

## 2024-07-08 LAB
ANION GAP SERPL CALCULATED.3IONS-SCNC: 12.4 MMOL/L (ref 5–15)
ANION GAP SERPL CALCULATED.3IONS-SCNC: 9 MMOL/L (ref 5–15)
ANION GAP SERPL CALCULATED.3IONS-SCNC: 9 MMOL/L (ref 5–15)
ANION GAP SERPL CALCULATED.3IONS-SCNC: 9.5 MMOL/L (ref 5–15)
ANISOCYTOSIS BLD QL: ABNORMAL
BACTERIA SPEC AEROBE CULT: NO GROWTH
BASOPHILS # BLD MANUAL: 0.11 10*3/MM3 (ref 0–0.2)
BASOPHILS NFR BLD MANUAL: 2 % (ref 0–1.5)
BUN SERPL-MCNC: 6 MG/DL (ref 8–23)
BUN SERPL-MCNC: 7 MG/DL (ref 8–23)
BUN SERPL-MCNC: 9 MG/DL (ref 8–23)
BUN SERPL-MCNC: 9 MG/DL (ref 8–23)
BUN/CREAT SERPL: 10.2 (ref 7–25)
BUN/CREAT SERPL: 12 (ref 7–25)
BUN/CREAT SERPL: 13.8 (ref 7–25)
BUN/CREAT SERPL: 9.1 (ref 7–25)
BURR CELLS BLD QL SMEAR: ABNORMAL
CALCIUM SPEC-SCNC: 8.4 MG/DL (ref 8.6–10.5)
CALCIUM SPEC-SCNC: 8.5 MG/DL (ref 8.6–10.5)
CALCIUM SPEC-SCNC: 8.7 MG/DL (ref 8.6–10.5)
CALCIUM SPEC-SCNC: 8.8 MG/DL (ref 8.6–10.5)
CHLORIDE SERPL-SCNC: 92 MMOL/L (ref 98–107)
CHLORIDE SERPL-SCNC: 96 MMOL/L (ref 98–107)
CHLORIDE SERPL-SCNC: 97 MMOL/L (ref 98–107)
CHLORIDE SERPL-SCNC: 97 MMOL/L (ref 98–107)
CO2 SERPL-SCNC: 23.6 MMOL/L (ref 22–29)
CO2 SERPL-SCNC: 24.5 MMOL/L (ref 22–29)
CO2 SERPL-SCNC: 26 MMOL/L (ref 22–29)
CO2 SERPL-SCNC: 26 MMOL/L (ref 22–29)
CREAT SERPL-MCNC: 0.59 MG/DL (ref 0.76–1.27)
CREAT SERPL-MCNC: 0.65 MG/DL (ref 0.76–1.27)
CREAT SERPL-MCNC: 0.75 MG/DL (ref 0.76–1.27)
CREAT SERPL-MCNC: 0.77 MG/DL (ref 0.76–1.27)
DEPRECATED RDW RBC AUTO: 44 FL (ref 37–54)
EGFRCR SERPLBLD CKD-EPI 2021: 92.2 ML/MIN/1.73
EGFRCR SERPLBLD CKD-EPI 2021: 92.9 ML/MIN/1.73
EGFRCR SERPLBLD CKD-EPI 2021: 97 ML/MIN/1.73
EGFRCR SERPLBLD CKD-EPI 2021: 99.9 ML/MIN/1.73
EOSINOPHIL # BLD MANUAL: 0.11 10*3/MM3 (ref 0–0.4)
EOSINOPHIL NFR BLD MANUAL: 2 % (ref 0.3–6.2)
ERYTHROCYTE [DISTWIDTH] IN BLOOD BY AUTOMATED COUNT: 13.1 % (ref 12.3–15.4)
GLUCOSE SERPL-MCNC: 101 MG/DL (ref 65–99)
GLUCOSE SERPL-MCNC: 108 MG/DL (ref 65–99)
HCT VFR BLD AUTO: 32.4 % (ref 37.5–51)
HGB BLD-MCNC: 11.1 G/DL (ref 13–17.7)
LYMPHOCYTES # BLD MANUAL: 1.08 10*3/MM3 (ref 0.7–3.1)
LYMPHOCYTES NFR BLD MANUAL: 22 % (ref 5–12)
MAGNESIUM SERPL-MCNC: 1.7 MG/DL (ref 1.6–2.4)
MCH RBC QN AUTO: 31.7 PG (ref 26.6–33)
MCHC RBC AUTO-ENTMCNC: 34.3 G/DL (ref 31.5–35.7)
MCV RBC AUTO: 92.6 FL (ref 79–97)
MONOCYTES # BLD: 1.19 10*3/MM3 (ref 0.1–0.9)
NEUTROPHILS # BLD AUTO: 2.92 10*3/MM3 (ref 1.7–7)
NEUTROPHILS NFR BLD MANUAL: 54 % (ref 42.7–76)
PHOSPHATE SERPL-MCNC: 2.7 MG/DL (ref 2.5–4.5)
PLAT MORPH BLD: NORMAL
PLATELET # BLD AUTO: 284 10*3/MM3 (ref 140–450)
PMV BLD AUTO: 8.7 FL (ref 6–12)
POIKILOCYTOSIS BLD QL SMEAR: ABNORMAL
POLYCHROMASIA BLD QL SMEAR: ABNORMAL
POTASSIUM SERPL-SCNC: 3.3 MMOL/L (ref 3.5–5.2)
POTASSIUM SERPL-SCNC: 3.8 MMOL/L (ref 3.5–5.2)
POTASSIUM SERPL-SCNC: 4 MMOL/L (ref 3.5–5.2)
POTASSIUM SERPL-SCNC: 4.4 MMOL/L (ref 3.5–5.2)
POTASSIUM SERPL-SCNC: 4.4 MMOL/L (ref 3.5–5.2)
RBC # BLD AUTO: 3.5 10*6/MM3 (ref 4.14–5.8)
SODIUM SERPL-SCNC: 127 MMOL/L (ref 136–145)
SODIUM SERPL-SCNC: 131 MMOL/L (ref 136–145)
SODIUM SERPL-SCNC: 132 MMOL/L (ref 136–145)
SODIUM SERPL-SCNC: 132 MMOL/L (ref 136–145)
VARIANT LYMPHS NFR BLD MANUAL: 20 % (ref 19.6–45.3)
WBC MORPH BLD: NORMAL
WBC NRBC COR # BLD AUTO: 5.4 10*3/MM3 (ref 3.4–10.8)

## 2024-07-08 PROCEDURE — 85007 BL SMEAR W/DIFF WBC COUNT: CPT | Performed by: INTERNAL MEDICINE

## 2024-07-08 PROCEDURE — 96376 TX/PRO/DX INJ SAME DRUG ADON: CPT

## 2024-07-08 PROCEDURE — 80048 BASIC METABOLIC PNL TOTAL CA: CPT | Performed by: INTERNAL MEDICINE

## 2024-07-08 PROCEDURE — G0378 HOSPITAL OBSERVATION PER HR: HCPCS

## 2024-07-08 PROCEDURE — 83735 ASSAY OF MAGNESIUM: CPT | Performed by: INTERNAL MEDICINE

## 2024-07-08 PROCEDURE — 84100 ASSAY OF PHOSPHORUS: CPT | Performed by: INTERNAL MEDICINE

## 2024-07-08 PROCEDURE — 99214 OFFICE O/P EST MOD 30 MIN: CPT | Performed by: NURSE PRACTITIONER

## 2024-07-08 PROCEDURE — 25010000002 CEFTRIAXONE PER 250 MG: Performed by: INTERNAL MEDICINE

## 2024-07-08 PROCEDURE — 25010000002 THIAMINE HCL 200 MG/2ML SOLUTION: Performed by: INTERNAL MEDICINE

## 2024-07-08 PROCEDURE — 85025 COMPLETE CBC W/AUTO DIFF WBC: CPT | Performed by: INTERNAL MEDICINE

## 2024-07-08 PROCEDURE — 96365 THER/PROPH/DIAG IV INF INIT: CPT

## 2024-07-08 PROCEDURE — 84132 ASSAY OF SERUM POTASSIUM: CPT | Performed by: INTERNAL MEDICINE

## 2024-07-08 RX ORDER — POTASSIUM CHLORIDE 750 MG/1
40 TABLET, FILM COATED, EXTENDED RELEASE ORAL EVERY 4 HOURS
Status: COMPLETED | OUTPATIENT
Start: 2024-07-08 | End: 2024-07-08

## 2024-07-08 RX ADMIN — CLONIDINE HYDROCHLORIDE 0.1 MG: 0.1 TABLET ORAL at 08:22

## 2024-07-08 RX ADMIN — FOLIC ACID 1 MG: 1 TABLET ORAL at 08:22

## 2024-07-08 RX ADMIN — THIAMINE HYDROCHLORIDE 200 MG: 100 INJECTION, SOLUTION INTRAMUSCULAR; INTRAVENOUS at 22:54

## 2024-07-08 RX ADMIN — Medication 10 ML: at 08:22

## 2024-07-08 RX ADMIN — ATORVASTATIN CALCIUM 20 MG: 20 TABLET, FILM COATED ORAL at 08:22

## 2024-07-08 RX ADMIN — CEFTRIAXONE SODIUM 1000 MG: 1 INJECTION, POWDER, FOR SOLUTION INTRAMUSCULAR; INTRAVENOUS at 15:22

## 2024-07-08 RX ADMIN — DILTIAZEM HYDROCHLORIDE 120 MG: 120 CAPSULE, EXTENDED RELEASE ORAL at 12:05

## 2024-07-08 RX ADMIN — POTASSIUM CHLORIDE 40 MEQ: 750 TABLET, EXTENDED RELEASE ORAL at 06:14

## 2024-07-08 RX ADMIN — POTASSIUM CHLORIDE 40 MEQ: 750 TABLET, EXTENDED RELEASE ORAL at 02:10

## 2024-07-08 RX ADMIN — Medication 10 ML: at 22:54

## 2024-07-08 RX ADMIN — THIAMINE HYDROCHLORIDE 200 MG: 100 INJECTION, SOLUTION INTRAMUSCULAR; INTRAVENOUS at 06:14

## 2024-07-08 RX ADMIN — THIAMINE HYDROCHLORIDE 200 MG: 100 INJECTION, SOLUTION INTRAMUSCULAR; INTRAVENOUS at 14:22

## 2024-07-08 NOTE — PROGRESS NOTES
[facility] f/u d/t ETOH. Chart reviewed and spoke w/ primary RN who denies mood/behavioral issues. Pt in room alone upon entry. Introduced self and role. Pt A&Ox4. Denies current depression/anxiety/SI/HI/AVH. Denies issues w/ sleep and appetite. CIWA score 0 at 8:26 AM. Denies current withdrawal sxs or cravings. Acknowledged previously receiving OP [facility] counseling resources and denies further needs/questions/concerns at this time. Access following.

## 2024-07-08 NOTE — SIGNIFICANT NOTE
07/08/24 0750   OTHER   Discipline physical therapist   Therapy Assessment/Plan (PT)   Criteria for Skilled Interventions Met (PT) does not meet criteria for skilled intervention  (per [facility] notes, pt is currently up sba with ampac of 24.  No current indication for acute PT.  Will follow peripherally for 1-2 days to monitor progress and dc needs.)   Recommendation   PT - Next Appointment 07/10/24

## 2024-07-08 NOTE — PLAN OF CARE
"Goal Outcome Evaluation:  Plan of Care Reviewed With: patient        Progress: improving  Outcome Evaluation: Rested well this shift. States feeling less \"fuzzy\". No seizure activity noted. Potassium replaced per protocol. VSS.                               "

## 2024-07-08 NOTE — PROGRESS NOTES
Name: Gamal Modi Sr. ADMIT: 2024   : 1945  PCP: Alysha Cosme MD    MRN: [id number] LOS: 1 days   AGE/SEX: 79 y.o. male  ROOM: [facility]     Subjective   Subjective   Patient is lying on the bed and does not appear to be in distress.  Continues to have ongoing hematuria.  Denies nausea, vomiting, abdominal pain, chest pain, shortness of breath.       Objective   Objective   Vital Signs  Temp:  [97.7 °F (36.5 °C)-98.1 °F (36.7 °C)] 98.1 °F (36.7 °C)  Heart Rate:  [69-84] 84  Resp:  [18] 18  BP: ()/(57-73) 97/57  SpO2:  [95 %-98 %] 96 %  on   ;   Device (Oxygen Therapy): room air  Body mass index is 29.16 kg/m².  Physical Exam  HEENT: PERRLA, extraocular movements intact, Scleras no icterus  Neck: Supple, no JVD  Cardiovascular: Regular rate and rhythm with normal S1 and S2  Respiratory: Fairly clear to auscultation anteriorly with no wheezes  GI: Soft, nontender, bowel sounds present  Extremities: No edema, palpable pulses  Neurologic: Globally weak, oriented x 3, no facial asymmetry    Results Review     I reviewed the patient's new clinical results.  Results from last 7 days   Lab Units 24  0609 24  0804   WBC 10*3/mm3 5.40 5.09   HEMOGLOBIN g/dL 11.1* 12.3*   PLATELETS 10*3/mm3 284 310     Results from last 7 days   Lab Units 24  1059 24  0609 24  2351 24  1456   SODIUM mmol/L 132* 131* 127* 125*   POTASSIUM mmol/L 4.4  4.4 3.8 3.3* 3.3*   CHLORIDE mmol/L 97* 97* 92* 88*   CO2 mmol/L 26.0 24.5 26.0 23.6   BUN mg/dL 7* 6* 9 6*   CREATININE mg/dL 0.77 0.59* 0.65* 0.59*   GLUCOSE mg/dL 108* 101* 108* 98   EGFR mL/min/1.73 92.2 99.9 97.0 99.9     Results from last 7 days   Lab Units 24  0804   ALBUMIN g/dL 3.9   BILIRUBIN mg/dL 1.1   ALK PHOS U/L 104   AST (SGOT) U/L 40   ALT (SGPT) U/L 27     Results from last 7 days   Lab Units 24  1059 24  0609 24  2351 24  1456 24  0804   CALCIUM mg/dL 8.8 8.5* 8.4* 8.6 8.9  "  ALBUMIN g/dL  --   --   --   --  3.9   MAGNESIUM mg/dL  --  1.7  --  1.9 1.4*   PHOSPHORUS mg/dL  --  2.7  --   --   --      Results from last 7 days   Lab Units 07/07/24  1456   LACTATE mmol/L 1.0     No results found for: \"HGBA1C\", \"POCGLU\"    CT Abdomen Pelvis With Contrast    Result Date: 7/7/2024   1. Bilateral nonobstructing nephrolithiasis. 2. No solid-appearing renal mass or hydronephrosis. 3. Colonic diverticulosis. 4. Small amount of free fluid in the rectovesicular pouch. 5. Calcified pleural plaques at the bases, suspect related to asbestos exposure  This report was finalized on 7/7/2024 9:26 AM by Dr. Gamal Kim M.D on Workstation: [id number]      CT Head Without Contrast    Result Date: 7/7/2024  No acute intracranial process.  This report was finalized on 7/7/2024 9:17 AM by Dr. Gamal Kim M.D on Workstation: [id number]       I have personally reviewed all medications:  Scheduled Medications  atorvastatin, 20 mg, Oral, Daily  cefTRIAXone, 1,000 mg, Intravenous, Q24H  cloNIDine, 0.1 mg, Oral, Daily  dilTIAZem CD, 120 mg, Oral, Daily  folic acid, 1 mg, Oral, Daily  sodium chloride, 10 mL, Intravenous, Q12H  thiamine (B-1) IV, 200 mg, Intravenous, Q8H   Followed by  [START ON 7/12/2024] thiamine, 100 mg, Oral, Daily    Infusions   Diet  Diet: Regular/House, Fluid Restriction (240 mL/tray); 1500 mL/day; Fluid Consistency: Thin (IDDSI 0)    I have personally reviewed:  [x]  Laboratory   [x]  Microbiology   [x]  Radiology   [x]  EKG/Telemetry  [x]  Cardiology/Vascular   []  Pathology    []  Records       Assessment/Plan     Active Hospital Problems    Diagnosis  POA    **Hyponatremia [E87.1]  Yes    Hematuria [R31.9]  Yes    Alcohol abuse [F10.10]  Yes    Paroxysmal atrial fibrillation [I48.0]  Yes    Pulmonary asbestosis [J61]  Yes    Essential hypertension [I10]  Yes      Resolved Hospital Problems   No resolved problems to display.       79 y.o. male admitted with " Hyponatremia.    Hyponatremia: Uncertain etiology and has not had alcohol to drink for the past 8 days now.  Sodium level is better at 132 and will continue with saline.  Hematuria: Likely due to UTI and is on IV Rocephin.  Await for the final cultures and sensitivities and home Eliquis dose has been held.  Urology is following along and hemoglobin is noted to be at 11.1 today.  Alcohol abuse: Continue with folate and thiamine supplements and last drink was 8 days ago.  Monitor for any withdrawals.    PAF: On diltiazem.  Eliquis held as above.  HLD: Statin  Hypertension: Continue with Cardizem and clonidine.  PPx: SCD until able to resume anticoagulation      Expected Discharge Date: 7/10/2024; Expected Discharge Time:       Vladislav Fish MD  [facility]  07/08/24  16:53 EDT

## 2024-07-08 NOTE — PLAN OF CARE
Goal Outcome Evaluation:              Outcome Evaluation: alert and oriented today.  Up in chair and ambulating in room well.  No seizure activity.  No evidence for alcohol withdrawals.  Labs improved

## 2024-07-08 NOTE — PROGRESS NOTES
LOS: 1 day   Patient Care Team:  Alysha Cosme MD as PCP - General (Internal Medicine)    Chief Complaint: Hematuria    Subjective     Interval History:     Patient Complaints: Denies pain or obstructive voiding symptoms      Review of Systems:    All systems were reviewed and negative except for:  Genitourinary: postivie for  blood in urine    Objective     Vital Signs  Temp:  [97.5 °F (36.4 °C)-98.1 °F (36.7 °C)] 97.7 °F (36.5 °C)  Heart Rate:  [58-82] 82  Resp:  [18] 18  BP: ()/(58-73) 119/67    Physical Exam:   No exam performed today,     Results Review:     I reviewed the patient's new clinical results.    Medication Review:     Assessment & Plan       Hyponatremia    Essential hypertension    Pulmonary asbestosis    Paroxysmal atrial fibrillation    Alcohol abuse    Hematuria      Hematuria.   Patient states he is having no difficulty urinating no pain.  Patient reports urine is brown which means old and no active bleeding.  I would recommend to continue to hold Eliquis until his urine clears in the next 24 hours.  I do not have time to do the cystoscopy in-house we will set him up as an outpatient to do this.  Okay to DC from urology standpoint.    Plan for disposition:    James Purvis MD  07/08/24  13:19 EDT      Time:

## 2024-07-08 NOTE — CASE MANAGEMENT/SOCIAL WORK
Discharge Planning Assessment  [facility]     Patient Name: Gamal Modi Sr.  MRN: [id number]  Today's Date: 7/8/2024    Admit Date: 7/7/2024    Plan: Home with wife   Discharge Needs Assessment       Row Name 07/08/24 1528       Living Environment    People in Home spouse    Name(s) of People in Home Jamee    Current Living Arrangements home    Potentially Unsafe Housing Conditions none    Primary Care Provided by self    Family Caregiver if Needed spouse    Quality of Family Relationships involved;helpful    Able to Return to Prior Arrangements yes       Resource/Environmental Concerns    Resource/Environmental Concerns none       Transition Planning    Patient/Family Anticipates Transition to home with family    Patient/Family Anticipated Services at Transition none    Transportation Anticipated family or friend will provide       Discharge Needs Assessment    Readmission Within the Last 30 Days no previous admission in last 30 days    Equipment Currently Used at Home none    Concerns to be Addressed no discharge needs identified    Anticipated Changes Related to Illness none    Equipment Needed After Discharge none                   Discharge Plan       Row Name 07/08/24 1528       Plan    Plan Home with wife    Patient/Family in Agreement with Plan yes    Plan Comments Spoke to pt at bedside, introduced self and explained CCP role, face sheet and pharmacy information verified. Pt lives with his wife Jamee in 1 level home with no EBONY, he is IADLS, uses no medical equipment, has no HH or SNF history, he plans home with son to transport, no anticipated needs.  CCP will follow -Charla BONILLA                  Continued Care and Services - Admitted Since 7/7/2024    No active coordination exists for this encounter.       Expected Discharge Date and Time       Expected Discharge Date Expected Discharge Time    Jul 10, 2024            Demographic Summary       Row Name 07/08/24 1528       General Information     Admission Type observation                   Functional Status       Row Name 07/08/24 1528       Functional Status    Usual Activity Tolerance excellent    Current Activity Tolerance good       Assessment of Health Literacy    Health Literacy Good       Functional Status, IADL    Medications independent    Meal Preparation independent    Housekeeping independent    Laundry independent    Shopping independent       Mental Status    General Appearance WDL WDL       Mental Status Summary    Recent Changes in Mental Status/Cognitive Functioning no changes                   Psychosocial    No documentation.                  Abuse/Neglect    No documentation.                  Legal       Row Name 07/08/24 1528       Financial/Legal    Who Manages Finances if Patient Unable Wife Jamee                   Substance Abuse    No documentation.                  Patient Forms    No documentation.                     Charla Tovar RN

## 2024-07-08 NOTE — CONSULTS
[facility]        Patient Name: Gamal Modi Sr.  Age/Sex: 79 y.o. male  : 1945  MRN: [id number]    Date of Admission: 2024  Date of Encounter Visit: 24  Encounter Provider: MATT Archer  Referring Provider: Alysha Cosme MD  Place of Service: [facility] CARDIOLOGY  Patient Care Team:  Alysha Cosme MD as PCP - General (Internal Medicine)    Subjective:     Chief Complaint: Hematuria    Reason for consult: Evaluation of anticoagulation, atrial fibrillation    History of Present Illness:  Gamal Modi Sr. is a 79 y.o. male who follows Dr. Whitmore in our office.  Patient was admitted with hematuria.  We have been asked to be seen for evaluation of anticoagulation.     Patient with history significant for atrial fibrillation, hypertension, hyperlipidemia, alcohol use.    Patient was last evaluated in clinic in 2024.  At that time patient was doing well and denied complaints.  He was tolerating apixaban without any bleeding complications.  He was originally diagnosed with atrial fibrillation in 2021, Zio monitor revealed atrial fibrillation ablation burden of 1% at that time.  He has been maintained on outpatient apixaban since then.    Patient presents to [facility] with complaints of hematuria x 3 days.  Evaluation in the emergency department revealed a CT of the abdomen and pelvis that was negative for renal mass.  UA revealed hematuria with WBCs and trace bacteria.  Patient received IV Rocephin in the emergency department.  Patient was also noted to be hyponatremic.  He has history of alcohol abuse and notes that he binge drinks.  He has not had alcohol for 9 months but reports that for the past several weeks he has been consuming alcohol again.    Cardiology was consulted for evaluation of anticoagulation.  Patient reports that he continues to struggle with hematuria.  He is taking IV  antibiotics as ordered.  Reports that he is anxious for discharge and is hopeful that he can be discharged tomorrow.  Currently denies chest pain, dyspnea.  Reports that he was unaware that he is back in atrial fibrillation.          Past Medical History:  Past Medical History:   Diagnosis Date    AF (atrial fibrillation)     Cataract 2019    Elevated cholesterol     Erectile dysfunction 2018    Gout     Hypertension     Long term exposure to asbestos     3 years    Sebaceous cyst        Past Surgical History:   Procedure Laterality Date    COLON SURGERY      COLONOSCOPY  approx 2015    negative per pt     COLONOSCOPY N/A 5/12/2022    Procedure: COLONOSCOPY INTO CECUM/ TERMINAL ILEUM WITH POLYPECTOMY;  Surgeon: Aidan Branham MD;  Location: [facility] ENDOSCOPY;  Service: Gastroenterology;  Laterality: N/A;  pre: anemia  post: hemorrhoids, normal TI, polyp, diverticulosis    ENDOSCOPY N/A 5/12/2022    Procedure: ESOPHAGOGASTRODUODENOSCOPY WITH BIOPSIES;  Surgeon: Aidan Branham MD;  Location: [facility] ENDOSCOPY;  Service: Gastroenterology;  Laterality: N/A;  pre: anemia  post: hiatal hernia, bnormal ampulla    OTHER SURGICAL HISTORY      BOWEL BLOCKAGE       Home Medications:   Medications Prior to Admission   Medication Sig Dispense Refill Last Dose    acetaminophen (TYLENOL) 500 MG tablet Take 2 tablets by mouth Daily.       ascorbic acid (VITAMIN C) 500 MG tablet Take 2 tablets by mouth Daily.       atorvastatin (LIPITOR) 20 MG tablet TAKE ONE TABLET BY MOUTH DAILY 90 tablet 3     calcium carbonate (TUMS) 500 MG chewable tablet Chew 3 tablets Daily As Needed for Indigestion or Heartburn.       carboxymethylcellulose (REFRESH PLUS) 0.5 % solution Administer 1 drop to both eyes Daily.       cholecalciferol (VITAMIN D3) 1000 units tablet Take 1 tablet by mouth Daily.       cloNIDine (CATAPRES) 0.1 MG tablet Take 1 tablet by mouth Daily.       dilTIAZem CD (CARDIZEM CD) 120 MG 24 hr capsule TAKE ONE CAPSULE BY MOUTH  DAILY 90 capsule 3     Eliquis 5 MG tablet tablet TAKE ONE TABLET BY MOUTH EVERY 12 HOURS 180 tablet 3 7/7/2024    ferrous sulfate 325 (65 FE) MG tablet Take 1 tablet by mouth Daily With Breakfast.       Hydrolyzed Silk (COGNIUM MEMORY PO) Take 1 tablet by mouth Daily.       Multiple Vitamins-Minerals (OCUVITE EXTRA PO) Take 1 tablet by mouth Daily.       tadalafil (CIALIS) 5 MG tablet TAKE 1 TABLET BY MOUTH DAILY AS NEEDED FOR ERECTILE DYSFUNCTION 90 tablet 1        Allergies:  No Known Allergies    Past Social History:  Social History     Socioeconomic History    Marital status:    Tobacco Use    Smoking status: Never    Smokeless tobacco: Never   Vaping Use    Vaping status: Never Used   Substance and Sexual Activity    Alcohol use: Yes     Comment: pt reports he drinks a fifth of whiskey a day, is a binge drinker, had not drank for 11 months then 2 weeks ago drank again, last drink 4 days ago    Drug use: Never    Sexual activity: Yes     Partners: Female       Past Family History:  Family History   Problem Relation Age of Onset    Diabetes Other     Cancer Mother     Diabetes Father     Cancer Sister        Review of Systems   Constitutional: Negative for chills, fever, weight gain and weight loss.   Cardiovascular:  Negative for leg swelling.   Respiratory:  Negative for cough, snoring and wheezing.    Hematologic/Lymphatic: Negative for bleeding problem. Does not bruise/bleed easily.   Skin:  Negative for color change.   Musculoskeletal:  Negative for falls, joint pain and myalgias.   Gastrointestinal:  Negative for melena.   Genitourinary:  Positive for hematuria.   Neurological:  Negative for excessive daytime sleepiness.   Psychiatric/Behavioral:  Negative for depression. The patient is not nervous/anxious.          Objective:   Temp:  [97.5 °F (36.4 °C)-98.1 °F (36.7 °C)] 97.7 °F (36.5 °C)  Heart Rate:  [58-82] 82  Resp:  [18] 18  BP: ()/(58-73) 119/67     Intake/Output Summary (Last 24  hours) at 7/8/2024 1208  Last data filed at 7/8/2024 1205  Gross per 24 hour   Intake 1840 ml   Output 380 ml   Net 1460 ml     Body mass index is 29.16 kg/m².      07/07/24  0803   Weight: 97.5 kg (215 lb)     Weight change:     Constitutional:       Appearance: Normal appearance. Well-developed.   Eyes:      Conjunctiva/sclera: Conjunctivae normal.   Neck:      Vascular: No carotid bruit.   Pulmonary:      Effort: Pulmonary effort is normal.      Breath sounds: Normal breath sounds.   Cardiovascular:      Normal rate. Regularly irregular rhythm. Normal S1. Normal S2.       Murmurs: There is no murmur.      No gallop.  No click. No rub.   Edema:     Peripheral edema absent.   Musculoskeletal: Normal range of motion. Skin:     General: Skin is warm and dry.   Neurological:      Mental Status: Alert and oriented to person, place, and time.      GCS: GCS eye subscore is 4. GCS verbal subscore is 5. GCS motor subscore is 6.   Psychiatric:         Speech: Speech normal.         Behavior: Behavior normal.         Thought Content: Thought content normal.         Judgment: Judgment normal.           Lab Review:   Results from last 7 days   Lab Units 07/08/24  1059 07/08/24  0609 07/07/24  2351 07/07/24  1456 07/07/24  0804   SODIUM mmol/L 132* 131* 127* 125* 118*   POTASSIUM mmol/L 4.4  4.4 3.8 3.3* 3.3* 3.0*   CHLORIDE mmol/L 97* 97* 92* 88* 80*   CO2 mmol/L 26.0 24.5 26.0 23.6 25.3   BUN mg/dL 7* 6* 9 6* 7*   CREATININE mg/dL 0.77 0.59* 0.65* 0.59* 0.79   GLUCOSE mg/dL 108* 101* 108* 98 119*   CALCIUM mg/dL 8.8 8.5* 8.4* 8.6 8.9   AST (SGOT) U/L  --   --   --   --  40   ALT (SGPT) U/L  --   --   --   --  27     Results from last 7 days   Lab Units 07/07/24  1012 07/07/24  0804   HSTROP T ng/L 25* 27*     Results from last 7 days   Lab Units 07/08/24  0609 07/07/24  0804   WBC 10*3/mm3 5.40 5.09   HEMOGLOBIN g/dL 11.1* 12.3*   HEMATOCRIT % 32.4* 34.5*   PLATELETS 10*3/mm3 284 310     Results from last 7 days   Lab Units  "07/07/24  0919   INR  1.30     Results from last 7 days   Lab Units 07/08/24  0609 07/07/24  1456 07/07/24  0804   MAGNESIUM mg/dL 1.7 1.9 1.4*           Invalid input(s): \"LDLCALC\"            Echo EF Estimated  Results for orders placed during the hospital encounter of 09/20/21    Adult Transthoracic Echo Complete W/ Cont if Necessary Per Protocol    Interpretation Summary  · Left ventricular ejection fraction appears to be 66 - 70%.  · Left ventricular diastolic function was indeterminate. Elevated left atrial filling pressure.  · Calculated right ventricular systolic pressure from tricuspid regurgitation is 35 mmHg.  · No significant valvular stenosis or regurgitation noted.  · Atrial fibrillation was the predominant rhythm observed during the procedure.      EKG:   I personally viewed and interpreted the patient's EKG          Imaging:  Imaging Results (Most Recent)       Procedure Component Value Units Date/Time    CT Abdomen Pelvis With Contrast [[id number]] Collected: 07/07/24 0917     Updated: 07/07/24 0929    Narrative:      CT ABDOMEN PELVIS W CONTRAST-     INDICATION: Painless hematuria     COMPARISON: High-resolution chest CT February 20, 2023     TECHNIQUE:  Routine CT abdomen/pelvis with IV contrast. Coronal and sagittal  reformats. Radiation dose reduction techniques were utilized, including  automated exposure control and exposure modulation based on body size.     FINDINGS:      Lung bases: Trace loculated left pleural effusion with surrounding  pleural thickening, unchanged. Calcified pleural plaques at the lung  bases, suspect related to asbestos exposure. Subsegmental atelectasis or  scarring at the left lung base. Some septal thickening at the bases,  question asbestosis.     ABDOMEN: Multiple fluid attenuating hepatic cysts. Calcifications in the  liver and spleen, consistent with prior granulomatous infection. Normal  gallbladder. No biliary ductal dilatation. Spleen is normal in size. " No  pancreatic mass or pancreatic ductal dilatation seen. No adrenal  nodules.     Right kidney: Nonobstructing nephrolithiasis in the inferior pole,  series 3, axial mage 69, measures 2 mm. Few small fluid attenuating  cysts. No solid-appearing renal mass or hydronephrosis.     Left kidney: Nonobstructing nephrolithiasis in the inferior pole, series  3, axial image 64, measures 6 mm. No solid-appearing renal mass or  hydronephrosis.     Pelvis: Prostate is normal in size. Underdistended bladder. No bladder  calculus. Small amount of free fluid in the rectovesicular pouch.     Bowel: No obstruction. Colonic diverticulosis. Appendectomy.     Abdominal wall: Rectus diastases. Tiny fat-containing umbilical hernia.  Small fat-containing left inguinal hernia.     Retroperitoneum: No lymphadenopathy.     Vasculature: Patent. No abdominal aortic aneurysm.     Osseous structures: Small amount of bilateral hip osteoarthritis. Old  compression deformity at L5 with 80% collapse..       Impression:         1. Bilateral nonobstructing nephrolithiasis.  2. No solid-appearing renal mass or hydronephrosis.  3. Colonic diverticulosis.  4. Small amount of free fluid in the rectovesicular pouch.  5. Calcified pleural plaques at the bases, suspect related to asbestos  exposure     This report was finalized on 7/7/2024 9:26 AM by Dr. Gamal Kim M.D  on Workstation: [id number]       CT Head Without Contrast [[id number]] Collected: 07/07/24 0915     Updated: 07/07/24 0920    Narrative:      CT HEAD WO CONTRAST-     INDICATION: Increased fatigue, confusion     COMPARISON: CT head September 15, 2023     TECHNIQUE:  Routine CT head without IV contrast. Coronal and sagittal reformats.  Radiation dose reduction techniques were utilized, including automated  exposure control and exposure modulation based on body size.     FINDINGS:      Brain: No intraparenchymal hemorrhage. Chronic small vessel ischemic  changes. Preserved gray-white  differentiation. No mass effect or midline  shift.     Ventricles: Mild ventriculomegaly, suspect ex vacuo dilatation from  central volume loss. No intraventricular hemorrhage.     Extra-axial spaces: No extra-axial hemorrhage or fluid collection.     Orbits: Cataract extractions.     Osseous structures: No fracture or bone lesion.     Sinuses: Patent mastoid air cells and middle ears. Patent paranasal  sinuses.       Impression:      No acute intracranial process.     This report was finalized on 7/7/2024 9:17 AM by Dr. Gamal Kim M.D  on Workstation: [id number]                   Assessment:     Active Hospital Problems    Diagnosis  POA    **Hyponatremia [E87.1]  Yes    Hematuria [R31.9]  Yes    Alcohol abuse [F10.10]  Yes    Paroxysmal atrial fibrillation [I48.0]  Yes    Pulmonary asbestosis [J61]  Yes    Essential hypertension [I10]  Yes      Resolved Hospital Problems   No resolved problems to display.          Plan:     Hyponatremia  Sodium originally 118 on admission, now improved to 132  Suspect alcohol abuse is a culprit  Nephrology recommendations noted.  Hematuria/UTI  WBCs present on UA, urine culture pending  Continue IV Rocephin  Hold apixaban  Urology consult noted  Alcohol abuse  PAF  Zio monitor in 2021 revealed 1% atrial fibrillation burden  Noted rate controlled atrial fibrillation on bedside telemetry  Heart rate controlled  I agree with holding apixaban, urology does plan for outpatient cystoscopy, timing after infection has cleared.  If this will be within the next week to I would continue to hold apixaban until after cystoscopy.  If it will be several weeks out I would recommend resuming apixaban when UTI has improved and then holding intermittently for cystoscopy.  CHADS2 SCORE : 2  Will reassess rhythm in the outpatient setting once UTI has cleared.  HLD  HTN    Hold apixaban until after cystoscopy  No further cardiac testing is warranted  Patient will need outpatient follow-up in  approximately 1 month.  Scheduling will arrange  CV services will sign off    Thank you for allowing me to participate in the care of Gamal Modi Sr.. Feel free to contact me directly with any further questions or concerns.         MATT Archer  [facility]   [facility]  [address] [place] [address] [address]  [place] [address]  Office: [phone]    07/08/24  12:08 EDT      EMR Dragon/Transcription disclaimer:   Parts of this note may be an electronic transcription/translation of spoken language to printed text using the Dragon dictation system

## 2024-07-08 NOTE — SIGNIFICANT NOTE
07/08/24 0920   OTHER   Discipline occupational therapist   Rehab Time/Intention   Session Not Performed other (see comments)  (spoke with pt, reports no acute OT needs, reports very active at baseline, up to BR with no concerns, not approp for inpatient skilled OT services, plans home with spouse, will sign off.)   Therapy Assessment/Plan (PT)   Criteria for Skilled Interventions Met (PT) does not meet criteria for skilled intervention;no problems identified which require skilled intervention

## 2024-07-09 ENCOUNTER — READMISSION MANAGEMENT (OUTPATIENT)
Dept: CALL CENTER | Facility: HOSPITAL | Age: 79
End: 2024-07-09
Payer: COMMERCIAL

## 2024-07-09 VITALS
SYSTOLIC BLOOD PRESSURE: 126 MMHG | BODY MASS INDEX: 29.12 KG/M2 | HEART RATE: 71 BPM | WEIGHT: 215 LBS | OXYGEN SATURATION: 98 % | HEIGHT: 72 IN | RESPIRATION RATE: 18 BRPM | TEMPERATURE: 98.1 F | DIASTOLIC BLOOD PRESSURE: 76 MMHG

## 2024-07-09 LAB
ALBUMIN SERPL-MCNC: 3.4 G/DL (ref 3.5–5.2)
ALBUMIN/GLOB SERPL: 1.4 G/DL
ALP SERPL-CCNC: 87 U/L (ref 39–117)
ALT SERPL W P-5'-P-CCNC: 19 U/L (ref 1–41)
ANION GAP SERPL CALCULATED.3IONS-SCNC: 10.9 MMOL/L (ref 5–15)
AST SERPL-CCNC: 18 U/L (ref 1–40)
BASOPHILS # BLD AUTO: 0.07 10*3/MM3 (ref 0–0.2)
BASOPHILS NFR BLD AUTO: 1.2 % (ref 0–1.5)
BILIRUB SERPL-MCNC: 0.4 MG/DL (ref 0–1.2)
BUN SERPL-MCNC: 8 MG/DL (ref 8–23)
BUN/CREAT SERPL: 11 (ref 7–25)
CALCIUM SPEC-SCNC: 8.9 MG/DL (ref 8.6–10.5)
CHLORIDE SERPL-SCNC: 98 MMOL/L (ref 98–107)
CO2 SERPL-SCNC: 24.1 MMOL/L (ref 22–29)
CREAT SERPL-MCNC: 0.73 MG/DL (ref 0.76–1.27)
DEPRECATED RDW RBC AUTO: 43.7 FL (ref 37–54)
EGFRCR SERPLBLD CKD-EPI 2021: 93.7 ML/MIN/1.73
EOSINOPHIL # BLD AUTO: 0.3 10*3/MM3 (ref 0–0.4)
EOSINOPHIL NFR BLD AUTO: 5 % (ref 0.3–6.2)
ERYTHROCYTE [DISTWIDTH] IN BLOOD BY AUTOMATED COUNT: 12.7 % (ref 12.3–15.4)
GLOBULIN UR ELPH-MCNC: 2.5 GM/DL
GLUCOSE SERPL-MCNC: 107 MG/DL (ref 65–99)
HCT VFR BLD AUTO: 32 % (ref 37.5–51)
HGB BLD-MCNC: 10.8 G/DL (ref 13–17.7)
IMM GRANULOCYTES # BLD AUTO: 0.02 10*3/MM3 (ref 0–0.05)
IMM GRANULOCYTES NFR BLD AUTO: 0.3 % (ref 0–0.5)
LYMPHOCYTES # BLD AUTO: 1.39 10*3/MM3 (ref 0.7–3.1)
LYMPHOCYTES NFR BLD AUTO: 23.4 % (ref 19.6–45.3)
MAGNESIUM SERPL-MCNC: 1.6 MG/DL (ref 1.6–2.4)
MCH RBC QN AUTO: 32 PG (ref 26.6–33)
MCHC RBC AUTO-ENTMCNC: 33.8 G/DL (ref 31.5–35.7)
MCV RBC AUTO: 95 FL (ref 79–97)
MONOCYTES # BLD AUTO: 1.2 10*3/MM3 (ref 0.1–0.9)
MONOCYTES NFR BLD AUTO: 20.2 % (ref 5–12)
NEUTROPHILS NFR BLD AUTO: 2.97 10*3/MM3 (ref 1.7–7)
NEUTROPHILS NFR BLD AUTO: 49.9 % (ref 42.7–76)
PLATELET # BLD AUTO: 273 10*3/MM3 (ref 140–450)
PMV BLD AUTO: 8.8 FL (ref 6–12)
POTASSIUM SERPL-SCNC: 4 MMOL/L (ref 3.5–5.2)
PROT SERPL-MCNC: 5.9 G/DL (ref 6–8.5)
RBC # BLD AUTO: 3.37 10*6/MM3 (ref 4.14–5.8)
SODIUM SERPL-SCNC: 133 MMOL/L (ref 136–145)
WBC NRBC COR # BLD AUTO: 5.95 10*3/MM3 (ref 3.4–10.8)

## 2024-07-09 PROCEDURE — 83735 ASSAY OF MAGNESIUM: CPT | Performed by: INTERNAL MEDICINE

## 2024-07-09 PROCEDURE — 25010000002 THIAMINE HCL 200 MG/2ML SOLUTION: Performed by: INTERNAL MEDICINE

## 2024-07-09 PROCEDURE — 85025 COMPLETE CBC W/AUTO DIFF WBC: CPT | Performed by: INTERNAL MEDICINE

## 2024-07-09 PROCEDURE — 80053 COMPREHEN METABOLIC PANEL: CPT | Performed by: INTERNAL MEDICINE

## 2024-07-09 PROCEDURE — 96376 TX/PRO/DX INJ SAME DRUG ADON: CPT

## 2024-07-09 PROCEDURE — G0378 HOSPITAL OBSERVATION PER HR: HCPCS

## 2024-07-09 PROCEDURE — 25010000002 MAGNESIUM SULFATE 2 GM/50ML SOLUTION: Performed by: INTERNAL MEDICINE

## 2024-07-09 RX ORDER — MAGNESIUM SULFATE HEPTAHYDRATE 40 MG/ML
2 INJECTION, SOLUTION INTRAVENOUS ONCE
Status: COMPLETED | OUTPATIENT
Start: 2024-07-09 | End: 2024-07-09

## 2024-07-09 RX ADMIN — DILTIAZEM HYDROCHLORIDE 120 MG: 120 CAPSULE, EXTENDED RELEASE ORAL at 09:10

## 2024-07-09 RX ADMIN — THIAMINE HYDROCHLORIDE 200 MG: 100 INJECTION, SOLUTION INTRAMUSCULAR; INTRAVENOUS at 06:28

## 2024-07-09 RX ADMIN — FOLIC ACID 1 MG: 1 TABLET ORAL at 09:10

## 2024-07-09 RX ADMIN — MAGNESIUM SULFATE HEPTAHYDRATE 2 G: 2 INJECTION, SOLUTION INTRAVENOUS at 09:10

## 2024-07-09 RX ADMIN — Medication 10 ML: at 09:11

## 2024-07-09 RX ADMIN — ATORVASTATIN CALCIUM 20 MG: 20 TABLET, FILM COATED ORAL at 09:10

## 2024-07-09 RX ADMIN — CLONIDINE HYDROCHLORIDE 0.1 MG: 0.1 TABLET ORAL at 09:10

## 2024-07-09 NOTE — PROGRESS NOTES
[facility] Progress Note      Patient Name: Gamal Modi Sr.  : 1945  MRN: [id number]  Primary Care Physician:  Alysha Cosme MD  Date of admission: 2024    Subjective     Interval History:   No more gross hematuria  Feels much better; appetite is excellent; no N/V  No shortness of breath    Review of Systems:   As noted above    Objective     Vitals:   Temp:  [97.7 °F (36.5 °C)-98.8 °F (37.1 °C)] 98.8 °F (37.1 °C)  Heart Rate:  [67-84] 67  Resp:  [18] 18  BP: ()/(57-73) 113/69    Intake/Output Summary (Last 24 hours) at 2024  Last data filed at 2024 1700  Gross per 24 hour   Intake 1500 ml   Output 380 ml   Net 1120 ml       Physical Exam:    Constitutional: Awake, alert, NAD  HEENT: Sclera anicteric, no conjunctival injection  Neck: Supple, no carotid bruit, trachea at midline, no JVD  Respiratory: Clear to auscultation bilaterally, nonlabored on RA  Cardiovascular: Irregularly irregular, no rub  Gastrointestinal: BS +, soft, nontender and nondistended  : No palpable bladder  Musculoskeletal: No edema, no clubbing or cyanosis  Psychiatric: Appropriate affect, cooperative, oriented  Neurologic: No asterixis, moving all extremities, normal speech   Skin: Warm and dry     Scheduled Meds:     atorvastatin, 20 mg, Oral, Daily  cefTRIAXone, 1,000 mg, Intravenous, Q24H  cloNIDine, 0.1 mg, Oral, Daily  dilTIAZem CD, 120 mg, Oral, Daily  folic acid, 1 mg, Oral, Daily  sodium chloride, 10 mL, Intravenous, Q12H  thiamine (B-1) IV, 200 mg, Intravenous, Q8H   Followed by  [START ON 2024] thiamine, 100 mg, Oral, Daily      IV Meds:        Results Reviewed:   I have personally reviewed the results from the time of this admission to 2024 20:55 EDT     Results from last 7 days   Lab Units 24  1756 24  1059 24  0609 24  1456 24  0804   SODIUM mmol/L 132* 132* 131*   < > 118*   POTASSIUM mmol/L 4.0 4.4  4.4 3.8   < > 3.0*    CHLORIDE mmol/L 96* 97* 97*   < > 80*   CO2 mmol/L 23.6 26.0 24.5   < > 25.3   BUN mg/dL 9 7* 6*   < > 7*   CREATININE mg/dL 0.75* 0.77 0.59*   < > 0.79   CALCIUM mg/dL 8.7 8.8 8.5*   < > 8.9   BILIRUBIN mg/dL  --   --   --   --  1.1   ALK PHOS U/L  --   --   --   --  104   ALT (SGPT) U/L  --   --   --   --  27   AST (SGOT) U/L  --   --   --   --  40   GLUCOSE mg/dL 108* 108* 101*   < > 119*    < > = values in this interval not displayed.       Estimated Creatinine Clearance: 99.9 mL/min (A) (by C-G formula based on SCr of 0.75 mg/dL (L)).    Results from last 7 days   Lab Units 07/08/24  0609 07/07/24  1456 07/07/24  0804   MAGNESIUM mg/dL 1.7 1.9 1.4*   PHOSPHORUS mg/dL 2.7  --   --              Results from last 7 days   Lab Units 07/08/24  0609 07/07/24  0804   WBC 10*3/mm3 5.40 5.09   HEMOGLOBIN g/dL 11.1* 12.3*   PLATELETS 10*3/mm3 284 310       Results from last 7 days   Lab Units 07/07/24  0919   INR  1.30       Assessment / Plan     ASSESSMENT:  1.  Hyponatremia, acute on chronic, improving:  multifactorial from alcoholism, poor nutrition, and polydipsia.  Rate of correction is appropriate.  Looks euvolemic; preserved renal function  2.  Alcoholism  3.  Gross hematuria with pyuria and bacteriuria  4.  Hypotension  5.  Anemia  6.  Hypokalemia and hypomagnesemia  7.  Atrial fibrillation on AC    PLAN:  1.  Replace magnesium  2.  Encouraged him to eat and to abstain from alcohol    Thank you for involving us in the care of Gamal Modi Sr..  Please feel free to call with any questions.    Samuel Montenegro MD  07/08/24  20:55 EDT    [facility]  [phone]    Please note that portions of this note were completed with a voice recognition program.

## 2024-07-09 NOTE — PLAN OF CARE
Goal Outcome Evaluation:  Plan of Care Reviewed With: patient        Progress: improving    Rested well this shift. Labs improving. Voids yellow urine per urinal. Gait steady. Oxygen levels drop at times when in deep sleep. No respiratory distress noted.

## 2024-07-09 NOTE — DISCHARGE SUMMARY
Patient Name: Gamal Modi Sr.  : 1945  MRN: [id number]    Date of Admission: 2024  Date of Discharge:  2024  Primary Care Physician: Alysha Cosme MD      Chief Complaint:   Blood in Urine and Altered Mental Status      Discharge Diagnoses     Active Hospital Problems    Diagnosis  POA    **Hyponatremia [E87.1]  Yes    Hematuria [R31.9]  Yes    Alcohol abuse [F10.10]  Yes    Paroxysmal atrial fibrillation [I48.0]  Yes    Pulmonary asbestosis [J61]  Yes    Essential hypertension [I10]  Yes      Resolved Hospital Problems   No resolved problems to display.        Hospital Course     Mr. Modi is a 79 y.o. with a history of A-fib, hypertension, hyperlipidemia, alcohol use who presents to [facility] complaining of initial report of hematuria for 3 days.  He did not report any fevers or chills.  He had CT at the emergency room that did not show any renal mass.  Urinalysis does show hematuria and also WBCs and trace bacteria.  He was given Rocephin there.  He has been having some lightheadedness but does not report any headache focal weakness or numbness.  He was found to be severely hyponatremic at the ER.  He reports that this has happened to him once before.  He binge drinks and had not drank for about 9 months but he reports he has been drinking for the past 2 weeks.  No nausea vomiting or abdominal pain reported.  No palpitations or shortness of breath reported.       Hyponatremia: Uncertain etiology and has not had alcohol to drink for the past 8 days now.  Sodium level is better at 133 and was treated with saline.  Hematuria: Likely secondary to being on anticoagulation.  Urine cultures did not reveal any growth therefore no antibiotics are being prescribed upon discharge.  Urine analysis was not very impressive for UTI as well upon admission.  Anticoagulation has been discontinued per urology recommendations and plan is to undergo cystoscopy as an outpatient basis.   Hematuria has significantly improved and near completely resolved.  Hemoglobin is 12.3 today.  Alcohol abuse: Continue with folate and thiamine supplements and last drink was 8 days ago.  Monitor for any withdrawals.    PAF: On diltiazem.  Cardiology evaluated recommended to discontinue Eliquis and follow-up as an outpatient basis for possible consideration of resumption based on cystoscopy findings.  HLD: Statin  Hypertension: Continue with Cardizem and clonidine.    Copied text on this note has been reviewed by me on 7/9/2024    Day of Discharge         Physical Exam:  Temp:  [98.1 °F (36.7 °C)-98.8 °F (37.1 °C)] 98.1 °F (36.7 °C)  Heart Rate:  [67-83] 71  Resp:  [18] 18  BP: (112-126)/(69-76) 126/76  Body mass index is 29.16 kg/m².  Physical Exam  HEENT: PERRLA, extraocular movements intact, Scleras no icterus  Neck: Supple, no JVD  Cardiovascular: Regular rate and rhythm with normal S1 and S2  Respiratory: Fairly clear to auscultation anteriorly with no wheezes  GI: Soft, nontender, bowel sounds present  Extremities: No edema, palpable pulses  Neurologic: Globally weak, oriented x 3, no facial asymmetry    Consultants     Consult Orders (all) (From admission, onward)       Start     Ordered    07/08/24 1057  Inpatient Cardiology Consult  Once,   Status:  Canceled        Specialty:  Cardiology  Provider:  Rodney Cherry MD    07/08/24 1057    07/07/24 1513  Inpatient Urology Consult  Once,   Status:  Canceled        Specialty:  Urology  Provider:  James Purvis MD    07/07/24 1512    07/07/24 1206  Inpatient Access Center Consult  Once        Provider:  (Not yet assigned)    07/07/24 1205    07/07/24 1145  Inpatient Nephrology Consult  Once,   Status:  Canceled        Specialty:  Nephrology  Provider:  Crow Lopez MD    07/07/24 1144    07/07/24 0944  IP Consult to Internal Medicine  STAT,   Status:  Canceled        Specialty:  Internal Medicine  Provider:  (Not yet assigned)    07/07/24  0943                  Procedures     * Surgery not found *    Imaging Results (All)       Procedure Component Value Units Date/Time    CT Abdomen Pelvis With Contrast [[id number]] Collected: 07/07/24 0917     Updated: 07/07/24 0929    Narrative:      CT ABDOMEN PELVIS W CONTRAST-     INDICATION: Painless hematuria     COMPARISON: High-resolution chest CT February 20, 2023     TECHNIQUE:  Routine CT abdomen/pelvis with IV contrast. Coronal and sagittal  reformats. Radiation dose reduction techniques were utilized, including  automated exposure control and exposure modulation based on body size.     FINDINGS:      Lung bases: Trace loculated left pleural effusion with surrounding  pleural thickening, unchanged. Calcified pleural plaques at the lung  bases, suspect related to asbestos exposure. Subsegmental atelectasis or  scarring at the left lung base. Some septal thickening at the bases,  question asbestosis.     ABDOMEN: Multiple fluid attenuating hepatic cysts. Calcifications in the  liver and spleen, consistent with prior granulomatous infection. Normal  gallbladder. No biliary ductal dilatation. Spleen is normal in size. No  pancreatic mass or pancreatic ductal dilatation seen. No adrenal  nodules.     Right kidney: Nonobstructing nephrolithiasis in the inferior pole,  series 3, axial mage 69, measures 2 mm. Few small fluid attenuating  cysts. No solid-appearing renal mass or hydronephrosis.     Left kidney: Nonobstructing nephrolithiasis in the inferior pole, series  3, axial image 64, measures 6 mm. No solid-appearing renal mass or  hydronephrosis.     Pelvis: Prostate is normal in size. Underdistended bladder. No bladder  calculus. Small amount of free fluid in the rectovesicular pouch.     Bowel: No obstruction. Colonic diverticulosis. Appendectomy.     Abdominal wall: Rectus diastases. Tiny fat-containing umbilical hernia.  Small fat-containing left inguinal hernia.     Retroperitoneum: No lymphadenopathy.      Vasculature: Patent. No abdominal aortic aneurysm.     Osseous structures: Small amount of bilateral hip osteoarthritis. Old  compression deformity at L5 with 80% collapse..       Impression:         1. Bilateral nonobstructing nephrolithiasis.  2. No solid-appearing renal mass or hydronephrosis.  3. Colonic diverticulosis.  4. Small amount of free fluid in the rectovesicular pouch.  5. Calcified pleural plaques at the bases, suspect related to asbestos  exposure     This report was finalized on 7/7/2024 9:26 AM by Dr. Gamal Kim M.D  on Workstation: [id number]       CT Head Without Contrast [[id number]] Collected: 07/07/24 0915     Updated: 07/07/24 0920    Narrative:      CT HEAD WO CONTRAST-     INDICATION: Increased fatigue, confusion     COMPARISON: CT head September 15, 2023     TECHNIQUE:  Routine CT head without IV contrast. Coronal and sagittal reformats.  Radiation dose reduction techniques were utilized, including automated  exposure control and exposure modulation based on body size.     FINDINGS:      Brain: No intraparenchymal hemorrhage. Chronic small vessel ischemic  changes. Preserved gray-white differentiation. No mass effect or midline  shift.     Ventricles: Mild ventriculomegaly, suspect ex vacuo dilatation from  central volume loss. No intraventricular hemorrhage.     Extra-axial spaces: No extra-axial hemorrhage or fluid collection.     Orbits: Cataract extractions.     Osseous structures: No fracture or bone lesion.     Sinuses: Patent mastoid air cells and middle ears. Patent paranasal  sinuses.       Impression:      No acute intracranial process.     This report was finalized on 7/7/2024 9:17 AM by Dr. Gamal Kim M.D  on Workstation: [id number]               Results for orders placed during the hospital encounter of 09/20/21    Adult Transthoracic Echo Complete W/ Cont if Necessary Per Protocol    Interpretation Summary  · Left ventricular ejection fraction appears to be  "66 - 70%.  · Left ventricular diastolic function was indeterminate. Elevated left atrial filling pressure.  · Calculated right ventricular systolic pressure from tricuspid regurgitation is 35 mmHg.  · No significant valvular stenosis or regurgitation noted.  · Atrial fibrillation was the predominant rhythm observed during the procedure.    Pertinent Labs     Results from last 7 days   Lab Units 07/09/24  0630 07/08/24  0609 07/07/24  0804   WBC 10*3/mm3 5.95 5.40 5.09   HEMOGLOBIN g/dL 10.8* 11.1* 12.3*   PLATELETS 10*3/mm3 273 284 310     Results from last 7 days   Lab Units 07/09/24  0630 07/08/24 1756 07/08/24  1059 07/08/24  0609   SODIUM mmol/L 133* 132* 132* 131*   POTASSIUM mmol/L 4.0 4.0 4.4  4.4 3.8   CHLORIDE mmol/L 98 96* 97* 97*   CO2 mmol/L 24.1 23.6 26.0 24.5   BUN mg/dL 8 9 7* 6*   CREATININE mg/dL 0.73* 0.75* 0.77 0.59*   GLUCOSE mg/dL 107* 108* 108* 101*   EGFR mL/min/1.73 93.7 92.9 92.2 99.9     Results from last 7 days   Lab Units 07/09/24  0630 07/07/24  0804   ALBUMIN g/dL 3.4* 3.9   BILIRUBIN mg/dL 0.4 1.1   ALK PHOS U/L 87 104   AST (SGOT) U/L 18 40   ALT (SGPT) U/L 19 27     Results from last 7 days   Lab Units 07/09/24  0630 07/08/24  1756 07/08/24  1059 07/08/24  0609 07/07/24  2351 07/07/24  1456 07/07/24  0804   CALCIUM mg/dL 8.9 8.7 8.8 8.5*   < > 8.6 8.9   ALBUMIN g/dL 3.4*  --   --   --   --   --  3.9   MAGNESIUM mg/dL 1.6  --   --  1.7  --  1.9 1.4*   PHOSPHORUS mg/dL  --   --   --  2.7  --   --   --     < > = values in this interval not displayed.     Results from last 7 days   Lab Units 07/07/24  0804   LIPASE U/L 32     Results from last 7 days   Lab Units 07/07/24  1012 07/07/24  0804   HSTROP T ng/L 25* 27*     Results from last 7 days   Lab Units 07/07/24  0908   SODIUM UR mmol/L <20   OSMOLALITY UR mOsm/kg 220         Invalid input(s): \"LDLCALC\"  Results from last 7 days   Lab Units 07/07/24  1503 07/07/24  1456 07/07/24  0908   BLOODCX  No growth at 2 days No growth at 24 " hours  --    URINECX   --   --  No growth         Test Results Pending at Discharge     Pending Labs       Order Current Status    Blood Culture - Blood, Arm, Left Preliminary result    Blood Culture - Blood, Arm, Right Preliminary result            Discharge Details        Discharge Medications        Continue These Medications        Instructions Start Date   acetaminophen 500 MG tablet  Commonly known as: TYLENOL   1,000 mg, Oral, Daily      ascorbic acid 500 MG tablet  Commonly known as: VITAMIN C   1,000 mg, Oral, Daily      atorvastatin 20 MG tablet  Commonly known as: LIPITOR   TAKE ONE TABLET BY MOUTH DAILY      calcium carbonate 500 MG chewable tablet  Commonly known as: TUMS   3 tablets, Oral, Daily PRN      carboxymethylcellulose 0.5 % solution  Commonly known as: REFRESH PLUS   1 drop, Both Eyes, Daily      cholecalciferol 25 MCG (1000 UT) tablet  Commonly known as: VITAMIN D3   1,000 Units, Oral, Daily      cloNIDine 0.1 MG tablet  Commonly known as: CATAPRES   Take 1 tablet by mouth Daily.      COGNIUM MEMORY PO   1 tablet, Oral, Daily      dilTIAZem  MG 24 hr capsule  Commonly known as: CARDIZEM CD   TAKE ONE CAPSULE BY MOUTH DAILY      ferrous sulfate 325 (65 FE) MG tablet   325 mg, Oral, Daily With Breakfast      multivitamin with minerals tablet tablet   1 tablet, Oral, Daily      tadalafil 5 MG tablet  Commonly known as: CIALIS   5 mg, Oral, Daily PRN             Stop These Medications      Eliquis 5 MG tablet tablet  Generic drug: apixaban              No Known Allergies    Discharge Disposition:  Home or Self Care      Discharge Diet:  No active diet order      Discharge Activity:   Activity Instructions       Activity as Tolerated              CODE STATUS:    Code Status and Medical Interventions:   Ordered at: 07/07/24 1427     Code Status (Patient has no pulse and is not breathing):    CPR (Attempt to Resuscitate)     Medical Interventions (Patient has pulse or is breathing):    Full  Support       Future Appointments   Date Time Provider Department Center   8/8/2024 10:00 AM Marisel Hannon APRN [facility]   11/13/2024  8:30 AM [facility]   11/20/2024 10:00 AM Alysha Comse MD [facility]   3/28/2025  8:45 AM Ochoa Murrell MD [facility]     Additional Instructions for the Follow-ups that You Need to Schedule       Discharge Follow-up with PCP   As directed       Currently Documented PCP:    Alysha Cosme MD    PCP Phone Number:    [phone]     Follow Up Details: 1 week        Discharge Follow-up with Specified Provider: ; 1 Month   As directed      To:    Follow Up: 1 Month        Discharge Follow-up with Specified Provider: ; 2 Weeks   As directed      To:    Follow Up: 2 Weeks        Discharge Follow-up with Specified Provider: [facility]; 1 Week   As directed      To: [facility]   Follow Up: 1 Week               Follow-up Information       Alysha Cosme MD .    Specialty: Internal Medicine  Why: 1 week  Contact information:  [address] [facility] [address]  [phone]               Samuel Montenegro MD. Schedule an appointment as soon as possible for a visit in 2 week(s).    Specialty: Nephrology  Contact information:  [address]  [facility] [address]  [facility] [address]  [phone]               James Purvis MD Follow up in 1 week(s).    Specialty: Urology  Contact information:  [address]  [address]  [place] [address]  [phone]                             Additional Instructions for the Follow-ups that You Need to Schedule       Discharge Follow-up with PCP   As directed       Currently Documented PCP:    Alysha Cosme MD    PCP Phone Number:    [phone]     Follow Up Details: 1 week        Discharge Follow-up with Specified Provider: ; 1 Month   As directed      To:    Follow Up: 1 Month        Discharge  Follow-up with Specified Provider: ; 2 Weeks   As directed      To:    Follow Up: 2 Weeks        Discharge Follow-up with Specified Provider: [facility]; 1 Week   As directed      To: [facility]   Follow Up: 1 Week            Time Spent on Discharge:  Greater than 30 minutes      Vladislav Fish MD  [facility]  07/09/24  18:35 EDT

## 2024-07-09 NOTE — OUTREACH NOTE
Prep Survey      Flowsheet Row Responses   [facility] patient discharged from? [facility]   Is LACE score < 7 ? Yes   Eligibility [facility]   Date of Admission 07/07/24   Date of Discharge 07/09/24   Discharge Disposition Home or Self Care   Discharge diagnosis Hyponatremia   Does the patient have one of the following disease processes/diagnoses(primary or secondary)? Other   Prep survey completed? Yes            Kenya CROWE - Registered Nurse

## 2024-07-09 NOTE — PROGRESS NOTES
Access did follow up with pt. Pt is being dc this morning. Pt states he will get back to his daily AA mtgs and regular time with his sponsor. Pt states he will continue to see counselor at [facility] as well.

## 2024-07-09 NOTE — PROGRESS NOTES
[facility] Progress Note      Patient Name: Gamal Modi Sr.  : 1945  MRN: [id number]  Primary Care Physician:  Alysha Cosme MD  Date of admission: 2024    Subjective     Interval History:   Feels fine; eager to go home  Gross hematuria resolved; appetite is excellent; no N/V  No shortness of breath    Review of Systems:   As noted above    Objective     Vitals:   Temp:  [98.1 °F (36.7 °C)-98.8 °F (37.1 °C)] 98.1 °F (36.7 °C)  Heart Rate:  [67-84] 71  Resp:  [18] 18  BP: ()/(57-76) 126/76    Intake/Output Summary (Last 24 hours) at 2024 0853  Last data filed at 2024 0330  Gross per 24 hour   Intake 1500 ml   Output 680 ml   Net 820 ml       Physical Exam:    Constitutional: Awake, alert, NAD  HEENT: Sclera anicteric, no conjunctival injection  Neck: Supple, no carotid bruit, trachea at midline, no JVD  Respiratory: Clear to auscultation bilaterally, nonlabored on RA  Cardiovascular: Irregularly irregular, no rub  Gastrointestinal: BS +, soft, nontender and nondistended  : No palpable bladder  Musculoskeletal: No edema, no clubbing or cyanosis  Psychiatric: Appropriate affect, cooperative, oriented  Neurologic: No asterixis, moving all extremities, normal speech   Skin: Warm and dry     Scheduled Meds:     atorvastatin, 20 mg, Oral, Daily  cefTRIAXone, 1,000 mg, Intravenous, Q24H  cloNIDine, 0.1 mg, Oral, Daily  dilTIAZem CD, 120 mg, Oral, Daily  folic acid, 1 mg, Oral, Daily  sodium chloride, 10 mL, Intravenous, Q12H  thiamine (B-1) IV, 200 mg, Intravenous, Q8H   Followed by  [START ON 2024] thiamine, 100 mg, Oral, Daily      IV Meds:        Results Reviewed:   I have personally reviewed the results from the time of this admission to 2024 08:53 EDT     Results from last 7 days   Lab Units 24  0630 24  1756 24  1059 24  1456 24  0804   SODIUM mmol/L 133* 132* 132*   < > 118*   POTASSIUM mmol/L 4.0 4.0 4.4  4.4    < > 3.0*   CHLORIDE mmol/L 98 96* 97*   < > 80*   CO2 mmol/L 24.1 23.6 26.0   < > 25.3   BUN mg/dL 8 9 7*   < > 7*   CREATININE mg/dL 0.73* 0.75* 0.77   < > 0.79   CALCIUM mg/dL 8.9 8.7 8.8   < > 8.9   BILIRUBIN mg/dL 0.4  --   --   --  1.1   ALK PHOS U/L 87  --   --   --  104   ALT (SGPT) U/L 19  --   --   --  27   AST (SGOT) U/L 18  --   --   --  40   GLUCOSE mg/dL 107* 108* 108*   < > 119*    < > = values in this interval not displayed.       Estimated Creatinine Clearance: 102.6 mL/min (A) (by C-G formula based on SCr of 0.73 mg/dL (L)).    Results from last 7 days   Lab Units 07/09/24  0630 07/08/24  0609 07/07/24  1456   MAGNESIUM mg/dL 1.6 1.7 1.9   PHOSPHORUS mg/dL  --  2.7  --              Results from last 7 days   Lab Units 07/09/24  0630 07/08/24  0609 07/07/24  0804   WBC 10*3/mm3 5.95 5.40 5.09   HEMOGLOBIN g/dL 10.8* 11.1* 12.3*   PLATELETS 10*3/mm3 273 284 310       Results from last 7 days   Lab Units 07/07/24  0919   INR  1.30       Assessment / Plan     ASSESSMENT:  1.  Hyponatremia, acute on chronic, improving:  multifactorial from alcoholism, poor nutrition, and polydipsia.  Rate of correction is appropriate.  Looks euvolemic; preserved renal function  2.  Alcoholism  3.  Gross hematuria with pyuria and bacteriuria  4.  Hypotension  5.  Anemia  6.  Hypokalemia and hypomagnesemia  7.  Atrial fibrillation on AC    PLAN:  1.  Replace magnesium  2.  Encouraged him to eat and to abstain from alcohol  3.  Discharge home anytime from renal view    Thank you for involving us in the care of Gamal LOPEZ Ly Sr..  Please feel free to call with any questions.    Samuel Montenegro MD  07/09/24  08:53 EDT    [facility]  [phone]    Please note that portions of this note were completed with a voice recognition program.

## 2024-07-09 NOTE — PLAN OF CARE
Goal Outcome Evaluation:  Plan of Care Reviewed With: patient        Progress: improving  Outcome Evaluation: Patient alert and oriented x4, up in chair and ambulating in room well. Patient being discharged today after replacement of magnesium.

## 2024-07-09 NOTE — PROGRESS NOTES
LOS: 1 day   Patient Care Team:  Alysha Cosme MD as PCP - General (Internal Medicine)    Chief Complaint: Hematuria    Subjective     Interval History:     Patient Complaints: Hematuria resolving no pain    Review of Systems:    All systems were reviewed and negative except for:  Genitourinary: postivie for  blood in urine    Objective     Vital Signs  Temp:  [98.1 °F (36.7 °C)-98.8 °F (37.1 °C)] 98.1 °F (36.7 °C)  Heart Rate:  [67-84] 71  Resp:  [18] 18  BP: ()/(57-76) 126/76    Physical Exam:        Results Review:     I reviewed the patient's new clinical results.    Medication Review:     Assessment & Plan       Hyponatremia    Essential hypertension    Pulmonary asbestosis    Paroxysmal atrial fibrillation    Alcohol abuse    Hematuria      Hematuria resolving nicely.  No obstruction.  I instructed the patient to hold his Eliquis until his urine is clear for 24 hours then restart it.  We will set him up in the next several days for cystoscopy in the office.  Okay to DC per  standpoint.    Plan for disposition:    James Purvis MD  07/09/24  13:09 EDT      Time:

## 2024-07-10 ENCOUNTER — TRANSITIONAL CARE MANAGEMENT TELEPHONE ENCOUNTER (OUTPATIENT)
Dept: CALL CENTER | Facility: HOSPITAL | Age: 79
End: 2024-07-10
Payer: COMMERCIAL

## 2024-07-10 NOTE — OUTREACH NOTE
Call Center TCM Note      Flowsheet Row Responses   [facility] patient discharged from? [place]   Does the patient have one of the following disease processes/diagnoses(primary or secondary)? Other   TCM attempt successful? No   Unsuccessful attempts Attempt 1  [called Patient's number, went to voicemail. Called Wife, Jamee Modi, she is listed on the verbal release. She states they are currently  and to call patient's number.]            Damari Hernández RN    7/10/2024, 10:09 EDT

## 2024-07-10 NOTE — CASE MANAGEMENT/SOCIAL WORK
Case Management Discharge Note      Final Note: home no needs         Selected Continued Care - Discharged on 7/9/2024 Admission date: 7/7/2024 - Discharge disposition: Home or Self Care      Destination    No services have been selected for the patient.                Durable Medical Equipment    No services have been selected for the patient.                Dialysis/Infusion    No services have been selected for the patient.                Home Medical Care    No services have been selected for the patient.                Therapy    No services have been selected for the patient.                Community Resources    No services have been selected for the patient.                Community & DME    No services have been selected for the patient.                    Transportation Services  Private: Car    Final Discharge Disposition Code: 01 - home or self-care

## 2024-07-10 NOTE — OUTREACH NOTE
Call Center TCM Note      Flowsheet Row Responses   [facility] patient discharged from? [place]   Does the patient have one of the following disease processes/diagnoses(primary or secondary)? Other   TCM attempt successful? No   Unsuccessful attempts Attempt 2            Damari Hernández RN    7/10/2024, 16:40 EDT

## 2024-07-11 ENCOUNTER — TRANSITIONAL CARE MANAGEMENT TELEPHONE ENCOUNTER (OUTPATIENT)
Dept: CALL CENTER | Facility: HOSPITAL | Age: 79
End: 2024-07-11
Payer: COMMERCIAL

## 2024-07-11 NOTE — OUTREACH NOTE
Call Center TCM Note      Flowsheet Row Responses   [facility] patient discharged from? [place]   Does the patient have one of the following disease processes/diagnoses(primary or secondary)? Other   TCM attempt successful? Yes   Call start time 0912   Revoked Reason Other  [unable to reach patient, reached spouse (listed on PCP verbal release) but she deferred call to patient]   Call end time 0913   Discharge diagnosis Hyponatremia   Is patient permission given to speak with other caregiver? Yes   List who call center can speak with spouse- Jamee (listed on PCP verbal)   Person spoke with today (if not patient) and relationship spouse   Call end time 0913            Caroline Gonzalez, RN    7/11/2024, 09:13 EDT

## 2024-07-12 ENCOUNTER — TELEPHONE (OUTPATIENT)
Dept: CARDIOLOGY | Facility: CLINIC | Age: 79
End: 2024-07-12
Payer: COMMERCIAL

## 2024-07-12 LAB
BACTERIA SPEC AEROBE CULT: NORMAL
BACTERIA SPEC AEROBE CULT: NORMAL

## 2024-07-12 NOTE — TELEPHONE ENCOUNTER
Caller: Gamal Modi Sr.    Relationship: Self    Best call back number: [phone] (home)      What is the best time to reach you: ANYTIME    Who are you requesting to speak with (clinical staff, provider,  specific staff member): MATT WELLS    What was the call regarding: PT REQUESTING TO SPEAK WITH MATT WELLS IN REGARDS TO A SURGERY HE JUST HAD, NO FURTHER INFORMATION WAS GIVEN HE IS JUST REQUESTING A BRIEF CALL BACK TO DISCUSS SOME THINGS WITH HER.

## 2024-07-12 NOTE — TELEPHONE ENCOUNTER
07/12/24  13:38 EDT    Called and spoke with patient.  Reports that he did have outpatient cystoscopy and it was normal.  He has restarted apixaban 5 mg twice daily.  I informed him to notify me immediately of any further episodes of hematuria and he verbalized understanding.  He is questioning if he is in atrial fibrillation or sinus rhythm.  I informed him that we will check this at his upcoming appointment on 8/8/2024.  He verbalized understanding.      MATT Lake  [facility]

## 2024-07-15 ENCOUNTER — OFFICE VISIT (OUTPATIENT)
Dept: FAMILY MEDICINE CLINIC | Facility: CLINIC | Age: 79
End: 2024-07-15
Payer: COMMERCIAL

## 2024-07-15 VITALS
RESPIRATION RATE: 16 BRPM | TEMPERATURE: 98.2 F | HEART RATE: 74 BPM | WEIGHT: 199.8 LBS | OXYGEN SATURATION: 97 % | DIASTOLIC BLOOD PRESSURE: 80 MMHG | SYSTOLIC BLOOD PRESSURE: 146 MMHG | HEIGHT: 71 IN | BODY MASS INDEX: 27.97 KG/M2

## 2024-07-15 DIAGNOSIS — I10 ESSENTIAL HYPERTENSION: ICD-10-CM

## 2024-07-15 DIAGNOSIS — E87.1 HYPONATREMIA: ICD-10-CM

## 2024-07-15 DIAGNOSIS — I48.0 PAROXYSMAL ATRIAL FIBRILLATION: ICD-10-CM

## 2024-07-15 DIAGNOSIS — J61 PULMONARY ASBESTOSIS: ICD-10-CM

## 2024-07-15 DIAGNOSIS — Z09 HOSPITAL DISCHARGE FOLLOW-UP: Primary | ICD-10-CM

## 2024-07-15 DIAGNOSIS — D50.8 OTHER IRON DEFICIENCY ANEMIA: ICD-10-CM

## 2024-07-15 PROCEDURE — 99496 TRANSJ CARE MGMT HIGH F2F 7D: CPT | Performed by: STUDENT IN AN ORGANIZED HEALTH CARE EDUCATION/TRAINING PROGRAM

## 2024-07-15 NOTE — PROGRESS NOTES
"Chief Complaint  Hospital Follow Up Visit (ADMITTED LAST WEEK SUNDAY, D/C TUESDAY @ [facility]./LOW SODIUM, POTASSIUM & MAGNESIUM./PT HERE TODAY WITH SON BRONWYN HILARIO)    Subjective        Bronwyn Modi  presents to [facility]IMARY [facility]  History of Present Illness  For hospital follow up  No new complaints  Feeling better today  Review of system is negative for fever, headache, chest pain, shortness of breath, palpitation, nausea, vomiting, any recent change in bladder habits.    Objective   Vital Signs:  /80 (BP Location: Left arm, Patient Position: Sitting, Cuff Size: Adult)   Pulse 74   Temp 98.2 °F (36.8 °C) (Oral)   Resp 16   Ht 180.3 cm (71\")   Wt 90.6 kg (199 lb 12.8 oz)   SpO2 97%   BMI 27.87 kg/m²   Estimated body mass index is 27.87 kg/m² as calculated from the following:    Height as of this encounter: 180.3 cm (71\").    Weight as of this encounter: 90.6 kg (199 lb 12.8 oz).               Physical Exam  Cardiovascular:      Rate and Rhythm: Normal rate.      Pulses: Normal pulses.      Heart sounds: Normal heart sounds.   Pulmonary:      Effort: Pulmonary effort is normal.      Breath sounds: Normal breath sounds.   Abdominal:      General: Bowel sounds are normal.      Palpations: Abdomen is soft.   Neurological:      Mental Status: He is alert and oriented to person, place, and time.        Result Review :    The following data was reviewed by: Alysha Cosme MD on 07/15/2024:  CMP          7/7/2024    08:04 7/7/2024    14:56 7/7/2024    23:51 7/8/2024    06:09 7/8/2024    10:59 7/8/2024    17:56 7/9/2024    06:30   CMP   Glucose 119  98  108  101  108  108  107    BUN 7  6  9  6  7  9  8    Creatinine 0.79  0.59  0.65  0.59  0.77  0.75  0.73    EGFR 91.5  99.9  97.0  99.9  92.2  92.9  93.7    Sodium 118  125  127  131  132  132  133    Potassium 3.0  3.3  3.3  3.8  4.4     4.4  4.0  4.0    Chloride 80  88  92  97  97  96  98    Calcium 8.9  " 8.6  8.4  8.5  8.8  8.7  8.9    Total Protein 6.4       5.9    Albumin 3.9       3.4    Globulin 2.5       2.5    Total Bilirubin 1.1       0.4    Alkaline Phosphatase 104       87    AST (SGOT) 40       18    ALT (SGPT) 27       19    Albumin/Globulin Ratio 1.6       1.4    BUN/Creatinine Ratio 8.9  10.2  13.8  10.2  9.1  12.0  11.0    Anion Gap 12.7  13.4  9.0  9.5  9.0  12.4  10.9      CBC          7/7/2024    08:04 7/8/2024    06:09 7/9/2024    06:30   CBC   WBC 5.09  5.40  5.95    RBC 3.88  3.50  3.37    Hemoglobin 12.3  11.1  10.8    Hematocrit 34.5  32.4  32.0    MCV 88.9  92.6  95.0    MCH 31.7  31.7  32.0    MCHC 35.7  34.3  33.8    RDW 12.6  13.1  12.7    Platelets 310  284  273      Lipid Panel          10/11/2023    08:08 4/12/2024    09:22   Lipid Panel   Total Cholesterol 130  136    Triglycerides 78  53    HDL Cholesterol 51  55    VLDL Cholesterol 15  12    LDL Cholesterol  64  69                   Assessment and Plan     Diagnoses and all orders for this visit:    1. Hospital discharge follow-up (Primary)    2. Other iron deficiency anemia  -     Hemoglobin and hematocrit, blood    3. Hyponatremia  -     Basic metabolic panel    4. Essential hypertension    5. Paroxysmal atrial fibrillation    6. Pulmonary asbestosis         # Hospital follow-up  Patient admitted to the hospital on July 7 and discharged on July 9 and presenting complaint was altered mental status and presence of blood in urine.  Patient was workup for hematuria and came out negative for any infection   Hematuria likely secondary to be on anticoagulation and urology consult was done and plan is to have cystoscopy done as an outpatient and patient anticoagulation was discontinued  # PT ALREADY  has seen urologist and underwent cystoscopy   and no abnormalities found as per pt. Has been on Eliquis for 5 days, no blood in urine noticed.    # plan to repeat Hemoglobin to make sure it is not further dropping down and BMP to make sure  kidney function and electrolytes all WNL  # Continue all medications.  RTC in next scheduled visit    Follow Up     No follow-ups on file.  Patient was given instructions and counseling regarding his condition or for health maintenance advice. Please see specific information pulled into the AVS if appropriate.

## 2024-07-23 LAB
BUN SERPL-MCNC: 5 MG/DL (ref 8–23)
BUN/CREAT SERPL: 6.5 (ref 7–25)
CALCIUM SERPL-MCNC: 9.5 MG/DL (ref 8.6–10.5)
CHLORIDE SERPL-SCNC: 97 MMOL/L (ref 98–107)
CO2 SERPL-SCNC: 26.2 MMOL/L (ref 22–29)
CREAT SERPL-MCNC: 0.77 MG/DL (ref 0.76–1.27)
EGFRCR SERPLBLD CKD-EPI 2021: 92.2 ML/MIN/1.73
GLUCOSE SERPL-MCNC: 97 MG/DL (ref 65–99)
HCT VFR BLD AUTO: 35.1 % (ref 37.5–51)
HGB BLD-MCNC: 11.8 G/DL (ref 13–17.7)
POTASSIUM SERPL-SCNC: 4.3 MMOL/L (ref 3.5–5.2)
SODIUM SERPL-SCNC: 137 MMOL/L (ref 136–145)

## 2024-07-26 ENCOUNTER — TELEPHONE (OUTPATIENT)
Dept: FAMILY MEDICINE CLINIC | Facility: CLINIC | Age: 79
End: 2024-07-26
Payer: COMMERCIAL

## 2024-07-26 NOTE — TELEPHONE ENCOUNTER
Caller: Gamal Modi Sr.    Relationship to patient: Self    Best call back number: [phone]    Patient is needing: HE RECEIVED A NO SHOW LETTER FOR LABS ON 7/22/24, HOWEVER, HE WAS THERE AND HAD THE LABS DONE AND ALREADY HAS GOTTEN THE RESULTS BACK.  CAN YOU PLEASE MAKE SURE HE IS NOT CHARGED FOR A NO SHOW.

## 2024-08-16 ENCOUNTER — TELEPHONE (OUTPATIENT)
Dept: FAMILY MEDICINE CLINIC | Facility: CLINIC | Age: 79
End: 2024-08-16

## 2024-08-16 RX ORDER — METHYLPREDNISOLONE 4 MG/1
TABLET ORAL
Qty: 21 TABLET | Refills: 0 | Status: SHIPPED | OUTPATIENT
Start: 2024-08-16

## 2024-08-19 ENCOUNTER — OFFICE VISIT (OUTPATIENT)
Dept: FAMILY MEDICINE CLINIC | Facility: CLINIC | Age: 79
End: 2024-08-19
Payer: COMMERCIAL

## 2024-08-19 VITALS
DIASTOLIC BLOOD PRESSURE: 78 MMHG | HEIGHT: 71 IN | WEIGHT: 199.6 LBS | HEART RATE: 81 BPM | TEMPERATURE: 97.8 F | SYSTOLIC BLOOD PRESSURE: 142 MMHG | OXYGEN SATURATION: 98 % | BODY MASS INDEX: 27.94 KG/M2 | RESPIRATION RATE: 16 BRPM

## 2024-08-19 DIAGNOSIS — M10.071 ACUTE IDIOPATHIC GOUT INVOLVING TOE OF RIGHT FOOT: Primary | ICD-10-CM

## 2024-08-19 PROCEDURE — 99213 OFFICE O/P EST LOW 20 MIN: CPT | Performed by: STUDENT IN AN ORGANIZED HEALTH CARE EDUCATION/TRAINING PROGRAM

## 2024-08-19 NOTE — PROGRESS NOTES
"Chief Complaint  Gout (RIGHT BIG TOE./DR. PAULA PRESCRIBED Z-PACK FOR GOUT ON FRIDAY 08/16/2024.)    Subjective        Gamal Modi  presents to [facility]  Gout      For following up on gout.  Patient stated his symptoms started actually late Friday and he was given prescription by Dr. PAULA and with that prescription patient stated he is feeling much better.  Patient stated he used to be on allopurinol many years ago but then it was stopped.  Patient stated he gets 1 or 2 episode in a year  Does not have any new issues today    Objective   Vital Signs:  /78 (BP Location: Left arm, Patient Position: Sitting, Cuff Size: Adult)   Pulse 81   Temp 97.8 °F (36.6 °C) (Oral)   Resp 16   Ht 180.3 cm (71\")   Wt 90.5 kg (199 lb 9.6 oz)   SpO2 98%   BMI 27.84 kg/m²   Estimated body mass index is 27.84 kg/m² as calculated from the following:    Height as of this encounter: 180.3 cm (71\").    Weight as of this encounter: 90.5 kg (199 lb 9.6 oz).            Physical Exam  Eyes:      Extraocular Movements: Extraocular movements intact.      Pupils: Pupils are equal, round, and reactive to light.   Cardiovascular:      Rate and Rhythm: Normal rate.      Pulses: Normal pulses.      Heart sounds: Normal heart sounds.   Pulmonary:      Effort: Pulmonary effort is normal.      Breath sounds: Normal breath sounds.   Abdominal:      General: Bowel sounds are normal.      Palpations: Abdomen is soft.   Musculoskeletal:      Comments: Right foot examined  Note tenderness, redness, swelling noticed on right big toe.  As per the patient swelling, redness, pain was present on Friday which slowly improved when he started taking medication   Neurological:      Mental Status: He is alert.        Result Review :                Assessment and Plan   Diagnoses and all orders for this visit:    1. Acute idiopathic gout involving toe of right foot (Primary)      Advised patient to continue Medrol " Dosepak as instructed  RTC if new episode or new symptoms arise         Follow Up   No follow-ups on file.  Patient was given instructions and counseling regarding his condition or for health maintenance advice. Please see specific information pulled into the AVS if appropriate.

## 2024-10-21 ENCOUNTER — OFFICE VISIT (OUTPATIENT)
Dept: FAMILY MEDICINE CLINIC | Facility: CLINIC | Age: 79
End: 2024-10-21
Payer: COMMERCIAL

## 2024-10-21 VITALS
DIASTOLIC BLOOD PRESSURE: 92 MMHG | HEART RATE: 92 BPM | RESPIRATION RATE: 16 BRPM | WEIGHT: 200.1 LBS | BODY MASS INDEX: 28.01 KG/M2 | SYSTOLIC BLOOD PRESSURE: 172 MMHG | OXYGEN SATURATION: 97 % | HEIGHT: 71 IN | TEMPERATURE: 97.9 F

## 2024-10-21 DIAGNOSIS — I48.0 PAROXYSMAL ATRIAL FIBRILLATION: ICD-10-CM

## 2024-10-21 DIAGNOSIS — R00.2 PALPITATIONS: ICD-10-CM

## 2024-10-21 DIAGNOSIS — I10 ESSENTIAL HYPERTENSION: Primary | ICD-10-CM

## 2024-10-21 PROCEDURE — 93000 ELECTROCARDIOGRAM COMPLETE: CPT | Performed by: STUDENT IN AN ORGANIZED HEALTH CARE EDUCATION/TRAINING PROGRAM

## 2024-10-21 PROCEDURE — 99214 OFFICE O/P EST MOD 30 MIN: CPT | Performed by: STUDENT IN AN ORGANIZED HEALTH CARE EDUCATION/TRAINING PROGRAM

## 2024-10-21 RX ORDER — LOSARTAN POTASSIUM 25 MG/1
25 TABLET ORAL DAILY
Qty: 90 TABLET | Refills: 0 | Status: SHIPPED | OUTPATIENT
Start: 2024-10-21

## 2024-10-21 NOTE — PROGRESS NOTES
"Chief Complaint  Atrial Fibrillation    Subjective    History of Present Illness  The patient is a 79-year-old gentleman who presents for evaluation of chest congestion, difficulty sleeping, and feeling odd and shaky.    He reports experiencing chest congestion, sleep disturbances, and an unusual, shaky feeling that began last Friday. He describes feeling slightly off balance and less calm than usual. Upon waking in the morning, he experiences shortness of breath and has noticed slight swelling in his ankles. He mentions a gurgling sensation in his throat when lying down or sleeping, which he likens to wheezing. He does not have a cough, ear pain, or sinus issues.    He is uncertain if these symptoms are related to his atrial fibrillation (AFib). He recalls being informed during a hospital visit that his heart rhythm was irregular, but it had returned to normal upon discharge. His current medications include Eliquis 5 mg twice daily, Lipitor, Tums as needed, Cardizem 120 mg daily, iron sulfate, multivitamin, and Cialis as needed. He has completed a course of Medrol Dosepak prescribed by Dr. Copeland in August 2023. He is not currently taking clonidine. He does not use oxygen at night.    He has an upcoming appointment with his pulmonologist on November 13, 2024, and a follow-up visit with me on November 20, 2024. He has been advised by his pulmonologist that there have been no changes in his condition over the past decade and that further visits are unnecessary unless his condition worsens. He has a home blood pressure monitor.    He also has a history of carpal tunnel syndrome.       Gamal Modi Sr. presents to [facility]  Atrial Fibrillation  Past medical history includes atrial fibrillation.       Objective   Vital Signs:  /92 (BP Location: Left arm, Patient Position: Sitting, Cuff Size: Adult)   Pulse 92   Temp 97.9 °F (36.6 °C) (Oral)   Resp 16   Ht 180.3 cm (71\")   " "Wt 90.8 kg (200 lb 1.6 oz)   SpO2 97%   BMI 27.91 kg/m²   Estimated body mass index is 27.91 kg/m² as calculated from the following:    Height as of this encounter: 180.3 cm (71\").    Weight as of this encounter: 90.8 kg (200 lb 1.6 oz).            Physical Exam  Cardiovascular:      Pulses: Normal pulses.      Heart sounds: Normal heart sounds.   Pulmonary:      Effort: Pulmonary effort is normal.      Breath sounds: Normal breath sounds.   Abdominal:      General: Bowel sounds are normal.      Palpations: Abdomen is soft.   Skin:     General: Skin is warm.   Neurological:      Mental Status: He is alert and oriented to person, place, and time.        Result Review :           ECG 12 Lead    Date/Time: 10/21/2024 12:54 PM  Performed by: Alysha Cosme MD    Authorized by: Alysha Cosme MD  Comparison: compared with previous ECG from 3/29/2024  Rhythm: atrial fibrillation    Clinical impression: abnormal EKG            Assessment and Plan   Diagnoses and all orders for this visit:    1. Essential hypertension (Primary)  -     losartan (Cozaar) 25 MG tablet; Take 1 tablet by mouth Daily.  Dispense: 90 tablet; Refill: 0    2. Palpitations  -     ECG 12 Lead    3. Paroxysmal atrial fibrillation        Assessment & Plan  1. Chest Congestion.  He reports feeling chest congestion, particularly in the mornings with a sensation of gurgling in his throat. There is no cough or sputum production. The lung examination revealed crepitation, likely related to his known pleural plaques. An EKG will be performed today He is advised to monitor his blood pressure at home.    2. Difficulty Sleeping.  He reports difficulty sleeping and feeling anxious. His current medications include Eliquis 5 mg twice daily, Lipitor, Cardizem 120 mg daily, and an over-the-counter iron tablet. He is not taking clonidine. An EKG will be performed to rule out any cardiac issues. He is advised to monitor his blood pressure at home.    3. " Shakiness.  He reports feeling odd and shaky since Friday. His blood pressure today is elevated at 172/92, but his oxygen saturation is 97%. An EKG will be performed to ensure his cardiac rhythm is normal. He is advised to monitor his blood pressure at home.    4. Shortness of Breath.  He reports experiencing shortness of breath, particularly in the mornings. His oxygen saturation is 97%. An EKG will be performed todayl. He is advised to monitor his blood pressure at home.    5. Swollen Ankles.  He reports noticing his ankles are a little swollen and is concerned about fluid retention. His blood pressure is elevated at 172/92. An EKG will be performed to ensure his cardiac rhythm is normal. He is advised to monitor his blood pressure at home.    6. Hypertension.  His blood pressure is elevated today at 172/92. He is currently on Cardizem 120 mg daily and not taking clonidine. He is advised to monitor his blood pressure at home. Started on losartan 25 mg po daily, Rtc in 4 weeks    7. Atrial Fibrillation.  He has a history of atrial fibrillation and is on Eliquis 5 mg twice daily. He reports feeling off and anxious, which may be related to his atrial fibrillation. An EKG will be performed to ensure his cardiac rhythm is normal.    Follow-up  Return on 20 November for follow-up.            Follow Up   No follow-ups on file.  Patient was given instructions and counseling regarding his condition or for health maintenance advice. Please see specific information pulled into the AVS if appropriate.     Patient or patient representative verbalized consent for the use of Ambient Listening during the visit with  Alysha Cosme MD for chart documentation. 10/21/2024  12:50 EDT

## 2024-10-23 ENCOUNTER — TELEPHONE (OUTPATIENT)
Dept: FAMILY MEDICINE CLINIC | Facility: CLINIC | Age: 79
End: 2024-10-23
Payer: COMMERCIAL

## 2024-10-23 NOTE — TELEPHONE ENCOUNTER
Patient is asking if he should increase his blood pressure medication?    Readings are still in the 170s    Saw you 10/21/24.    Please advise, thanks      Phone number in chart is best contact number

## 2024-10-28 DIAGNOSIS — N52.9 ERECTILE DYSFUNCTION, UNSPECIFIED ERECTILE DYSFUNCTION TYPE: ICD-10-CM

## 2024-10-29 RX ORDER — TADALAFIL 5 MG/1
5 TABLET ORAL DAILY PRN
Qty: 90 TABLET | Refills: 1 | Status: SHIPPED | OUTPATIENT
Start: 2024-10-29

## 2024-10-31 ENCOUNTER — OFFICE VISIT (OUTPATIENT)
Dept: CARDIOLOGY | Facility: CLINIC | Age: 79
End: 2024-10-31
Payer: COMMERCIAL

## 2024-10-31 VITALS
SYSTOLIC BLOOD PRESSURE: 132 MMHG | HEART RATE: 70 BPM | WEIGHT: 189.9 LBS | BODY MASS INDEX: 26.59 KG/M2 | DIASTOLIC BLOOD PRESSURE: 78 MMHG | HEIGHT: 71 IN

## 2024-10-31 DIAGNOSIS — F10.10 ALCOHOL ABUSE: ICD-10-CM

## 2024-10-31 DIAGNOSIS — I10 ESSENTIAL HYPERTENSION: Primary | ICD-10-CM

## 2024-10-31 DIAGNOSIS — J61 PULMONARY ASBESTOSIS: ICD-10-CM

## 2024-10-31 DIAGNOSIS — I48.0 PAROXYSMAL ATRIAL FIBRILLATION: ICD-10-CM

## 2024-10-31 PROCEDURE — 99214 OFFICE O/P EST MOD 30 MIN: CPT | Performed by: NURSE PRACTITIONER

## 2024-10-31 PROCEDURE — 93000 ELECTROCARDIOGRAM COMPLETE: CPT | Performed by: NURSE PRACTITIONER

## 2024-10-31 RX ORDER — CEPHALEXIN 500 MG/1
500 CAPSULE ORAL 4 TIMES DAILY
COMMUNITY
Start: 2024-10-30 | End: 2024-11-09

## 2024-10-31 NOTE — PROGRESS NOTES
Date of Office Visit: 10/31/2024  Encounter Provider: MATT Montenegro  Place of Service: [facility] CARDIOLOGY  Patient Name: Gamal Modi Sr.  :1945    No chief complaint on file.  : hypertension  Atrial fibrillation    HPI: Gamal Modi Sr. is a 79 y.o. male who is a patient of  Dr. Murrell.  He is new to me today and presents at the request of his PCP.  He has a history of hypertension, palpitations, paroxysmal atrial fibrillation, recent right carpal tunnel surgery.  Patient also admits to drinking about 1/5 of alcohol a day.  He is on anticoagulation with Eliquis.  No bleeding issues.  Patient underwent right carpal tunnel surgery on 10/4/2024 patient.  Seen in their office yesterday and was started on antibiotics secondary to developing a raised area about 4 days ago.       On 10/21/2024, patient presented to his PCP office with complaints of chest congestion, difficulty sleeping and feeling odd and shaky.  He was also feeling slightly off balance and a little anxious.  He noted some shortness of breath and some swelling in his ankles.  EKG was obtained and showed atrial fibrillation with controlled ventricular rate.  Patient was started on losartan secondary to elevated blood pressure readings.  He was advised to increase losartan to 50 mg if readings remain 170's systolic.      Mr. Modi has no complaints of chest pain or palpitations.  Patient notes that he was a little short of breath and noticed some lower extremity edema after having his carpal tunnel surgery.  He is feeling better now.  Edema is much better.  He enjoys walking almost on a daily basis for exercise and has noticed that shortness of air is getting better, as well.  Patient notes that he stopped his Eliquis only 24 hours prior to his surgery and restarted the next day.  Blood pressure is controlled at today's office visit.  EKG today shows atrial fibrillation with  controlled ventricular rate of 70.      Previous testing and notes have been reviewed by me.   Past Medical History:   Diagnosis Date    AF (atrial fibrillation)     Cataract 2019    Elevated cholesterol     Erectile dysfunction 2018    Gout     Hypertension     Long term exposure to asbestos     3 years    Sebaceous cyst        Past Surgical History:   Procedure Laterality Date    COLON SURGERY      COLONOSCOPY  approx 2015    negative per pt     COLONOSCOPY N/A 5/12/2022    Procedure: COLONOSCOPY INTO CECUM/ TERMINAL ILEUM WITH POLYPECTOMY;  Surgeon: Aidan Branham MD;  Location:  [facility];  Service: Gastroenterology;  Laterality: N/A;  pre: anemia  post: hemorrhoids, normal TI, polyp, diverticulosis    ENDOSCOPY N/A 5/12/2022    Procedure: ESOPHAGOGASTRODUODENOSCOPY WITH BIOPSIES;  Surgeon: Aidan Branham MD;  Location: [facility] ENDOSCOPY;  Service: Gastroenterology;  Laterality: N/A;  pre: anemia  post: hiatal hernia, bnormal ampulla    OTHER SURGICAL HISTORY      BOWEL BLOCKAGE       Social History     Socioeconomic History    Marital status:    Tobacco Use    Smoking status: Never     Passive exposure: Never    Smokeless tobacco: Never   Vaping Use    Vaping status: Never Used   Substance and Sexual Activity    Alcohol use: Not Currently     Comment: pt reports he drinks a fifth of whiskey a day, is a binge drinker, had not drank for 11 months then 2 weeks ago drank again, last drink 4 days ago    Drug use: Never    Sexual activity: Yes     Partners: Female       Family History   Problem Relation Age of Onset    Diabetes Other     Cancer Mother     Diabetes Father     Cancer Sister        Review of Systems   Constitutional: Negative.   HENT: Negative.     Eyes: Negative.    Cardiovascular:  Positive for irregular heartbeat and leg swelling.   Respiratory: Negative.     Endocrine: Negative.    Hematologic/Lymphatic:        Eliquis   Skin: Negative.    Musculoskeletal: Negative.   "  Gastrointestinal: Negative.    Genitourinary: Negative.    Neurological: Negative.    Psychiatric/Behavioral: Negative.     Allergic/Immunologic: Negative.        No Known Allergies      Current Outpatient Medications:     acetaminophen (TYLENOL) 500 MG tablet, Take 2 tablets by mouth Daily., Disp: , Rfl:     apixaban (ELIQUIS) 5 MG tablet tablet, Take 1 tablet by mouth 2 (Two) Times a Day. CARDIOLOGIST., Disp: , Rfl:     ascorbic acid (VITAMIN C) 500 MG tablet, Take 2 tablets by mouth Daily., Disp: , Rfl:     atorvastatin (LIPITOR) 20 MG tablet, TAKE ONE TABLET BY MOUTH DAILY, Disp: 90 tablet, Rfl: 3    calcium carbonate (TUMS) 500 MG chewable tablet, Chew 3 tablets Daily As Needed for Indigestion or Heartburn., Disp: , Rfl:     carboxymethylcellulose (REFRESH PLUS) 0.5 % solution, Administer 1 drop to both eyes Daily., Disp: , Rfl:     cephalexin (KEFLEX) 500 MG capsule, Take 1 capsule by mouth 4 (Four) Times a Day., Disp: , Rfl:     cholecalciferol (VITAMIN D3) 1000 units tablet, Take 1 tablet by mouth Daily., Disp: , Rfl:     dilTIAZem CD (CARDIZEM CD) 120 MG 24 hr capsule, TAKE ONE CAPSULE BY MOUTH DAILY, Disp: 90 capsule, Rfl: 3    ferrous sulfate 325 (65 FE) MG tablet, Take 1 tablet by mouth Daily With Breakfast., Disp: , Rfl:     Hydrolyzed Silk (COGNIUM MEMORY PO), Take 1 tablet by mouth Daily., Disp: , Rfl:     losartan (Cozaar) 25 MG tablet, Take 1 tablet by mouth Daily., Disp: 90 tablet, Rfl: 0    Multiple Vitamins-Minerals (OCUVITE EXTRA PO), Take 1 tablet by mouth Daily., Disp: , Rfl:     tadalafil (CIALIS) 5 MG tablet, TAKE 1 TABLET BY MOUTH DAILY AS NEEDED FOR ERECTILE DYSFUNCTION, Disp: 90 tablet, Rfl: 1      Objective:     Vitals:    10/31/24 1417   BP: 132/78   Pulse: 70   Weight: 86.1 kg (189 lb 14.4 oz)   Height: 180.3 cm (70.98\")     Body mass index is 26.5 kg/m².    PHYSICAL EXAM:    Constitutional:       Appearance: Healthy appearance. Not in distress.   Neck:      Vascular: No JVR. JVD " normal.   Pulmonary:      Effort: Pulmonary effort is normal.      Breath sounds: Normal breath sounds. No wheezing. No rhonchi. No rales.   Chest:      Chest wall: Not tender to palpatation.   Cardiovascular:      PMI at left midclavicular line. Normal rate. Irregularly irregular rhythm. Normal S1. Normal S2.       Murmurs: There is no murmur.      No gallop.  No click. No rub.   Pulses:     Intact distal pulses.   Edema:     Ankle: bilateral edema of the ankle.     Feet: bilateral trace edema of the feet.  Abdominal:      General: Bowel sounds are normal.      Palpations: Abdomen is soft.      Tenderness: There is no abdominal tenderness.   Musculoskeletal: Normal range of motion.         General: No tenderness. Skin:     General: Skin is warm and dry.   Neurological:      General: No focal deficit present.      Mental Status: Alert and oriented to person, place and time.           ECG 12 Lead    Date/Time: 10/31/2024 3:07 PM  Performed by: Maddie Holloway APRN    Authorized by: Maddie Holloway APRN  Comparison: compared with previous ECG from 10/21/2024  Similar to previous ECG  Rhythm: atrial fibrillation  BPM: 70  Conduction: conduction normal            Assessment:       Diagnosis Plan   1. Essential hypertension        2. Paroxysmal atrial fibrillation        3. Alcohol abuse        4. Pulmonary asbestosis          No orders of the defined types were placed in this encounter.         Plan:       Paroxysmal atrial fibrillation: Rate controlled on diltiazem.  Anticoagulation with Eliquis.  No bleeding issues on Eliquis.  2.   Hypertension: Well-controlled at this time.  Goal 130/80.  Mr. Graham will continue to monitor his blood pressure at home and let me know how readings look.  If BP becomes higher, we will increase losartan to 50 mg daily.    Mr. Modi has a follow-up appointment in a few months with Dr. Murrell.  He will call sooner for any questions or concerns.             Your medication  list            Accurate as of October 31, 2024  3:06 PM. If you have any questions, ask your nurse or doctor.                CONTINUE taking these medications        Instructions Last Dose Given Next Dose Due   acetaminophen 500 MG tablet  Commonly known as: TYLENOL      Take 2 tablets by mouth Daily.       apixaban 5 MG tablet tablet  Commonly known as: ELIQUIS      Take 1 tablet by mouth 2 (Two) Times a Day. CARDIOLOGIST.       ascorbic acid 500 MG tablet  Commonly known as: VITAMIN C      Take 2 tablets by mouth Daily.       atorvastatin 20 MG tablet  Commonly known as: LIPITOR      TAKE ONE TABLET BY MOUTH DAILY       calcium carbonate 500 MG chewable tablet  Commonly known as: TUMS      Chew 3 tablets Daily As Needed for Indigestion or Heartburn.       carboxymethylcellulose 0.5 % solution  Commonly known as: REFRESH PLUS      Administer 1 drop to both eyes Daily.       cephalexin 500 MG capsule  Commonly known as: KEFLEX      Take 1 capsule by mouth 4 (Four) Times a Day.       cholecalciferol 25 MCG (1000 UT) tablet  Commonly known as: VITAMIN D3      Take 1 tablet by mouth Daily.       COGNIUM MEMORY PO      Take 1 tablet by mouth Daily.       dilTIAZem  MG 24 hr capsule  Commonly known as: CARDIZEM CD      TAKE ONE CAPSULE BY MOUTH DAILY       ferrous sulfate 325 (65 FE) MG tablet      Take 1 tablet by mouth Daily With Breakfast.       losartan 25 MG tablet  Commonly known as: Cozaar      Take 1 tablet by mouth Daily.       multivitamin with minerals tablet tablet      Take 1 tablet by mouth Daily.       tadalafil 5 MG tablet  Commonly known as: CIALIS      TAKE 1 TABLET BY MOUTH DAILY AS NEEDED FOR ERECTILE DYSFUNCTION              STOP taking these medications      cloNIDine 0.1 MG tablet  Commonly known as: CATAPRES  Stopped by: Maddie Holloway                   As always, it has been a pleasure to participate in your patient's care.      Sincerely,       MATT Peña

## 2024-11-20 ENCOUNTER — OFFICE VISIT (OUTPATIENT)
Dept: FAMILY MEDICINE CLINIC | Facility: CLINIC | Age: 79
End: 2024-11-20
Payer: COMMERCIAL

## 2024-11-20 VITALS
SYSTOLIC BLOOD PRESSURE: 156 MMHG | BODY MASS INDEX: 25.62 KG/M2 | RESPIRATION RATE: 16 BRPM | HEIGHT: 70 IN | TEMPERATURE: 98.7 F | HEART RATE: 53 BPM | WEIGHT: 179 LBS | DIASTOLIC BLOOD PRESSURE: 84 MMHG | OXYGEN SATURATION: 97 %

## 2024-11-20 DIAGNOSIS — Z12.5 PROSTATE CANCER SCREENING: ICD-10-CM

## 2024-11-20 DIAGNOSIS — Z00.00 ANNUAL PHYSICAL EXAM: ICD-10-CM

## 2024-11-20 DIAGNOSIS — J61 PULMONARY ASBESTOSIS: ICD-10-CM

## 2024-11-20 DIAGNOSIS — E78.00 HYPERCHOLESTEROLEMIA: ICD-10-CM

## 2024-11-20 DIAGNOSIS — I48.0 PAROXYSMAL ATRIAL FIBRILLATION: ICD-10-CM

## 2024-11-20 DIAGNOSIS — Z00.00 ENCOUNTER FOR SUBSEQUENT ANNUAL WELLNESS VISIT (AWV) IN MEDICARE PATIENT: Primary | ICD-10-CM

## 2024-11-20 DIAGNOSIS — I10 ESSENTIAL HYPERTENSION: ICD-10-CM

## 2024-11-20 DIAGNOSIS — D50.8 OTHER IRON DEFICIENCY ANEMIA: ICD-10-CM

## 2024-11-20 RX ORDER — LOSARTAN POTASSIUM 50 MG/1
50 TABLET ORAL DAILY
Qty: 90 TABLET | Refills: 3 | Status: SHIPPED | OUTPATIENT
Start: 2024-11-20

## 2024-11-20 NOTE — PROGRESS NOTES
"Chief Complaint  Medicare Wellness-subsequent, Abnormal Lab (10/21/2024, CBC with Diff, LIPID, CMP.), and Hypertension    Subjective    History of Present Illness         Gamal LOPEZ Ly Childers presents to [facility]  History of Present Illness    Objective   Vital Signs:  /84 (BP Location: Left arm, Patient Position: Sitting, Cuff Size: Adult)   Pulse 53   Temp 98.7 °F (37.1 °C) (Oral)   Resp 16   Ht 177.8 cm (70\")   Wt 81.2 kg (179 lb)   SpO2 97%   BMI 25.68 kg/m²   Estimated body mass index is 25.68 kg/m² as calculated from the following:    Height as of this encounter: 177.8 cm (70\").    Weight as of this encounter: 81.2 kg (179 lb).    {BMI is >= 25 and <30. (Overweight) The following options were offered after discussion; (Optional):04147}      Physical Exam   Result Review :{Labs  Result Review  Imaging  Med Tab  Media  Procedures :23}  {The following data was reviewed by (Optional):98145}  {Ambulatory Labs (Optional):29287}  {Data reviewed (Optional):18901:::1}          Assessment and Plan {CC Problem List  Visit Diagnosis   ROS  Review (Popup)  Health Maintenance  Quality  BestPractice  Medications  SmartSets  SnapShot Encounters  Media :23}  Diagnoses and all orders for this visit:    1. Essential hypertension (Primary)  -     losartan (Cozaar) 50 MG tablet; Take 1 tablet by mouth Daily.  Dispense: 90 tablet; Refill: 3        Assessment & Plan            {Time Spent (Optional):20842}  Follow Up {Instructions Charge Capture  Follow-up Communications :23}  No follow-ups on file.  Patient was given instructions and counseling regarding his condition or for health maintenance advice. Please see specific information pulled into the AVS if appropriate.     {ALEXIA [employer] Provider Statement:68065}            "

## 2024-12-04 NOTE — PROGRESS NOTES
Subjective   The ABCs of the Annual Wellness Visit  Medicare Wellness Visit      Gamal Modi Sr. is a 79 y.o. patient who presents for a Medicare Wellness Visit.    The following portions of the patient's history were reviewed and   updated as appropriate: allergies, current medications, past family history, past medical history, past social history, past surgical history, and problem list.    Compared to one year ago, the patient's physical   health is the same.  Compared to one year ago, the patient's mental   health is the same.    Recent Hospitalizations:  This patient has had a [facility] admission record on file within the last 365 days.  Current Medical Providers:  Patient Care Team:  Alysha Cosme MD as PCP - General (Internal Medicine)    Outpatient Medications Prior to Visit   Medication Sig Dispense Refill    acetaminophen (TYLENOL) 500 MG tablet Take 2 tablets by mouth Daily.      apixaban (ELIQUIS) 5 MG tablet tablet Take 1 tablet by mouth 2 (Two) Times a Day. CARDIOLOGIST.      ascorbic acid (VITAMIN C) 500 MG tablet Take 2 tablets by mouth Daily.      atorvastatin (LIPITOR) 20 MG tablet TAKE ONE TABLET BY MOUTH DAILY 90 tablet 3    calcium carbonate (TUMS) 500 MG chewable tablet Chew 3 tablets Daily As Needed for Indigestion or Heartburn.      carboxymethylcellulose (REFRESH PLUS) 0.5 % solution Administer 1 drop to both eyes Daily.      cholecalciferol (VITAMIN D3) 1000 units tablet Take 1 tablet by mouth Daily.      dilTIAZem CD (CARDIZEM CD) 120 MG 24 hr capsule TAKE ONE CAPSULE BY MOUTH DAILY 90 capsule 3    ferrous sulfate 325 (65 FE) MG tablet Take 1 tablet by mouth Daily With Breakfast.      Hydrolyzed Silk (COGNIUM MEMORY PO) Take 1 tablet by mouth Daily.      Multiple Vitamins-Minerals (OCUVITE EXTRA PO) Take 1 tablet by mouth Daily.      tadalafil (CIALIS) 5 MG tablet TAKE 1 TABLET BY MOUTH DAILY AS NEEDED FOR ERECTILE DYSFUNCTION 90 tablet 1    losartan (Cozaar) 25 MG  "tablet Take 1 tablet by mouth Daily. 90 tablet 0     No facility-administered medications prior to visit.     No opioid medication identified on active medication list. I have reviewed chart for other potential  high risk medication/s and harmful drug interactions in the elderly.      Aspirin is not on active medication list.  Aspirin use is contraindicated for this patient due to: current use of Eliquis.  .    Patient Active Problem List   Diagnosis    Gout    Essential hypertension    Annual physical exam    Hypercholesterolemia    Anemia    Pulmonary asbestosis    Paroxysmal atrial fibrillation    Diastolic dysfunction    Anemia    Fatty liver    Abnormal thyroid function test    Dizziness    Hyponatremia    Coronary artery calcification seen on CAT scan    Alcohol abuse    Right hip pain    Alcohol dependence    Closed compression fracture of L5 lumbar vertebra, with routine healing, subsequent encounter    Sacral fracture, closed, multiple    Hematuria     Advance Care Planning Advance Directive is not on file.  ACP discussion was held with the patient during this visit. Patient does not have an advance directive, information provided.            Objective   Vitals:    11/20/24 1007   BP: 156/84   BP Location: Left arm   Patient Position: Sitting   Cuff Size: Adult   Pulse: 53   Resp: 16   Temp: 98.7 °F (37.1 °C)   TempSrc: Oral   SpO2: 97%   Weight: 81.2 kg (179 lb)   Height: 177.8 cm (70\")   PainSc: 0-No pain       Estimated body mass index is 25.68 kg/m² as calculated from the following:    Height as of this encounter: 177.8 cm (70\").    Weight as of this encounter: 81.2 kg (179 lb).    BMI is >= 25 and <30. (Overweight) The following options were offered after discussion;: exercise counseling/recommendations and nutrition counseling/recommendations       Does the patient have evidence of cognitive impairment? No  Lab Results   Component Value Date    CHLPL 116 10/21/2024    TRIG 41 10/21/2024    HDL 62 (H) " 10/21/2024    LDL 43 10/21/2024    VLDL 11 10/21/2024    HGBA1C 5.30 10/21/2024                                                                                                Health  Risk Assessment    Smoking Status:  Social History     Tobacco Use   Smoking Status Never    Passive exposure: Never   Smokeless Tobacco Never     Alcohol Consumption:  Social History     Substance and Sexual Activity   Alcohol Use Not Currently    Comment: pt reports he drinks a fifth of whiskey a day, is a binge drinker, had not drank for 11 months then 2 weeks ago drank again, last drink 4 days ago       Fall Risk Screen  STEADI Fall Risk Assessment was completed, and patient is at LOW risk for falls.Assessment completed on:2024    Depression Screening   Little interest or pleasure in doing things? Not at all   Feeling down, depressed, or hopeless? Not at all   PHQ-2 Total Score 0      Health Habits and Functional and Cognitive Screenin/20/2024    10:15 AM   Functional & Cognitive Status   Do you have difficulty preparing food and eating? No   Do you have difficulty bathing yourself, getting dressed or grooming yourself? No   Do you have difficulty using the toilet? No   Do you have difficulty moving around from place to place? No   Do you have trouble with steps or getting out of a bed or a chair? No   Current Diet Well Balanced Diet   Dental Exam Up to date   Eye Exam Up to date   Exercise (times per week) 6 times per week   Current Exercises Include Walking   Do you need help using the phone?  No   Are you deaf or do you have serious difficulty hearing?  No   Do you need help to go to places out of walking distance? No   Do you need help shopping? No   Do you need help preparing meals?  No   Do you need help with housework?  No   Do you need help with laundry? No   Do you need help taking your medications? No   Do you need help managing money? No   Do you ever drive or ride in a car without wearing a seat belt? No    Have you felt unusual stress, anger or loneliness in the last month? No   Who do you live with? Spouse   If you need help, do you have trouble finding someone available to you? No   Have you been bothered in the last four weeks by sexual problems? No   Do you have difficulty concentrating, remembering or making decisions? No           Age-appropriate Screening Schedule:  Refer to the list below for future screening recommendations based on patient's age, sex and/or medical conditions. Orders for these recommended tests are listed in the plan section. The patient has been provided with a written plan.    Health Maintenance List  Health Maintenance   Topic Date Due    RSV Vaccine - Adults (1 - 1-dose 75+ series) Never done    COVID-19 Vaccine (4 - 2024-25 season) 09/01/2024    ANNUAL PHYSICAL  10/16/2024    BMI FOLLOWUP  11/09/2024    LIPID PANEL  10/21/2025    COLORECTAL CANCER SCREENING  05/12/2032    TDAP/TD VACCINES (2 - Td or Tdap) 09/06/2032    INFLUENZA VACCINE  Completed    Pneumococcal Vaccine 65+  Completed    HEPATITIS C SCREENING  Discontinued    ZOSTER VACCINE  Discontinued                                                                                                                                                CMS Preventative Services Quick Reference  Risk Factors Identified During Encounter  Immunizations Discussed/Encouraged: COVID19 and RSV (Respiratory Syncytial Virus)    The above risks/problems have been discussed with the patient.  Pertinent information has been shared with the patient in the After Visit Summary.  An After Visit Summary and PPPS were made available to the patient.    Follow Up:   Next Medicare Wellness visit to be scheduled in 1 year.         Additional E&M Note during same encounter follows:  Patient has additional, significant, and separately identifiable condition(s)/problem(s) that require work above and beyond the Medicare Wellness Visit     Chief Complaint  Medicare  Wellness-subsequent, Abnormal Lab (10/21/2024, CBC with Diff, LIPID, CMP.), and Hypertension    Subjective    Hypertension             The patient is a 79-year-old gentleman here for a wellness checkup and follow-up on other issues.    During his last visit, he reported feeling odd, experiencing shakiness, and shortness of breath. His blood pressure was elevated, and he was started on losartan 25 mg daily for 4 weeks. He was also advised to continue taking diltiazem 120 mg capsule daily. He has been monitoring his blood pressure at home daily around 10:00 AM, with readings consistently between 135 and 140 over 75 to 80.    He is a known case of paroxysmal atrial fibrillation, pulmonary diastasis, hyponatremia, and iron deficiency anemia. He maintains an active lifestyle, walking six days a week and feeling well overall. His weight is stable at 179 pounds. He recently consulted his cardiologist, who confirmed his paroxysmal atrial fibrillation but found his pulse to be normal. He believes his previous discomfort was due to hydrocodone. His lab results were satisfactory.    He has not consumed alcohol since 07/02/2023. He is currently on Eliquis and takes Tylenol as needed. He has a living will in place. He has lost a significant amount of weight intentionally through walking two miles daily and intermittent fasting.    He had a colonoscopy about 1.5 to 2 years ago and was advised to repeat it in 10 years. 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 He reports no abdominal pain or issues with urination or bowel movements. He reports no hearing problems and recently had a dental check-up.    He had carpal tunnel surgery and was given hydrocodone for pain.    IMMUNIZATIONS  He received influenza vaccine for this season.          Objective   Vital Signs:  /84 (BP Location: Left arm, Patient Position: Sitting, Cuff  "Size: Adult)   Pulse 53   Temp 98.7 °F (37.1 °C) (Oral)   Resp 16   Ht 177.8 cm (70\")   Wt 81.2 kg (179 lb)   SpO2 97%   BMI 25.68 kg/m²   Physical Exam  Cardiovascular:      Rate and Rhythm: Normal rate.      Pulses: Normal pulses.      Heart sounds: Normal heart sounds.   Pulmonary:      Effort: Pulmonary effort is normal.      Breath sounds: Rhonchi present.   Skin:     General: Skin is warm.   Neurological:      Mental Status: He is alert and oriented to person, place, and time.           Vital Signs  Blood pressure reading is 156/84.            Results  Laboratory Studies  A1c improved to 5.3. Glucose is 102. Hemoglobin improved to 12.1.              Assessment and Plan        1. Hypertension.  His blood pressure readings at home are consistently between 135/75 and 140/80. However, in the office, his blood pressure was 156/84. The dosage of losartan will be increased from 25 mg to 50 mg to better control his blood pressure. He is advised to continue monitoring his blood pressure at home, ensuring it remains within the 120 to 130 range, provided he does not experience dizziness or weakness. A new prescription for losartan 50 mg will be sent to his pharmacy, [facility] pharmacy in [place].    2. Paroxysmal atrial fibrillation.  He is a known case of paroxysmal atrial fibrillation and is currently on Eliquis. He reports no new episodes since the last visit. He is advised to continue his current medication regimen and follow up with his cardiologist as needed.    3. Hyponatremia.  He is a known case of hyponatremia. His recent blood work shows normal electrolyte levels. He is advised to continue his current dietary and medication regimen.    4. Iron deficiency anemia.  His hemoglobin level has improved to 12.1, which is within the normal range. He is advised to continue his current dietary and medication regimen.    5. Pulmonary diastasis.  He is a known case of pulmonary diastasis. He reports no new symptoms " and his lung doctor has advised no further action unless symptoms arise. He is advised to follow up with his lung doctor as needed.    6. Health Maintenance.  His blood work results are satisfactory, indicating normal kidney function, liver function, cholesterol levels, and thyroid function. His A1c has improved to 5.3, and his glucose level is 102. A PSA level test will be ordered for his next visit. Additional tests including CBC, CMP, cholesterol, thyroid, and urine will be conducted. He is advised to wear compression stockings during walks to prevent the veins from increasing in size.    Follow-up  Return in 6 months for follow up.            Follow Up   No follow-ups on file.  Patient was given instructions and counseling regarding his condition or for health maintenance advice. Please see specific information pulled into the AVS if appropriate.  Patient or patient representative verbalized consent for the use of Ambient Listening during the visit with  Alysha Cosme MD for chart documentation. 12/4/2024  11:37 EST

## 2025-01-15 DIAGNOSIS — I48.91 NEW ONSET ATRIAL FIBRILLATION: ICD-10-CM

## 2025-01-15 RX ORDER — DILTIAZEM HYDROCHLORIDE 120 MG/1
120 CAPSULE, COATED, EXTENDED RELEASE ORAL DAILY
Qty: 90 CAPSULE | Refills: 3 | Status: SHIPPED | OUTPATIENT
Start: 2025-01-15

## 2025-01-30 RX ORDER — APIXABAN 5 MG/1
5 TABLET, FILM COATED ORAL
Qty: 180 TABLET | Refills: 0 | Status: SHIPPED | OUTPATIENT
Start: 2025-01-30

## 2025-03-19 ENCOUNTER — OFFICE VISIT (OUTPATIENT)
Dept: FAMILY MEDICINE CLINIC | Facility: CLINIC | Age: 80
End: 2025-03-19
Payer: MEDICARE

## 2025-03-19 VITALS
SYSTOLIC BLOOD PRESSURE: 150 MMHG | HEIGHT: 70 IN | RESPIRATION RATE: 16 BRPM | HEART RATE: 85 BPM | BODY MASS INDEX: 25.56 KG/M2 | OXYGEN SATURATION: 96 % | DIASTOLIC BLOOD PRESSURE: 80 MMHG | TEMPERATURE: 98 F | WEIGHT: 178.5 LBS

## 2025-03-19 DIAGNOSIS — J02.9 SORE THROAT: ICD-10-CM

## 2025-03-19 DIAGNOSIS — J06.9 UPPER RESPIRATORY TRACT INFECTION, UNSPECIFIED TYPE: Primary | ICD-10-CM

## 2025-03-19 DIAGNOSIS — G44.89 OTHER HEADACHE SYNDROME: ICD-10-CM

## 2025-03-19 DIAGNOSIS — R05.1 ACUTE COUGH: ICD-10-CM

## 2025-03-19 DIAGNOSIS — R52 GENERALIZED BODY ACHES: ICD-10-CM

## 2025-03-19 LAB
EXPIRATION DATE: NORMAL
FLUAV AG NPH QL: NEGATIVE
FLUBV AG NPH QL: NEGATIVE
INTERNAL CONTROL: NORMAL
Lab: NORMAL
S PYO RRNA THROAT QL PROBE: NEGATIVE
SARS-COV-2 AG UPPER RESP QL IA.RAPID: NOT DETECTED

## 2025-03-26 DIAGNOSIS — I51.89 DIASTOLIC DYSFUNCTION: ICD-10-CM

## 2025-03-27 NOTE — TELEPHONE ENCOUNTER
Caller: Gamal Modi Sr.    Relationship: Self    Best call back number: [phone]     Requested Prescriptions:   Requested Prescriptions     Pending Prescriptions Disp Refills    atorvastatin (LIPITOR) 20 MG tablet [Pharmacy Med Name: ATORVASTATIN 20 MG TABLET] 90 tablet 3     Sig: TAKE 1 TABLET BY MOUTH DAILY        Pharmacy where request should be sent: [facility] [id number] [facility] [address] [address] AT [facility] [address] [facility] [phone] [facility] [phone]      Last office visit with prescribing clinician: 3/19/2025   Last telemedicine visit with prescribing clinician: Visit date not found   Next office visit with prescribing clinician: 6/11/2025     Additional details provided by patient: PATIENT IS GOING OUT OF TOWN AND NEEDS THIS MEDICATION REFILLED TODAY.  PLEASE CALL IN NEW PRESCRIPTION TO THE PHARMACY.  HE ONLY HAS 2 LEFT AND PHARMACY CAN ONLY GIVE HIM 3 DAYS BUT HE WILL NEED REFILL DUE TO GOING TO BE OUT OF TOWN.      Does the patient have less than a 3 day supply:  [x] Yes  [] No    Would you like a call back once the refill request has been completed: [] Yes [] No    If the office needs to give you a call back, can they leave a voicemail: [] Yes [] No    Aamir Stubbs Rep   03/27/25 15:26 EDT

## 2025-03-27 NOTE — PROGRESS NOTES
"Chief Complaint  Sore Throat (X 2 days.), Generalized Body Aches (X 2 days.), Cough (X 2 days.), Diarrhea (Yesterday afternoon. ), and Headache    Subjective    History of Present Illness  The patient is a 79-year-old gentleman who presents for evaluation of acute issues.    He has been experiencing a sore throat for the past 2 days, which has worsened over the last 24 hours. Upon awakening this morning, he found it difficult to swallow due to excessive drainage. Accompanying symptoms include a low-grade headache, generalized body aches, and a cough. He also reports rhinorrhea but does not experience any chest pain or tightness. He has not taken any antipyretics today and has not monitored his temperature at home. He attends Alcoholics Anonymous (AA) meetings daily, which are typically attended by approximately 80 individuals. He suspects that he may have contracted his illness from these meetings. He is currently on a daily regimen of Tylenol, taking two 325 mg tablets, as it is the only medication he can tolerate due to his atrial fibrillation. He is also on Eliquis.    He began experiencing diarrhea yesterday, with three episodes yesterday and one today. He describes the sensation as cramping rather than painful.    SOCIAL HISTORY  He attends Alcoholics Anonymous (AA) meetings daily.    MEDICATIONS  Current: Tylenol, Eliquis       Gamal Modi Sr. presents to [facility]  Sore Throat   Associated symptoms include coughing, diarrhea and headaches.   Cough  Associated symptoms include headaches and a sore throat.   Diarrhea   Associated symptoms include coughing and headaches.   Headache      Objective   Vital Signs:  /80 (BP Location: Left arm, Patient Position: Sitting, Cuff Size: Adult)   Pulse 85   Temp 98 °F (36.7 °C) (Oral)   Resp 16   Ht 177.8 cm (70\")   Wt 81 kg (178 lb 8 oz)   SpO2 96%   BMI 25.61 kg/m²   Estimated body mass index is 25.61 kg/m² as " "calculated from the following:    Height as of this encounter: 177.8 cm (70\").    Weight as of this encounter: 81 kg (178 lb 8 oz).            Physical Exam  HENT:      Mouth/Throat:      Pharynx: Posterior oropharyngeal erythema present. No oropharyngeal exudate.   Eyes:      Extraocular Movements: Extraocular movements intact.      Pupils: Pupils are equal, round, and reactive to light.   Cardiovascular:      Pulses: Normal pulses.   Pulmonary:      Effort: Pulmonary effort is normal.      Breath sounds: Normal breath sounds.   Abdominal:      General: Bowel sounds are normal.      Palpations: Abdomen is soft.   Skin:     General: Skin is warm.   Neurological:      Mental Status: He is alert and oriented to person, place, and time.        Result Review :                Assessment and Plan   Diagnoses and all orders for this visit:    1. Upper respiratory tract infection, unspecified type (Primary)    2. Sore throat  -     POC Influenza A / B  -     POCT SARS-CoV-2 Antigen  -     POCT Strep A, molecular    3. Acute cough  -     POC Influenza A / B  -     POCT SARS-CoV-2 Antigen  -     POCT Strep A, molecular    4. Generalized body aches  -     POC Influenza A / B  -     POCT SARS-CoV-2 Antigen  -     POCT Strep A, molecular    5. Other headache syndrome  -     POC Influenza A / B  -     POCT SARS-CoV-2 Antigen  -     POCT Strep A, molecular        Assessment & Plan  1. Viral infection.  Symptoms include sore throat, generalized body aches, cough, headache, and diarrhea. Examination revealed throat redness without pus pockets. Strep, influenza, and COVID-19 tests were negative, indicating a viral etiology. He is advised to take Tylenol 650 mg three times daily for pain and inflammation. Adequate hydration and regular meals are recommended, including Gatorade. Over-the-counter Imodium should be avoided unless bowel movements exceed four times daily. He should wear a mask during AA meetings to prevent further spread. " If symptoms persist beyond Monday, he should contact the office for potential antibiotic prescription.    2. Diarrhea.  He reported three bowel movements yesterday and one today. This is likely related to the viral infection. He is advised to maintain hydration and avoid over-the-counter Imodium unless bowel movements exceed four times daily.            Follow Up   No follow-ups on file.  Patient was given instructions and counseling regarding his condition or for health maintenance advice. Please see specific information pulled into the AVS if appropriate.     Patient or patient representative verbalized consent for the use of Ambient Listening during the visit with  Alysha Cosme MD for chart documentation. 3/27/2025  13:36 EDT

## 2025-03-27 NOTE — TELEPHONE ENCOUNTER
DELETE AFTER REVIEWING: Send the encounter HIGH priority, If patient has less than a 3 day supply. If the patient will run out of medication over the weekend add that information to the additional details line. Send to the appropriate pool. Check your call action grid or workflows.    Caller: Gamal Modi Sr.    Relationship: Self    Best call back number:     [phone] (Mobile)       Requested Prescriptions:   Requested Prescriptions     Pending Prescriptions Disp Refills    atorvastatin (LIPITOR) 20 MG tablet [Pharmacy Med Name: ATORVASTATIN 20 MG TABLET] 90 tablet 3     Sig: TAKE 1 TABLET BY MOUTH DAILY        Pharmacy where request should be sent: [facility] [id number] [facility] [place] - [address] [facility] AT [facility] [phone] [facility] [phone]      Last office visit with prescribing clinician: 3/19/2025   Last telemedicine visit with prescribing clinician: Visit date not found   Next office visit with prescribing clinician: 6/11/2025     Additional details provided by patient: GOING OUT OF TOWN TOMORROW, NEEDS THIS SENT AS QUICKLY AS POSSIBLE.     Does the patient have less than a 3 day supply:  [x] Yes  [] No    Would you like a call back once the refill request has been completed: [] Yes [x] No    If the office needs to give you a call back, can they leave a voicemail: [] Yes [x] No    Aamir Hull Rep   03/27/25 12:59 EDT         DELETE AFTER READING TO PATIENT: “Thank you for sharing this information with me. I will send a message to the clinical team. Please allow 48 hours for the clinical staff to follow up on this request.”

## 2025-03-31 RX ORDER — ATORVASTATIN CALCIUM 20 MG/1
20 TABLET, FILM COATED ORAL DAILY
Qty: 90 TABLET | Refills: 0 | Status: SHIPPED | OUTPATIENT
Start: 2025-03-31

## 2025-04-26 DIAGNOSIS — N52.9 ERECTILE DYSFUNCTION, UNSPECIFIED ERECTILE DYSFUNCTION TYPE: ICD-10-CM

## 2025-04-28 RX ORDER — TADALAFIL 5 MG/1
5 TABLET ORAL DAILY PRN
Qty: 90 TABLET | Refills: 1 | Status: SHIPPED | OUTPATIENT
Start: 2025-04-28

## 2025-05-02 NOTE — PROGRESS NOTES
PCP:  Alysha Cosme MD                                                                         ROOM:____________    : 1945  AGE: 80 y.o.    ALLERGIES:Patient has no known allergies.    LAST OV:______________________ LAST EKG:________________ LAST WEIGHT:   Wt Readings from Last 1 Encounters:   25 81 kg (178 lb 8 oz)        BP Readings from Last 3 Encounters:   25 150/80   24 156/84   10/31/24 132/78        WT: ____________  BP: __________ HR ______   02% _______      CHEST PAIN: YES OR NO                                           LIGHTHEADED: YES OR NO    SOA: YES OR NO                    FATIGUE: YES OR NO    PALPITATIONS: YES OR NO                                      EDEMA: YES OR NO    SLEEP APNEA: YES OR NO                                     CPAP     OR    BIPAP                                                  SMOKING:   Social History     Socioeconomic History    Marital status:    Tobacco Use    Smoking status: Never     Passive exposure: Never    Smokeless tobacco: Never   Vaping Use    Vaping status: Never Used   Substance and Sexual Activity    Alcohol use: Not Currently     Comment: pt reports he drinks a fifth of whiskey a day, is a binge drinker, had not drank for 11 months then 2 weeks ago drank again, last drink 4 days ago    Drug use: Never    Sexual activity: Yes     Partners: Female

## 2025-05-05 ENCOUNTER — OFFICE VISIT (OUTPATIENT)
Dept: CARDIOLOGY | Age: 80
End: 2025-05-05
Payer: MEDICARE

## 2025-05-05 VITALS
SYSTOLIC BLOOD PRESSURE: 178 MMHG | OXYGEN SATURATION: 100 % | HEART RATE: 69 BPM | DIASTOLIC BLOOD PRESSURE: 110 MMHG | BODY MASS INDEX: 26.97 KG/M2 | HEIGHT: 70 IN | WEIGHT: 188.4 LBS

## 2025-05-05 DIAGNOSIS — E78.00 HYPERCHOLESTEROLEMIA: ICD-10-CM

## 2025-05-05 DIAGNOSIS — I25.10 CORONARY ARTERY CALCIFICATION SEEN ON CAT SCAN: Primary | ICD-10-CM

## 2025-05-05 DIAGNOSIS — I48.0 PAROXYSMAL ATRIAL FIBRILLATION: ICD-10-CM

## 2025-05-05 DIAGNOSIS — I10 ESSENTIAL HYPERTENSION: ICD-10-CM

## 2025-05-05 DIAGNOSIS — I51.89 DIASTOLIC DYSFUNCTION: ICD-10-CM

## 2025-05-05 PROCEDURE — 3077F SYST BP >= 140 MM HG: CPT | Performed by: INTERNAL MEDICINE

## 2025-05-05 PROCEDURE — 99214 OFFICE O/P EST MOD 30 MIN: CPT | Performed by: INTERNAL MEDICINE

## 2025-05-05 PROCEDURE — 3080F DIAST BP >= 90 MM HG: CPT | Performed by: INTERNAL MEDICINE

## 2025-05-05 PROCEDURE — G2211 COMPLEX E/M VISIT ADD ON: HCPCS | Performed by: INTERNAL MEDICINE

## 2025-05-05 RX ORDER — VALSARTAN AND HYDROCHLOROTHIAZIDE 80; 12.5 MG/1; MG/1
1 TABLET, FILM COATED ORAL DAILY
Qty: 30 TABLET | Refills: 3 | Status: SHIPPED | OUTPATIENT
Start: 2025-05-05

## 2025-05-05 NOTE — PROGRESS NOTES
PATIENTINFORMATION    Date of Office Visit: 2025  Encounter Provider: Ochoa Murrell MD  Place of Service: [facility] CARDIOLOGY  Patient Name: Gamal Moid Sr.  : 1945    Subjective:     Encounter Date:2025      Patient ID: Gamal Modi Sr. is a 80 y.o. male.    Chief Complaint   Patient presents with    Atrial Fibrillation       HPI  Mr. Modi is a very pleasant 80 years old gentleman who came to cardiac clinic for follow-up visit.  He had few recurrence of A-fib since last clinic visit.  He gets more ankle swelling during a few episodes.  He is very active and exercise regularly walking at least 15 miles per week without chest pain or significant shortness of breath.  He  is compliant with all current medications.  He reports blood pressure running normal during home monitoring.      ROS  All systems reviewed and negative except as noted in HPI.    Past Medical History:   Diagnosis Date    AF (atrial fibrillation)     Cataract 2019    Elevated cholesterol     Erectile dysfunction 2018    Gout     Hypertension     Long term exposure to asbestos     3 years    Sebaceous cyst        Past Surgical History:   Procedure Laterality Date    COLON SURGERY      COLONOSCOPY  approx     negative per pt     COLONOSCOPY N/A 2022    Procedure: COLONOSCOPY INTO CECUM/ TERMINAL ILEUM WITH POLYPECTOMY;  Surgeon: Aidan Branham MD;  Location: [facility] ENDOSCOPY;  Service: Gastroenterology;  Laterality: N/A;  pre: anemia  post: hemorrhoids, normal TI, polyp, diverticulosis    ENDOSCOPY N/A 2022    Procedure: ESOPHAGOGASTRODUODENOSCOPY WITH BIOPSIES;  Surgeon: Aidan Branham MD;  Location: [facility] ENDOSCOPY;  Service: Gastroenterology;  Laterality: N/A;  pre: anemia  post: hiatal hernia, bnormal ampulla    HAND SURGERY  10/04/2024    MI NEUROPLASTY &/TRANSPOS MEDIAN NRV CARPAL TUNNE  CARPAL TUNNEL RELEASE    OTHER SURGICAL HISTORY      BOWEL BLOCKAGE  "      Social History     Socioeconomic History    Marital status:    Tobacco Use    Smoking status: Never     Passive exposure: Never    Smokeless tobacco: Never   Vaping Use    Vaping status: Never Used   Substance and Sexual Activity    Alcohol use: Not Currently     Comment: pt reports he drinks a fifth of whiskey a day, is a binge drinker, had not drank for 11 months then 2 weeks ago drank again, last drink 4 days ago    Drug use: Never    Sexual activity: Yes     Partners: Female       Family History   Problem Relation Age of Onset    Diabetes Other     Cancer Mother     Diabetes Father     Cancer Sister          Procedures       Objective:     BP (!) 178/110 (BP Location: Right arm, Patient Position: Sitting, Cuff Size: Adult)   Pulse 69   Ht 177.8 cm (70\")   Wt 85.5 kg (188 lb 6.4 oz)   SpO2 100%   BMI 27.03 kg/m²  Body mass index is 27.03 kg/m².     Constitutional:       General: Not in acute distress.     Appearance: Well-developed. Not diaphoretic.   Eyes:      Pupils: Pupils are equal, round, and reactive to light.   HENT:      Head: Normocephalic and atraumatic.   Neck:      Thyroid: No thyromegaly.   Pulmonary:      Effort: Pulmonary effort is normal. No respiratory distress.      Breath sounds: Normal breath sounds. No wheezing. No rales.   Chest:      Chest wall: Not tender to palpatation.   Cardiovascular:      Normal rate. Regular rhythm.      No gallop.    Pulses:     Intact distal pulses.   Edema:     Peripheral edema absent.   Abdominal:      General: Bowel sounds are normal. There is no distension.      Palpations: Abdomen is soft.      Tenderness: There is no guarding.   Musculoskeletal: Normal range of motion.         General: No deformity.      Cervical back: Normal range of motion and neck supple. Skin:     General: Skin is warm and dry.      Findings: No rash.   Neurological:      Mental Status: Alert and oriented to person, place, and time.      Cranial Nerves: No cranial nerve " deficit.      Deep Tendon Reflexes: Reflexes are normal and symmetric.   Psychiatric:         Judgment: Judgment normal.         Review Of Data: I have reviewed pertinent recent labs, images and documents and pertinent findings included in HPI or assessment below.    Lipid Panel          10/21/2024    10:05   Lipid Panel   Total Cholesterol 116    Triglycerides 41    HDL Cholesterol 62    VLDL Cholesterol 11    LDL Cholesterol  43    Assessment and Plan   Essential hypertension currently on losartan and diltiazem  Paroxysmal atrial fibrillation-on Eliquis and diltiazem.  Prior history of shortness of breath/coronary calcification-normal MPI in October 2021.  Echocardiogram shows normal ventricular ejection fraction.  Hypercholesterolemia on statin  Pleural plaque-sees pulmonary and uses nocturnal oxygen coronary calcification on CT -normal myocardial perfusion study in October 2021.  On statin  Prior hx of TIA and patient reports intermittent RUE numbness -resolved   Admitted in July 2022 for symptomatic hyponatremia     Significantly elevated blood pressure on both arms.  Denies any new symptoms today.  In sinus rhythm.  I will add thiazide diuretic to current regimen.  He will continue to exercise regularly  As wise continue current care  Return to clinic in 2 weeks for BP check.  I will see him in 1 year or sooner with concerning symptoms.  Diagnosis and plan of care discussed with patient and verbalized understanding.            Your medication list            Accurate as of May 5, 2025  9:21 AM. If you have any questions, ask your nurse or doctor.                START taking these medications        Instructions Last Dose Given Next Dose Due   valsartan-hydrochlorothiazide 80-12.5 MG per tablet  Commonly known as: Diovan HCT  Started by: Ochoa Murrell      Take 1 tablet by mouth Daily.              CONTINUE taking these medications        Instructions Last Dose Given Next Dose Due   acetaminophen 500 MG  tablet  Commonly known as: TYLENOL      Take 2 tablets by mouth Daily.       ascorbic acid 500 MG tablet  Commonly known as: VITAMIN C      Take 2 tablets by mouth Daily.       atorvastatin 20 MG tablet  Commonly known as: LIPITOR      TAKE 1 TABLET BY MOUTH DAILY       calcium carbonate 500 MG chewable tablet  Commonly known as: TUMS      Chew 3 tablets Daily As Needed for Indigestion or Heartburn.       carboxymethylcellulose 0.5 % solution  Commonly known as: REFRESH PLUS      Administer 1 drop to both eyes Daily.       cholecalciferol 25 MCG (1000 UT) tablet  Commonly known as: VITAMIN D3      Take 1 tablet by mouth Daily.       COGNIUM MEMORY PO      Take 1 tablet by mouth Daily.       dilTIAZem  MG 24 hr capsule  Commonly known as: CARDIZEM CD      TAKE 1 CAPSULE BY MOUTH DAILY       Eliquis 5 MG tablet tablet  Generic drug: apixaban      TAKE ONE TABLET BY MOUTH EVERY 12 HOURS       ferrous sulfate 325 (65 FE) MG tablet      Take 1 tablet by mouth Daily With Breakfast.       multivitamin with minerals tablet tablet      Take 1 tablet by mouth Daily.       tadalafil 5 MG tablet  Commonly known as: CIALIS      TAKE 1 TABLET BY MOUTH DAILY AS NEEDED FOR ERECTILE DYSFUNCTION                 Where to Get Your Medications        These medications were sent to [facility] [id number] - [place], [place] - [address] AT [facility] [address] - [phone]  - [phone]   [address], [facility] [address]      Phone: [phone]   valsartan-hydrochlorothiazide 80-12.5 MG per tablet             Ochoa Murrell MD  05/05/25  09:21 EDT

## 2025-05-06 RX ORDER — APIXABAN 5 MG/1
5 TABLET, FILM COATED ORAL EVERY 12 HOURS SCHEDULED
Qty: 180 TABLET | Refills: 0 | Status: SHIPPED | OUTPATIENT
Start: 2025-05-06

## 2025-06-03 ENCOUNTER — TELEPHONE (OUTPATIENT)
Dept: FAMILY MEDICINE CLINIC | Facility: CLINIC | Age: 80
End: 2025-06-03
Payer: COMMERCIAL

## 2025-06-03 NOTE — TELEPHONE ENCOUNTER
Reviewed billing from November visit. Patient was on [employer] insurance, and visit was billed as a physical. Spoke to patient, he stated there were no issues re: billing statement/balances. Patient stated that he is on Medicare as of 1/1/25, so the June visit will be his Initial Medicare Wellness visit. Updated notes for visit to indicate ok to do wellness visit.

## 2025-06-03 NOTE — TELEPHONE ENCOUNTER
PT CALLED IN STATING THAT HIS UPCOMING VISIT SHOULD BE INIT MCR WELLNESS BECAUSE VISIT IN NOV WAS A MISTAKE AS PT WAS NOT ON MCR BACK THEN. PT TOLD HUB HE THOUGHT WE GOT THIS SORTED ALL OUT WITH THAT BUT I AM NOT SURE IF WE CAN SCHEDULE THAT WAY SINCE IT IS IN THE SYSTEM THIS WAY BECAUSE PT MAY HAVE HAD PHYSICAL IN NOV WHICH MEANS WE CAN'T SCHEDULE NEXT ONE UNTIL AFTER NOV 20TH. PLEASE LOOK INTO THIS AND CALL PATIENT.

## 2025-06-11 ENCOUNTER — OFFICE VISIT (OUTPATIENT)
Dept: FAMILY MEDICINE CLINIC | Facility: CLINIC | Age: 80
End: 2025-06-11
Payer: COMMERCIAL

## 2025-06-11 VITALS
BODY MASS INDEX: 26.24 KG/M2 | WEIGHT: 183.3 LBS | TEMPERATURE: 97.9 F | DIASTOLIC BLOOD PRESSURE: 88 MMHG | HEART RATE: 76 BPM | SYSTOLIC BLOOD PRESSURE: 124 MMHG | OXYGEN SATURATION: 99 % | RESPIRATION RATE: 17 BRPM | HEIGHT: 70 IN

## 2025-06-11 DIAGNOSIS — I10 ESSENTIAL HYPERTENSION: ICD-10-CM

## 2025-06-11 DIAGNOSIS — J61 PULMONARY ASBESTOSIS: ICD-10-CM

## 2025-06-11 DIAGNOSIS — Z86.73 HISTORY OF TIA (TRANSIENT ISCHEMIC ATTACK): ICD-10-CM

## 2025-06-11 DIAGNOSIS — Z00.00 ENCOUNTER FOR SUBSEQUENT ANNUAL WELLNESS VISIT (AWV) IN MEDICARE PATIENT: Primary | ICD-10-CM

## 2025-06-11 DIAGNOSIS — I48.0 PAROXYSMAL ATRIAL FIBRILLATION: ICD-10-CM

## 2025-06-11 DIAGNOSIS — D50.8 OTHER IRON DEFICIENCY ANEMIA: ICD-10-CM

## 2025-06-11 DIAGNOSIS — E78.00 HYPERCHOLESTEROLEMIA: ICD-10-CM

## 2025-06-11 RX ORDER — VALSARTAN AND HYDROCHLOROTHIAZIDE 80; 12.5 MG/1; MG/1
1 TABLET, FILM COATED ORAL DAILY
Qty: 90 TABLET | Refills: 3 | Status: SHIPPED | OUTPATIENT
Start: 2025-06-11

## 2025-06-11 NOTE — PROGRESS NOTES
Subjective   The ABCs of the Annual Wellness Visit  Medicare Wellness Visit      Gamal Modi Sr. is a 80 y.o. patient who presents for a Medicare Wellness Visit.    The following portions of the patient's history were reviewed and   updated as appropriate: allergies, current medications, past family history, past medical history, past social history, past surgical history, and problem list.    Compared to one year ago, the patient's physical   health is the same.  Compared to one year ago, the patient's mental   health is the same.    Recent Hospitalizations:  This patient has had a [facility] admission record on file within the last 365 days.  Current Medical Providers:  Patient Care Team:  Alysha Cosme MD as PCP - General (Internal Medicine)    Outpatient Medications Prior to Visit   Medication Sig Dispense Refill    acetaminophen (TYLENOL) 500 MG tablet Take 2 tablets by mouth Daily.      ascorbic acid (VITAMIN C) 500 MG tablet Take 2 tablets by mouth Daily.      atorvastatin (LIPITOR) 20 MG tablet TAKE 1 TABLET BY MOUTH DAILY 90 tablet 0    calcium carbonate (TUMS) 500 MG chewable tablet Chew 3 tablets Daily As Needed for Indigestion or Heartburn.      carboxymethylcellulose (REFRESH PLUS) 0.5 % solution Administer 1 drop to both eyes Daily.      cholecalciferol (VITAMIN D3) 1000 units tablet Take 1 tablet by mouth Daily.      dilTIAZem CD (CARDIZEM CD) 120 MG 24 hr capsule TAKE 1 CAPSULE BY MOUTH DAILY 90 capsule 3    Eliquis 5 MG tablet tablet TAKE 1 TABLET BY MOUTH EVERY 12 HOURS 180 tablet 0    ferrous sulfate 325 (65 FE) MG tablet Take 1 tablet by mouth Daily With Breakfast.      Hydrolyzed Silk (COGNIUM MEMORY PO) Take 1 tablet by mouth Daily.      Multiple Vitamins-Minerals (OCUVITE EXTRA PO) Take 1 tablet by mouth Daily.      tadalafil (CIALIS) 5 MG tablet TAKE 1 TABLET BY MOUTH DAILY AS NEEDED FOR ERECTILE DYSFUNCTION 90 tablet 1    valsartan-hydrochlorothiazide (Diovan HCT) 80-12.5  "MG per tablet Take 1 tablet by mouth Daily. 30 tablet 3     No facility-administered medications prior to visit.     No opioid medication identified on active medication list. I have reviewed chart for other potential  high risk medication/s and harmful drug interactions in the elderly.      Aspirin is not on active medication list.  Aspirin use is contraindicated for this patient due to: current use of Eliquis.  .    Patient Active Problem List   Diagnosis    Gout    Essential hypertension    Annual physical exam    Hypercholesterolemia    Anemia    Pulmonary asbestosis    Paroxysmal atrial fibrillation    Diastolic dysfunction    Anemia    Fatty liver    Abnormal thyroid function test    Dizziness    Hyponatremia    Coronary artery calcification seen on CAT scan    Alcohol abuse    Right hip pain    Alcohol dependence    Closed compression fracture of L5 lumbar vertebra, with routine healing, subsequent encounter    Sacral fracture, closed, multiple    Hematuria     Advance Care Planning Advance Directive is not on file.  ACP discussion was held with the patient during this visit. Patient has an advance directive (not in EMR), copy requested.            Objective   Vitals:    06/11/25 1025   BP: 124/88   BP Location: Left arm   Patient Position: Sitting   Cuff Size: Adult   Pulse: 76   Resp: 17   Temp: 97.9 °F (36.6 °C)   TempSrc: Oral   SpO2: 99%   Weight: 83.1 kg (183 lb 4.8 oz)   Height: 177.8 cm (70\")   PainSc: 0-No pain       Estimated body mass index is 26.3 kg/m² as calculated from the following:    Height as of this encounter: 177.8 cm (70\").    Weight as of this encounter: 83.1 kg (183 lb 4.8 oz).                Does the patient have evidence of cognitive impairment? No  Lab Results   Component Value Date    CHLPL 133 06/04/2025    TRIG 34 06/04/2025    HDL 68 (H) 06/04/2025    LDL 56 06/04/2025    VLDL 9 06/04/2025                                                                                            "     Health  Risk Assessment    Smoking Status:  Social History     Tobacco Use   Smoking Status Never    Passive exposure: Never   Smokeless Tobacco Never     Alcohol Consumption:  Social History     Substance and Sexual Activity   Alcohol Use Not Currently    Comment: pt reports he drinks a fifth of whiskey a day, is a binge drinker, had not drank for 11 months then 2 weeks ago drank again, last drink 4 days ago       Fall Risk Screen  JAYME Fall Risk Assessment was completed, and patient is at LOW risk for falls.Assessment completed on:2025    Depression Screening   Little interest or pleasure in doing things? Not at all   Feeling down, depressed, or hopeless? Not at all   PHQ-2 Total Score 0      Health Habits and Functional and Cognitive Screenin/10/2025    12:12 PM   Functional & Cognitive Status   Do you have difficulty preparing food and eating? No   Do you have difficulty bathing yourself, getting dressed or grooming yourself? No   Do you have difficulty using the toilet? No   Do you have difficulty moving around from place to place? No   Do you have trouble with steps or getting out of a bed or a chair? No   Current Diet Well Balanced Diet   Dental Exam Up to date   Eye Exam Up to date   Exercise (times per week) 7 times per week   Current Exercises Include Gardening;Home Fitness Gym;Light Weights;Treadmill;Walking   Do you need help using the phone?  No   Are you deaf or do you have serious difficulty hearing?  No   Do you need help to go to places out of walking distance? No   Do you need help shopping? No   Do you need help preparing meals?  No   Do you need help with housework?  No   Do you need help with laundry? No   Do you need help taking your medications? No   Do you need help managing money? No   Do you ever drive or ride in a car without wearing a seat belt? No   Have you felt unusual stress, anger or loneliness in the last month? No   Who do you live with? Spouse   If you need  help, do you have trouble finding someone available to you? No   Have you been bothered in the last four weeks by sexual problems? No   Do you have difficulty concentrating, remembering or making decisions? No           Age-appropriate Screening Schedule:  Refer to the list below for future screening recommendations based on patient's age, sex and/or medical conditions. Orders for these recommended tests are listed in the plan section. The patient has been provided with a written plan.    Health Maintenance List  Health Maintenance   Topic Date Due    RSV Vaccine - Adults (1 - 1-dose 75+ series) Never done    COVID-19 Vaccine (4 - 2024-25 season) 09/01/2024    INFLUENZA VACCINE  07/01/2025    ANNUAL PHYSICAL  11/20/2025    LIPID PANEL  06/04/2026    COLORECTAL CANCER SCREENING  05/12/2032    TDAP/TD VACCINES (2 - Td or Tdap) 09/06/2032    Pneumococcal Vaccine 50+  Completed    HEPATITIS C SCREENING  Discontinued    ZOSTER VACCINE  Discontinued                                                                                                                                                CMS Preventative Services Quick Reference  Risk Factors Identified During Encounter  Immunizations Discussed/Encouraged: COVID19 and RSV (Respiratory Syncytial Virus)    The above risks/problems have been discussed with the patient.  Pertinent information has been shared with the patient in the After Visit Summary.  An After Visit Summary and PPPS were made available to the patient.    Follow Up:   Next Medicare Wellness visit to be scheduled in 1 year.         Additional E&M Note during same encounter follows:  Patient has additional, significant, and separately identifiable condition(s)/problem(s) that require work above and beyond the Medicare Wellness Visit     Chief Complaint  Medicare Wellness-Initial Visit    Subjective    HPI         The patient is a 80-year-old male who presents for an initial Medicare wellness visit.    He has  been under the care of a cardiologist, who recently transitioned his medication from losartan to valsartan and hydrochlorothiazide for hypertension management. He has been diligently monitoring his blood pressure at home using two different machines, with readings consistently within the normal range. He reports overall good health and has requested a change in his prescription refill duration from 30 to 90 days. He has been feeling great. He has not experienced any leg swelling but notes that his ankles tend to swell slightly when he experiences atrial fibrillation. He has compression stockings at home.    He has a history of transient ischemic attack (TIA) and reports intermittent numbness in his right upper extremity, which resolved spontaneously in 07/2024.    He is under the care of a pulmonologist, who has advised him to return only if he experiences any deterioration in his condition. He has not observed any changes in his lung function over the past few years.    He maintains an active lifestyle, walking approximately 17 to 20 pounds daily. He has abstained from alcohol for nearly a year and attends Alcoholics Anonymous meetings five days a week. He has never smoked.    He takes an iron supplement and a multivitamin daily.    He reports no issues with his testicles or penile discharge. He also reports no swelling in the groin area.    He uses progressive prescription glasses for work and underwent cataract surgery a few years ago. About a month ago, he visited his ophthalmologist due to some visual disturbances and was informed that his lenses were becoming cloudy. Laser treatment was performed to clear the lenses, and he is scheduled for a follow-up visit in 60 days.    SOCIAL HISTORY  He does not smoke. He has been sober for almost a year and attends AA meetings 5 days a week.          Objective   Vital Signs:  /88 (BP Location: Left arm, Patient Position: Sitting, Cuff Size: Adult)   Pulse 76    "Temp 97.9 °F (36.6 °C) (Oral)   Resp 17   Ht 177.8 cm (70\")   Wt 83.1 kg (183 lb 4.8 oz)   SpO2 99%   BMI 26.30 kg/m²   Physical Exam  Cardiovascular:      Rate and Rhythm: Normal rate.      Pulses: Normal pulses.      Heart sounds: Normal heart sounds.   Pulmonary:      Effort: Pulmonary effort is normal.      Breath sounds: Normal breath sounds.   Abdominal:      General: Bowel sounds are normal.      Palpations: Abdomen is soft.   Skin:     General: Skin is warm.   Neurological:      Mental Status: He is alert and oriented to person, place, and time.           Ears: External ear canals and tympanic membranes intact  Mouth/Throat: Mucous membranes moist, no erythema, no exudate  Respiratory: Clear to auscultation, no wheezing, rales or rhonchi  Cardiovascular: Regular rate and rhythm, no murmurs, rubs, or gallops  Extremities: No edema, slight dilation of veins observed  Genitourinary: Normal external appearance, no masses, lesions, or discharge    The following data was reviewed by: Alysha Cosme MD on 06/11/2025:    CMP          7/22/2024    09:28 10/21/2024    10:05 6/4/2025    08:20   CMP   Glucose 97  102  102    BUN 5  7  11.0    Creatinine 0.77  0.69  0.80    EGFR 92.2  95.3  90.6    Sodium 137  136  136    Potassium 4.3  4.2  4.4    Chloride 97  99  96    Calcium 9.5  9.8  9.7    Total Protein  6.4  6.6    Albumin  4.1  4.2    Globulin  2.3  2.4    Total Bilirubin  0.7  0.5    Alkaline Phosphatase  105  80    AST (SGOT)  21  21    ALT (SGPT)  19  13    Albumin/Globulin Ratio  1.8  1.8    BUN/Creatinine Ratio 6.5  10.1  13.8      CBC          7/22/2024    09:28 10/21/2024    10:05 6/4/2025    08:20   CBC   WBC  6.36  5.13    RBC  3.74  3.75    Hemoglobin 11.8  12.1  12.3    Hematocrit 35.1  35.8  37.1    MCV  95.7  98.9    MCH  32.4  32.8    MCHC  33.8  33.2    RDW  12.9  11.8    Platelets  345  340      Lipid Panel          10/21/2024    10:05 6/4/2025    08:20   Lipid Panel   Total Cholesterol 116  " 133    Triglycerides 41  34    HDL Cholesterol 62  68    VLDL Cholesterol 11  9    LDL Cholesterol  43  56      TSH          10/21/2024    10:05 6/4/2025    08:20   TSH   TSH 1.810  1.850        Results  Labs   - PSA: Normal   - Glucose: Slightly elevated but stable   - Kidney function: Normal   - Electrolytes: Normal   - Liver function: Normal   - Cholesterol: Normal   - Thyroid: Normal   - Urine test: Clear   - Hemoglobin: Improved from 11.8 to 12.3 over the past 10 months    Imaging   - Echocardiogram: 10/2021, Normal ejection fraction           Assessment and Plan        1. Hypertension.  - Blood pressure readings have been within the normal range, indicating effective control.  - Increased pain and limited mobility.  - Advised to continue his exercise routine.  - Prescription for valsartan and hydrochlorothiazide with a 90-day supply will be sent to pharmacy.    2. Anemia.  - Hemoglobin levels have shown improvement, increasing from 11.8 to 12.3 over the past 10 months.  - Advised to continue taking iron tablet and multivitamin daily.  - Lab order for CBC, CMP, cholesterol, and A1c will be placed for next visit.  - Vitamin testing will be considered if hemoglobin levels do not normalize by next visit.    3. Transient Ischemic Attack (TIA).  - Reports intermittent right upper extremity numbness, which resolved spontaneously in 07/2024.  - No current symptoms.  - Reviewed history and discussed current status.  - No new treatment required at this time.    4. Health Maintenance.  - Continues attending AA meetings 5 days a week to maintain sobriety.  - Encouraged to continue using compression stockings to manage leg swelling and dilated veins.  - Reviewed overall health status and preventive measures.  - No new treatment required at this time.    5. Visual impairment.  - Uses progressive prescription glasses for work.  - Underwent cataract surgery a few years ago; lenses became cloudy and were treated with laser  about a month ago.  - Scheduled for a follow-up visit in 60 days.  - No new treatment required at this time.    6. Pulmonary issues.  - Under the care of a pulmonologist, advised to return only if condition deteriorates.  - No changes in lung function over the past few years.  - Reviewed history and discussed current status.  - No new treatment required at this time.    7. Cardiac issues.  - Under the care of a cardiologist, transitioned medication from losartan to valsartan and hydrochlorothiazide for hypertension management.  - Monitoring blood pressure at home with readings consistently within the normal range.  - Requested change in prescription refill duration from 30 to 90 days.  - No leg swelling noted, but ankles swell slightly with atrial fibrillation; has compression stockings at home.    Follow-up  - Follow-up in 6 months.         Follow Up   No follow-ups on file.  Patient was given instructions and counseling regarding his condition or for health maintenance advice. Please see specific information pulled into the AVS if appropriate.  Patient or patient representative verbalized consent for the use of Ambient Listening during the visit with  Alysha Cosme MD for chart documentation. 6/26/2025  06:03 EDT

## 2025-06-27 DIAGNOSIS — I51.89 DIASTOLIC DYSFUNCTION: ICD-10-CM

## 2025-06-27 RX ORDER — ATORVASTATIN CALCIUM 20 MG/1
20 TABLET, FILM COATED ORAL DAILY
Qty: 90 TABLET | Refills: 1 | Status: SHIPPED | OUTPATIENT
Start: 2025-06-27

## 2025-08-05 RX ORDER — APIXABAN 5 MG/1
5 TABLET, FILM COATED ORAL EVERY 12 HOURS SCHEDULED
Qty: 180 TABLET | Refills: 1 | Status: SHIPPED | OUTPATIENT
Start: 2025-08-05

## (undated) DEVICE — MSK ENDO PORT O2 POM ELITE CURAPLEX A/

## (undated) DEVICE — KT ORCA ORCAPOD DISP STRL

## (undated) DEVICE — SENSR O2 OXIMAX FNGR A/ 18IN NONSTR

## (undated) DEVICE — LN SMPL CO2 SHTRM SD STREAM W/M LUER

## (undated) DEVICE — BITEBLOCK OMNI BLOC

## (undated) DEVICE — SNAR POLYP CAPTIVATOR RND STFF 2.4 240CM 10MM 1P/U

## (undated) DEVICE — THE SINGLE USE ETRAP – POLYP TRAP IS USED FOR SUCTION RETRIEVAL OF ENDOSCOPICALLY REMOVED POLYPS.: Brand: ETRAP

## (undated) DEVICE — SINGLE-USE BIOPSY FORCEPS: Brand: RADIAL JAW 4

## (undated) DEVICE — ADAPT CLN BIOGUARD AIR/H2O DISP

## (undated) DEVICE — TUBING, SUCTION, 1/4" X 10', STRAIGHT: Brand: MEDLINE